# Patient Record
Sex: FEMALE | Race: WHITE | NOT HISPANIC OR LATINO | Employment: OTHER | URBAN - METROPOLITAN AREA
[De-identification: names, ages, dates, MRNs, and addresses within clinical notes are randomized per-mention and may not be internally consistent; named-entity substitution may affect disease eponyms.]

---

## 2017-01-04 ENCOUNTER — OFFICE VISIT (OUTPATIENT)
Dept: MEDICAL GROUP | Age: 72
End: 2017-01-04
Payer: MEDICARE

## 2017-01-04 VITALS
HEIGHT: 66 IN | HEART RATE: 83 BPM | BODY MASS INDEX: 25.07 KG/M2 | OXYGEN SATURATION: 93 % | WEIGHT: 156 LBS | TEMPERATURE: 97 F | DIASTOLIC BLOOD PRESSURE: 78 MMHG | SYSTOLIC BLOOD PRESSURE: 118 MMHG

## 2017-01-04 DIAGNOSIS — Z00.00 PREVENTATIVE HEALTH CARE: ICD-10-CM

## 2017-01-04 DIAGNOSIS — Z12.11 SCREENING FOR COLON CANCER: ICD-10-CM

## 2017-01-04 DIAGNOSIS — Z79.899 MEDICATION MANAGEMENT: ICD-10-CM

## 2017-01-04 DIAGNOSIS — E78.5 DYSLIPIDEMIA: ICD-10-CM

## 2017-01-04 DIAGNOSIS — F32.0 MILD SINGLE CURRENT EPISODE OF MAJOR DEPRESSIVE DISORDER (HCC): ICD-10-CM

## 2017-01-04 DIAGNOSIS — F17.210 SMOKING GREATER THAN 30 PACK YEARS: ICD-10-CM

## 2017-01-04 DIAGNOSIS — Z91.09 ENVIRONMENTAL ALLERGIES: ICD-10-CM

## 2017-01-04 PROCEDURE — 99214 OFFICE O/P EST MOD 30 MIN: CPT | Performed by: FAMILY MEDICINE

## 2017-01-04 RX ORDER — MONTELUKAST SODIUM 10 MG/1
10 TABLET ORAL DAILY
Qty: 180 TAB | Refills: 0 | Status: SHIPPED | OUTPATIENT
Start: 2017-01-04 | End: 2017-06-27 | Stop reason: SDUPTHER

## 2017-01-04 RX ORDER — ATORVASTATIN CALCIUM 10 MG/1
10 TABLET, FILM COATED ORAL
Qty: 180 TAB | Refills: 0 | Status: SHIPPED | OUTPATIENT
Start: 2017-01-04 | End: 2018-08-15

## 2017-01-04 RX ORDER — VENLAFAXINE HYDROCHLORIDE 75 MG/1
75 CAPSULE, EXTENDED RELEASE ORAL DAILY
Qty: 180 CAP | Refills: 0 | Status: SHIPPED | OUTPATIENT
Start: 2017-01-04 | End: 2017-06-27 | Stop reason: SDUPTHER

## 2017-01-04 ASSESSMENT — PATIENT HEALTH QUESTIONNAIRE - PHQ9: CLINICAL INTERPRETATION OF PHQ2 SCORE: 0

## 2017-01-04 NOTE — ASSESSMENT & PLAN NOTE
The patient has been compliant with her Lipitor 10 mg by mouth daily at bedtime. This was switched from Crestor in the past due to myalgias. She is tolerating the Lipitor just fine. She denies any abdominal pain, no muscle no History of elevated liver enzymes.

## 2017-01-04 NOTE — ASSESSMENT & PLAN NOTE
Patient continues to do fine on Effexor 75 mg. Her  continues to be very happy because she is no longer anxious or upset. She denies any adverse events and is tolerating medication just fine. Denies any depressed mood and also suicidal ideations no anhedonia no insomnia.

## 2017-01-04 NOTE — PROGRESS NOTES
This medical record contains text that has been entered with the assistance of computer voice recognition and dictation software.  Therefore, it may contain unintended errors in text, spelling, punctuation, or grammar    Chief Complaint   Patient presents with   • Establish Care     would like 1 yr rx's        Alissa Hwang is a 71 y.o. female here evaluation and management of: establish care, htn, MDD      HPI:     Dyslipidemia  The patient has been compliant with her Lipitor 10 mg by mouth daily at bedtime. This was switched from Crestor in the past due to myalgias. She is tolerating the Lipitor just fine. She denies any abdominal pain, no muscle no History of elevated liver enzymes.    Smoking greater than 30 pack years  She quit in 2005, was introduced to our lung cancer screening program.    Major depressive disorder  Patient continues to do fine on Effexor 75 mg. Her  continues to be very happy because she is no longer anxious or upset. She denies any adverse events and is tolerating medication just fine. Denies any depressed mood and also suicidal ideations no anhedonia no insomnia.    Preventative health care  The patient is a very pleasant 71-year-old female who presents to clinic for routine follow-up. She has a significant  past medical history of depression stable, hyperlipidemia, asthma moderate persistent, postmenopausal, had a 30-pack-year smoking history she did quit in 2005. The patient denied any chest pain, no sob, no oliveira, no  pnd, no orthopnea, no headache, no changes in vision, no numbness or tingling, no nausea, no diarrhea, no abdominal pain, no fevers, no chills, no bright red blood per rectum, no  difficulty urinating, no burning during micturition, no depressed mood, no other concerns.      Current medicines (including changes today)  Current Outpatient Prescriptions   Medication Sig Dispense Refill   • atorvastatin (LIPITOR) 10 MG Tab Take 1 Tab by mouth every bedtime.  180 Tab 0   • montelukast (SINGULAIR) 10 MG Tab Take 1 Tab by mouth every day. Indications: Asthma 180 Tab 0   • venlafaxine XR (EFFEXOR XR) 75 MG CAPSULE SR 24 HR Take 1 Cap by mouth every day. 180 Cap 0   • venlafaxine XR (EFFEXOR XR) 75 MG CAPSULE SR 24 HR Take 1 Cap by mouth every day. 90 Cap 0   • atorvastatin (LIPITOR) 10 MG Tab Take 1 Tab by mouth every day. 90 Tab 0   • fexofenadine-pseudoephedrine (ALLEGRA-D)  MG per tablet Take 1 Tab by mouth 2 times a day. 60 Tab 3   • acetaminophen-codeine (TYLENOL-CODEINE) 120-12 MG/5ML Solution Take 5 mL by mouth every 6 hours as needed. 120 mL 0   • fluticasone (FLONASE) 50 MCG/ACT nasal spray Spray 1 Spray in nose every day. 16 g 11   • fluticasone (FLONASE) 50 MCG/ACT nasal spray Spray 1-2 Sprays in nose every day. Each nostril. 1 Bottle 6   • estradiol (ESTRACE VAGINAL) 0.1 MG/GM vaginal cream Insert  in vagina every day.     • Budesonide-Formoterol Fumarate (SYMBICORT INH) Inhale 1 Puff by mouth every day. 160/4.5 mcg     • albuterol (VENTOLIN HFA) 108 (90 BASE) MCG/ACT AERS inhalation aerosol Inhale 2 Puffs by mouth every four hours as needed. Indications: Asthma       No current facility-administered medications for this visit.     She  has a past medical history of Depression and Asthma.  She  has past surgical history that includes enlarge breast with implant (1972).  Social History   Substance Use Topics   • Smoking status: Former Smoker -- 1.00 packs/day for 34 years     Types: Cigarettes     Start date: 01/01/1968     Quit date: 01/01/2005   • Smokeless tobacco: Never Used   • Alcohol Use: 7.0 oz/week     14 Glasses of wine per week      Comment: beer-2-3 per weekday, occ on weekend     Social History     Social History Narrative     Family History   Problem Relation Age of Onset   • Hypertension Sister    • Hypertension Brother    • Arthritis Brother      RA   • Dementia Mother      Family Status   Relation Status Death Age   • Sister Alive    •  "Brother Alive    • Mother  90     dementia   • Father  95     FTT   • Brother Alive          ROS  Please see hpi    All other systems reviewed and are negative     Objective:     Blood pressure 118/78, pulse 83, temperature 36.1 °C (97 °F), height 1.676 m (5' 5.98\"), weight 70.761 kg (156 lb), SpO2 93 %. Body mass index is 25.19 kg/(m^2).  Physical Exam:    Constitutional: Alert, no distress.  Skin: Warm, dry, good turgor, no rashes in visible areas.  Eye: Equal, round and reactive, conjunctiva clear, lids normal.  ENMT: Lips without lesions, good dentition, oropharynx clear.  Neck: Trachea midline, no masses, no thyromegaly. No cervical or supraclavicular lymphadenopathy.  Respiratory: Unlabored respiratory effort, lungs clear to auscultation, no wheezes, no ronchi.  Cardiovascular: Normal S1, S2, no murmur, no edema.  Abdomen: Soft, non-tender, no masses, no hepatosplenomegaly.  Psych: Alert and oriented x3, normal affect and mood.          Assessment and Plan:   The following treatment plan was discussed, again this medical record contains text that has been entered with the assistance of computer voice recognition and dictation software.  Therefore, it may contain unintended errors in text, spelling, punctuation, or grammar      1. Dyslipidemia  Patient has been stable with current management  We will make no changes for now    - COMP METABOLIC PANEL; Future  - CBC WITH DIFFERENTIAL; Future  - LIPID PROFILE; Future  - atorvastatin (LIPITOR) 10 MG Tab; Take 1 Tab by mouth every bedtime.  Dispense: 180 Tab; Refill: 0    2. Mild single current episode of major depressive disorder (HCC)  Overall doing well  Symptoms are controlled  Continue current management  - venlafaxine XR (EFFEXOR XR) 75 MG CAPSULE SR 24 HR; Take 1 Cap by mouth every day.  Dispense: 180 Cap; Refill: 0    3. Preventative health care  We reviewed [place] preventative task force guidelines    4. Screening for colon cancer    - OCCULT " BLOOD FECES IMMUNOASSAY; Future    5. Environmental allergies  Patient has been stable with current management  We will make no changes for now    6. Medication management    - montelukast (SINGULAIR) 10 MG Tab; Take 1 Tab by mouth every day. Indications: Asthma  Dispense: 180 Tab; Refill: 0    7. Smoking greater than 30 pack years  She states she did enroll in her lung cancer screening program  But needs to follow-up      Followup: Return in about 6 months (around 7/4/2017) for Reevaluation, Medication refill.

## 2017-01-04 NOTE — ASSESSMENT & PLAN NOTE
The patient is a very pleasant 71-year-old female who presents to clinic for routine follow-up. She has a significant  past medical history of depression stable, hyperlipidemia, asthma moderate persistent, postmenopausal, had a 30-pack-year smoking history she did quit in 2005. The patient denied any chest pain, no sob, no oliveira, no  pnd, no orthopnea, no headache, no changes in vision, no numbness or tingling, no nausea, no diarrhea, no abdominal pain, no fevers, no chills, no bright red blood per rectum, no  difficulty urinating, no burning during micturition, no depressed mood, no other concerns.

## 2017-01-17 ENCOUNTER — HOSPITAL ENCOUNTER (OUTPATIENT)
Dept: LAB | Facility: MEDICAL CENTER | Age: 72
End: 2017-01-17
Attending: FAMILY MEDICINE
Payer: MEDICARE

## 2017-01-17 DIAGNOSIS — E78.5 DYSLIPIDEMIA: ICD-10-CM

## 2017-01-17 LAB
ALBUMIN SERPL BCP-MCNC: 4 G/DL (ref 3.2–4.9)
ALBUMIN/GLOB SERPL: 1.6 G/DL
ALP SERPL-CCNC: 62 U/L (ref 30–99)
ALT SERPL-CCNC: 36 U/L (ref 2–50)
ANION GAP SERPL CALC-SCNC: 8 MMOL/L (ref 0–11.9)
AST SERPL-CCNC: 26 U/L (ref 12–45)
BASOPHILS # BLD AUTO: 0.06 K/UL (ref 0–0.12)
BASOPHILS NFR BLD AUTO: 1.1 % (ref 0–1.8)
BILIRUB SERPL-MCNC: 0.7 MG/DL (ref 0.1–1.5)
BUN SERPL-MCNC: 14 MG/DL (ref 8–22)
CALCIUM SERPL-MCNC: 9 MG/DL (ref 8.5–10.5)
CHLORIDE SERPL-SCNC: 111 MMOL/L (ref 96–112)
CHOLEST SERPL-MCNC: 186 MG/DL (ref 100–199)
CO2 SERPL-SCNC: 25 MMOL/L (ref 20–33)
CREAT SERPL-MCNC: 0.72 MG/DL (ref 0.5–1.4)
EOSINOPHIL # BLD: 0.5 K/UL (ref 0–0.51)
EOSINOPHIL NFR BLD AUTO: 9 % (ref 0–6.9)
ERYTHROCYTE [DISTWIDTH] IN BLOOD BY AUTOMATED COUNT: 47 FL (ref 35.9–50)
GLOBULIN SER CALC-MCNC: 2.5 G/DL (ref 1.9–3.5)
GLUCOSE SERPL-MCNC: 92 MG/DL (ref 65–99)
HCT VFR BLD AUTO: 41.3 % (ref 37–47)
HDLC SERPL-MCNC: 78 MG/DL
HGB BLD-MCNC: 13.9 G/DL (ref 12–16)
IMM GRANULOCYTES # BLD AUTO: 0.01 K/UL (ref 0–0.11)
IMM GRANULOCYTES NFR BLD AUTO: 0.2 % (ref 0–0.9)
LDLC SERPL CALC-MCNC: 92 MG/DL
LYMPHOCYTES # BLD: 2.32 K/UL (ref 1–4.8)
LYMPHOCYTES NFR BLD AUTO: 41.7 % (ref 22–41)
MCH RBC QN AUTO: 31.5 PG (ref 27–33)
MCHC RBC AUTO-ENTMCNC: 33.7 G/DL (ref 33.6–35)
MCV RBC AUTO: 93.7 FL (ref 81.4–97.8)
MONOCYTES # BLD: 0.27 K/UL (ref 0–0.85)
MONOCYTES NFR BLD AUTO: 4.9 % (ref 0–13.4)
NEUTROPHILS # BLD: 2.4 K/UL (ref 2–7.15)
NEUTROPHILS NFR BLD AUTO: 43.1 % (ref 44–72)
NRBC # BLD AUTO: 0 K/UL
NRBC BLD-RTO: 0 /100 WBC
PLATELET # BLD AUTO: 305 K/UL (ref 164–446)
PMV BLD AUTO: 10.5 FL (ref 9–12.9)
POTASSIUM SERPL-SCNC: 4.3 MMOL/L (ref 3.6–5.5)
PROT SERPL-MCNC: 6.5 G/DL (ref 6–8.2)
RBC # BLD AUTO: 4.41 M/UL (ref 4.2–5.4)
SODIUM SERPL-SCNC: 144 MMOL/L (ref 135–145)
TRIGL SERPL-MCNC: 82 MG/DL (ref 0–149)
WBC # BLD AUTO: 5.6 K/UL (ref 4.8–10.8)

## 2017-01-17 PROCEDURE — 85025 COMPLETE CBC W/AUTO DIFF WBC: CPT

## 2017-01-17 PROCEDURE — 36415 COLL VENOUS BLD VENIPUNCTURE: CPT

## 2017-01-17 PROCEDURE — 80053 COMPREHEN METABOLIC PANEL: CPT

## 2017-01-17 PROCEDURE — 80061 LIPID PANEL: CPT

## 2017-05-03 ENCOUNTER — OFFICE VISIT (OUTPATIENT)
Dept: URGENT CARE | Facility: CLINIC | Age: 72
End: 2017-05-03
Payer: MEDICARE

## 2017-05-03 ENCOUNTER — HOSPITAL ENCOUNTER (OUTPATIENT)
Facility: MEDICAL CENTER | Age: 72
End: 2017-05-03
Attending: PHYSICIAN ASSISTANT
Payer: MEDICARE

## 2017-05-03 VITALS
TEMPERATURE: 98 F | DIASTOLIC BLOOD PRESSURE: 78 MMHG | SYSTOLIC BLOOD PRESSURE: 120 MMHG | WEIGHT: 155 LBS | HEART RATE: 80 BPM | BODY MASS INDEX: 25.03 KG/M2 | RESPIRATION RATE: 18 BRPM | OXYGEN SATURATION: 97 %

## 2017-05-03 DIAGNOSIS — R53.83 FATIGUE, UNSPECIFIED TYPE: ICD-10-CM

## 2017-05-03 LAB
ALBUMIN SERPL BCP-MCNC: 4.3 G/DL (ref 3.2–4.9)
ALBUMIN/GLOB SERPL: 1.4 G/DL
ALP SERPL-CCNC: 61 U/L (ref 30–99)
ALT SERPL-CCNC: 30 U/L (ref 2–50)
ANION GAP SERPL CALC-SCNC: 7 MMOL/L (ref 0–11.9)
AST SERPL-CCNC: 24 U/L (ref 12–45)
BASOPHILS # BLD AUTO: 0.1 % (ref 0–1.8)
BASOPHILS # BLD: 0.01 K/UL (ref 0–0.12)
BILIRUB SERPL-MCNC: 0.7 MG/DL (ref 0.1–1.5)
BUN SERPL-MCNC: 16 MG/DL (ref 8–22)
CALCIUM SERPL-MCNC: 10 MG/DL (ref 8.5–10.5)
CHLORIDE SERPL-SCNC: 103 MMOL/L (ref 96–112)
CO2 SERPL-SCNC: 26 MMOL/L (ref 20–33)
CREAT SERPL-MCNC: 0.86 MG/DL (ref 0.5–1.4)
EKG 4674: NORMAL
EOSINOPHIL # BLD AUTO: 0.35 K/UL (ref 0–0.51)
EOSINOPHIL NFR BLD: 5.2 % (ref 0–6.9)
ERYTHROCYTE [DISTWIDTH] IN BLOOD BY AUTOMATED COUNT: 47.6 FL (ref 35.9–50)
GFR SERPL CREATININE-BSD FRML MDRD: >60 ML/MIN/1.73 M 2
GLOBULIN SER CALC-MCNC: 3 G/DL (ref 1.9–3.5)
GLUCOSE SERPL-MCNC: 101 MG/DL (ref 65–99)
HCT VFR BLD AUTO: 42 % (ref 37–47)
HGB BLD-MCNC: 13.7 G/DL (ref 12–16)
IMM GRANULOCYTES # BLD AUTO: 0.02 K/UL (ref 0–0.11)
IMM GRANULOCYTES NFR BLD AUTO: 0.3 % (ref 0–0.9)
LYMPHOCYTES # BLD AUTO: 2.18 K/UL (ref 1–4.8)
LYMPHOCYTES NFR BLD: 32.2 % (ref 22–41)
MCH RBC QN AUTO: 30.4 PG (ref 27–33)
MCHC RBC AUTO-ENTMCNC: 32.6 G/DL (ref 33.6–35)
MCV RBC AUTO: 93.3 FL (ref 81.4–97.8)
MONOCYTES # BLD AUTO: 0.43 K/UL (ref 0–0.85)
MONOCYTES NFR BLD AUTO: 6.4 % (ref 0–13.4)
NEUTROPHILS # BLD AUTO: 3.77 K/UL (ref 2–7.15)
NEUTROPHILS NFR BLD: 55.8 % (ref 44–72)
NRBC # BLD AUTO: 0 K/UL
NRBC BLD AUTO-RTO: 0 /100 WBC
PLATELET # BLD AUTO: 269 K/UL (ref 164–446)
PMV BLD AUTO: 11 FL (ref 9–12.9)
POTASSIUM SERPL-SCNC: 4.5 MMOL/L (ref 3.6–5.5)
PROT SERPL-MCNC: 7.3 G/DL (ref 6–8.2)
RBC # BLD AUTO: 4.5 M/UL (ref 4.2–5.4)
SODIUM SERPL-SCNC: 136 MMOL/L (ref 135–145)
WBC # BLD AUTO: 6.8 K/UL (ref 4.8–10.8)

## 2017-05-03 PROCEDURE — 80053 COMPREHEN METABOLIC PANEL: CPT

## 2017-05-03 PROCEDURE — G8432 DEP SCR NOT DOC, RNG: HCPCS | Performed by: PHYSICIAN ASSISTANT

## 2017-05-03 PROCEDURE — 99214 OFFICE O/P EST MOD 30 MIN: CPT | Performed by: PHYSICIAN ASSISTANT

## 2017-05-03 PROCEDURE — 85025 COMPLETE CBC W/AUTO DIFF WBC: CPT

## 2017-05-03 PROCEDURE — 1101F PT FALLS ASSESS-DOCD LE1/YR: CPT | Performed by: PHYSICIAN ASSISTANT

## 2017-05-03 PROCEDURE — G8419 CALC BMI OUT NRM PARAM NOF/U: HCPCS | Performed by: PHYSICIAN ASSISTANT

## 2017-05-03 PROCEDURE — 1036F TOBACCO NON-USER: CPT | Performed by: PHYSICIAN ASSISTANT

## 2017-05-03 PROCEDURE — 3014F SCREEN MAMMO DOC REV: CPT | Performed by: PHYSICIAN ASSISTANT

## 2017-05-03 PROCEDURE — 4040F PNEUMOC VAC/ADMIN/RCVD: CPT | Performed by: PHYSICIAN ASSISTANT

## 2017-05-03 NOTE — MR AVS SNAPSHOT
"        Alissa Abraham OnesimopaulinaRoeIrene   5/3/2017 12:00 PM   Office Visit   MRN: [id number]    Department:  [facility]   Dept Phone:  [phone]    Description:  Female : 1945   Provider:  Grazyna Looney PA-C           Reason for Visit     Fatigue X 4 days fatigue.      Allergies as of 5/3/2017     Allergen Noted Reactions    Sulfa Drugs 2015       \"crazy\"      You were diagnosed with     Fatigue, unspecified type   [3362032]       Abnormal EKG   [[id number]]         Vital Signs     Blood Pressure Pulse Temperature Respirations Weight Oxygen Saturation    120/78 mmHg 80 36.7 °C (98 °F) 18 70.308 kg (155 lb) 97%    Smoking Status                   Former Smoker           Basic Information     Date Of Birth Sex Race Ethnicity Preferred Language    1945 Female White Non- English      Problem List              ICD-10-CM Priority Class Noted - Resolved    Asthma, moderate persistent J45.40   2015 - Present    Environmental allergies Z91.09   2015 - Present    Major depressive disorder (CMS-HCC) F32.9   2015 - Present    Dyslipidemia E78.5   2015 - Present    Smoking greater than 30 pack years F17.210   2016 - Present    Postmenopausal Z78.0   2016 - Present    Screening for colon cancer Z12.11   2016 - Present    Need for prophylactic vaccination with combined vaccine Z23   2016 - Present    Viral upper respiratory tract infection J06.9, B97.89   2016 - Present    Preventative health care Z00.00   2017 - Present      Health Maintenance        Date Due Completion Dates    IMM DTaP/Tdap/Td Vaccine (1 - Tdap) 1966 ---    PAP SMEAR 1968 ---    COLONOSCOPY 1997 ---    IMM ZOSTER VACCINE 2007 ---    MAMMOGRAM 2016    IMM PNEUMOCOCCAL 65+ (ADULT) LOW/MEDIUM RISK SERIES (2 of 2 - PPSV23) 2016    BONE DENSITY 2021            Results     POCT EKG                   Current Immunizations    " 13-VALENT PCV PREVNAR 11/23/2015 10:20 AM    Influenza Vaccine Adult HD 11/23/2015 10:20 AM      Below and/or attached are the medications your provider expects you to take. Review all of your home medications and newly ordered medications with your provider and/or pharmacist. Follow medication instructions as directed by your provider and/or pharmacist. Please keep your medication list with you and share with your provider. Update the information when medications are discontinued, doses are changed, or new medications (including over-the-counter products) are added; and carry medication information at all times in the event of emergency situations     Allergies:  SULFA DRUGS - (reactions not documented)               Medications  Valid as of: May 03, 2017 - 12:49 PM    Generic Name Brand Name Tablet Size Instructions for use    Acetaminophen-Codeine (Solution) TYLENOL-CODEINE 120-12 MG/5ML Take 5 mL by mouth every 6 hours as needed.        Albuterol Sulfate (Aero Soln) albuterol 108 (90 BASE) MCG/ACT Inhale 2 Puffs by mouth every four hours as needed. Indications: Asthma        Atorvastatin Calcium (Tab) LIPITOR 10 MG Take 1 Tab by mouth every day.        Atorvastatin Calcium (Tab) LIPITOR 10 MG Take 1 Tab by mouth every bedtime.        Budesonide-Formoterol Fumarate   Inhale 1 Puff by mouth every day. 160/4.5 mcg        Estradiol (Cream) ESTRACE 0.1 MG/GM Insert  in vagina every day.        Fexofenadine-Pseudoephedrine (TABLET SR 12 HR) ALLEGRA-D  MG Take 1 Tab by mouth 2 times a day.        Fluticasone Propionate (Suspension) FLONASE 50 MCG/ACT Spray 1-2 Sprays in nose every day. Each nostril.        Fluticasone Propionate (Suspension) FLONASE 50 MCG/ACT Spray 1 Spray in nose every day.        Montelukast Sodium (Tab) SINGULAIR 10 MG Take 1 Tab by mouth every day. Indications: Asthma        Venlafaxine HCl (CAPSULE SR 24 HR) EFFEXOR XR 75 MG Take 1 Cap by mouth every day.        Venlafaxine HCl (CAPSULE SR  24 HR) EFFEXOR XR 75 MG Take 1 Cap by mouth every day.        .                 Medicines prescribed today were sent to:     [facility] - [place], [place] - [address]    [address] [address]    Phone: [phone] Fax: [phone]    Open 24 Hours?: No    [facility] - [place], [place] - [address]    [address] [place] [address]    Phone: [phone] Fax: [phone]    Open 24 Hours?: No    [facility], [place] - [address] AT [address]    [address] [place] [address]    Phone: [phone] Fax: [phone]    Open 24 Hours?: No      Medication refill instructions:       If your prescription bottle indicates you have medication refills left, it is not necessary to call your provider’s office. Please contact your pharmacy and they will refill your medication.    If your prescription bottle indicates you do not have any refills left, you may request refills at any time through one of the following ways: The online [employer] system (except Urgent Care), by calling your provider’s office, or by asking your pharmacy to contact your provider’s office with a refill request. Medication refills are processed only during regular business hours and may not be available until the next business day. Your provider may request additional information or to have a follow-up visit with you prior to refilling your medication.   *Please Note: Medication refills are assigned a new Rx number when refilled electronically. Your pharmacy may indicate that no refills were authorized even though a new prescription for the same medication is available at the pharmacy. Please request the medicine by name with the pharmacy before contacting your provider for a refill.        Your To Do List     Future Labs/Procedures Complete By Expires    CBC WITH DIFFERENTIAL  As directed 5/3/2018    COMP METABOLIC PANEL  As  directed 5/3/2018         [facility] Access Code: Activation code not generated  Current [facility] Status: Active

## 2017-05-03 NOTE — PROGRESS NOTES
Chief Complaint   Patient presents with   • Fatigue     X 4 days fatigue.       HISTORY OF PRESENT ILLNESS: Patient is a 71 y.o. female who presents today for 4 days of fatigue.  Patient states she had a very busy Friday and Saturday, horse show, heat/wind/dust.   Sunday/Monday were ok with rest but today and yesterday after 9 hours of sleep she still feels fatigue.   She has hx of asthma and the dust seemed to aggravate it slightly.   Denies sore throat, ear pain, headache, chest pain, palpitations, nausea, vomiting, body aches.   She sees PCP regularly and labs have been stable.    Patient does admit to medication change recently by mistake. She has been stable on Venlafaxine and notes she forgot to take it for a few days this weekend.  She just started it back yesterday.     Patient Active Problem List    Diagnosis Date Noted   • Preventative health care 01/04/2017   • Viral upper respiratory tract infection 12/07/2016   • Smoking greater than 30 pack years 09/01/2016   • Postmenopausal 09/01/2016   • Screening for colon cancer 09/01/2016   • Need for prophylactic vaccination with combined vaccine 09/01/2016   • Asthma, moderate persistent 02/26/2015   • Environmental allergies 02/26/2015   • Major depressive disorder (CMS-[facility]) 02/26/2015   • Dyslipidemia 02/26/2015       Allergies:Sulfa drugs    Current Outpatient Prescriptions Ordered in [facility]   Medication Sig Dispense Refill   • atorvastatin (LIPITOR) 10 MG Tab Take 1 Tab by mouth every bedtime. 180 Tab 0   • montelukast (SINGULAIR) 10 MG Tab Take 1 Tab by mouth every day. Indications: Asthma 180 Tab 0   • venlafaxine XR (EFFEXOR XR) 75 MG CAPSULE SR 24 HR Take 1 Cap by mouth every day. 180 Cap 0   • fexofenadine-pseudoephedrine (ALLEGRA-D)  MG per tablet Take 1 Tab by mouth 2 times a day. 60 Tab 3   • acetaminophen-codeine (TYLENOL-CODEINE) 120-12 MG/5ML Solution Take 5 mL by mouth every 6 hours as needed. 120 mL 0   • fluticasone (FLONASE) 50 MCG/ACT  nasal spray Spray 1 Spray in nose every day. 16 g 11   • venlafaxine XR (EFFEXOR XR) 75 MG CAPSULE SR 24 HR Take 1 Cap by mouth every day. 90 Cap 0   • atorvastatin (LIPITOR) 10 MG Tab Take 1 Tab by mouth every day. 90 Tab 0   • fluticasone (FLONASE) 50 MCG/ACT nasal spray Spray 1-2 Sprays in nose every day. Each nostril. 1 Bottle 6   • estradiol (ESTRACE VAGINAL) 0.1 MG/GM vaginal cream Insert  in vagina every day.     • Budesonide-Formoterol Fumarate (SYMBICORT INH) Inhale 1 Puff by mouth every day. 160/4.5 mcg     • albuterol (VENTOLIN HFA) 108 (90 BASE) MCG/ACT AERS inhalation aerosol Inhale 2 Puffs by mouth every four hours as needed. Indications: Asthma       No current [facility]-ordered facility-administered medications on file.       Past Medical History   Diagnosis Date   • Depression    • Asthma        Social History   Substance Use Topics   • Smoking status: Former Smoker -- 1.00 packs/day for 34 years     Types: Cigarettes     Start date: 1968     Quit date: 2005   • Smokeless tobacco: Never Used   • Alcohol Use: 7.0 oz/week     14 Glasses of wine per week      Comment: beer-2-3 per weekday, occ on weekend       Family Status   Relation Status Death Age   • Sister Alive    • Brother Alive    • Mother  90     dementia   • Father  95     FTT   • Brother Alive      Family History   Problem Relation Age of Onset   • Hypertension Sister    • Hypertension Brother    • Arthritis Brother      RA   • Dementia Mother        ROS:  Review of Systems   Constitutional: SEE HPI  HENT: Negative for ear pain, nosebleeds, congestion, sore throat and neck pain.    Eyes: Negative for blurred vision.   Respiratory: Negative for cough, sputum production, shortness of breath and wheezing.    Cardiovascular: Negative for chest pain, palpitations, orthopnea and leg swelling.   Gastrointestinal: Negative for heartburn, nausea, vomiting and abdominal pain.   Genitourinary: Negative for dysuria, urgency and  frequency.   All other systems reviewed and are negative.       Exam:  Blood pressure 120/78, pulse 80, temperature 36.7 °C (98 °F), resp. rate 18, weight 70.308 kg (155 lb), SpO2 97 %.  General:  Well nourished, well developed female in NAD  Eyes: PERRLA, EOM within normal limits, no conjunctival injection, no scleral icterus, visual fields and acuity grossly intact.  Ears: Normal shape and symmetry, no tenderness, no discharge. External canals are without any significant edema or erythema. Tympanic membranes are without any inflammation, no effusion. Gross auditory acuity is intact  Nose: Symmetrical, sinuses without tenderness, no discharge.   Mouth: reasonable hygiene, no erythema exudates or tonsillar enlargement.  Neck: no masses, range of motion within normal limits, no tracheal deviation. No lymphadenopathy.  No thyromegaly.   Pulmonary: Normal respiratory effort, no wheezes, crackles, or rhonchi.  Cardiovascular: regular rate and rhythm without murmurs, rubs, or gallops.  Abdomen: Soft, nontender, nondistended. Normal bowel sounds. No hepatosplenomegaly or masses, or hernias. No rebound or guarding.  Skin: No visible rashes or lesion. Warm, pink, dry.   Extremities: no clubbing, cyanosis, or edema.  Neuro: A&O x 3. Speech normal/clear.  Normal gait.       EKG Interpretation   Interpreted by me   Rhythm: normal sinus   Rate: normal   Axis: normal   Ectopy: none   Conduction: normal   ST Segments: no acute change   T Waves: no acute change   Q Waves: none   Clinical Impression: no acute changes and normal EKG      Assessment/Plan:  1. Fatigue, unspecified type  CBC WITH DIFFERENTIAL    COMP METABOLIC PANEL    POCT EKG       -benign physical exam.   -ekg with note of possible LVH criteria.  Not overt, compared to 2015 EKG stable.   Did mention to patient however she would like to follow up with PCP on this.   Do not feel this is related  -labs unremarkable.   -discussed with patient this is likely a  combination of the busy weekend and her abruptly discontinuing her Effexor and the side effect of this.   -she has resumed this and will make follow up with PCP in next few weeks for check up     Supportive care, differential diagnoses, and indications for immediate follow-up discussed with patient.   Pathogenesis of diagnosis discussed including typical length and natural progression.   Instructed to return to clinic or nearest emergency department for any change in condition, further concerns, or worsening of symptoms.  Patient states understanding of the plan of care and discharge instructions.  Instructed to make an appointment, for follow up, with their primary care provider.      Grazyna Looney PA-C

## 2017-05-15 ENCOUNTER — TELEPHONE (OUTPATIENT)
Dept: URGENT CARE | Facility: CLINIC | Age: 72
End: 2017-05-15

## 2017-05-15 ENCOUNTER — OFFICE VISIT (OUTPATIENT)
Dept: URGENT CARE | Facility: CLINIC | Age: 72
End: 2017-05-15
Payer: MEDICARE

## 2017-05-15 VITALS
OXYGEN SATURATION: 95 % | BODY MASS INDEX: 24.22 KG/M2 | HEART RATE: 74 BPM | WEIGHT: 150 LBS | RESPIRATION RATE: 14 BRPM | TEMPERATURE: 97.9 F | SYSTOLIC BLOOD PRESSURE: 96 MMHG | DIASTOLIC BLOOD PRESSURE: 68 MMHG

## 2017-05-15 DIAGNOSIS — B34.9 VIRAL ILLNESS: ICD-10-CM

## 2017-05-15 DIAGNOSIS — B30.9 ACUTE VIRAL CONJUNCTIVITIS OF BOTH EYES: ICD-10-CM

## 2017-05-15 DIAGNOSIS — J45.991 ASTHMA, COUGH VARIANT: ICD-10-CM

## 2017-05-15 DIAGNOSIS — J02.9 PHARYNGITIS, UNSPECIFIED ETIOLOGY: ICD-10-CM

## 2017-05-15 LAB
INT CON NEG: NEGATIVE
INT CON POS: POSITIVE
S PYO AG THROAT QL: NORMAL

## 2017-05-15 PROCEDURE — 4040F PNEUMOC VAC/ADMIN/RCVD: CPT | Performed by: PHYSICIAN ASSISTANT

## 2017-05-15 PROCEDURE — 99214 OFFICE O/P EST MOD 30 MIN: CPT | Performed by: PHYSICIAN ASSISTANT

## 2017-05-15 PROCEDURE — G8420 CALC BMI NORM PARAMETERS: HCPCS | Performed by: PHYSICIAN ASSISTANT

## 2017-05-15 PROCEDURE — 1036F TOBACCO NON-USER: CPT | Performed by: PHYSICIAN ASSISTANT

## 2017-05-15 PROCEDURE — G8432 DEP SCR NOT DOC, RNG: HCPCS | Performed by: PHYSICIAN ASSISTANT

## 2017-05-15 PROCEDURE — 87880 STREP A ASSAY W/OPTIC: CPT | Performed by: PHYSICIAN ASSISTANT

## 2017-05-15 PROCEDURE — 1101F PT FALLS ASSESS-DOCD LE1/YR: CPT | Performed by: PHYSICIAN ASSISTANT

## 2017-05-15 PROCEDURE — 3014F SCREEN MAMMO DOC REV: CPT | Performed by: PHYSICIAN ASSISTANT

## 2017-05-15 RX ORDER — NEOMYCIN POLYMYXIN B SULFATES AND DEXAMETHASONE 3.5; 10000; 1 MG/ML; [USP'U]/ML; MG/ML
1 SUSPENSION/ DROPS OPHTHALMIC 4 TIMES DAILY
Qty: 5 ML | Refills: 0 | Status: SHIPPED | OUTPATIENT
Start: 2017-05-15 | End: 2018-08-15

## 2017-05-15 RX ORDER — PROMETHAZINE HYDROCHLORIDE AND CODEINE PHOSPHATE 6.25; 1 MG/5ML; MG/5ML
5-10 SYRUP ORAL
Qty: 120 ML | Refills: 0 | Status: SHIPPED | OUTPATIENT
Start: 2017-05-15 | End: 2018-08-15

## 2017-05-15 ASSESSMENT — ENCOUNTER SYMPTOMS
MYALGIAS: 1
ABDOMINAL PAIN: 0
CHILLS: 1
SORE THROAT: 1
DIZZINESS: 0
WHEEZING: 1
FEVER: 0
NAUSEA: 0
SPUTUM PRODUCTION: 1
BODY ACHES: 1
PALPITATIONS: 0
SHORTNESS OF BREATH: 1
COUGH: 1
VOMITING: 0
HEADACHES: 0

## 2017-05-15 NOTE — TELEPHONE ENCOUNTER
Pt called regarding eyes Rx ,  Rx was sent by mistake to [facility] mail order, I called and cancelled and Called for Rx at [facility] pharmacy.

## 2017-05-15 NOTE — MR AVS SNAPSHOT
"        Alissa Abraham OnesimopaulinaMarisa   5/15/2017 9:30 AM   Office Visit   MRN: [id number]    Department:  [facility]   Dept Phone:  [phone]    Description:  Female : 1945   Provider:  Jessica Frias PA-C           Reason for Visit     Generalized Body Aches x 1 week       Allergies as of 5/15/2017     Allergen Noted Reactions    Sulfa Drugs 2015       \"crazy\"      You were diagnosed with     Pharyngitis, unspecified etiology   [5409104]       Asthma, cough variant   [300003]       Viral illness   [952929]       Acute viral conjunctivitis of both eyes   [2442662]         Vital Signs     Blood Pressure Pulse Temperature Respirations Weight Oxygen Saturation    96/68 mmHg 74 36.6 °C (97.9 °F) 14 68.04 kg (150 lb) 95%    Smoking Status                   Former Smoker           Basic Information     Date Of Birth Sex Race Ethnicity Preferred Language    1945 Female White Non- English      Problem List              ICD-10-CM Priority Class Noted - Resolved    Asthma, moderate persistent J45.40   2015 - Present    Environmental allergies Z91.09   2015 - Present    Major depressive disorder (CMS-HCC) F32.9   2015 - Present    Dyslipidemia E78.5   2015 - Present    Smoking greater than 30 pack years F17.210   2016 - Present    Postmenopausal Z78.0   2016 - Present    Screening for colon cancer Z12.11   2016 - Present    Need for prophylactic vaccination with combined vaccine Z23   2016 - Present    Viral upper respiratory tract infection J06.9, B97.89   2016 - Present    Preventative health care Z00.00   2017 - Present      Health Maintenance        Date Due Completion Dates    IMM DTaP/Tdap/Td Vaccine (1 - Tdap) 1966 ---    PAP SMEAR 1968 ---    COLONOSCOPY 1997 ---    IMM ZOSTER VACCINE 2007 ---    MAMMOGRAM 2016    IMM PNEUMOCOCCAL 65+ (ADULT) LOW/MEDIUM RISK SERIES (2 of 2 - PPSV23) 2016 " 11/23/2015    BONE DENSITY 9/9/2021 9/9/2016            Results     POCT Rapid Strep A      Component    Rapid Strep Screen    NEG    Internal Control Positive    Positive    Internal Control Negative    Negative                        Current Immunizations     13-VALENT PCV PREVNAR 11/23/2015 10:20 AM    Influenza Vaccine Adult HD 11/23/2015 10:20 AM      Below and/or attached are the medications your provider expects you to take. Review all of your home medications and newly ordered medications with your provider and/or pharmacist. Follow medication instructions as directed by your provider and/or pharmacist. Please keep your medication list with you and share with your provider. Update the information when medications are discontinued, doses are changed, or new medications (including over-the-counter products) are added; and carry medication information at all times in the event of emergency situations     Allergies:  SULFA DRUGS - (reactions not documented)               Medications  Valid as of: May 15, 2017 - 10:29 AM    Generic Name Brand Name Tablet Size Instructions for use    Acetaminophen-Codeine (Solution) TYLENOL-CODEINE 120-12 MG/5ML Take 5 mL by mouth every 6 hours as needed.        Albuterol Sulfate (Aero Soln) albuterol 108 (90 BASE) MCG/ACT Inhale 2 Puffs by mouth every four hours as needed. Indications: Asthma        Atorvastatin Calcium (Tab) LIPITOR 10 MG Take 1 Tab by mouth every day.        Atorvastatin Calcium (Tab) LIPITOR 10 MG Take 1 Tab by mouth every bedtime.        Budesonide-Formoterol Fumarate   Inhale 1 Puff by mouth every day. 160/4.5 mcg        Estradiol (Cream) ESTRACE 0.1 MG/GM Insert  in vagina every day.        Fexofenadine-Pseudoephedrine (TABLET SR 12 HR) ALLEGRA-D  MG Take 1 Tab by mouth 2 times a day.        Fluticasone Propionate (Suspension) FLONASE 50 MCG/ACT Spray 1-2 Sprays in nose every day. Each nostril.        Fluticasone Propionate (Suspension) FLONASE 50  MCG/ACT Spray 1 Spray in nose every day.        Montelukast Sodium (Tab) SINGULAIR 10 MG Take 1 Tab by mouth every day. Indications: Asthma        Neomycin-Polymyxin-Dexameth (Suspension) MAXITROL 0.1 % Place 1 Drop in both eyes 4 times a day.        Promethazine-Codeine (Syrup) PHENERGAN-CODEINE 6.25-10 MG/5ML Take 5-10 mL by mouth at bedtime as needed for Cough (May redose every 4-6 hours PRN).        Venlafaxine HCl (CAPSULE SR 24 HR) EFFEXOR XR 75 MG Take 1 Cap by mouth every day.        Venlafaxine HCl (CAPSULE SR 24 HR) EFFEXOR XR 75 MG Take 1 Cap by mouth every day.        .                 Medicines prescribed today were sent to:     [facility] - [place], [place] - [address]    [address] [facility] [address]    Phone: [phone] Fax: [phone]    Open 24 Hours?: No    [facility] [address] [place] [address] & [facility]    [address] [facility] [address]    Phone: [phone] Fax: [phone]    Open 24 Hours?: No      Medication refill instructions:       If your prescription bottle indicates you have medication refills left, it is not necessary to call your provider’s office. Please contact your pharmacy and they will refill your medication.    If your prescription bottle indicates you do not have any refills left, you may request refills at any time through one of the following ways: The online OneTeamVisi system (except Urgent Care), by calling your provider’s office, or by asking your pharmacy to contact your provider’s office with a refill request. Medication refills are processed only during regular business hours and may not be available until the next business day. Your provider may request additional information or to have a follow-up visit with you prior to refilling your medication.   *Please Note: Medication refills are assigned a new Rx number when refilled electronically. Your pharmacy may indicate that  no refills were authorized even though a new prescription for the same medication is available at the pharmacy. Please request the medicine by name with the pharmacy before contacting your provider for a refill.           Qulsarhart Access Code: Activation code not generated  Current VideoProst Status: Active

## 2017-05-15 NOTE — PROGRESS NOTES
Subjective:      Alissa Hwang is a 71 y.o. female who presents with Generalized Body Aches    Current medications reviewed.  Past Medical History   Diagnosis Date   • Depression    • Asthma      Social History   Substance Use Topics   • Smoking status: Former Smoker -- 1.00 packs/day for 34 years     Types: Cigarettes     Start date: 01/01/1968     Quit date: 01/01/2005   • Smokeless tobacco: Never Used   • Alcohol Use: 7.0 oz/week     14 Glasses of wine per week      Comment: beer-2-3 per weekday, occ on weekend     Family History Reviewed: noncontributory      One week with myalgias, cough, red/draining eye, hypothermia with chills.  She is asthmatic and is experiencing increased sob, wheezing, darkening sputurm, severe sore throat.      Generalized Body Aches  Associated symptoms include chills, coughing, myalgias and a sore throat. Pertinent negatives include no abdominal pain, chest pain, congestion, fever, headaches, nausea or vomiting.       Review of Systems   Constitutional: Positive for chills and malaise/fatigue. Negative for fever.   HENT: Positive for ear pain (pressure) and sore throat. Negative for congestion.    Respiratory: Positive for cough, sputum production, shortness of breath and wheezing.    Cardiovascular: Negative for chest pain and palpitations.   Gastrointestinal: Negative for nausea, vomiting and abdominal pain.   Musculoskeletal: Positive for myalgias. Negative for joint pain.   Neurological: Negative for dizziness and headaches.   All other systems reviewed and are negative.         Objective:     BP 96/68 mmHg  Pulse 74  Temp(Src) 36.6 °C (97.9 °F)  Resp 14  Wt 68.04 kg (150 lb)  SpO2 95%     Physical Exam   Constitutional: She is oriented to person, place, and time. She appears well-developed and well-nourished.   HENT:   Right Ear: A middle ear effusion is present.   Left Ear: A middle ear effusion is present.   Nose: Mucosal edema (moderate with moderate  erythema), rhinorrhea and sinus tenderness present. Right sinus exhibits no maxillary sinus tenderness and no frontal sinus tenderness. Left sinus exhibits no maxillary sinus tenderness and no frontal sinus tenderness.   Mouth/Throat: Posterior oropharyngeal edema (moderate) and posterior oropharyngeal erythema (moderate) present.   Cardiovascular: Normal rate and regular rhythm.    Pulmonary/Chest: Effort normal and breath sounds normal.   Neurological: She is alert and oriented to person, place, and time.   Skin: Skin is warm and dry.   Nursing note and vitals reviewed.              Assessment/Plan:     1. Pharyngitis, unspecified etiology  POCT Rapid Strep A    promethazine-codeine (PHENERGAN-CODEINE) 6.25-10 MG/5ML Syrup    neomycin-polymyxin-dexamethasone (MAXITROL) 0.1 % ophthalmic suspension   2. Asthma, cough variant  promethazine-codeine (PHENERGAN-CODEINE) 6.25-10 MG/5ML Syrup    neomycin-polymyxin-dexamethasone (MAXITROL) 0.1 % ophthalmic suspension   3. Viral illness  promethazine-codeine (PHENERGAN-CODEINE) 6.25-10 MG/5ML Syrup    neomycin-polymyxin-dexamethasone (MAXITROL) 0.1 % ophthalmic suspension   4. Acute viral conjunctivitis of both eyes  promethazine-codeine (PHENERGAN-CODEINE) 6.25-10 MG/5ML Syrup    neomycin-polymyxin-dexamethasone (MAXITROL) 0.1 % ophthalmic suspension       Strep neg in office  Explained illness is associated with a virus and supportive treatment and rest is recommended treatment.  Illness should resolve on it's own with Rx meds. OTC meds discussed for symptom control.  Follow up with pcp in 1-2 weeks if sx are worsening.   RX maxitrol and Prometh/Cod cough syrup: Advised no drinking etoh, driving, or operating heavy equipment while taking.  Pt was advised of the sedation effect of these medications.  reviewed.  Start using albuterol inhaler 3-4 x daily.  Patient reports understanding and agrees with plan.

## 2017-05-17 ENCOUNTER — TELEPHONE (OUTPATIENT)
Dept: MEDICAL GROUP | Age: 72
End: 2017-05-17

## 2017-05-17 NOTE — TELEPHONE ENCOUNTER
ESTABLISHED PATIENT PRE-VISIT PLANNING     Note: Patient will not be contacted if there is no indication to call.     1.  Reviewed note from last office visit with PCP and/or other med group provider: Yes    2.  If any orders were placed at last visit, do we have Results/Consult Notes?        •  Labs - labs completed except for FIT test.       •  Imaging - Imaging ordered, completed and results are in chart.       •  Referrals - No referrals were ordered at last office visit.    3.  Immunizations were updated in [facility] using WebIZ?: Yes       •  Web Iz Recommendations: HEPATITIS A  HEPATITIS B PNEUMOVAX (PPSV23) TDAP ZOSTAVAX (Shingles)    4.  Patient is due for the following Health Maintenance Topics:   Health Maintenance Due   Topic Date Due   • IMM DTaP/Tdap/Td Vaccine (1 - Tdap) 12/05/1966   • PAP SMEAR  12/05/1968   • COLONOSCOPY  12/05/1997   • IMM ZOSTER VACCINE  12/05/2007   • Annual Wellness Visit  02/27/2016   • MAMMOGRAM  11/11/2016   • IMM PNEUMOCOCCAL 65+ (ADULT) LOW/MEDIUM RISK SERIES (2 of 2 - PPSV23) 11/23/2016       - Patient has completed FLU and PREVNAR (PCV13)  Immunization(s) per WebIZ. Chart has been updated.    5.  Patient was not informed to arrive 15 min prior to their scheduled appointment and bring in their medication bottles.

## 2017-05-18 ENCOUNTER — APPOINTMENT (OUTPATIENT)
Dept: MEDICAL GROUP | Age: 72
End: 2017-05-18
Payer: MEDICARE

## 2017-05-25 ENCOUNTER — OFFICE VISIT (OUTPATIENT)
Dept: MEDICAL GROUP | Facility: MEDICAL CENTER | Age: 72
End: 2017-05-25
Payer: MEDICARE

## 2017-05-25 VITALS
HEIGHT: 66 IN | OXYGEN SATURATION: 95 % | DIASTOLIC BLOOD PRESSURE: 68 MMHG | SYSTOLIC BLOOD PRESSURE: 102 MMHG | WEIGHT: 153.6 LBS | TEMPERATURE: 98 F | HEART RATE: 64 BPM | BODY MASS INDEX: 24.68 KG/M2

## 2017-05-25 DIAGNOSIS — R09.81 NASAL CONGESTION: ICD-10-CM

## 2017-05-25 DIAGNOSIS — Z91.09 ENVIRONMENTAL ALLERGIES: ICD-10-CM

## 2017-05-25 PROCEDURE — 1036F TOBACCO NON-USER: CPT | Performed by: NURSE PRACTITIONER

## 2017-05-25 PROCEDURE — G8432 DEP SCR NOT DOC, RNG: HCPCS | Performed by: NURSE PRACTITIONER

## 2017-05-25 PROCEDURE — 3014F SCREEN MAMMO DOC REV: CPT | Performed by: NURSE PRACTITIONER

## 2017-05-25 PROCEDURE — 1101F PT FALLS ASSESS-DOCD LE1/YR: CPT | Performed by: NURSE PRACTITIONER

## 2017-05-25 PROCEDURE — 4040F PNEUMOC VAC/ADMIN/RCVD: CPT | Performed by: NURSE PRACTITIONER

## 2017-05-25 PROCEDURE — G8420 CALC BMI NORM PARAMETERS: HCPCS | Performed by: NURSE PRACTITIONER

## 2017-05-25 PROCEDURE — 99214 OFFICE O/P EST MOD 30 MIN: CPT | Performed by: NURSE PRACTITIONER

## 2017-05-25 NOTE — MR AVS SNAPSHOT
"Alissa Abraham OnesimopaulinaRoeIrene   2017 11:40 AM   Office Visit   MRN: [id number]    Department:  [facility]   Dept Phone:  [phone]    Description:  Female : 1945   Provider:  NAYELY Harp           Reason for Visit     Influenza x16 days       Allergies as of 2017     Allergen Noted Reactions    Sulfa Drugs 2015       \"crazy\"      You were diagnosed with     Nasal congestion   [566984]       Environmental allergies   [049110]         Vital Signs     Blood Pressure Pulse Temperature Height Weight Body Mass Index    102/68 mmHg 64 36.7 °C (98 °F) 1.676 m (5' 6\") 69.673 kg (153 lb 9.6 oz) 24.80 kg/m2    Oxygen Saturation Smoking Status                95% Former Smoker          Basic Information     Date Of Birth Sex Race Ethnicity Preferred Language    1945 Female White Non- English      Your appointments     2017  8:00 AM   ANNUAL EXAM PREVENTATIVE with Wilbert Calhoun M.D.   [address] [id number] [phone]              Problem List              ICD-10-CM Priority Class Noted - Resolved    Asthma, moderate persistent J45.40   2015 - Present    Environmental allergies Z91.09   2015 - Present    Major depressive disorder (CMS-[facility]) F32.9   2015 - Present    Dyslipidemia E78.5   2015 - Present    Smoking greater than 30 pack years F17.210   2016 - Present    Postmenopausal Z78.0   2016 - Present    Screening for colon cancer Z12.11   2016 - Present    Need for prophylactic vaccination with combined vaccine Z23   2016 - Present    Viral upper respiratory tract infection J06.9, B97.89   2016 - Present    Preventative health care Z00.00   2017 - Present      Health Maintenance        Date Due Completion Dates    IMM DTaP/Tdap/Td Vaccine (1 - Tdap) 1966 ---    PAP SMEAR 1968 ---    COLONOSCOPY 1997 ---    IMM ZOSTER " VACCINE 12/5/2007 ---    MAMMOGRAM 11/11/2016 11/11/2015    IMM PNEUMOCOCCAL 65+ (ADULT) LOW/MEDIUM RISK SERIES (2 of 2 - PPSV23) 11/23/2016 11/23/2015    BONE DENSITY 9/9/2021 9/9/2016            Current Immunizations     13-VALENT PCV PREVNAR 11/23/2015 10:20 AM    Influenza Vaccine Adult HD 11/17/2016, 11/23/2015 10:20 AM      Below and/or attached are the medications your provider expects you to take. Review all of your home medications and newly ordered medications with your provider and/or pharmacist. Follow medication instructions as directed by your provider and/or pharmacist. Please keep your medication list with you and share with your provider. Update the information when medications are discontinued, doses are changed, or new medications (including over-the-counter products) are added; and carry medication information at all times in the event of emergency situations     Allergies:  SULFA DRUGS - (reactions not documented)               Medications  Valid as of: May 25, 2017 - 12:35 PM    Generic Name Brand Name Tablet Size Instructions for use    Acetaminophen-Codeine (Solution) TYLENOL-CODEINE 120-12 MG/5ML Take 5 mL by mouth every 6 hours as needed.        Albuterol Sulfate (Aero Soln) albuterol 108 (90 BASE) MCG/ACT Inhale 2 Puffs by mouth every four hours as needed. Indications: Asthma        Atorvastatin Calcium (Tab) LIPITOR 10 MG Take 1 Tab by mouth every day.        Atorvastatin Calcium (Tab) LIPITOR 10 MG Take 1 Tab by mouth every bedtime.        Budesonide-Formoterol Fumarate   Inhale 1 Puff by mouth every day. 160/4.5 mcg        Estradiol (Cream) ESTRACE 0.1 MG/GM Insert  in vagina every day.        Fexofenadine-Pseudoephedrine (TABLET SR 12 HR) ALLEGRA-D  MG Take 1 Tab by mouth 2 times a day.        Fluticasone Propionate (Suspension) FLONASE 50 MCG/ACT Spray 1-2 Sprays in nose every day. Each nostril.        Fluticasone Propionate (Suspension) FLONASE 50 MCG/ACT Spray 1 Spray in nose  every day.        Montelukast Sodium (Tab) SINGULAIR 10 MG Take 1 Tab by mouth every day. Indications: Asthma        Neomycin-Polymyxin-Dexameth (Suspension) MAXITROL 0.1 % Place 1 Drop in both eyes 4 times a day.        Promethazine-Codeine (Syrup) PHENERGAN-CODEINE 6.25-10 MG/5ML Take 5-10 mL by mouth at bedtime as needed for Cough (May redose every 4-6 hours PRN).        Venlafaxine HCl (CAPSULE SR 24 HR) EFFEXOR XR 75 MG Take 1 Cap by mouth every day.        Venlafaxine HCl (CAPSULE SR 24 HR) EFFEXOR XR 75 MG Take 1 Cap by mouth every day.        .                 Medicines prescribed today were sent to:     [facility] - [place], [place] - [address]    [address] [facility] [address]    Phone: [phone] Fax: [phone]    Open 24 Hours?: No    [facility] [address] [place] [address] & [place] [id number] [facility] [address]    Phone: [phone] Fax: [phone]    Open 24 Hours?: No      Medication refill instructions:       If your prescription bottle indicates you have medication refills left, it is not necessary to call your provider’s office. Please contact your pharmacy and they will refill your medication.    If your prescription bottle indicates you do not have any refills left, you may request refills at any time through one of the following ways: The online [employer] system (except Urgent Care), by calling your provider’s office, or by asking your pharmacy to contact your provider’s office with a refill request. Medication refills are processed only during regular business hours and may not be available until the next business day. Your provider may request additional information or to have a follow-up visit with you prior to refilling your medication.   *Please Note: Medication refills are assigned a new Rx number when refilled electronically. Your pharmacy may indicate that no refills were authorized even  though a new prescription for the same medication is available at the pharmacy. Please request the medicine by name with the pharmacy before contacting your provider for a refill.           Deezerhart Access Code: Activation code not generated  Current NORCAT Status: Active

## 2017-05-25 NOTE — PROGRESS NOTES
Subjective:     Chief Complaint   Patient presents with   • Influenza     x16 days      Alissa Hwang is a 71 y.o. female established patient here for evaluation of acute congestion, throat irritation. She was initially seen in urgent care just over 2 weeks ago with viral illness at that time. Her cough is gradually resolved and she was feeling slightly better. However, she continues to have significant nasal mucus, yellow and sometimes blood-tinged. She has postnasal drainage irritating the throat. She feels like the original illness is lingering.  She does have history of environmental allergies, previously treated with Allegra-D but not taking anything recently.  She denies fever, shortness of breath, nausea, chest pain, focal facial pain, pain in the teeth, headache    Current medicines (including changes today)  Current Outpatient Prescriptions   Medication Sig Dispense Refill   • promethazine-codeine (PHENERGAN-CODEINE) 6.25-10 MG/5ML Syrup Take 5-10 mL by mouth at bedtime as needed for Cough (May redose every 4-6 hours PRN). 120 mL 0   • neomycin-polymyxin-dexamethasone (MAXITROL) 0.1 % ophthalmic suspension Place 1 Drop in both eyes 4 times a day. 5 mL 0   • atorvastatin (LIPITOR) 10 MG Tab Take 1 Tab by mouth every bedtime. 180 Tab 0   • montelukast (SINGULAIR) 10 MG Tab Take 1 Tab by mouth every day. Indications: Asthma 180 Tab 0   • venlafaxine XR (EFFEXOR XR) 75 MG CAPSULE SR 24 HR Take 1 Cap by mouth every day. 180 Cap 0   • fexofenadine-pseudoephedrine (ALLEGRA-D)  MG per tablet Take 1 Tab by mouth 2 times a day. 60 Tab 3   • acetaminophen-codeine (TYLENOL-CODEINE) 120-12 MG/5ML Solution Take 5 mL by mouth every 6 hours as needed. 120 mL 0   • fluticasone (FLONASE) 50 MCG/ACT nasal spray Spray 1 Spray in nose every day. 16 g 11   • venlafaxine XR (EFFEXOR XR) 75 MG CAPSULE SR 24 HR Take 1 Cap by mouth every day. 90 Cap 0   • atorvastatin (LIPITOR) 10 MG Tab Take 1 Tab by mouth every  "day. 90 Tab 0   • fluticasone (FLONASE) 50 MCG/ACT nasal spray Spray 1-2 Sprays in nose every day. Each nostril. 1 Bottle 6   • estradiol (ESTRACE VAGINAL) 0.1 MG/GM vaginal cream Insert  in vagina every day.     • Budesonide-Formoterol Fumarate (SYMBICORT INH) Inhale 1 Puff by mouth every day. 160/4.5 mcg     • albuterol (VENTOLIN HFA) 108 (90 BASE) MCG/ACT AERS inhalation aerosol Inhale 2 Puffs by mouth every four hours as needed. Indications: Asthma       No current facility-administered medications for this visit.     She  has a past medical history of Depression and Asthma.    ROS included above     Objective:     Blood pressure 102/68, pulse 64, temperature 36.7 °C (98 °F), height 1.676 m (5' 6\"), weight 69.673 kg (153 lb 9.6 oz), SpO2 95 %. Body mass index is 24.8 kg/(m^2).     Physical Exam:  General: Alert, oriented in no acute distress.  Eye contact is good, speech is normal, affect calm  HEENT: Oral mucosa pink moist, no lesions. Nares with erythema, edema, yellow mucus. No tenderness over maxillary or frontal sinuses. TMs gray with good landmarks bilaterally. No lymphadenopathy.  Lungs: clear to auscultation bilaterally, normal effort, no wheeze/ rhonchi/ rales.  CV: regular rate and rhythm, S1, S2  Abdomen: soft, nontender  Ext: no edema, color normal, vascularity normal, temperature normal    Assessment and Plan:   The following treatment plan was discussed   1. Nasal congestion  Significant nasal congestion with yellow and blood-tinged mucus, PND causing throat irriation. No tenderness over maxillary or frontal sinuses, no other indication of bacterial infection. The cough that she was initially seen for has gradually improved and her lungs are clear today. Advised to start trial of Flonase and Zyrtec, daily nasal irrigation. Notify me for development of pain in the teeth or focal facial pain, fever. Otherwise she will send me an update via email or phone next week    2. Environmental allergies  Same " as above       Followup: 1 week by phone/ email         Please note that this dictation was created using voice recognition software. I have worked with consultants from the vendor as well as technical experts from [facility] [employer] to optimize the interface. I have made every reasonable attempt to correct obvious errors, but I expect that there are errors of grammar and possibly content that I did not discover before finalizing the note.

## 2017-05-30 ENCOUNTER — OFFICE VISIT (OUTPATIENT)
Dept: URGENT CARE | Facility: CLINIC | Age: 72
End: 2017-05-30
Payer: MEDICARE

## 2017-05-30 VITALS
HEART RATE: 78 BPM | WEIGHT: 155 LBS | RESPIRATION RATE: 20 BRPM | OXYGEN SATURATION: 96 % | BODY MASS INDEX: 25.03 KG/M2 | TEMPERATURE: 98.2 F | DIASTOLIC BLOOD PRESSURE: 68 MMHG | SYSTOLIC BLOOD PRESSURE: 110 MMHG

## 2017-05-30 DIAGNOSIS — J02.9 PHARYNGITIS, UNSPECIFIED ETIOLOGY: ICD-10-CM

## 2017-05-30 PROCEDURE — 1036F TOBACCO NON-USER: CPT | Performed by: NURSE PRACTITIONER

## 2017-05-30 PROCEDURE — 3014F SCREEN MAMMO DOC REV: CPT | Performed by: NURSE PRACTITIONER

## 2017-05-30 PROCEDURE — 99214 OFFICE O/P EST MOD 30 MIN: CPT | Performed by: NURSE PRACTITIONER

## 2017-05-30 PROCEDURE — 4040F PNEUMOC VAC/ADMIN/RCVD: CPT | Performed by: NURSE PRACTITIONER

## 2017-05-30 PROCEDURE — 1101F PT FALLS ASSESS-DOCD LE1/YR: CPT | Performed by: NURSE PRACTITIONER

## 2017-05-30 PROCEDURE — G8432 DEP SCR NOT DOC, RNG: HCPCS | Performed by: NURSE PRACTITIONER

## 2017-05-30 PROCEDURE — G8419 CALC BMI OUT NRM PARAM NOF/U: HCPCS | Performed by: NURSE PRACTITIONER

## 2017-05-30 RX ORDER — AMOXICILLIN 500 MG/1
500 CAPSULE ORAL 3 TIMES DAILY
Qty: 30 CAP | Refills: 0 | Status: SHIPPED | OUTPATIENT
Start: 2017-05-30 | End: 2017-06-09

## 2017-05-30 ASSESSMENT — ENCOUNTER SYMPTOMS
FEVER: 0
SWOLLEN GLANDS: 1
SORE THROAT: 1
CARDIOVASCULAR NEGATIVE: 1
MUSCULOSKELETAL NEGATIVE: 1
EYES NEGATIVE: 1
TROUBLE SWALLOWING: 1
CHILLS: 0
RESPIRATORY NEGATIVE: 1

## 2017-05-30 NOTE — MR AVS SNAPSHOT
"        Alissa Abraham Jaiden   2017 9:30 AM   Office Visit   MRN: [id number]    Department:  [facility]   Dept Phone:  [phone]    Description:  Female : 1945   Provider:  Cathey J Hamman, A.P.N.           Reason for Visit     Pharyngitis Couple days swollen throat, earache , gum pain .      Allergies as of 2017     Allergen Noted Reactions    Sulfa Drugs 2015       \"crazy\"      You were diagnosed with     Pharyngitis, unspecified etiology   [2408948]         Vital Signs     Blood Pressure Pulse Temperature Respirations Weight Oxygen Saturation    110/68 mmHg 78 36.8 °C (98.2 °F) 20 70.308 kg (155 lb) 96%    Smoking Status                   Former Smoker           Basic Information     Date Of Birth Sex Race Ethnicity Preferred Language    1945 Female White Non- English      Your appointments     2017  8:00 AM   ANNUAL EXAM PREVENTATIVE with Wilbert Calhoun M.D.   [address] [id number] [phone]              Problem List              ICD-10-CM Priority Class Noted - Resolved    Asthma, moderate persistent J45.40   2015 - Present    Environmental allergies Z91.09   2015 - Present    Major depressive disorder (CMS-HCC) F32.9   2015 - Present    Dyslipidemia E78.5   2015 - Present    Smoking greater than 30 pack years F17.210   2016 - Present    Postmenopausal Z78.0   2016 - Present    Screening for colon cancer Z12.11   2016 - Present    Need for prophylactic vaccination with combined vaccine Z23   2016 - Present    Viral upper respiratory tract infection J06.9, B97.89   2016 - Present    Preventative health care Z00.00   2017 - Present      Health Maintenance        Date Due Completion Dates    IMM DTaP/Tdap/Td Vaccine (1 - Tdap) 1966 ---    PAP SMEAR 1968 ---    COLONOSCOPY 1997 ---    IMM ZOSTER VACCINE 2007 ---   "    MAMMOGRAM 11/11/2016 11/11/2015    IMM PNEUMOCOCCAL 65+ (ADULT) LOW/MEDIUM RISK SERIES (2 of 2 - PPSV23) 11/23/2016 11/23/2015    BONE DENSITY 9/9/2021 9/9/2016            Current Immunizations     13-VALENT PCV PREVNAR 11/23/2015 10:20 AM    Influenza Vaccine Adult HD 11/17/2016, 11/23/2015 10:20 AM      Below and/or attached are the medications your provider expects you to take. Review all of your home medications and newly ordered medications with your provider and/or pharmacist. Follow medication instructions as directed by your provider and/or pharmacist. Please keep your medication list with you and share with your provider. Update the information when medications are discontinued, doses are changed, or new medications (including over-the-counter products) are added; and carry medication information at all times in the event of emergency situations     Allergies:  SULFA DRUGS - (reactions not documented)               Medications  Valid as of: May 30, 2017 -  9:59 AM    Generic Name Brand Name Tablet Size Instructions for use    Acetaminophen-Codeine (Solution) TYLENOL-CODEINE 120-12 MG/5ML Take 5 mL by mouth every 6 hours as needed.        Albuterol Sulfate (Aero Soln) albuterol 108 (90 BASE) MCG/ACT Inhale 2 Puffs by mouth every four hours as needed. Indications: Asthma        Amoxicillin (Cap) AMOXIL 500 MG Take 1 Cap by mouth 3 times a day for 10 days.        Atorvastatin Calcium (Tab) LIPITOR 10 MG Take 1 Tab by mouth every day.        Atorvastatin Calcium (Tab) LIPITOR 10 MG Take 1 Tab by mouth every bedtime.        Budesonide-Formoterol Fumarate   Inhale 1 Puff by mouth every day. 160/4.5 mcg        Estradiol (Cream) ESTRACE 0.1 MG/GM Insert  in vagina every day.        Fexofenadine-Pseudoephedrine (TABLET SR 12 HR) ALLEGRA-D  MG Take 1 Tab by mouth 2 times a day.        Fluticasone Propionate (Suspension) FLONASE 50 MCG/ACT Spray 1-2 Sprays in nose every day. Each nostril.        Fluticasone  Propionate (Suspension) FLONASE 50 MCG/ACT Spray 1 Spray in nose every day.        Montelukast Sodium (Tab) SINGULAIR 10 MG Take 1 Tab by mouth every day. Indications: Asthma        Neomycin-Polymyxin-Dexameth (Suspension) MAXITROL 0.1 % Place 1 Drop in both eyes 4 times a day.        Promethazine-Codeine (Syrup) PHENERGAN-CODEINE 6.25-10 MG/5ML Take 5-10 mL by mouth at bedtime as needed for Cough (May redose every 4-6 hours PRN).        Venlafaxine HCl (CAPSULE SR 24 HR) EFFEXOR XR 75 MG Take 1 Cap by mouth every day.        Venlafaxine HCl (CAPSULE SR 24 HR) EFFEXOR XR 75 MG Take 1 Cap by mouth every day.        .                 Medicines prescribed today were sent to:     [facility] - [place], [place] - [address] [facility] [address] [facility]    [address] [address]    Phone: [phone] Fax: [phone]    Open 24 Hours?: No    [facility] [address] [place] [address] - [id number] [place] [address] & [facility]    [address] [facility] [address]    Phone: [phone] Fax: [phone]    Open 24 Hours?: No      Medication refill instructions:       If your prescription bottle indicates you have medication refills left, it is not necessary to call your provider’s office. Please contact your pharmacy and they will refill your medication.    If your prescription bottle indicates you do not have any refills left, you may request refills at any time through one of the following ways: The online [employer] system (except Urgent Care), by calling your provider’s office, or by asking your pharmacy to contact your provider’s office with a refill request. Medication refills are processed only during regular business hours and may not be available until the next business day. Your provider may request additional information or to have a follow-up visit with you prior to refilling your medication.   *Please Note: Medication refills are assigned a new Rx number when refilled  electronically. Your pharmacy may indicate that no refills were authorized even though a new prescription for the same medication is available at the pharmacy. Please request the medicine by name with the pharmacy before contacting your provider for a refill.           Ed4Ut Access Code: Activation code not generated  Current Simples[employer] Status: Active

## 2017-05-30 NOTE — PROGRESS NOTES
Subjective:      Alissa Hwang is a 71 y.o. female who presents with Pharyngitis    Past Medical History   Diagnosis Date   • Depression    • Asthma      Social History     Social History   • Marital Status:      Spouse Name: N/A   • Number of Children: N/A   • Years of Education: N/A     Occupational History   • Not on file.     Social History Main Topics   • Smoking status: Former Smoker -- 1.00 packs/day for 34 years     Types: Cigarettes     Start date: 01/01/1968     Quit date: 01/01/2005   • Smokeless tobacco: Never Used   • Alcohol Use: 7.0 oz/week     14 Glasses of wine per week      Comment: beer-2-3 per weekday, occ on weekend   • Drug Use: No   • Sexual Activity:     Partners: Male     Other Topics Concern   • Not on file     Social History Narrative     Family History   Problem Relation Age of Onset   • Hypertension Sister    • Hypertension Brother    • Arthritis Brother      RA   • Dementia Mother      Allergies: Sulfa drugs    This patient is a 71-year-old female who presents today with complaint of week long onset of sore throat. She does not have any other upper respiratory symptoms at this time. Patient states that swallowing is painful and that her mouth is painful as well. She has not been on antibiotics recently.        Pharyngitis   This is a new problem. The current episode started in the past 7 days. The problem has been unchanged. There has been no fever. Associated symptoms include swollen glands and trouble swallowing. She has tried nothing for the symptoms. The treatment provided no relief.       Review of Systems   Constitutional: Positive for malaise/fatigue. Negative for fever and chills.   HENT: Positive for sore throat and trouble swallowing.    Eyes: Negative.    Respiratory: Negative.    Cardiovascular: Negative.    Genitourinary: Negative.    Musculoskeletal: Negative.    Skin: Negative.        All other systems reviewed and are negative      Objective:     BP  110/68 mmHg  Pulse 78  Temp(Src) 36.8 °C (98.2 °F)  Resp 20  Wt 70.308 kg (155 lb)  SpO2 96%     Physical Exam   Constitutional: She is oriented to person, place, and time. She appears well-developed and well-nourished. No distress.   HENT:   Head: Normocephalic.   Right Ear: External ear normal.   Left Ear: External ear normal.   Oropharynx is red   Eyes: Conjunctivae and EOM are normal. Pupils are equal, round, and reactive to light.   Neck: Normal range of motion. Neck supple.   Cardiovascular: Normal rate and regular rhythm.    Pulmonary/Chest: Effort normal and breath sounds normal.   Lymphadenopathy:     She has cervical adenopathy.   Neurological: She is alert and oriented to person, place, and time.   Skin: Skin is warm and dry. She is not diaphoretic.   Vitals reviewed.              Assessment/Plan:   Pharyngitis  Amoxicillin  Warm salt water gargles  Tylenol when necessary  Follow-up if symptoms persist or worsen ----  There are no diagnoses linked to this encounter.

## 2017-06-27 ENCOUNTER — TELEPHONE (OUTPATIENT)
Dept: MEDICAL GROUP | Age: 72
End: 2017-06-27

## 2017-06-27 DIAGNOSIS — E78.5 DYSLIPIDEMIA: ICD-10-CM

## 2017-06-27 RX ORDER — ATORVASTATIN CALCIUM 10 MG/1
TABLET, FILM COATED ORAL
Qty: 90 TAB | Refills: 0 | Status: SHIPPED | OUTPATIENT
Start: 2017-06-27 | End: 2017-06-28 | Stop reason: SDUPTHER

## 2017-06-27 RX ORDER — MONTELUKAST SODIUM 10 MG/1
TABLET ORAL
Qty: 90 TAB | Refills: 0 | Status: SHIPPED | OUTPATIENT
Start: 2017-06-27 | End: 2017-06-28 | Stop reason: SDUPTHER

## 2017-06-27 RX ORDER — VENLAFAXINE HYDROCHLORIDE 75 MG/1
CAPSULE, EXTENDED RELEASE ORAL
Qty: 90 CAP | Refills: 0 | Status: SHIPPED | OUTPATIENT
Start: 2017-06-27 | End: 2017-06-28 | Stop reason: SDUPTHER

## 2017-06-27 NOTE — TELEPHONE ENCOUNTER
ESTABLISHED PATIENT PRE-VISIT PLANNING     Note: Patient will not be contacted if there is no indication to call.     1.  Reviewed notes from the last few office visits within the medical group: Yes    2.  If any orders were placed at last visit or intended to be done for this visit (i.e. 6 mos follow-up), do we have Results/Consult Notes?        •  Labs - Labs ordered, completed and results are in chart.       •  Imaging - Imaging was not ordered at last office visit.       •  Referrals - No referrals were ordered at last office visit.    3. Is this appointment scheduled as a Hospital Follow-Up? No    4.  Immunizations were updated in [facility] using WebIZ?: Yes       •  Web Iz Recommendations: HEPATITIS A  PNEUMOVAX (PPSV23) TDAP ZOSTAVAX (Shingles)    5.  Patient is due for the following Health Maintenance Topics:   Health Maintenance Due   Topic Date Due   • IMM DTaP/Tdap/Td Vaccine (1 - Tdap) 12/05/1966   • PAP SMEAR  12/05/1968   • COLONOSCOPY  12/05/1997   • IMM ZOSTER VACCINE  12/05/2007   • Annual Wellness Visit  02/27/2016   • MAMMOGRAM  11/11/2016   • IMM PNEUMOCOCCAL 65+ (ADULT) LOW/MEDIUM RISK SERIES (2 of 2 - PPSV23) 11/23/2016       - Patient has completed FLU and PREVNAR (PCV13)  Immunization(s) per WebIZ. Chart has been updated.    6.  Patient was NOT informed to arrive 15 min prior to their scheduled appointment and bring in their medication bottles.

## 2017-06-28 ENCOUNTER — OFFICE VISIT (OUTPATIENT)
Dept: MEDICAL GROUP | Age: 72
End: 2017-06-28
Payer: MEDICARE

## 2017-06-28 VITALS
BODY MASS INDEX: 24.17 KG/M2 | DIASTOLIC BLOOD PRESSURE: 78 MMHG | SYSTOLIC BLOOD PRESSURE: 102 MMHG | WEIGHT: 154 LBS | OXYGEN SATURATION: 96 % | HEART RATE: 62 BPM | TEMPERATURE: 98 F | HEIGHT: 67 IN

## 2017-06-28 DIAGNOSIS — J45.40 MODERATE PERSISTENT ASTHMA WITHOUT COMPLICATION: ICD-10-CM

## 2017-06-28 DIAGNOSIS — Z12.11 SCREENING FOR COLON CANCER: ICD-10-CM

## 2017-06-28 DIAGNOSIS — Z12.31 VISIT FOR SCREENING MAMMOGRAM: ICD-10-CM

## 2017-06-28 DIAGNOSIS — Z00.00 MEDICARE ANNUAL WELLNESS VISIT, INITIAL: ICD-10-CM

## 2017-06-28 DIAGNOSIS — F32.0 MILD SINGLE CURRENT EPISODE OF MAJOR DEPRESSIVE DISORDER (HCC): ICD-10-CM

## 2017-06-28 DIAGNOSIS — J06.9 VIRAL UPPER RESPIRATORY TRACT INFECTION: ICD-10-CM

## 2017-06-28 DIAGNOSIS — L57.0 AK (ACTINIC KERATOSIS): ICD-10-CM

## 2017-06-28 DIAGNOSIS — Z78.0 POSTMENOPAUSAL: ICD-10-CM

## 2017-06-28 DIAGNOSIS — Z23 NEED FOR VACCINATION: ICD-10-CM

## 2017-06-28 PROCEDURE — 17000 DESTRUCT PREMALG LESION: CPT | Performed by: FAMILY MEDICINE

## 2017-06-28 PROCEDURE — 17003 DESTRUCT PREMALG LES 2-14: CPT | Performed by: FAMILY MEDICINE

## 2017-06-28 PROCEDURE — 90732 PPSV23 VACC 2 YRS+ SUBQ/IM: CPT | Performed by: FAMILY MEDICINE

## 2017-06-28 PROCEDURE — G0439 PPPS, SUBSEQ VISIT: HCPCS | Mod: 25 | Performed by: FAMILY MEDICINE

## 2017-06-28 PROCEDURE — G0009 ADMIN PNEUMOCOCCAL VACCINE: HCPCS | Performed by: FAMILY MEDICINE

## 2017-06-28 RX ORDER — FLUTICASONE PROPIONATE 50 MCG
1 SPRAY, SUSPENSION (ML) NASAL DAILY
Qty: 16 G | Refills: 3 | Status: SHIPPED | OUTPATIENT
Start: 2017-06-28 | End: 2018-08-15

## 2017-06-28 RX ORDER — VENLAFAXINE HYDROCHLORIDE 75 MG/1
CAPSULE, EXTENDED RELEASE ORAL
Qty: 360 CAP | Refills: 0 | Status: SHIPPED | OUTPATIENT
Start: 2017-06-28 | End: 2018-08-15

## 2017-06-28 RX ORDER — ATORVASTATIN CALCIUM 10 MG/1
TABLET, FILM COATED ORAL
Qty: 360 TAB | Refills: 0 | Status: SHIPPED | OUTPATIENT
Start: 2017-06-28 | End: 2018-08-15

## 2017-06-28 RX ORDER — MONTELUKAST SODIUM 10 MG/1
TABLET ORAL
Qty: 360 TAB | Refills: 0 | Status: SHIPPED | OUTPATIENT
Start: 2017-06-28 | End: 2018-08-14 | Stop reason: SDUPTHER

## 2017-06-28 RX ORDER — ALBUTEROL SULFATE 90 UG/1
2 AEROSOL, METERED RESPIRATORY (INHALATION) EVERY 4 HOURS PRN
Qty: 8.5 G | Refills: 1 | Status: SHIPPED | OUTPATIENT
Start: 2017-06-28 | End: 2019-09-18 | Stop reason: SDUPTHER

## 2017-06-28 ASSESSMENT — PATIENT HEALTH QUESTIONNAIRE - PHQ9: CLINICAL INTERPRETATION OF PHQ2 SCORE: 0

## 2017-06-28 NOTE — MR AVS SNAPSHOT
"        Alissa Abraham OnesimopaulinaRoeIrene   2017 8:00 AM   Office Visit   MRN: [id number]    Department:  [address] [place] [address]   Dept Phone:  [phone]    Description:  Female : 1945   Provider:  Wilbert Calhoun M.D.           Reason for Visit     Annual Wellness Visit           Allergies as of 2017     Allergen Noted Reactions    Sulfa Drugs 2015       \"crazy\"      You were diagnosed with     Need for vaccination   [488637]       Postmenopausal   [089890]       Viral upper respiratory tract infection   [757589]       Moderate persistent asthma without complication   [523550]       Mild single current episode of major depressive disorder (CMS-HCC)   [0634293]       Screening for colon cancer   [577257]       Visit for screening mammogram   [861189]       AK (actinic keratosis)   [152450]         Vital Signs     Blood Pressure Pulse Temperature Height Weight Body Mass Index    102/78 mmHg 62 36.7 °C (98 °F) 1.689 m (5' 6.5\") 69.854 kg (154 lb) 24.49 kg/m2    Oxygen Saturation Smoking Status                96% Former Smoker          Basic Information     Date Of Birth Sex Race Ethnicity Preferred Language    1945 Female White Non- English      Your appointments     2017  3:00 PM   ANNUAL EXAM PREVENTATIVE with Wilbert Calhoun M.D.   [address] [id number] [phone]              Problem List              ICD-10-CM Priority Class Noted - Resolved    Asthma, moderate persistent J45.40   2015 - Present    Environmental allergies Z91.09   2015 - Present    Major depressive disorder (CMS-[facility]) F32.9   2015 - Present    Dyslipidemia E78.5   2015 - Present    Smoking greater than 30 pack years F17.210   2016 - Present    Postmenopausal Z78.0   2016 - Present    Screening for colon cancer Z12.11   2016 - Present    Need for prophylactic vaccination with combined vaccine " Z23   9/1/2016 - Present    Viral upper respiratory tract infection J06.9, B97.89   12/7/2016 - Present    Preventative health care Z00.00   1/4/2017 - Present    Visit for screening mammogram Z12.31   6/28/2017 - Present    AK (actinic keratosis) L57.0   6/28/2017 - Present      Health Maintenance        Date Due Completion Dates    IMM DTaP/Tdap/Td Vaccine (1 - Tdap) 12/5/1966 ---    PAP SMEAR 12/5/1968 ---    COLONOSCOPY 12/5/1997 ---    IMM ZOSTER VACCINE 12/5/2007 ---    MAMMOGRAM 11/11/2016 11/11/2015    BONE DENSITY 9/9/2021 9/9/2016            Current Immunizations     13-VALENT PCV PREVNAR 11/23/2015 10:20 AM    Influenza Vaccine Adult HD 11/17/2016, 11/23/2015 10:20 AM    Pneumococcal polysaccharide vaccine (PPSV-23) 6/28/2017      Below and/or attached are the medications your provider expects you to take. Review all of your home medications and newly ordered medications with your provider and/or pharmacist. Follow medication instructions as directed by your provider and/or pharmacist. Please keep your medication list with you and share with your provider. Update the information when medications are discontinued, doses are changed, or new medications (including over-the-counter products) are added; and carry medication information at all times in the event of emergency situations     Allergies:  SULFA DRUGS - (reactions not documented)               Medications  Valid as of: June 28, 2017 -  8:55 AM    Generic Name Brand Name Tablet Size Instructions for use    Acetaminophen-Codeine (Solution) TYLENOL-CODEINE 120-12 MG/5ML Take 5 mL by mouth every 6 hours as needed.        Albuterol Sulfate (Aero Soln) albuterol 108 (90 BASE) MCG/ACT Inhale 2 Puffs by mouth every four hours as needed. Indications: Asthma        Atorvastatin Calcium (Tab) LIPITOR 10 MG Take 1 Tab by mouth every day.        Atorvastatin Calcium (Tab) LIPITOR 10 MG Take 1 Tab by mouth every bedtime.        Atorvastatin Calcium (Tab) LIPITOR  10 MG TAKE 1 TABLET AT BEDTIME        Budesonide-Formoterol Fumarate   Inhale 1 Puff by mouth every day. 160/4.5 mcg        Estradiol (Cream) ESTRACE 0.1 MG/GM Insert  in vagina every day.        Estrogens, Conjugated (Cream) PREMARIN 0.625 MG/GM Apply to vagina prn coitus        Fexofenadine-Pseudoephedrine (TABLET SR 12 HR) ALLEGRA-D  MG Take 1 Tab by mouth 2 times a day.        Fluticasone Propionate (Suspension) FLONASE 50 MCG/ACT Spray 1-2 Sprays in nose every day. Each nostril.        Fluticasone Propionate (Suspension) FLONASE 50 MCG/ACT Spray 1 Spray in nose every day.        Misc. Devices (Misc) Misc. Devices  Please give patient shingles vaccine and Tdap        Montelukast Sodium (Tab) SINGULAIR 10 MG TAKE 1 TABLET EVERY DAY FOR ASTHMA        Neomycin-Polymyxin-Dexameth (Suspension) MAXITROL 0.1 % Place 1 Drop in both eyes 4 times a day.        Promethazine-Codeine (Syrup) PHENERGAN-CODEINE 6.25-10 MG/5ML Take 5-10 mL by mouth at bedtime as needed for Cough (May redose every 4-6 hours PRN).        Venlafaxine HCl (CAPSULE SR 24 HR) EFFEXOR XR 75 MG Take 1 Cap by mouth every day.        Venlafaxine HCl (CAPSULE SR 24 HR) EFFEXOR XR 75 MG TAKE 1 CAPSULE EVERY DAY        .                 Medicines prescribed today were sent to:     [facility] - [place], [place] - [address]    [address] [facility] [address]    Phone: [phone] Fax: [phone]    Open 24 Hours?: No    [facility] [address] [place] [address] - [address] [facility]    [address] [facility] [address]    Phone: [phone] Fax: [phone]    Open 24 Hours?: No      Medication refill instructions:       If your prescription bottle indicates you have medication refills left, it is not necessary to call your provider’s office. Please contact your pharmacy and they will refill your medication.    If your prescription bottle indicates you do not have any refills  left, you may request refills at any time through one of the following ways: The online [employer] system (except Urgent Care), by calling your provider’s office, or by asking your pharmacy to contact your provider’s office with a refill request. Medication refills are processed only during regular business hours and may not be available until the next business day. Your provider may request additional information or to have a follow-up visit with you prior to refilling your medication.   *Please Note: Medication refills are assigned a new Rx number when refilled electronically. Your pharmacy may indicate that no refills were authorized even though a new prescription for the same medication is available at the pharmacy. Please request the medicine by name with the pharmacy before contacting your provider for a refill.        Your To Do List     Future Labs/Procedures Complete By Expires    MA-SCREEN MAMMO W/CAD-BILAT  As directed 6/28/2018      Referral     A referral request has been sent to our patient care coordination department. Please allow 3-5 business days for us to process this request and contact you either by phone or mail. If you do not hear from us by the 5th business day, please call us at [phone].           Anesco Access Code: Activation code not generated  Current Anesco Status: Active

## 2017-06-28 NOTE — PROGRESS NOTES
Chief Complaint   Patient presents with   • Annual Wellness Visit         HPI:  Alissa Hwang is a 71 y.o. female here for Medicare Annual Wellness Visit        Patient Active Problem List    Diagnosis Date Noted   • Visit for screening mammogram 06/28/2017   • AK (actinic keratosis) 06/28/2017   • Medicare annual wellness visit, initial 06/28/2017   • Preventative health care 01/04/2017   • Viral upper respiratory tract infection 12/07/2016   • Smoking greater than 30 pack years 09/01/2016   • Postmenopausal 09/01/2016   • Screening for colon cancer 09/01/2016   • Need for prophylactic vaccination with combined vaccine 09/01/2016   • Asthma, moderate persistent 02/26/2015   • Environmental allergies 02/26/2015   • Major depressive disorder (CMS-HCC) 02/26/2015   • Dyslipidemia 02/26/2015       Current Outpatient Prescriptions   Medication Sig Dispense Refill   • Misc. Devices Misc Please give patient shingles vaccine and Tdap 1 Application 0   • venlafaxine XR (EFFEXOR XR) 75 MG CAPSULE SR 24 HR TAKE 1 CAPSULE EVERY  Cap 0   • montelukast (SINGULAIR) 10 MG Tab TAKE 1 TABLET EVERY DAY FOR ASTHMA 360 Tab 0   • atorvastatin (LIPITOR) 10 MG Tab TAKE 1 TABLET AT BEDTIME 360 Tab 0   • conjugated estrogen (PREMARIN) 0.625 MG/GM Cream Apply to vagina prn coitus 1 Tube 0   • fluticasone (FLONASE) 50 MCG/ACT nasal spray Spray 1 Spray in nose every day. 16 g 3   • albuterol (VENTOLIN HFA) 108 (90 BASE) MCG/ACT Aero Soln inhalation aerosol Inhale 2 Puffs by mouth every four hours as needed. Indications: Asthma 8.5 g 1   • venlafaxine XR (EFFEXOR XR) 75 MG CAPSULE SR 24 HR Take 1 Cap by mouth every day. 90 Cap 0   • atorvastatin (LIPITOR) 10 MG Tab Take 1 Tab by mouth every day. 90 Tab 0   • fluticasone (FLONASE) 50 MCG/ACT nasal spray Spray 1-2 Sprays in nose every day. Each nostril. 1 Bottle 6   • Budesonide-Formoterol Fumarate (SYMBICORT INH) Inhale 1 Puff by mouth every day. 160/4.5 mcg     •  promethazine-codeine (PHENERGAN-CODEINE) 6.25-10 MG/5ML Syrup Take 5-10 mL by mouth at bedtime as needed for Cough (May redose every 4-6 hours PRN). (Patient not taking: Reported on 6/28/2017) 120 mL 0   • neomycin-polymyxin-dexamethasone (MAXITROL) 0.1 % ophthalmic suspension Place 1 Drop in both eyes 4 times a day. (Patient not taking: Reported on 6/28/2017) 5 mL 0   • atorvastatin (LIPITOR) 10 MG Tab Take 1 Tab by mouth every bedtime. 180 Tab 0   • fexofenadine-pseudoephedrine (ALLEGRA-D)  MG per tablet Take 1 Tab by mouth 2 times a day. (Patient not taking: Reported on 6/28/2017) 60 Tab 3   • acetaminophen-codeine (TYLENOL-CODEINE) 120-12 MG/5ML Solution Take 5 mL by mouth every 6 hours as needed. (Patient not taking: Reported on 6/28/2017) 120 mL 0   • estradiol (ESTRACE VAGINAL) 0.1 MG/GM vaginal cream Insert  in vagina every day.       No current facility-administered medications for this visit.        Patient is taking medications as noted in medication list.  Current supplements as per medication list.   Chronic narcotic pain medicines: no    Allergies: Sulfa drugs    Current social contact/activities: shopping, going to dinner, festivals, going to the beach, traveling, riding horse     Is patient current with immunizations?  No, due for PNEUMOVAX (PPSV23), TDAP and ZOSTAVAX (Shingles). Patient is interested in receiving PNEUMOVAX (PPSV23), TDAP and ZOSTAVAX (Shingles) today.     She  reports that she quit smoking about 12 years ago. Her smoking use included Cigarettes. She started smoking about 49 years ago. She has a 34 pack-year smoking history. She has never used smokeless tobacco. She reports that she drinks about 7.0 oz of alcohol per week. She reports that she does not use illicit drugs.  Counseling given: Yes        DPA/Advanced Directive:  Patient does not have an Advanced Directive.  A packet and workshop information was given on Advanced Directives.    ROS:    Gait: Uses no assistive device    Ostomy: no   Other tubes: no   Amputations: no   Chronic oxygen use: no   Last eye exam: 1/2017   Wears hearing aids: no   : Denies incontinence.       Depression Screening    Little interest or pleasure in doing things?  0 - not at all  Feeling down, depressed, or hopeless?  0 - not at all  Patient Health Questionnaire Score: 0  If depressive symptoms identified deferred to follow up visit unless specifically addressed in assessment and plan.    Interpretation of PHQ-9 Total Score   Score Severity   1-4 Minimal Depression   5-9 Mild Depression   10-14 Moderate Depression   15-19 Moderately Severe Depression   20-27 Severe Depression    Screening for Cognitive Impairment    Three Minute Recall (banana, sunrise, fence)  3/3    Draw clock face with all 12 numbers set to the hand to show 10 minutes past 11 o'clock  5/5  If cognitive concerns identified deferred to follow up visit unless specifically addressed in assessment and plan.    Fall Risk Assessment    Has the patient had two or more falls in the last year or any fall with injury in the last year?  No  If Fall Risk identified deferred to follow up visit unless specifically addressed in assessment and plan.    Safety Assessment    Throw rugs on floor.  Yes  Handrails on all stairs.  Yes  Good lighting in all hallways.  Yes  Difficulty hearing.  Yes  Patient counseled about all safety risks that were identified.    Functional Assessment ADLs    Are there any barriers preventing you from cooking for yourself or meeting nutritional needs?  No.    Are there any barriers preventing you from driving safely or obtaining transportation?  No.    Are there any barriers preventing you from using a telephone or calling for help?  No.    Are there any barriers preventing you from shopping?  No.    Are there any barriers preventing you from taking care of your own finances?  No.    Are there any barriers preventing you from managing your medications?  No.    Are currently  "engaging any exercise or physical activity?  Yes.  Rides a horse 5 days week. Walking every day.    Health Maintenance Summary                IMM DTaP/Tdap/Td Vaccine Overdue 12/5/1966     PAP SMEAR Overdue 12/5/1968     COLONOSCOPY Overdue 12/5/1997     IMM ZOSTER VACCINE Overdue 12/5/2007     Annual Wellness Visit Overdue 2/27/2016      Done 2/26/2015 Visit Dx: Medicare annual wellness visit, initial    MAMMOGRAM Overdue 11/11/2016      Done 11/11/2015 MA-SCREEN MAMMO W/ IMPLANTS W/CAD-BILAT    IMM PNEUMOCOCCAL 65+ (ADULT) LOW/MEDIUM RISK SERIES Overdue 11/23/2016      Done 11/23/2015 Imm Admin: Pneumococcal Conjugate Vaccine (Prevnar/PCV-13)    BONE DENSITY Next Due 9/9/2021      Done 9/9/2016 DS-BONE DENSITY STUDY (DEXA)          Patient Care Team:  Wilbert Calhoun M.D. as PCP - General (Family Medicine)  Wen Price M.D. (Pulmonary Medicine)  Yo King O.D. as Consulting Physician (Optometry)    Social History   Substance Use Topics   • Smoking status: Former Smoker -- 1.00 packs/day for 34 years     Types: Cigarettes     Start date: 01/01/1968     Quit date: 01/01/2005   • Smokeless tobacco: Never Used   • Alcohol Use: 7.0 oz/week     14 Glasses of wine per week      Comment: beer-2-3 per weekday, occ on weekend     Family History   Problem Relation Age of Onset   • Hypertension Sister    • Hypertension Brother    • Arthritis Brother      RA   • Dementia Mother      She  has a past medical history of Depression and Asthma.   Past Surgical History   Procedure Laterality Date   • Pr enlarge breast with implant  1972           Exam:     Blood pressure 102/78, pulse 62, temperature 36.7 °C (98 °F), height 1.689 m (5' 6.5\"), weight 69.854 kg (154 lb), SpO2 96 %. Body mass index is 24.49 kg/(m^2).    Hearing excellent.    Dentition good  Alert, oriented in no acute distress.  Eye contact is good, speech goal directed, affect calm      Assessment and Plan. The following treatment and monitoring " plan is recommended:    1. Need for vaccination  Pneumococal Polysaccharide Vaccine 23-Valent =>1yo SQ/IM    Misc. Devices Misc    Annual Wellness Visit - Includes PPPS Subsequent ()   2. Postmenopausal  conjugated estrogen (PREMARIN) 0.625 MG/GM Cream    Annual Wellness Visit - Includes PPPS Subsequent ()   3. Viral upper respiratory tract infection  fluticasone (FLONASE) 50 MCG/ACT nasal spray    Annual Wellness Visit - Includes PPPS Subsequent ()   4. Moderate persistent asthma without complication  Annual Wellness Visit - Includes PPPS Subsequent ()   5. Mild single current episode of major depressive disorder (CMS-HCC)  Annual Wellness Visit - Includes PPPS Subsequent ()   6. Screening for colon cancer  REFERRAL TO GI FOR COLONOSCOPY    Annual Wellness Visit - Includes PPPS Subsequent ()   7. Visit for screening mammogram  MA-SCREEN MAMMO W/CAD-BILAT    Annual Wellness Visit - Includes PPPS Subsequent ()   8. AK (actinic keratosis)  Annual Wellness Visit - Includes PPPS Subsequent ()   9. Medicare annual wellness visit, initial  Annual Wellness Visit - Includes PPPS Subsequent ()         Services suggested: No services needed at this time  Health Care Screening recommendations as per orders if indicated.  Referrals offered: PT/OT/Nutrition counseling/Behavioral Health/Smoking cessation as per orders if indicated.    Discussion today about general wellness and lifestyle habits:    · Prevent falls and reduce trip hazards; Cautioned about securing or removing rugs.  · Have a working fire alarm and carbon monoxide detector;   · Engage in regular physical activity and social activities       Follow-up: Return in about 3 months (around 9/28/2017) for Reevaluation.

## 2017-07-06 ENCOUNTER — OFFICE VISIT (OUTPATIENT)
Dept: MEDICAL GROUP | Age: 72
End: 2017-07-06
Payer: MEDICARE

## 2017-07-06 ENCOUNTER — HOSPITAL ENCOUNTER (OUTPATIENT)
Facility: MEDICAL CENTER | Age: 72
End: 2017-07-06
Attending: FAMILY MEDICINE
Payer: MEDICARE

## 2017-07-06 VITALS
DIASTOLIC BLOOD PRESSURE: 62 MMHG | HEART RATE: 92 BPM | HEIGHT: 66 IN | OXYGEN SATURATION: 97 % | BODY MASS INDEX: 24.81 KG/M2 | WEIGHT: 154.4 LBS | SYSTOLIC BLOOD PRESSURE: 120 MMHG | TEMPERATURE: 97 F

## 2017-07-06 DIAGNOSIS — Z00.00 PREVENTATIVE HEALTH CARE: ICD-10-CM

## 2017-07-06 DIAGNOSIS — I10 BENIGN ESSENTIAL HTN: ICD-10-CM

## 2017-07-06 DIAGNOSIS — Z12.4 CERVICAL CANCER SCREENING: ICD-10-CM

## 2017-07-06 DIAGNOSIS — D48.9 NEOPLASM OF UNCERTAIN BEHAVIOR: ICD-10-CM

## 2017-07-06 PROCEDURE — 87491 CHLMYD TRACH DNA AMP PROBE: CPT | Mod: GZ

## 2017-07-06 PROCEDURE — 88175 CYTOPATH C/V AUTO FLUID REDO: CPT

## 2017-07-06 PROCEDURE — 87591 N.GONORRHOEAE DNA AMP PROB: CPT | Mod: GZ

## 2017-07-06 PROCEDURE — 87624 HPV HI-RISK TYP POOLED RSLT: CPT

## 2017-07-06 NOTE — PROGRESS NOTES
This medical record contains text that has been entered with the assistance of computer voice recognition and dictation software.  Therefore, it may contain unintended errors in text, spelling, punctuation, or grammar    Chief Complaint   Patient presents with   • Other     see reason for visit       Alissa Hwang is a 71 y.o. female here evaluation and management of: Pelvic exam      HPI:     Preventative health care  The patient is a 71-year-old female with a history of abnormal Paps status post cone biopsy who presents clinic for pelvic exam.      Current medicines (including changes today)  Current Outpatient Prescriptions   Medication Sig Dispense Refill   • venlafaxine XR (EFFEXOR XR) 75 MG CAPSULE SR 24 HR TAKE 1 CAPSULE EVERY  Cap 0   • montelukast (SINGULAIR) 10 MG Tab TAKE 1 TABLET EVERY DAY FOR ASTHMA 360 Tab 0   • atorvastatin (LIPITOR) 10 MG Tab TAKE 1 TABLET AT BEDTIME 360 Tab 0   • conjugated estrogen (PREMARIN) 0.625 MG/GM Cream Apply to vagina prn coitus 1 Tube 0   • fluticasone (FLONASE) 50 MCG/ACT nasal spray Spray 1 Spray in nose every day. 16 g 3   • albuterol (VENTOLIN HFA) 108 (90 BASE) MCG/ACT Aero Soln inhalation aerosol Inhale 2 Puffs by mouth every four hours as needed. Indications: Asthma 8.5 g 1   • venlafaxine XR (EFFEXOR XR) 75 MG CAPSULE SR 24 HR Take 1 Cap by mouth every day. 90 Cap 0   • atorvastatin (LIPITOR) 10 MG Tab Take 1 Tab by mouth every day. 90 Tab 0   • Misc. Devices Misc Please give patient shingles vaccine and Tdap 1 Application 0   • promethazine-codeine (PHENERGAN-CODEINE) 6.25-10 MG/5ML Syrup Take 5-10 mL by mouth at bedtime as needed for Cough (May redose every 4-6 hours PRN). (Patient not taking: Reported on 6/28/2017) 120 mL 0   • neomycin-polymyxin-dexamethasone (MAXITROL) 0.1 % ophthalmic suspension Place 1 Drop in both eyes 4 times a day. (Patient not taking: Reported on 6/28/2017) 5 mL 0   • atorvastatin (LIPITOR) 10 MG Tab Take 1 Tab by  "mouth every bedtime. 180 Tab 0   • fexofenadine-pseudoephedrine (ALLEGRA-D)  MG per tablet Take 1 Tab by mouth 2 times a day. (Patient not taking: Reported on 2017) 60 Tab 3   • acetaminophen-codeine (TYLENOL-CODEINE) 120-12 MG/5ML Solution Take 5 mL by mouth every 6 hours as needed. (Patient not taking: Reported on 2017) 120 mL 0   • fluticasone (FLONASE) 50 MCG/ACT nasal spray Spray 1-2 Sprays in nose every day. Each nostril. 1 Bottle 6   • estradiol (ESTRACE VAGINAL) 0.1 MG/GM vaginal cream Insert  in vagina every day.     • Budesonide-Formoterol Fumarate (SYMBICORT INH) Inhale 1 Puff by mouth every day. 160/4.5 mcg       No current facility-administered medications for this visit.     She  has a past medical history of Depression and Asthma.  She  has past surgical history that includes enlarge breast with implant ().  Social History   Substance Use Topics   • Smoking status: Former Smoker -- 1.00 packs/day for 34 years     Types: Cigarettes     Start date: 1968     Quit date: 2005   • Smokeless tobacco: Never Used   • Alcohol Use: 7.0 oz/week     14 Glasses of wine per week      Comment: beer-2-3 per weekday, occ on weekend     Social History     Social History Narrative     Family History   Problem Relation Age of Onset   • Hypertension Sister    • Hypertension Brother    • Arthritis Brother      RA   • Dementia Mother      Family Status   Relation Status Death Age   • Sister Alive    • Brother Alive    • Brother Alive    • Mother  90     dementia   • Father  95     FTT   • Maternal Grandmother     • Maternal Grandfather     • Paternal Grandmother     • Paternal Grandfather           ROS  Please see history of present illness    All other systems reviewed and are negative     Objective:     Blood pressure 120/62, pulse 92, temperature 36.1 °C (97 °F), height 1.689 m (5' 6.5\"), weight 70.035 kg (154 lb 6.4 oz), SpO2 97 %. Body mass " index is 24.55 kg/(m^2).  Physical Exam:    Constitutional: Alert, no distress.  Skin: Warm, dry, good turgor, no rashes in visible areas.  Eye: Equal, round and reactive, conjunctiva clear, lids normal.  ENMT: Lips without lesions, good dentition, oropharynx clear.  Neck: Trachea midline, no masses, no thyromegaly. No cervical or supraclavicular lymphadenopathy.  Respiratory: Unlabored respiratory effort, lungs clear to auscultation, no wheezes, no ronchi.  Cardiovascular: Normal S1, S2, no murmur, no edema.  Abdomen: Soft, non-tender, no masses, no hepatosplenomegaly.  Psych: Alert and oriented x3, normal affect and mood.      Female MA at beside during pap/pelvic exam ([place])      Pelvic:     Nl ext/int genitalia, cervix nl, no lesions, nl discharge, no CMT, no adnexal masses palpated, PAP sample taken, Cervical Chlamydia/Gonorhea sample taken        Assessment and Plan:   The following treatment plan was discussed    1. Cervical cancer screening  Patient tollerrated procedure well  There were no adverse events  Patient was given post procedure precautions     2. Benign essential HTN    - COMP METABOLIC PANEL; Future  - LIPID PROFILE; Future  - CBC WITH DIFFERENTIAL; Future    3. Neoplasm of uncertain behavior    - REFERRAL TO DERMATOLOGY      4. Preventative health care        HEALTH MAINTENANCE: due for mammogram, shingles, colonoscopy, and tdap    Instructed to Follow up in clinic or ER for worsening symptoms, difficulty breathing, lack of expected recovery, or should new symptoms or problems arise.    Followup: Return in about 4 weeks (around 8/3/2017) for Cryotherapy.       Once again this medical record contains text that has been entered with the assistance of computer voice recognition and dictation software.  Therefore, it may contain unintended errors in text, spelling, punctuation, or grammar

## 2017-07-06 NOTE — ASSESSMENT & PLAN NOTE
The patient is a 71-year-old female with a history of abnormal Paps status post cone biopsy who presents clinic for pelvic exam.

## 2017-07-07 LAB
C TRACH DNA GENITAL QL NAA+PROBE: NEGATIVE
CYTOLOGY REG CYTOL: NORMAL
HPV HR 12 DNA CVX QL NAA+PROBE: NEGATIVE
HPV16 DNA SPEC QL NAA+PROBE: NEGATIVE
HPV18 DNA SPEC QL NAA+PROBE: NEGATIVE
N GONORRHOEA DNA GENITAL QL NAA+PROBE: NEGATIVE
SPECIMEN SOURCE: NORMAL
SPECIMEN SOURCE: NORMAL

## 2017-07-17 ENCOUNTER — HOSPITAL ENCOUNTER (OUTPATIENT)
Dept: RADIOLOGY | Facility: MEDICAL CENTER | Age: 72
End: 2017-07-17
Attending: FAMILY MEDICINE
Payer: MEDICARE

## 2017-07-17 DIAGNOSIS — Z12.31 VISIT FOR SCREENING MAMMOGRAM: ICD-10-CM

## 2017-07-17 PROCEDURE — 77063 BREAST TOMOSYNTHESIS BI: CPT

## 2017-07-26 ENCOUNTER — OFFICE VISIT (OUTPATIENT)
Dept: URGENT CARE | Facility: CLINIC | Age: 72
End: 2017-07-26
Payer: MEDICARE

## 2017-07-26 VITALS
WEIGHT: 154 LBS | OXYGEN SATURATION: 97 % | RESPIRATION RATE: 12 BRPM | BODY MASS INDEX: 24.75 KG/M2 | TEMPERATURE: 98.8 F | DIASTOLIC BLOOD PRESSURE: 70 MMHG | SYSTOLIC BLOOD PRESSURE: 104 MMHG | HEART RATE: 66 BPM | HEIGHT: 66 IN

## 2017-07-26 DIAGNOSIS — S99.922A INJURY OF TOENAIL OF LEFT FOOT, INITIAL ENCOUNTER: ICD-10-CM

## 2017-07-26 PROCEDURE — 99212 OFFICE O/P EST SF 10 MIN: CPT | Performed by: PHYSICIAN ASSISTANT

## 2017-07-26 ASSESSMENT — ENCOUNTER SYMPTOMS
NUMBNESS: 0
FOCAL WEAKNESS: 0
TINGLING: 0
FEVER: 0
CHILLS: 0
SENSORY CHANGE: 0
JOINT SWELLING: 1
WEAKNESS: 0
ARTHRALGIAS: 0

## 2017-07-26 NOTE — PROGRESS NOTES
"Subjective:      Alissa Hwang is a 71 y.o. female who presents with Toe Pain            Toe Injury  This is a new problem. Episode onset: 1 week ago. The patient reports that her horse stepped on her right big toe - denies pain. The patient is concerned with bruising beneath the toenail. The problem has been gradually improving. Associated symptoms include joint swelling (swelling of right big toe). Pertinent negatives include no arthralgias (no toe joint pain ), chills, fever, numbness or weakness. Associated symptoms comments: Subungual hematoma of right big toe . Nothing aggravates the symptoms. She has tried nothing for the symptoms.   TDAP is UTD.    Past Medical History   Diagnosis Date   • Depression    • Asthma      Past Surgical History   Procedure Laterality Date   • Pr enlarge breast with implant  1972       Family History   Problem Relation Age of Onset   • Hypertension Sister    • Hypertension Brother    • Arthritis Brother      RA   • Dementia Mother        Allergies   Allergen Reactions   • Sulfa Drugs      \"crazy\"       Medications, Allergies, and current problem list reviewed today in Epic      Review of Systems   Constitutional: Negative for fever and chills.   Musculoskeletal: Positive for joint swelling (swelling of right big toe). Negative for joint pain and arthralgias (no toe joint pain ).   Skin:        Bruising beneath right big toe    Neurological: Negative for tingling, sensory change, focal weakness, weakness and numbness.     All other systems reviewed and are negative.        Objective:     /70 mmHg  Pulse 66  Temp(Src) 37.1 °C (98.8 °F)  Resp 12  Ht 1.689 m (5' 6.5\")  Wt 69.854 kg (154 lb)  BMI 24.49 kg/m2  SpO2 97%     Physical Exam   Constitutional: She is oriented to person, place, and time. She appears well-developed and well-nourished. No distress.   Eyes: Conjunctivae are normal.   Pulmonary/Chest: Effort normal. No respiratory distress. "   Musculoskeletal:        Feet:    Neurological: She is alert and oriented to person, place, and time. No cranial nerve deficit.   Psychiatric: She has a normal mood and affect. Her behavior is normal. Judgment and thought content normal.               Assessment/Plan:     1. Injury of toenail of left foot, initial encounter    Reassurance given. X-ray not warranted due to NTTP.  Explained to the patient that her toenail would eventually fall off. Recommended natural progression of injury.     Differential diagnoses, Supportive care, and indications for immediate follow-up discussed with patient.   Instructed to return to clinic or nearest emergency department for any change in condition, further concerns, or worsening of symptoms.    The patient demonstrated a good understanding and agreed with the treatment plan.    Grazyna Rush PA-C

## 2017-07-26 NOTE — MR AVS SNAPSHOT
"        Alissa Leigh Hwang   2017 9:00 AM   Office Visit   MRN: [id number]    Department:  [facility]   Dept Phone:  [phone]    Description:  Female : 1945   Provider:  Grazyna Rush PA-C           Reason for Visit     Toe Pain horse stepped on toe, R big toe, happened a week ago       Allergies as of 2017     Allergen Noted Reactions    Sulfa Drugs 2015       \"crazy\"      You were diagnosed with     Injury of toenail of left foot, initial encounter   [[id number]]         Vital Signs     Blood Pressure Pulse Temperature Respirations Height Weight    104/70 mmHg 66 37.1 °C (98.8 °F) 12 1.689 m (5' 6.5\") 69.854 kg (154 lb)    Body Mass Index Oxygen Saturation Smoking Status             24.49 kg/m2 97% Former Smoker         Basic Information     Date Of Birth Sex Race Ethnicity Preferred Language    1945 Female White Non- English      Problem List              ICD-10-CM Priority Class Noted - Resolved    Asthma, moderate persistent J45.40   2015 - Present    Environmental allergies Z91.09   2015 - Present    Major depressive disorder (CMS-HCC) F32.9   2015 - Present    Dyslipidemia E78.5   2015 - Present    Smoking greater than 30 pack years F17.210   2016 - Present    Postmenopausal Z78.0   2016 - Present    Screening for colon cancer Z12.11   2016 - Present    Need for prophylactic vaccination with combined vaccine Z23   2016 - Present    Viral upper respiratory tract infection J06.9, B97.89   2016 - Present    Preventative health care Z00.00   2017 - Present    Visit for screening mammogram Z12.31   2017 - Present    AK (actinic keratosis) L57.0   2017 - Present    Medicare annual wellness visit, initial Z00.00   2017 - Present    Benign essential HTN I10   2017 - Present    Cervical cancer screening Z12.4   2017 - Present    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   2017 - Present      "   Health Maintenance        Date Due Completion Dates    IMM DTaP/Tdap/Td Vaccine (1 - Tdap) 12/5/1966 ---    COLONOSCOPY 12/5/1997 ---    IMM ZOSTER VACCINE 12/5/2007 ---    IMM INFLUENZA (1) 9/1/2017 11/17/2016, 11/23/2015    MAMMOGRAM 7/17/2018 7/17/2017, 11/11/2015    PAP SMEAR 7/6/2020 7/6/2017, 7/6/2017, 7/6/2017 (Done)    Override on 7/6/2017: Done    BONE DENSITY 9/9/2021 9/9/2016            Current Immunizations     13-VALENT PCV PREVNAR 11/23/2015 10:20 AM    Influenza Vaccine Adult HD 11/17/2016, 11/23/2015 10:20 AM    Pneumococcal polysaccharide vaccine (PPSV-23) 6/28/2017      Below and/or attached are the medications your provider expects you to take. Review all of your home medications and newly ordered medications with your provider and/or pharmacist. Follow medication instructions as directed by your provider and/or pharmacist. Please keep your medication list with you and share with your provider. Update the information when medications are discontinued, doses are changed, or new medications (including over-the-counter products) are added; and carry medication information at all times in the event of emergency situations     Allergies:  SULFA DRUGS - (reactions not documented)               Medications  Valid as of: July 26, 2017 -  9:31 AM    Generic Name Brand Name Tablet Size Instructions for use    Acetaminophen-Codeine (Solution) TYLENOL-CODEINE 120-12 MG/5ML Take 5 mL by mouth every 6 hours as needed.        Albuterol Sulfate (Aero Soln) albuterol 108 (90 BASE) MCG/ACT Inhale 2 Puffs by mouth every four hours as needed. Indications: Asthma        Atorvastatin Calcium (Tab) LIPITOR 10 MG Take 1 Tab by mouth every day.        Atorvastatin Calcium (Tab) LIPITOR 10 MG Take 1 Tab by mouth every bedtime.        Atorvastatin Calcium (Tab) LIPITOR 10 MG TAKE 1 TABLET AT BEDTIME        Budesonide-Formoterol Fumarate   Inhale 1 Puff by mouth every day. 160/4.5 mcg        Estradiol (Cream) ESTRACE 0.1  MG/GM Insert  in vagina every day.        Estrogens, Conjugated (Cream) PREMARIN 0.625 MG/GM Apply to vagina prn coitus        Fexofenadine-Pseudoephedrine (TABLET SR 12 HR) ALLEGRA-D  MG Take 1 Tab by mouth 2 times a day.        Fluticasone Propionate (Suspension) FLONASE 50 MCG/ACT Spray 1-2 Sprays in nose every day. Each nostril.        Fluticasone Propionate (Suspension) FLONASE 50 MCG/ACT Spray 1 Spray in nose every day.        Misc. Devices (Misc) Misc. Devices  Please give patient shingles vaccine and Tdap        Montelukast Sodium (Tab) SINGULAIR 10 MG TAKE 1 TABLET EVERY DAY FOR ASTHMA        Neomycin-Polymyxin-Dexameth (Suspension) MAXITROL 0.1 % Place 1 Drop in both eyes 4 times a day.        Promethazine-Codeine (Syrup) PHENERGAN-CODEINE 6.25-10 MG/5ML Take 5-10 mL by mouth at bedtime as needed for Cough (May redose every 4-6 hours PRN).        Venlafaxine HCl (CAPSULE SR 24 HR) EFFEXOR XR 75 MG Take 1 Cap by mouth every day.        Venlafaxine HCl (CAPSULE SR 24 HR) EFFEXOR XR 75 MG TAKE 1 CAPSULE EVERY DAY        .                 Medicines prescribed today were sent to:     [facility] [address] [place] [address] - [id number] [place], [place] [id number] [facility] [address]    Phone: [phone] Fax: [phone]    Open 24 Hours?: No      Medication refill instructions:       If your prescription bottle indicates you have medication refills left, it is not necessary to call your provider’s office. Please contact your pharmacy and they will refill your medication.    If your prescription bottle indicates you do not have any refills left, you may request refills at any time through one of the following ways: The online [employer] system (except Urgent Care), by calling your provider’s office, or by asking your pharmacy to contact your provider’s office with a refill request. Medication refills are processed only during regular business hours and may not be  available until the next business day. Your provider may request additional information or to have a follow-up visit with you prior to refilling your medication.   *Please Note: Medication refills are assigned a new Rx number when refilled electronically. Your pharmacy may indicate that no refills were authorized even though a new prescription for the same medication is available at the pharmacy. Please request the medicine by name with the pharmacy before contacting your provider for a refill.           [employer] Access Code: Activation code not generated  Current [employer] Status: Active

## 2018-01-02 ENCOUNTER — OFFICE VISIT (OUTPATIENT)
Dept: URGENT CARE | Facility: CLINIC | Age: 73
End: 2018-01-02
Payer: MEDICARE

## 2018-01-02 VITALS
OXYGEN SATURATION: 95 % | HEART RATE: 65 BPM | SYSTOLIC BLOOD PRESSURE: 124 MMHG | RESPIRATION RATE: 14 BRPM | WEIGHT: 151 LBS | BODY MASS INDEX: 23.7 KG/M2 | DIASTOLIC BLOOD PRESSURE: 80 MMHG | TEMPERATURE: 97 F | HEIGHT: 67 IN

## 2018-01-02 DIAGNOSIS — J06.9 VIRAL URI: ICD-10-CM

## 2018-01-02 PROCEDURE — 99213 OFFICE O/P EST LOW 20 MIN: CPT | Performed by: INTERNAL MEDICINE

## 2018-01-02 ASSESSMENT — ENCOUNTER SYMPTOMS
FEVER: 0
WHEEZING: 0
HEADACHES: 0
VOMITING: 0
PALPITATIONS: 0
WEIGHT LOSS: 0
BLOOD IN STOOL: 0
DIZZINESS: 0
EYES NEGATIVE: 1
DIARRHEA: 0
CHILLS: 0
NAUSEA: 0
SHORTNESS OF BREATH: 0
SORE THROAT: 0
COUGH: 1
MYALGIAS: 1

## 2018-01-03 NOTE — PROGRESS NOTES
"Subjective:      Alissa Hwang is a 72 y.o. female who presents with Other (chest congestion)            Patient is a very pleasant 72-year-old female who presents today with 7 days of nasal congestion, cough, stuffiness. She has had a flu shot. She has a history of chronic mildly persistent asthma and was concerned of possible pneumonia. No fever today and she states that she is feeling better        Review of Systems   Constitutional: Positive for malaise/fatigue. Negative for chills, fever and weight loss.   HENT: Positive for congestion. Negative for sore throat.    Eyes: Negative.    Respiratory: Positive for cough. Negative for shortness of breath and wheezing.    Cardiovascular: Negative for chest pain, palpitations and leg swelling.   Gastrointestinal: Negative for blood in stool, diarrhea, nausea and vomiting.   Genitourinary: Negative for dysuria.   Musculoskeletal: Positive for myalgias.   Skin: Negative for rash.   Neurological: Negative for dizziness (negative headache) and headaches.          Objective:     /80   Pulse 65   Temp 36.1 °C (97 °F)   Resp 14   Ht 1.689 m (5' 6.5\")   Wt 68.5 kg (151 lb)   SpO2 95%   BMI 24.01 kg/m²      Physical Exam   Constitutional: She is oriented to person, place, and time. She appears well-developed and well-nourished. She is active. No distress.   HENT:   Head: Normocephalic and atraumatic.   Right Ear: External ear normal.   Left Ear: External ear normal.   Mouth/Throat: Oropharynx is clear and moist. No oropharyngeal exudate.   Eyes: Conjunctivae and EOM are normal. Pupils are equal, round, and reactive to light. Right eye exhibits no discharge. Left eye exhibits no discharge. No scleral icterus.   Neck: Normal range of motion. Neck supple. No JVD present. Carotid bruit is not present. No thyroid mass and no thyromegaly present.   Cardiovascular: Normal rate, regular rhythm, S1 normal, S2 normal and normal heart sounds.  Exam reveals no " friction rub.    No murmur heard.  Pulmonary/Chest: Effort normal. No respiratory distress. She has wheezes (mild). She has no rales.   Abdominal: Soft. Bowel sounds are normal. She exhibits no distension and no mass. There is no hepatosplenomegaly. There is no tenderness. There is no rebound and no guarding.   Musculoskeletal: Normal range of motion. She exhibits no edema.   Lymphadenopathy:     She has no cervical adenopathy.   Neurological: She is alert and oriented to person, place, and time. She has normal strength. No cranial nerve deficit.   Skin: Skin is warm and dry. No rash noted. No erythema.   Psychiatric: She has a normal mood and affect. Her behavior is normal. Thought content normal.               Assessment/Plan:   Viral URI  Asthma      Patient advised to use her inhaler as needed  Humidified o2  Tylenol when necessary, by mouth fluids  Return to clinic for worsening symptoms

## 2018-05-21 ENCOUNTER — APPOINTMENT (OUTPATIENT)
Dept: RADIOLOGY | Facility: IMAGING CENTER | Age: 73
End: 2018-05-21
Attending: NURSE PRACTITIONER
Payer: MEDICARE

## 2018-05-21 ENCOUNTER — OFFICE VISIT (OUTPATIENT)
Dept: URGENT CARE | Facility: CLINIC | Age: 73
End: 2018-05-21
Payer: MEDICARE

## 2018-05-21 VITALS
BODY MASS INDEX: 23.23 KG/M2 | HEART RATE: 72 BPM | RESPIRATION RATE: 16 BRPM | DIASTOLIC BLOOD PRESSURE: 80 MMHG | SYSTOLIC BLOOD PRESSURE: 130 MMHG | OXYGEN SATURATION: 98 % | TEMPERATURE: 98.2 F | WEIGHT: 148 LBS | HEIGHT: 67 IN

## 2018-05-21 DIAGNOSIS — S20.212A RIB CONTUSION, LEFT, INITIAL ENCOUNTER: ICD-10-CM

## 2018-05-21 DIAGNOSIS — R07.81 RIB PAIN ON LEFT SIDE: ICD-10-CM

## 2018-05-21 PROCEDURE — 71101 X-RAY EXAM UNILAT RIBS/CHEST: CPT | Mod: TC,FY,LT | Performed by: NURSE PRACTITIONER

## 2018-05-21 PROCEDURE — 99214 OFFICE O/P EST MOD 30 MIN: CPT | Performed by: NURSE PRACTITIONER

## 2018-05-22 ASSESSMENT — ENCOUNTER SYMPTOMS
FEVER: 0
SHORTNESS OF BREATH: 0
COUGH: 0

## 2018-05-22 NOTE — PROGRESS NOTES
"Subjective:      Alissa Hwang is a 72 y.o. female who presents with Fall (2 wks ago horse push her back , hurt ribs, left side pain .)            This is a new problem. Pt reports she was walking one of her younger horses on a lead down a gravel path, when he became spooked and knocked her down. She states she landed on the front of her chest, also striking the left side of her face on the gravel. She states all other injuries have healed, except she still has pain on the left side of her rib cage. She has been able to continue doing her regular day to day activites, but the pain is still present as it was 2 weeks ago. She has taken some tylenol for the pain with little relief. Denies any SOB, difficulty breathing, or cough.        Review of Systems   Constitutional: Negative for fever.   Respiratory: Negative for cough and shortness of breath.    Musculoskeletal:        Left rib cage injury   All other systems reviewed and are negative.    Past Medical History:   Diagnosis Date   • Asthma    • Depression       Past Surgical History:   Procedure Laterality Date   • PB ENLARGE BREAST WITH IMPLANT  1972      Social History     Social History   • Marital status:      Spouse name: N/A   • Number of children: N/A   • Years of education: N/A     Occupational History   • Not on file.     Social History Main Topics   • Smoking status: Former Smoker     Packs/day: 1.00     Years: 34.00     Types: Cigarettes     Start date: 1/1/1968     Quit date: 1/1/2005   • Smokeless tobacco: Never Used   • Alcohol use 7.0 oz/week     14 Glasses of wine per week      Comment: beer-2-3 per weekday, occ on weekend   • Drug use: No   • Sexual activity: Yes     Partners: Male     Other Topics Concern   • Not on file     Social History Narrative   • No narrative on file          Objective:     /80   Pulse 72   Temp 36.8 °C (98.2 °F)   Resp 16   Ht 1.689 m (5' 6.5\")   Wt 67.1 kg (148 lb)   SpO2 98%   BMI 23.53 " kg/m²      Physical Exam   Constitutional: She is oriented to person, place, and time. Vital signs are normal. She appears well-developed and well-nourished.   HENT:   Head: Normocephalic and atraumatic.   Eyes: EOM are normal. Pupils are equal, round, and reactive to light.   Neck: Normal range of motion.   Cardiovascular: Normal rate and regular rhythm.    Pulmonary/Chest: Effort normal. She exhibits tenderness.       Musculoskeletal: Normal range of motion.   Neurological: She is alert and oriented to person, place, and time.   Skin: Skin is warm and dry. Capillary refill takes less than 2 seconds.   Psychiatric: She has a normal mood and affect. Her speech is normal and behavior is normal. Thought content normal.   Vitals reviewed.         Xray: no fracture or dislocation by my read. Radiology review pending.  5/21/2018 5:46 PM    HISTORY/REASON FOR EXAM: Left-sided rib pain below breast region after fall to ground.      TECHNIQUE/EXAM DESCRIPTION AND NUMBER OF VIEWS:  5 images of the left ribs and chest.    COMPARISON: CXR 12/7/2016    FINDINGS:  No displaced fractures or acute bony changes are noted.  There is no evidence of a hemo or pneumothorax.   Impression       Negative left rib series.          Assessment/Plan:     1. Rib pain on left side  - [id number] (WITH 1-VIEW CXR) LEFT; Future    2. Rib contusion, left, initial encounter    Advised pt a contusion may take several weeks to heal, tenderness will slowly improve  Splinting techniques discussed  Alternate tylenol and ibuprofen PRN pain  Continue to cough and deep breathe, no shallow breaths  Supportive care, differential diagnoses, and indications for immediate follow-up discussed with patient.    Pathogenesis of diagnosis discussed including typical length and natural progression.      Instructed to return to  or nearest emergency department if symptoms fail to improve, for any change in condition, further concerns, or new concerning  symptoms.  Patient states understanding of the plan of care and discharge instructions.

## 2018-07-26 ENCOUNTER — HOSPITAL ENCOUNTER (OUTPATIENT)
Dept: LAB | Facility: MEDICAL CENTER | Age: 73
End: 2018-07-26
Attending: FAMILY MEDICINE
Payer: MEDICARE

## 2018-07-26 DIAGNOSIS — E78.00 PURE HYPERCHOLESTEROLEMIA: ICD-10-CM

## 2018-07-26 LAB
ALBUMIN SERPL BCP-MCNC: 4.2 G/DL (ref 3.2–4.9)
ALBUMIN/GLOB SERPL: 1.6 G/DL
ALP SERPL-CCNC: 61 U/L (ref 30–99)
ALT SERPL-CCNC: 24 U/L (ref 2–50)
ANION GAP SERPL CALC-SCNC: 8 MMOL/L (ref 0–11.9)
AST SERPL-CCNC: 21 U/L (ref 12–45)
BILIRUB SERPL-MCNC: 0.8 MG/DL (ref 0.1–1.5)
BUN SERPL-MCNC: 14 MG/DL (ref 8–22)
CALCIUM SERPL-MCNC: 9.7 MG/DL (ref 8.5–10.5)
CHLORIDE SERPL-SCNC: 105 MMOL/L (ref 96–112)
CHOLEST SERPL-MCNC: 187 MG/DL (ref 100–199)
CO2 SERPL-SCNC: 27 MMOL/L (ref 20–33)
CREAT SERPL-MCNC: 0.75 MG/DL (ref 0.5–1.4)
ERYTHROCYTE [DISTWIDTH] IN BLOOD BY AUTOMATED COUNT: 46.5 FL (ref 35.9–50)
GLOBULIN SER CALC-MCNC: 2.6 G/DL (ref 1.9–3.5)
GLUCOSE SERPL-MCNC: 96 MG/DL (ref 65–99)
HCT VFR BLD AUTO: 41.6 % (ref 37–47)
HDLC SERPL-MCNC: 79 MG/DL
HGB BLD-MCNC: 13.7 G/DL (ref 12–16)
LDLC SERPL CALC-MCNC: 94 MG/DL
MCH RBC QN AUTO: 30.8 PG (ref 27–33)
MCHC RBC AUTO-ENTMCNC: 32.9 G/DL (ref 33.6–35)
MCV RBC AUTO: 93.5 FL (ref 81.4–97.8)
PLATELET # BLD AUTO: 295 K/UL (ref 164–446)
PMV BLD AUTO: 10.7 FL (ref 9–12.9)
POTASSIUM SERPL-SCNC: 5 MMOL/L (ref 3.6–5.5)
PROT SERPL-MCNC: 6.8 G/DL (ref 6–8.2)
RBC # BLD AUTO: 4.45 M/UL (ref 4.2–5.4)
SODIUM SERPL-SCNC: 140 MMOL/L (ref 135–145)
TRIGL SERPL-MCNC: 69 MG/DL (ref 0–149)
WBC # BLD AUTO: 5.5 K/UL (ref 4.8–10.8)

## 2018-07-26 PROCEDURE — 36415 COLL VENOUS BLD VENIPUNCTURE: CPT

## 2018-07-26 PROCEDURE — 80061 LIPID PANEL: CPT

## 2018-07-26 PROCEDURE — 85027 COMPLETE CBC AUTOMATED: CPT

## 2018-07-26 PROCEDURE — 80053 COMPREHEN METABOLIC PANEL: CPT

## 2018-08-13 ENCOUNTER — TELEPHONE (OUTPATIENT)
Dept: MEDICAL GROUP | Age: 73
End: 2018-08-13

## 2018-08-13 NOTE — TELEPHONE ENCOUNTER
Future Appointments       Provider Department Center    8/14/2018 3:40 PM Estevan Atkins M.D. [facility] [address]                    Left message for patient to call back regarding pre-visit planning. Please transfer call to [phone].

## 2018-08-14 ENCOUNTER — OFFICE VISIT (OUTPATIENT)
Dept: MEDICAL GROUP | Age: 73
End: 2018-08-14
Payer: MEDICARE

## 2018-08-14 VITALS
BODY MASS INDEX: 24.8 KG/M2 | HEIGHT: 67 IN | OXYGEN SATURATION: 92 % | TEMPERATURE: 97.2 F | DIASTOLIC BLOOD PRESSURE: 66 MMHG | WEIGHT: 158 LBS | SYSTOLIC BLOOD PRESSURE: 126 MMHG | HEART RATE: 57 BPM

## 2018-08-14 DIAGNOSIS — M25.571 ACUTE RIGHT ANKLE PAIN: ICD-10-CM

## 2018-08-14 DIAGNOSIS — E78.5 DYSLIPIDEMIA: ICD-10-CM

## 2018-08-14 DIAGNOSIS — F32.0 MAJOR DEPRESSIVE DISORDER, SINGLE EPISODE, MILD (HCC): ICD-10-CM

## 2018-08-14 DIAGNOSIS — F32.0 CURRENT MILD EPISODE OF MAJOR DEPRESSIVE DISORDER WITHOUT PRIOR EPISODE (HCC): ICD-10-CM

## 2018-08-14 DIAGNOSIS — J45.909 UNCOMPLICATED ASTHMA, UNSPECIFIED ASTHMA SEVERITY, UNSPECIFIED WHETHER PERSISTENT: ICD-10-CM

## 2018-08-14 DIAGNOSIS — Z12.31 VISIT FOR SCREENING MAMMOGRAM: ICD-10-CM

## 2018-08-14 PROCEDURE — 99214 OFFICE O/P EST MOD 30 MIN: CPT | Performed by: FAMILY MEDICINE

## 2018-08-14 RX ORDER — MONTELUKAST SODIUM 10 MG/1
TABLET ORAL
Qty: 360 TAB | Refills: 0 | Status: SHIPPED | OUTPATIENT
Start: 2018-08-14 | End: 2019-01-10 | Stop reason: SDUPTHER

## 2018-08-14 RX ORDER — ATORVASTATIN CALCIUM 10 MG/1
10 TABLET, FILM COATED ORAL DAILY
Qty: 90 TAB | Refills: 0 | Status: SHIPPED | OUTPATIENT
Start: 2018-08-14 | End: 2019-01-10 | Stop reason: SDUPTHER

## 2018-08-14 RX ORDER — VENLAFAXINE HYDROCHLORIDE 75 MG/1
75 CAPSULE, EXTENDED RELEASE ORAL DAILY
Qty: 90 CAP | Refills: 0 | Status: SHIPPED | OUTPATIENT
Start: 2018-08-14 | End: 2018-11-30 | Stop reason: SDUPTHER

## 2018-08-14 ASSESSMENT — PATIENT HEALTH QUESTIONNAIRE - PHQ9: CLINICAL INTERPRETATION OF PHQ2 SCORE: 0

## 2018-08-15 PROBLEM — M25.571 RIGHT ANKLE PAIN: Status: ACTIVE | Noted: 2018-08-15

## 2018-08-15 RX ORDER — ATORVASTATIN CALCIUM 10 MG/1
10 TABLET, FILM COATED ORAL DAILY
Qty: 90 TAB | Refills: 2 | Status: SHIPPED | OUTPATIENT
Start: 2018-08-15 | End: 2019-01-10

## 2018-08-15 NOTE — ASSESSMENT & PLAN NOTE
Doing well with atorvastatin 10 mg p.o. daily.  Denies any muscle aches, she did have muscle aches with Crestor so we switched atorvastatin.  No history of elevated liver enzymes.    Results for SUERoeFALLON LEWIS DRUMMOND (MRN [id number]) as of 8/15/2018 08:06   Ref. Range 7/26/2018 09:48   Cholesterol,Tot Latest Ref Range: 100 - 199 mg/dL 187   Triglycerides Latest Ref Range: 0 - 149 mg/dL 69   HDL Latest Ref Range: >=40 mg/dL 79   LDL Latest Ref Range: <100 mg/dL 94

## 2018-08-15 NOTE — ASSESSMENT & PLAN NOTE
NEW PROBLEM    Patient states that she twisted her ankle about 2 weeks ago.  She was walking and she tripped over a rock she felt extreme pain but was able to bear weight after 10 seconds.  Denies any popping locking or instability the pain is not unbearable about 1 out of 10, dull ache over the lateral malleolus.  She is been using ice and anti-inflammatories overall it is improving.  But she noticed a small swelling on the right side.  Denies any chest pain or shortness breath no dyspnea on exertion, no other new joint pain no new rashes no nausea no vomiting no fevers chills or night sweats.

## 2018-08-15 NOTE — PROGRESS NOTES
This medical record contains text that has been entered with the assistance of computer voice recognition and dictation software.  Therefore, it may contain unintended errors in text, spelling, punctuation, or grammar    Chief Complaint   Patient presents with   • Medication Refill   • Ankle Pain     right ankle         Alissa Hwang is a 72 y.o. female here evaluation and management of: Ankle pain hyperlipidemia routine visit depression      HPI:     Right ankle pain  NEW PROBLEM    Patient states that she twisted her ankle about 2 weeks ago.  She was walking and she tripped over a rock she felt extreme pain but was able to bear weight after 10 seconds.  Denies any popping locking or instability the pain is not unbearable about 1 out of 10, dull ache over the lateral malleolus.  She is been using ice and anti-inflammatories overall it is improving.  But she noticed a small swelling on the right side.  Denies any chest pain or shortness breath no dyspnea on exertion, no other new joint pain no new rashes no nausea no vomiting no fevers chills or night sweats.    Dyslipidemia  Doing well with atorvastatin 10 mg p.o. daily.  Denies any muscle aches, she did have muscle aches with Crestor so we switched atorvastatin.  No history of elevated liver enzymes.    Results for ALISSA HWANG (MRN [id number]) as of 8/15/2018 08:06   Ref. Range 7/26/2018 09:48   Cholesterol,Tot Latest Ref Range: 100 - 199 mg/dL 187   Triglycerides Latest Ref Range: 0 - 149 mg/dL 69   HDL Latest Ref Range: >=40 mg/dL 79   LDL Latest Ref Range: <100 mg/dL 94       Major depressive disorder  Patient is doing very well with Effexor 75 mg extended release p.o. Daily.    DEPRESSION SCREEN  Denied all of the following,  Depressed mood  Loss of interest or pleasure in nearly all activities  Changes in appetite or weight  Insomnia or hypersomnia,  Psychomotor agitation or retardation,  Fatigue or loss of energy,  Feelings of  worthlessness or guilt,  Difficulty thinking, concentrating, or making decisions,  Recurrent thoughts of death or suicidal ideation, plans, or attempts   No Early Morning Awakenings    Current medicines (including changes today)  Current Outpatient Prescriptions   Medication Sig Dispense Refill   • atorvastatin (LIPITOR) 10 MG Tab Take 1 Tab by mouth every day. 90 Tab 2   • venlafaxine XR (EFFEXOR XR) 75 MG CAPSULE SR 24 HR Take 1 Cap by mouth every day. 90 Cap 0   • atorvastatin (LIPITOR) 10 MG Tab Take 1 Tab by mouth every day. 90 Tab 0   • montelukast (SINGULAIR) 10 MG Tab TAKE 1 TABLET EVERY DAY FOR ASTHMA 360 Tab 0   • Misc. Devices Misc Please give patient shingles vaccine and Tdap 1 Application 0   • albuterol (VENTOLIN HFA) 108 (90 BASE) MCG/ACT Aero Soln inhalation aerosol Inhale 2 Puffs by mouth every four hours as needed. Indications: Asthma 8.5 g 1   • Budesonide-Formoterol Fumarate (SYMBICORT INH) Inhale 1 Puff by mouth every day. 160/4.5 mcg       No current facility-administered medications for this visit.      She  has a past medical history of Asthma and Depression.  She  has a past surgical history that includes pr enlarge breast with implant ().  Social History   Substance Use Topics   • Smoking status: Former Smoker     Packs/day: 1.00     Years: 34.00     Types: Cigarettes     Start date: 1968     Quit date: 2005   • Smokeless tobacco: Never Used   • Alcohol use 7.0 oz/week     14 Glasses of wine per week      Comment: beer-2-3 per weekday, occ on weekend     Social History     Social History Narrative   • No narrative on file     Family History   Problem Relation Age of Onset   • Hypertension Sister    • Hypertension Brother    • Arthritis Brother         RA   • Dementia Mother      Family Status   Relation Status   • Sis Alive   • Bro Alive   • Bro Alive   • Mo  at age 90        dementia   • Fa  at age 95        FTT   • MGMo    • MGFa    • PGMo  "   • PGFa          ROS    Please see hpi     All other systems reviewed and are negative     Objective:     Blood pressure 126/66, pulse (!) 57, temperature 36.2 °C (97.2 °F), height 1.689 m (5' 6.5\"), weight 71.7 kg (158 lb), SpO2 92 %, not currently breastfeeding. Body mass index is 25.12 kg/m².  Physical Exam:    Constitutional: Alert, no distress.  Skin: Warm, dry, good turgor, no rashes in visible areas.  Eye: Equal, round and reactive, conjunctiva clear, lids normal.  ENMT: Lips without lesions, good dentition, oropharynx clear.  Neck: Trachea midline, no masses, no thyromegaly. No cervical or supraclavicular lymphadenopathy.  Respiratory: Unlabored respiratory effort, lungs clear to auscultation, no wheezes, no ronchi.  Cardiovascular: Normal S1, S2, no murmur, no edema.  Abdomen: Soft, non-tender, no masses, no hepatosplenomegaly.  Psych: Alert and oriented x3, normal affect and mood.          Assessment and Plan:   The following treatment plan was discussed      1. Major depressive disorder, single episode, mild (HCC)    Patient has been stable with current management  We will make no changes for now    - venlafaxine XR (EFFEXOR XR) 75 MG CAPSULE SR 24 HR; Take 1 Cap by mouth every day.  Dispense: 90 Cap; Refill: 0    2. Dyslipidemia  Patient has been stable with current management  We will make no changes for now    - atorvastatin (LIPITOR) 10 MG Tab; Take 1 Tab by mouth every day.  Dispense: 90 Tab; Refill: 0    3. Uncomplicated asthma, unspecified asthma severity, unspecified whether persistent  I explained the importance of preventing antibiotic resistance  Educated patient that Washing the nasal cavities with saline reduces postnasal drainage, removes secretions, and rinses away allergens and irritants.  Patient was encouraged to use nasal saline rinses prior to using intranasal steroid  Will add Zyrtec and steroid taper   Oral prednisone  20 mg twice daily for five days, followed by 20 " mg daily for five days (ie, total of 10 days of treatment).    If symptoms persist will consider antibiotics  And Leukotriene D4 (LTD4) receptor blockers including montelukast or zafirlukast     - montelukast (SINGULAIR) 10 MG Tab; TAKE 1 TABLET EVERY DAY FOR ASTHMA  Dispense: 360 Tab; Refill: 0    4. Visit for screening mammogram  Due for mammogram    - MA-MAMMO SCREENING BILAT W/[place] W/O CAD; Future    5. Acute right ankle pain  Patient was instructed on activity modification ×2 weeks  Use the brace that  was given in clinic for 2 weeks with activity  NSAIDs when necessary  Ice when necessary and compression          HEALTH MAINTENANCE:    Instructed to Follow up in clinic or ER for worsening symptoms, difficulty breathing, lack of expected recovery, or should new symptoms or problems arise.    Followup: Return in about 6 months (around 2/14/2019) for Reevaluation.       Once again this medical record contains text that has been entered with the assistance of computer voice recognition and dictation software.  Therefore, it may contain unintended errors in text, spelling, punctuation, or grammar

## 2018-08-15 NOTE — ASSESSMENT & PLAN NOTE
Patient is doing very well with Effexor 75 mg extended release p.o. Daily.    DEPRESSION SCREEN  Denied all of the following,  Depressed mood  Loss of interest or pleasure in nearly all activities  Changes in appetite or weight  Insomnia or hypersomnia,  Psychomotor agitation or retardation,  Fatigue or loss of energy,  Feelings of worthlessness or guilt,  Difficulty thinking, concentrating, or making decisions,  Recurrent thoughts of death or suicidal ideation, plans, or attempts   No Early Morning Awakenings

## 2018-09-07 ENCOUNTER — HOSPITAL ENCOUNTER (OUTPATIENT)
Dept: RADIOLOGY | Facility: MEDICAL CENTER | Age: 73
End: 2018-09-07
Attending: FAMILY MEDICINE
Payer: MEDICARE

## 2018-09-07 DIAGNOSIS — Z12.31 VISIT FOR SCREENING MAMMOGRAM: ICD-10-CM

## 2018-09-07 PROCEDURE — 77067 SCR MAMMO BI INCL CAD: CPT

## 2018-11-30 DIAGNOSIS — F32.0 MAJOR DEPRESSIVE DISORDER, SINGLE EPISODE, MILD (HCC): ICD-10-CM

## 2018-12-04 RX ORDER — VENLAFAXINE HYDROCHLORIDE 75 MG/1
CAPSULE, EXTENDED RELEASE ORAL
Qty: 90 CAP | Refills: 0 | Status: SHIPPED | OUTPATIENT
Start: 2018-12-04 | End: 2019-01-10 | Stop reason: SDUPTHER

## 2018-12-05 ENCOUNTER — PATIENT OUTREACH (OUTPATIENT)
Dept: HEALTH INFORMATION MANAGEMENT | Facility: OTHER | Age: 73
End: 2018-12-05

## 2018-12-05 NOTE — PROGRESS NOTES
1. Attempt #:1    2. HealthConnect Verified: no    3. Verify PCP: yes    4. Review Care Team: yes    5. WebIZ Checked & Epic Updated: Yes   Td (adult), adsorbed   Influenza w/preserv.   Zoster Bar (Shingrix)         WebIZ Recommendations: FLU, TD and SHINGRIX (Shingles)  · Is patient due for Tdap? NO  · Is patient due for Shingles? YES. Patient was not notified of copay/out of pocket cost.    6. Reviewed/Updated the following with patient:       •   Communication Preference Obtained? YES       •   Preferred Pharmacy? YES       •   Preferred Lab? YES       •   Family History (document living status of immediate family members and if + hx of cancer, diabetes, hypertension, hyperlipidemia, heart attack, stroke) YES    7. Annual Wellness Visit Scheduling  · Scheduling Status:Scheduled     8. Care Gap Scheduling (Attempt to Schedule EACH Overdue Care Gap!)     Health Maintenance Due   Topic Date Due   • IMM ZOSTER VACCINES (2 of 3) 08/31/2017   • Annual Wellness Visit  06/29/2018   • IMM INFLUENZA (1) 09/01/2018        Scheduled patient for Annual Wellness Visit     9. [employer] Activation: already active    10. [employer] Fannie: no    11. Virtual Visits: no    12. Opt In to Text Messages: no    Transferred from [employer]   13. Patient was NOT advised: “This is a free wellness visit. The provider will screen for medical conditions to help you stay healthy. If you have other concerns to address you may be asked to discuss these at a separate visit or there may be an additional fee.”     14. Patient was NOT informed to arrive 15 min prior to their scheduled appointment and bring in their medication bottles.

## 2019-01-10 ENCOUNTER — OFFICE VISIT (OUTPATIENT)
Dept: MEDICAL GROUP | Age: 74
End: 2019-01-10
Payer: MEDICARE

## 2019-01-10 VITALS
HEART RATE: 68 BPM | TEMPERATURE: 97.7 F | OXYGEN SATURATION: 91 % | SYSTOLIC BLOOD PRESSURE: 128 MMHG | BODY MASS INDEX: 25.27 KG/M2 | HEIGHT: 67 IN | DIASTOLIC BLOOD PRESSURE: 68 MMHG | WEIGHT: 161 LBS

## 2019-01-10 DIAGNOSIS — F32.0 MAJOR DEPRESSIVE DISORDER, SINGLE EPISODE, MILD (HCC): ICD-10-CM

## 2019-01-10 DIAGNOSIS — E78.5 DYSLIPIDEMIA: ICD-10-CM

## 2019-01-10 DIAGNOSIS — J45.909 UNCOMPLICATED ASTHMA, UNSPECIFIED ASTHMA SEVERITY, UNSPECIFIED WHETHER PERSISTENT: ICD-10-CM

## 2019-01-10 DIAGNOSIS — Z00.00 MEDICARE ANNUAL WELLNESS VISIT, INITIAL: ICD-10-CM

## 2019-01-10 PROCEDURE — G0439 PPPS, SUBSEQ VISIT: HCPCS | Performed by: FAMILY MEDICINE

## 2019-01-10 RX ORDER — MONTELUKAST SODIUM 10 MG/1
TABLET ORAL
Qty: 360 TAB | Refills: 0 | Status: SHIPPED | OUTPATIENT
Start: 2019-01-10 | End: 2019-08-27 | Stop reason: SDUPTHER

## 2019-01-10 RX ORDER — INFLUENZA VACCINE, ADJUVANTED 15; 15; 15 UG/.5ML; UG/.5ML; UG/.5ML
INJECTION, SUSPENSION INTRAMUSCULAR
Refills: 0 | COMMUNITY
Start: 2018-11-19 | End: 2019-01-10

## 2019-01-10 RX ORDER — ATORVASTATIN CALCIUM 10 MG/1
10 TABLET, FILM COATED ORAL DAILY
Qty: 90 TAB | Refills: 0 | Status: SHIPPED | OUTPATIENT
Start: 2019-01-10 | End: 2019-04-18 | Stop reason: SDUPTHER

## 2019-01-10 RX ORDER — VENLAFAXINE HYDROCHLORIDE 75 MG/1
CAPSULE, EXTENDED RELEASE ORAL
Qty: 90 CAP | Refills: 0 | Status: SHIPPED | OUTPATIENT
Start: 2019-01-10 | End: 2019-04-08 | Stop reason: SDUPTHER

## 2019-01-10 ASSESSMENT — PATIENT HEALTH QUESTIONNAIRE - PHQ9
5. POOR APPETITE OR OVEREATING: NOT AT ALL
1. LITTLE INTEREST OR PLEASURE IN DOING THINGS: NOT AT ALL
6. FEELING BAD ABOUT YOURSELF - OR THAT YOU ARE A FAILURE OR HAVE LET YOURSELF OR YOUR FAMILY DOWN: NOT AL ALL
8. MOVING OR SPEAKING SO SLOWLY THAT OTHER PEOPLE COULD HAVE NOTICED. OR THE OPPOSITE, BEING SO FIGETY OR RESTLESS THAT YOU HAVE BEEN MOVING AROUND A LOT MORE THAN USUAL: NOT AT ALL
SUM OF ALL RESPONSES TO PHQ9 QUESTIONS 1 AND 2: 0
SUM OF ALL RESPONSES TO PHQ QUESTIONS 1-9: 0
3. TROUBLE FALLING OR STAYING ASLEEP OR SLEEPING TOO MUCH: NOT AT ALL
7. TROUBLE CONCENTRATING ON THINGS, SUCH AS READING THE NEWSPAPER OR WATCHING TELEVISION: NOT AT ALL
CLINICAL INTERPRETATION OF PHQ2 SCORE: 0
2. FEELING DOWN, DEPRESSED, IRRITABLE, OR HOPELESS: NOT AT ALL
9. THOUGHTS THAT YOU WOULD BE BETTER OFF DEAD, OR OF HURTING YOURSELF: NOT AT ALL
4. FEELING TIRED OR HAVING LITTLE ENERGY: NOT AT ALL

## 2019-01-10 ASSESSMENT — ACTIVITIES OF DAILY LIVING (ADL): BATHING_REQUIRES_ASSISTANCE: 0

## 2019-01-10 ASSESSMENT — ENCOUNTER SYMPTOMS: GENERAL WELL-BEING: EXCELLENT

## 2019-01-10 NOTE — PROGRESS NOTES
Chief Complaint   Patient presents with   • Annual Wellness Visit              HPI:  Alissa is a 73 y.o. here for Medicare Annual Wellness Visit        Patient Active Problem List    Diagnosis Date Noted   • Right ankle pain 08/15/2018   • Uncomplicated asthma 08/14/2018   • Benign essential HTN 07/06/2017   • Cervical cancer screening 07/06/2017   • Neoplasm of uncertain behavior 07/06/2017   • Visit for screening mammogram 06/28/2017   • AK (actinic keratosis) 06/28/2017   • Medicare annual wellness visit, initial 06/28/2017   • Preventative health care 01/04/2017   • Viral upper respiratory tract infection 12/07/2016   • Smoking greater than 30 pack years 09/01/2016   • Postmenopausal 09/01/2016   • Screening for colon cancer 09/01/2016   • Need for prophylactic vaccination with combined vaccine 09/01/2016   • Asthma, moderate persistent 02/26/2015   • Environmental allergies 02/26/2015   • Major depressive disorder 02/26/2015   • Dyslipidemia 02/26/2015       Current Outpatient Prescriptions   Medication Sig Dispense Refill   • venlafaxine XR (EFFEXOR XR) 75 MG CAPSULE SR 24 HR TAKE 1 CAPSULE EVERY DAY 90 Cap 0   • atorvastatin (LIPITOR) 10 MG Tab Take 1 Tab by mouth every day. 90 Tab 0   • montelukast (SINGULAIR) 10 MG Tab TAKE 1 TABLET EVERY DAY FOR ASTHMA 360 Tab 0   • Misc. Devices Misc Please give patient shingles vaccine and Tdap 1 Application 0   • albuterol (VENTOLIN HFA) 108 (90 BASE) MCG/ACT Aero Soln inhalation aerosol Inhale 2 Puffs by mouth every four hours as needed. Indications: Asthma 8.5 g 1   • Budesonide-Formoterol Fumarate (SYMBICORT INH) Inhale 1 Puff by mouth every day. 160/4.5 mcg       No current facility-administered medications for this visit.         Patient is taking medications as noted in medication list.  Current supplements as per medication list.     Allergies: Sulfa drugs    Current social contact/activities: country club//dinner with friends     Is patient current  with immunizations? No, due for SHINGRIX (Shingles). Patient is interested in receiving NONE today.    She  reports that she quit smoking about 14 years ago. Her smoking use included Cigarettes. She started smoking about 51 years ago. She has a 34.00 pack-year smoking history. She has never used smokeless tobacco. She reports that she drinks about 7.0 oz of alcohol per week . She reports that she uses drugs, including Marijuana.  Counseling given: Not Answered        DPA/Advanced directive: Patient does not have an Advanced Directive.  A packet and workshop information was given on Advanced Directives.    ROS:    Gait: Uses no assistive device   Ostomy: No   Other tubes: No   Amputations: No   Chronic oxygen use No   Last eye exam 2018   Wears hearing aids: No   : Denies any urinary leakage during the last 6 months      Screening:        Depression Screening    Little interest or pleasure in doing things?  0 - not at all  Feeling down, depressed, or hopeless? 0 - not at all  Patient Health Questionnaire Score: 0    If depressive symptoms identified deferred to follow up visit unless specifically addressed in assessment and plan.    Interpretation of PHQ-9 Total Score   Score Severity   1-4 No Depression   5-9 Mild Depression   10-14 Moderate Depression   15-19 Moderately Severe Depression   20-27 Severe Depression    Screening for Cognitive Impairment    Three Minute Recall (leader, season, table)  3/3    Pacheco clock face with all 12 numbers and set the hands to show 10 past 11.  Yes    If cognitive concerns identified, deferred for follow up unless specifically addressed in assessment and plan.    Fall Risk Assessment    Has the patient had two or more falls in the last year or any fall with injury in the last year?  No  If fall risk identified, deferred for follow up unless specifically addressed in assessment and plan.    Safety Assessment    Throw rugs on floor.  Yes  Handrails on all stairs.  No  Good lighting  in all hallways.  Yes  Difficulty hearing.  No  Patient counseled about all safety risks that were identified.    Functional Assessment ADLs    Are there any barriers preventing you from cooking for yourself or meeting nutritional needs?  No.    Are there any barriers preventing you from driving safely or obtaining transportation?  No.    Are there any barriers preventing you from using a telephone or calling for help?  No.    Are there any barriers preventing you from shopping?  No.    Are there any barriers preventing you from taking care of your own finances?  No.    Are there any barriers preventing you from managing your medications?  No.    Are there any barriers preventing you from showering, bathing or dressing yourself?  No.    Are you currently engaging in any exercise or physical activity?  Yes.  Ride   What is your perception of your health?  Excellent.    Health Maintenance Summary                IMM ZOSTER VACCINES Overdue 8/31/2017      Done 7/6/2017 Imm Admin: Zoster Vaccine Live (ZVL) (Zostavax)    Annual Wellness Visit Overdue 6/29/2018      Done 6/28/2017 Visit Dx: Medicare annual wellness visit, initial     Patient has more history with this topic...    MAMMOGRAM Next Due 9/7/2019      Done 9/7/2018 MA-MAMMO SCREENING BILAT W/IMPLANTS W/[place] W/CAD     Patient has more history with this topic...    PAP SMEAR Next Due 7/6/2020      Done 7/6/2017      Patient has more history with this topic...    BONE DENSITY Next Due 9/9/2021      Done 9/9/2016 DS-BONE DENSITY STUDY (DEXA)    COLONOSCOPY Next Due 1/9/2023      Done 1/9/2018 REFERRAL TO GI FOR COLONOSCOPY    IMM DTaP/Tdap/Td Vaccine Next Due 7/6/2027      Done 7/6/2017 Imm Admin: Tdap Vaccine          Patient Care Team:  Estevan Atkins M.D. as PCP - General (Family Medicine)  Wen Price M.D. (Pulmonary Medicine)  Yo King O.D. as Consulting Physician (Optometry)    Social History   Substance Use Topics   • Smoking status:  "Former Smoker     Packs/day: 1.00     Years: 34.00     Types: Cigarettes     Start date: 1/1/1968     Quit date: 1/1/2005   • Smokeless tobacco: Never Used   • Alcohol use 7.0 oz/week     14 Glasses of wine per week     Family History   Problem Relation Age of Onset   • Hypertension Sister    • Hypertension Brother    • Arthritis Brother         RA   • Dementia Mother    • Heart Attack Mother      She  has a past medical history of Asthma and Depression.   Past Surgical History:   Procedure Laterality Date   • PB ENLARGE BREAST WITH IMPLANT  1972           Exam:     Blood pressure 128/68, pulse 68, temperature 36.5 °C (97.7 °F), height 1.702 m (5' 7\"), weight 73 kg (161 lb), SpO2 91 %, not currently breastfeeding. Body mass index is 25.22 kg/m².    Hearing good.    Dentition good  Alert, oriented in no acute distress.  Eye contact is good, speech goal directed, affect calm      Assessment and Plan. The following treatment and monitoring plan is recommended:    1. Major depressive disorder, single episode, mild (HCC)  venlafaxine XR (EFFEXOR XR) 75 MG CAPSULE SR 24 HR   2. Dyslipidemia  atorvastatin (LIPITOR) 10 MG Tab   3. Uncomplicated asthma, unspecified asthma severity, unspecified whether persistent  montelukast (SINGULAIR) 10 MG Tab   4. Medicare annual wellness visit, initial  Subsequent Annual Wellness Visit - Includes PPPS ()     Plan:    1. Patient has been stable with current management  We will make no changes for now    2. Patient has been stable with current management  We will make no changes for now    3. Patient has been stable with current management  We will make no changes for now    4. complete    Services suggested: No services needed at this time  Health Care Screening recommendations as per orders if indicated.  Referrals offered: PT/OT/Nutrition counseling/Behavioral Health/Smoking cessation as per orders if indicated.    Discussion today about general wellness and lifestyle habits:  "   · Prevent falls and reduce trip hazards; Cautioned about securing or removing rugs.  · Have a working fire alarm and carbon monoxide detector;   · Engage in regular physical activity and social activities       Follow-up: Return in about 6 months (around 7/10/2019) for Reevaluation, labs.

## 2019-02-05 DIAGNOSIS — L30.9 DERMATITIS: ICD-10-CM

## 2019-04-22 DIAGNOSIS — E78.5 DYSLIPIDEMIA: ICD-10-CM

## 2019-04-23 RX ORDER — ATORVASTATIN CALCIUM 10 MG/1
10 TABLET, FILM COATED ORAL DAILY
Qty: 90 TAB | Refills: 0 | Status: SHIPPED | OUTPATIENT
Start: 2019-04-23 | End: 2019-08-27

## 2019-08-27 ENCOUNTER — OFFICE VISIT (OUTPATIENT)
Dept: MEDICAL GROUP | Facility: LAB | Age: 74
End: 2019-08-27
Payer: MEDICARE

## 2019-08-27 VITALS
SYSTOLIC BLOOD PRESSURE: 122 MMHG | HEART RATE: 69 BPM | HEIGHT: 67 IN | BODY MASS INDEX: 24.8 KG/M2 | RESPIRATION RATE: 12 BRPM | WEIGHT: 158 LBS | TEMPERATURE: 98 F | DIASTOLIC BLOOD PRESSURE: 64 MMHG | OXYGEN SATURATION: 95 %

## 2019-08-27 DIAGNOSIS — F32.0 CURRENT MILD EPISODE OF MAJOR DEPRESSIVE DISORDER WITHOUT PRIOR EPISODE (HCC): ICD-10-CM

## 2019-08-27 DIAGNOSIS — Z12.39 BREAST CANCER SCREENING: ICD-10-CM

## 2019-08-27 DIAGNOSIS — F17.210 SMOKING GREATER THAN 30 PACK YEARS: ICD-10-CM

## 2019-08-27 DIAGNOSIS — J45.909 UNCOMPLICATED ASTHMA, UNSPECIFIED ASTHMA SEVERITY, UNSPECIFIED WHETHER PERSISTENT: ICD-10-CM

## 2019-08-27 DIAGNOSIS — E78.5 DYSLIPIDEMIA: ICD-10-CM

## 2019-08-27 DIAGNOSIS — J45.40 MODERATE PERSISTENT ASTHMA WITHOUT COMPLICATION: ICD-10-CM

## 2019-08-27 DIAGNOSIS — F32.0 MAJOR DEPRESSIVE DISORDER, SINGLE EPISODE, MILD (HCC): ICD-10-CM

## 2019-08-27 DIAGNOSIS — Z11.59 NEED FOR HEPATITIS C SCREENING TEST: ICD-10-CM

## 2019-08-27 PROCEDURE — 99214 OFFICE O/P EST MOD 30 MIN: CPT | Performed by: FAMILY MEDICINE

## 2019-08-27 RX ORDER — MONTELUKAST SODIUM 10 MG/1
TABLET ORAL
Qty: 360 TAB | Refills: 0 | Status: SHIPPED | OUTPATIENT
Start: 2019-08-27 | End: 2019-09-18 | Stop reason: SDUPTHER

## 2019-08-27 RX ORDER — VENLAFAXINE HYDROCHLORIDE 75 MG/1
CAPSULE, EXTENDED RELEASE ORAL
Qty: 90 CAP | Refills: 3 | Status: SHIPPED | OUTPATIENT
Start: 2019-08-27 | End: 2019-09-18 | Stop reason: SDUPTHER

## 2019-08-27 RX ORDER — ATORVASTATIN CALCIUM 10 MG/1
10 TABLET, FILM COATED ORAL
Qty: 90 TAB | Refills: 3 | Status: SHIPPED | OUTPATIENT
Start: 2019-08-27 | End: 2019-09-18 | Stop reason: SDUPTHER

## 2019-08-27 NOTE — PROGRESS NOTES
Subjective:     CC: [facility]    HPI:   Alissa presents today with    Depression:  This is a chronic stable issue, new to me.  Patient states that she has no issues with depression however she has been on Effexor for several years now.  She is attempted to trial off but has not been able to stay off the medication.  She denies any side effects.  Is here for refills.    HLD:  This is a chronic stable issue, new to me.  Patient ASCVD risk 9%.  Lipid panel stable.  Patient tries to maintain a healthy diet and exercise.  She is currently on Lipitor 10 mg.  She denies any side effects.    Asthma:  This is a chronic stable issue, new to me.  Patient currently on albuterol as needed and the last time she is needed to use it is 1 year ago.  She is not using her maintenance inhaler as she denies having any shortness of breath.  She is also taking Singulair daily which she states she needs at for her allergy related asthma.  She denies a nighttime cough or shortness of breath.        Past Medical History:   Diagnosis Date   • Asthma    • Depression        Social History     Tobacco Use   • Smoking status: Former Smoker     Packs/day: 1.00     Years: 34.00     Pack years: 34.00     Types: Cigarettes     Start date: 1968     Last attempt to quit: 2005     Years since quittin.6   • Smokeless tobacco: Never Used   Substance Use Topics   • Alcohol use: Yes     Alcohol/week: 7.0 oz     Types: 14 Glasses of wine per week   • Drug use: Yes     Types: Marijuana     Comment: occasional       Current Outpatient Medications Ordered in Epic   Medication Sig Dispense Refill   • atorvastatin (LIPITOR) 10 MG Tab TAKE 1 TABLET EVERY DAY 90 Tab 2   • venlafaxine XR (EFFEXOR XR) 75 MG CAPSULE SR 24 HR TAKE 1 CAPSULE EVERY DAY 90 Cap 1   • montelukast (SINGULAIR) 10 MG Tab TAKE 1 TABLET EVERY DAY FOR ASTHMA 360 Tab 0   • albuterol (VENTOLIN HFA) 108 (90 BASE) MCG/ACT Aero Soln inhalation aerosol Inhale 2 Puffs by mouth every four  "hours as needed. Indications: Asthma 8.5 g 1   • Budesonide-Formoterol Fumarate (SYMBICORT INH) Inhale 1 Puff by mouth every day. 160/4.5 mcg     • Misc. Devices Misc Please give patient shingles vaccine and Tdap 1 Application 0     No current Epic-ordered facility-administered medications on file.        Allergies:  Sulfa drugs    ROS:  Gen: no fevers/chill, no changes in weight  Eyes: no changes in vision  ENT: no sore throat, no hearing loss, no bloody nose  Pulm: no sob, no cough  CV: no chest pain, no palpitations  GI: no nausea/vomiting, no diarrhea  : no dysuria  MSk: no myalgias  Skin: no rash  Neuro: no headaches, no numbness/tingling  Heme/Lymph: no easy bruising      Objective:       Exam:  /64   Pulse 69   Temp 36.7 °C (98 °F) (Temporal)   Resp 12   Ht 1.702 m (5' 7\")   Wt 71.7 kg (158 lb)   SpO2 95%   BMI 24.75 kg/m²  Body mass index is 24.75 kg/m².    Gen: Alert and oriented, No apparent distress.  Neck: Neck is supple without lymphadenopathy.  Lungs: Normal effort, CTA bilaterally, no wheezes, rhonchi, or rales  CV: Regular rate and rhythm. No murmurs, rubs, or gallops.       Ext: No clubbing, cyanosis, edema.      Assessment & Plan:     73 y.o. female with the following -     1. Current mild episode of major depressive disorder without prior episode (HCC)  Continue Effexor as needed.  Discussed therapy and other forms of depression management.    2. Dyslipidemia  Continue statin.  Stable ASCVD risk and lipid panel.  Continue diet and exercise.  - atorvastatin (LIPITOR) 10 MG Tab; Take 1 Tab by mouth every day.  Dispense: 90 Tab; Refill: 3    3. Moderate persistent asthma without complication  Can continue albuterol as needed okay to stay off of maintenance inhaler.  Continue Singulair.    4. Breast cancer screening  Ordered today  - MA-SCREEN MAMMO W/CAD-BILAT; Future    5. Need for hepatitis C screening test  Ordered today  - HEP C VIRUS ANTIBODY; Future    6. Smoking greater than 30 " pack years  Greater than 30-pack-year history quit within 14 years, follow-up lung cancer screening program  - REFERRAL TO LUNG CANCER SCREENING PROGRAM    Please note that this dictation was created using voice recognition software. I have made every reasonable attempt to correct obvious errors, but I expect that there are errors of grammar and possibly content that I did not discover before finalizing the note.

## 2019-08-28 DIAGNOSIS — Z12.2 ENCOUNTER FOR SCREENING FOR MALIGNANT NEOPLASM OF RESPIRATORY ORGANS: ICD-10-CM

## 2019-09-17 DIAGNOSIS — Z72.0 TOBACCO USE: ICD-10-CM

## 2019-09-18 DIAGNOSIS — E78.5 DYSLIPIDEMIA: ICD-10-CM

## 2019-09-18 DIAGNOSIS — J45.909 UNCOMPLICATED ASTHMA, UNSPECIFIED ASTHMA SEVERITY, UNSPECIFIED WHETHER PERSISTENT: ICD-10-CM

## 2019-09-18 DIAGNOSIS — F32.0 MAJOR DEPRESSIVE DISORDER, SINGLE EPISODE, MILD (HCC): ICD-10-CM

## 2019-09-18 RX ORDER — ATORVASTATIN CALCIUM 10 MG/1
10 TABLET, FILM COATED ORAL
Qty: 90 TAB | Refills: 3 | Status: SHIPPED | OUTPATIENT
Start: 2019-09-18 | End: 2020-01-03 | Stop reason: SDUPTHER

## 2019-09-18 RX ORDER — ALBUTEROL SULFATE 90 UG/1
2 AEROSOL, METERED RESPIRATORY (INHALATION) EVERY 4 HOURS PRN
Qty: 8.5 G | Refills: 1 | Status: SHIPPED | OUTPATIENT
Start: 2019-09-18 | End: 2020-09-25

## 2019-09-18 RX ORDER — VENLAFAXINE HYDROCHLORIDE 75 MG/1
CAPSULE, EXTENDED RELEASE ORAL
Qty: 90 CAP | Refills: 3 | Status: SHIPPED | OUTPATIENT
Start: 2019-09-18 | End: 2020-01-03 | Stop reason: SDUPTHER

## 2019-09-18 RX ORDER — MONTELUKAST SODIUM 10 MG/1
TABLET ORAL
Qty: 360 TAB | Refills: 0 | Status: SHIPPED | OUTPATIENT
Start: 2019-09-18 | End: 2020-01-03 | Stop reason: SDUPTHER

## 2019-09-18 NOTE — TELEPHONE ENCOUNTER
----- Message from Alissa Hwang sent at 9/18/2019 12:18 PM PDT -----  Regarding: Prescription Question  Contact: [phone]  Please make sure that all my prescriptions are fulled through the [employer] [facility] [employer] instead of [facility].     Thank you.

## 2019-09-18 NOTE — TELEPHONE ENCOUNTER
Was the patient seen in the last year in this department? Yes 8/27/19    Does patient have an active prescription for medications requested? No     Received Request Via: patient

## 2019-09-20 ENCOUNTER — HOSPITAL ENCOUNTER (OUTPATIENT)
Dept: LAB | Facility: MEDICAL CENTER | Age: 74
End: 2019-09-20
Attending: FAMILY MEDICINE
Payer: MEDICARE

## 2019-09-20 DIAGNOSIS — Z11.59 NEED FOR HEPATITIS C SCREENING TEST: ICD-10-CM

## 2019-09-20 LAB — HCV AB SER QL: NEGATIVE

## 2019-09-20 PROCEDURE — 36415 COLL VENOUS BLD VENIPUNCTURE: CPT | Mod: GY

## 2019-09-20 PROCEDURE — 86803 HEPATITIS C AB TEST: CPT | Mod: GY

## 2019-10-14 ENCOUNTER — HOSPITAL ENCOUNTER (OUTPATIENT)
Dept: RADIOLOGY | Facility: MEDICAL CENTER | Age: 74
End: 2019-10-14
Attending: FAMILY MEDICINE
Payer: MEDICARE

## 2019-10-14 DIAGNOSIS — Z12.31 VISIT FOR SCREENING MAMMOGRAM: ICD-10-CM

## 2019-10-14 PROCEDURE — 77063 BREAST TOMOSYNTHESIS BI: CPT

## 2019-10-28 ENCOUNTER — APPOINTMENT (OUTPATIENT)
Dept: RADIOLOGY | Facility: MEDICAL CENTER | Age: 74
End: 2019-10-28
Attending: FAMILY MEDICINE
Payer: MEDICARE

## 2019-11-08 ENCOUNTER — HOSPITAL ENCOUNTER (OUTPATIENT)
Dept: RADIOLOGY | Facility: MEDICAL CENTER | Age: 74
End: 2019-11-08
Attending: FAMILY MEDICINE
Payer: MEDICARE

## 2019-11-08 DIAGNOSIS — Z12.2 SCREENING FOR RESPIRATORY ORGAN CANCER: ICD-10-CM

## 2019-11-08 PROCEDURE — G0297 LDCT FOR LUNG CA SCREEN: HCPCS

## 2019-11-11 ENCOUNTER — TELEPHONE (OUTPATIENT)
Dept: MEDICAL GROUP | Facility: LAB | Age: 74
End: 2019-11-11

## 2019-11-11 NOTE — TELEPHONE ENCOUNTER
----- Message from Steff Her M.D. sent at 11/10/2019  5:52 PM PST -----  Need to see patient   ----- Message -----  From: Int[facility], Radiant In  Sent: 11/8/2019   3:32 PM PST  To: Steff Her M.D.

## 2019-11-12 ENCOUNTER — OFFICE VISIT (OUTPATIENT)
Dept: MEDICAL GROUP | Facility: LAB | Age: 74
End: 2019-11-12
Payer: MEDICARE

## 2019-11-12 VITALS
SYSTOLIC BLOOD PRESSURE: 110 MMHG | HEIGHT: 67 IN | TEMPERATURE: 97.8 F | HEART RATE: 68 BPM | OXYGEN SATURATION: 96 % | DIASTOLIC BLOOD PRESSURE: 62 MMHG | BODY MASS INDEX: 24.96 KG/M2 | WEIGHT: 159 LBS

## 2019-11-12 DIAGNOSIS — Z23 NEED FOR VACCINATION: ICD-10-CM

## 2019-11-12 DIAGNOSIS — R91.1 LUNG NODULE: ICD-10-CM

## 2019-11-12 DIAGNOSIS — K44.9 HIATAL HERNIA: ICD-10-CM

## 2019-11-12 DIAGNOSIS — D35.02 ADRENAL ADENOMA, LEFT: ICD-10-CM

## 2019-11-12 PROCEDURE — 90471 IMMUNIZATION ADMIN: CPT | Performed by: FAMILY MEDICINE

## 2019-11-12 PROCEDURE — 90750 HZV VACC RECOMBINANT IM: CPT | Performed by: FAMILY MEDICINE

## 2019-11-12 PROCEDURE — 99214 OFFICE O/P EST MOD 30 MIN: CPT | Mod: 25 | Performed by: FAMILY MEDICINE

## 2019-11-13 NOTE — PROGRESS NOTES
Subjective:     CC: LDCT    HPI:   Alissa presents today with     LDCT:  Patient here to follow up Low dose CT for lung cancer screening. She has a greater than 30 pack year history and quit less than 15 years ago. She is asymptomatic.  A 2.2 cm groundglass nodule in the right upper lung was found.  3 solid nodules in the middle lobe measuring up to 5 mm were found.  An incidental left adrenal adenoma was found and a tiny hiatal hernia was found.  The recommendations according to Fleischner guidelines is a repeat 6-month low-dose CT.      Past Medical History:   Diagnosis Date   • Asthma    • Depression        Social History     Tobacco Use   • Smoking status: Former Smoker     Packs/day: 1.00     Years: 34.00     Pack years: 34.00     Types: Cigarettes     Start date: 1968     Last attempt to quit: 2005     Years since quittin.8   • Smokeless tobacco: Never Used   Substance Use Topics   • Alcohol use: Yes     Alcohol/week: 7.0 oz     Types: 14 Glasses of wine per week   • Drug use: Yes     Types: Marijuana     Comment: occasional       Current Outpatient Medications Ordered in Epic   Medication Sig Dispense Refill   • atorvastatin (LIPITOR) 10 MG Tab Take 1 Tab by mouth every day. 90 Tab 3   • montelukast (SINGULAIR) 10 MG Tab TAKE 1 TABLET EVERY DAY FOR ASTHMA 360 Tab 0   • venlafaxine XR (EFFEXOR XR) 75 MG CAPSULE SR 24 HR TAKE 1 CAPSULE EVERY DAY 90 Cap 3   • albuterol (VENTOLIN HFA) 108 (90 Base) MCG/ACT Aero Soln inhalation aerosol Inhale 2 Puffs by mouth every four hours as needed. Indications: Asthma 8.5 g 1   • Budesonide-Formoterol Fumarate (SYMBICORT INH) Inhale 1 Puff by mouth every day. 160/4.5 mcg       No current [facility]-ordered facility-administered medications on file.        Allergies:  Sulfa drugs    ROS:  Gen: no fevers/chill, no changes in weight  Eyes: no changes in vision  ENT: no sore throat, no hearing loss, no bloody nose  Pulm: no sob, no cough  CV: no chest pain, no  "palpitations  GI: no nausea/vomiting, no diarrhea  : no dysuria  MSk: no myalgias  Skin: no rash  Neuro: no headaches, no numbness/tingling  Heme/Lymph: no easy bruising      Objective:       Exam:  /62 (BP Location: Left arm, Patient Position: Sitting)   Pulse 68   Temp 36.6 °C (97.8 °F) (Temporal)   Ht 1.702 m (5' 7\")   Wt 72.1 kg (159 lb)   SpO2 96%   BMI 24.90 kg/m²  Body mass index is 24.9 kg/m².    Gen: Alert and oriented, No apparent distress.  Neck: Neck is supple without lymphadenopathy.  Lungs: Normal effort, CTA bilaterally, no wheezes, rhonchi, or rales  CV: Regular rate and rhythm. No murmurs, rubs, or gallops.               Ext: No clubbing, cyanosis, edema.    Assessment & Plan:     73 y.o. female with the following -     1. Need for vaccination  Administered today  - Shingrix Vaccine    2. Adrenal adenoma, left  Benign, monitor clinically    3. Hiatal hernia  Patient complains of on and off reflux, can use abortive medications as needed    4. Lung nodule  Will repeat low-dose CT in 6 months      Please note that this dictation was created using voice recognition software. I have made every reasonable attempt to correct obvious errors, but I expect that there are errors of grammar and possibly content that I did not discover before finalizing the note.      "

## 2020-01-03 DIAGNOSIS — F32.0 MAJOR DEPRESSIVE DISORDER, SINGLE EPISODE, MILD (HCC): ICD-10-CM

## 2020-01-03 DIAGNOSIS — J45.909 UNCOMPLICATED ASTHMA, UNSPECIFIED ASTHMA SEVERITY, UNSPECIFIED WHETHER PERSISTENT: ICD-10-CM

## 2020-01-03 DIAGNOSIS — E78.5 DYSLIPIDEMIA: ICD-10-CM

## 2020-01-06 RX ORDER — VENLAFAXINE HYDROCHLORIDE 75 MG/1
CAPSULE, EXTENDED RELEASE ORAL
Qty: 90 CAP | Refills: 3 | Status: SHIPPED | OUTPATIENT
Start: 2020-01-06 | End: 2021-03-22 | Stop reason: SDUPTHER

## 2020-01-06 RX ORDER — ATORVASTATIN CALCIUM 10 MG/1
10 TABLET, FILM COATED ORAL
Qty: 90 TAB | Refills: 3 | Status: SHIPPED | OUTPATIENT
Start: 2020-01-06 | End: 2021-03-15 | Stop reason: SDUPTHER

## 2020-01-06 RX ORDER — MONTELUKAST SODIUM 10 MG/1
TABLET ORAL
Qty: 360 TAB | Refills: 0 | Status: SHIPPED | OUTPATIENT
Start: 2020-01-06 | End: 2020-03-08 | Stop reason: SDUPTHER

## 2020-03-08 DIAGNOSIS — J45.909 UNCOMPLICATED ASTHMA, UNSPECIFIED ASTHMA SEVERITY, UNSPECIFIED WHETHER PERSISTENT: ICD-10-CM

## 2020-03-09 RX ORDER — MONTELUKAST SODIUM 10 MG/1
TABLET ORAL
Qty: 360 TAB | Refills: 0 | Status: SHIPPED | OUTPATIENT
Start: 2020-03-09 | End: 2021-03-15 | Stop reason: SDUPTHER

## 2020-03-09 NOTE — TELEPHONE ENCOUNTER
Received request via: Pharmacy    Was the patient seen in the last year in this department? Yes  11/12/19  Does the patient have an active prescription (recently filled or refills available) for medication(s) requested? No

## 2020-03-19 ENCOUNTER — OFFICE VISIT (OUTPATIENT)
Dept: MEDICAL GROUP | Facility: LAB | Age: 75
End: 2020-03-19
Payer: MEDICARE

## 2020-03-19 VITALS
BODY MASS INDEX: 24.64 KG/M2 | HEIGHT: 67 IN | TEMPERATURE: 97.3 F | SYSTOLIC BLOOD PRESSURE: 108 MMHG | OXYGEN SATURATION: 93 % | WEIGHT: 157 LBS | DIASTOLIC BLOOD PRESSURE: 60 MMHG | HEART RATE: 61 BPM

## 2020-03-19 DIAGNOSIS — E78.5 DYSLIPIDEMIA: ICD-10-CM

## 2020-03-19 DIAGNOSIS — Z91.89 OTHER SPECIFIED PERSONAL RISK FACTORS, NOT ELSEWHERE CLASSIFIED: ICD-10-CM

## 2020-03-19 DIAGNOSIS — L57.0 KERATOSIS: ICD-10-CM

## 2020-03-19 DIAGNOSIS — H91.90 HEARING LOSS, UNSPECIFIED HEARING LOSS TYPE, UNSPECIFIED LATERALITY: ICD-10-CM

## 2020-03-19 DIAGNOSIS — N95.2 VAGINAL ATROPHY: ICD-10-CM

## 2020-03-19 PROCEDURE — 99214 OFFICE O/P EST MOD 30 MIN: CPT | Performed by: FAMILY MEDICINE

## 2020-03-19 RX ORDER — CONJUGATED ESTROGENS 0.62 MG/G
0.5 CREAM VAGINAL DAILY
Qty: 1 TUBE | Refills: 3 | Status: SHIPPED | OUTPATIENT
Start: 2020-03-19 | End: 2020-09-25

## 2020-03-19 ASSESSMENT — FIBROSIS 4 INDEX: FIB4 SCORE: 1.05

## 2020-03-19 NOTE — PROGRESS NOTES
Subjective:     CC: Questions    HPI:   Alissa presents today with    Skin lesion:  This is a chronic issue, new to me.  Patient states she has had a patch on her stomach and breast that she noticed 1 day.  She states it is raised and she can feel it.  She is worried about skin cancer.  She has not noticed any changes from when she has noticed it.  She denies any itching, crusting, oozing, or changes at all.    Pain with sex:  This is a chronic unstable issue, new to me.  Patient complains of pain with sex.  She states it is a burning sensation during intercourse and she noticed that this started to happen when she went through menopause.  She has not been participating in sexual activity because of the pain and she would like to with her .  She denies any discharge or bleeding.    Cardiac Risk:  This is a chronic unstable issue.  Patient states she is looking into her family history and she has significant cardiac disease in her family.  She is currently on Lipitor and her ASCVD risk on Lipitor is 8.9%.  She tries to maintain a healthy lifestyle.  She does have a smoking history.  She would like to know more of her cardiac risk.    Hearing Loss:  This is a chronic unstable issue, new to me.  Patient complaining of bilateral hearing loss for several years now.  She would like to see an audiologist.    Past Medical History:   Diagnosis Date   • Asthma    • Depression        Social History     Tobacco Use   • Smoking status: Former Smoker     Packs/day: 1.00     Years: 34.00     Pack years: 34.00     Types: Cigarettes     Start date: 1/1/1968     Last attempt to quit: 1/1/2005     Years since quitting: 15.2   • Smokeless tobacco: Never Used   Substance Use Topics   • Alcohol use: Yes     Alcohol/week: 7.0 oz     Types: 14 Glasses of wine per week   • Drug use: Yes     Types: Marijuana     Comment: occasional       Current Outpatient Medications Ordered in Epic   Medication Sig Dispense Refill   • estrogens,  "conjugated (PREMARIN) 0.625 MG/GM Cream Insert 0.5 g in vagina every day. 1 Tube 3   • montelukast (SINGULAIR) 10 MG Tab TAKE 1 TABLET EVERY DAY FOR ASTHMA 360 Tab 0   • atorvastatin (LIPITOR) 10 MG Tab Take 1 Tab by mouth every day. 90 Tab 3   • venlafaxine XR (EFFEXOR XR) 75 MG CAPSULE SR 24 HR TAKE 1 CAPSULE EVERY DAY 90 Cap 3   • albuterol (VENTOLIN HFA) 108 (90 Base) MCG/ACT Aero Soln inhalation aerosol Inhale 2 Puffs by mouth every four hours as needed. Indications: Asthma 8.5 g 1   • Budesonide-Formoterol Fumarate (SYMBICORT INH) Inhale 1 Puff by mouth every day. 160/4.5 mcg       No current [facility]-ordered facility-administered medications on file.        Allergies:  Sulfa drugs    ROS:  Gen: no fevers/chill, no changes in weight  Eyes: no changes in vision  ENT: no sore throat, +hearing loss, no bloody nose  Pulm: no sob, no cough  CV: no chest pain, no palpitations  GI: no nausea/vomiting, no diarrhea  : no dysuria  MSk: no myalgias  Skin: no rash, + skin changes  Neuro: no headaches, no numbness/tingling  Heme/Lymph: no easy bruising      Objective:       Exam:  /60 (BP Location: Left arm, Patient Position: Sitting)   Pulse 61   Temp 36.3 °C (97.3 °F) (Temporal)   Ht 1.702 m (5' 7\")   Wt 71.2 kg (157 lb)   SpO2 93%   BMI 24.59 kg/m²  Body mass index is 24.59 kg/m².    Gen: Alert and oriented, No apparent distress.  Neck: Neck is supple without lymphadenopathy.  Lungs: Normal effort, CTA bilaterally, no wheezes, rhonchi, or rales  CV: Regular rate and rhythm. No murmurs, rubs, or gallops.               Ext: No clubbing, cyanosis, edema.  Skin:    Areas of concern clinically seborrheic keratoses    Assessment & Plan:     74 y.o. female with the following -     1. Other specified personal risk factors, not elsewhere classified  See below  - CT-CARDIAC SCORING; Future    2. Hearing loss, unspecified hearing loss type, unspecified laterality  Referral placed to audiology.  - REFERRAL TO " AUDIOLOGY    3. Vaginal atrophy  Likely vaginal atrophy, did not perform pelvic exam.  Discussed the risks and benefits of vaginal estrogen.  Patient agrees to trial.  Discussed water-based loops as well.  Follow-up if no resolution.    4. Keratosis  Discussed the benign nature of seborrheic keratoses.  We will continue to monitor for changes.    5. Dyslipidemia  Discussed cardiac scoring as another piece of data to determine cardiac risk.  Patient is interested in this.  Ordered today.        Please note that this dictation was created using voice recognition software. I have made every reasonable attempt to correct obvious errors, but I expect that there are errors of grammar and possibly content that I did not discover before finalizing the note.

## 2020-05-26 ENCOUNTER — PATIENT OUTREACH (OUTPATIENT)
Dept: HEALTH INFORMATION MANAGEMENT | Facility: OTHER | Age: 75
End: 2020-05-26

## 2020-05-26 ENCOUNTER — TELEPHONE (OUTPATIENT)
Dept: HEALTH INFORMATION MANAGEMENT | Facility: OTHER | Age: 75
End: 2020-05-26

## 2020-05-26 DIAGNOSIS — Z72.0 TOBACCO USE: ICD-10-CM

## 2020-05-26 NOTE — TELEPHONE ENCOUNTER
2. SPECIFIC Action To Be Taken: Orders pending, please sign.   At last lung cancer screening CT in November 2019, radiologist recommended 6 month follow-up chest CT.  Due May 2020

## 2020-06-07 ENCOUNTER — HOSPITAL ENCOUNTER (OUTPATIENT)
Dept: RADIOLOGY | Facility: MEDICAL CENTER | Age: 75
End: 2020-06-07
Attending: FAMILY MEDICINE
Payer: MEDICARE

## 2020-06-07 DIAGNOSIS — Z72.0 TOBACCO USE: ICD-10-CM

## 2020-06-07 PROCEDURE — 71250 CT THORAX DX C-: CPT

## 2020-06-15 ENCOUNTER — APPOINTMENT (OUTPATIENT)
Dept: RADIOLOGY | Facility: MEDICAL CENTER | Age: 75
End: 2020-06-15
Attending: FAMILY MEDICINE
Payer: COMMERCIAL

## 2020-09-25 ENCOUNTER — HOSPITAL ENCOUNTER (EMERGENCY)
Facility: MEDICAL CENTER | Age: 75
End: 2020-09-25
Attending: EMERGENCY MEDICINE
Payer: MEDICARE

## 2020-09-25 ENCOUNTER — APPOINTMENT (OUTPATIENT)
Dept: RADIOLOGY | Facility: MEDICAL CENTER | Age: 75
End: 2020-09-25
Attending: EMERGENCY MEDICINE
Payer: MEDICARE

## 2020-09-25 VITALS
TEMPERATURE: 97.6 F | RESPIRATION RATE: 16 BRPM | BODY MASS INDEX: 25.51 KG/M2 | DIASTOLIC BLOOD PRESSURE: 65 MMHG | WEIGHT: 158.73 LBS | SYSTOLIC BLOOD PRESSURE: 143 MMHG | OXYGEN SATURATION: 97 % | HEIGHT: 66 IN | HEART RATE: 56 BPM

## 2020-09-25 DIAGNOSIS — R07.9 CHEST PAIN, UNSPECIFIED TYPE: ICD-10-CM

## 2020-09-25 LAB
ALBUMIN SERPL BCP-MCNC: 4.2 G/DL (ref 3.2–4.9)
ALBUMIN/GLOB SERPL: 1.7 G/DL
ALP SERPL-CCNC: 73 U/L (ref 30–99)
ALT SERPL-CCNC: 39 U/L (ref 2–50)
ANION GAP SERPL CALC-SCNC: 11 MMOL/L (ref 7–16)
AST SERPL-CCNC: 30 U/L (ref 12–45)
BASOPHILS # BLD AUTO: 0.7 % (ref 0–1.8)
BASOPHILS # BLD: 0.05 K/UL (ref 0–0.12)
BILIRUB SERPL-MCNC: 0.6 MG/DL (ref 0.1–1.5)
BUN SERPL-MCNC: 15 MG/DL (ref 8–22)
CALCIUM SERPL-MCNC: 8.9 MG/DL (ref 8.4–10.2)
CHLORIDE SERPL-SCNC: 103 MMOL/L (ref 96–112)
CO2 SERPL-SCNC: 22 MMOL/L (ref 20–33)
CREAT SERPL-MCNC: 0.69 MG/DL (ref 0.5–1.4)
EKG IMPRESSION: NORMAL
EOSINOPHIL # BLD AUTO: 0.27 K/UL (ref 0–0.51)
EOSINOPHIL NFR BLD: 4 % (ref 0–6.9)
ERYTHROCYTE [DISTWIDTH] IN BLOOD BY AUTOMATED COUNT: 46.5 FL (ref 35.9–50)
GLOBULIN SER CALC-MCNC: 2.5 G/DL (ref 1.9–3.5)
GLUCOSE SERPL-MCNC: 92 MG/DL (ref 65–99)
HCT VFR BLD AUTO: 39.8 % (ref 37–47)
HGB BLD-MCNC: 13.5 G/DL (ref 12–16)
IMM GRANULOCYTES # BLD AUTO: 0.01 K/UL (ref 0–0.11)
IMM GRANULOCYTES NFR BLD AUTO: 0.1 % (ref 0–0.9)
INR PPP: 0.87 (ref 0.87–1.13)
LYMPHOCYTES # BLD AUTO: 2.12 K/UL (ref 1–4.8)
LYMPHOCYTES NFR BLD: 31.6 % (ref 22–41)
MCH RBC QN AUTO: 31.5 PG (ref 27–33)
MCHC RBC AUTO-ENTMCNC: 33.9 G/DL (ref 33.6–35)
MCV RBC AUTO: 92.8 FL (ref 81.4–97.8)
MONOCYTES # BLD AUTO: 0.49 K/UL (ref 0–0.85)
MONOCYTES NFR BLD AUTO: 7.3 % (ref 0–13.4)
NEUTROPHILS # BLD AUTO: 3.77 K/UL (ref 2–7.15)
NEUTROPHILS NFR BLD: 56.3 % (ref 44–72)
NRBC # BLD AUTO: 0 K/UL
NRBC BLD-RTO: 0 /100 WBC
NT-PROBNP SERPL IA-MCNC: 45 PG/ML (ref 0–125)
PLATELET # BLD AUTO: 270 K/UL (ref 164–446)
PMV BLD AUTO: 10.3 FL (ref 9–12.9)
POTASSIUM SERPL-SCNC: 4.3 MMOL/L (ref 3.6–5.5)
PROT SERPL-MCNC: 6.7 G/DL (ref 6–8.2)
PROTHROMBIN TIME: 11.6 SEC (ref 12–14.6)
RBC # BLD AUTO: 4.29 M/UL (ref 4.2–5.4)
SODIUM SERPL-SCNC: 136 MMOL/L (ref 135–145)
TROPONIN T SERPL-MCNC: <6 NG/L (ref 6–19)
TROPONIN T SERPL-MCNC: <6 NG/L (ref 6–19)
WBC # BLD AUTO: 6.7 K/UL (ref 4.8–10.8)

## 2020-09-25 PROCEDURE — 93005 ELECTROCARDIOGRAM TRACING: CPT

## 2020-09-25 PROCEDURE — 99285 EMERGENCY DEPT VISIT HI MDM: CPT

## 2020-09-25 PROCEDURE — 85025 COMPLETE CBC W/AUTO DIFF WBC: CPT

## 2020-09-25 PROCEDURE — 74175 CTA ABDOMEN W/CONTRAST: CPT

## 2020-09-25 PROCEDURE — 93005 ELECTROCARDIOGRAM TRACING: CPT | Performed by: EMERGENCY MEDICINE

## 2020-09-25 PROCEDURE — 80053 COMPREHEN METABOLIC PANEL: CPT

## 2020-09-25 PROCEDURE — 71045 X-RAY EXAM CHEST 1 VIEW: CPT

## 2020-09-25 PROCEDURE — 84484 ASSAY OF TROPONIN QUANT: CPT

## 2020-09-25 PROCEDURE — 83880 ASSAY OF NATRIURETIC PEPTIDE: CPT

## 2020-09-25 PROCEDURE — 85610 PROTHROMBIN TIME: CPT

## 2020-09-25 PROCEDURE — 700117 HCHG RX CONTRAST REV CODE 255: Performed by: EMERGENCY MEDICINE

## 2020-09-25 RX ORDER — IRON HEME POLYPEPTIDE/FOLIC AC 12-1MG
5000 TABLET ORAL DAILY
COMMUNITY

## 2020-09-25 RX ORDER — MULTIVIT WITH MINERALS/LUTEIN
1 TABLET ORAL DAILY
COMMUNITY

## 2020-09-25 RX ORDER — ASPIRIN 325 MG
325 TABLET ORAL EVERY 6 HOURS PRN
Status: SHIPPED | COMMUNITY
End: 2022-08-26

## 2020-09-25 RX ADMIN — IOHEXOL 100 ML: 350 INJECTION, SOLUTION INTRAVENOUS at 16:37

## 2020-09-25 ASSESSMENT — PAIN DESCRIPTION - DESCRIPTORS: DESCRIPTORS: STABBING

## 2020-09-25 NOTE — ED PROVIDER NOTES
"ED Provider Note    CHIEF COMPLAINT  Chief Complaint   Patient presents with   • Chest Pain   • Back Pain   • Jaw Pain       Hpi  Alissa Hwang is a 74 y.o. female here for evaluation of back pain and chest pain.  Patient states that she woke up this morning fine, started to her normal activity, which she had sudden onset of mid back pain, that radiated to her chest.  States that this persisted for a few hours, and then gradually went away.  States that she does have a small pain that she feels down the back, but nothing acute patient has no fever chills, no vomiting, no fall or other trauma.  She has no ill contacts.  Nothing seems alleviate exacerbate her symptoms.  She not take anything prior to arrival for the discomfort.      ROS  See HPI for further details, o/w negative.     PAST MEDICAL HISTORY   has a past medical history of Asthma and Depression.    SOCIAL HISTORY  Social History     Tobacco Use   • Smoking status: Former Smoker     Packs/day: 1.00     Years: 34.00     Pack years: 34.00     Types: Cigarettes     Start date: 1/1/1968     Quit date: 1/1/2005     Years since quitting: 15.7   • Smokeless tobacco: Never Used   Substance and Sexual Activity   • Alcohol use: Yes     Alcohol/week: 7.0 oz     Types: 14 Glasses of wine per week     Comment: Occasionally   • Drug use: Not Currently     Types: Marijuana   • Sexual activity: Yes     Partners: Male       Family History  No bleeding disorders    SURGICAL HISTORY   has a past surgical history that includes enlarge breast with implant (1972).    CURRENT MEDICATIONS  Home Medications    **Home medications have not yet been reviewed for this encounter**         ALLERGIES  Allergies   Allergen Reactions   • Sulfa Drugs      \"crazy\"       REVIEW OF SYSTEMS  See HPI for further details. Review of systems as above, otherwise all other systems are negative.     PHYSICAL EXAM  Constitutional: Well developed, well nourished. No acute distress.  HEENT: " Normocephalic, atraumatic. Posterior pharynx clear and moist.  Eyes:  EOMI. Normal sclera.  Neck: Supple, Full range of motion, nontender.  Chest/Pulmonary: clear to ausculation. Symmetrical expansion.   Cardio: Regular rate and rhythm with no murmur.   Abdomen: Soft, nontender. No peritoneal signs. No guarding. No palpable masses.  Musculoskeletal: No deformity, no edema, neurovascular intact.   Neuro: Clear speech, appropriate, cooperative, cranial nerves II-XII grossly intact.  Psych: Normal mood and affect    Results for orders placed or performed during the hospital encounter of 09/25/20   CBC with Differential   Result Value Ref Range    WBC 6.7 4.8 - 10.8 K/uL    RBC 4.29 4.20 - 5.40 M/uL    Hemoglobin 13.5 12.0 - 16.0 g/dL    Hematocrit 39.8 37.0 - 47.0 %    MCV 92.8 81.4 - 97.8 fL    MCH 31.5 27.0 - 33.0 pg    MCHC 33.9 33.6 - 35.0 g/dL    RDW 46.5 35.9 - 50.0 fL    Platelet Count 270 164 - 446 K/uL    MPV 10.3 9.0 - 12.9 fL    Neutrophils-Polys 56.30 44.00 - 72.00 %    Lymphocytes 31.60 22.00 - 41.00 %    Monocytes 7.30 0.00 - 13.40 %    Eosinophils 4.00 0.00 - 6.90 %    Basophils 0.70 0.00 - 1.80 %    Immature Granulocytes 0.10 0.00 - 0.90 %    Nucleated RBC 0.00 /100 WBC    Neutrophils (Absolute) 3.77 2.00 - 7.15 K/uL    Lymphs (Absolute) 2.12 1.00 - 4.80 K/uL    Monos (Absolute) 0.49 0.00 - 0.85 K/uL    Eos (Absolute) 0.27 0.00 - 0.51 K/uL    Baso (Absolute) 0.05 0.00 - 0.12 K/uL    Immature Granulocytes (abs) 0.01 0.00 - 0.11 K/uL    NRBC (Absolute) 0.00 K/uL   Complete Metabolic Panel (CMP)   Result Value Ref Range    Sodium 136 135 - 145 mmol/L    Potassium 4.3 3.6 - 5.5 mmol/L    Chloride 103 96 - 112 mmol/L    Co2 22 20 - 33 mmol/L    Anion Gap 11.0 7.0 - 16.0    Glucose 92 65 - 99 mg/dL    Bun 15 8 - 22 mg/dL    Creatinine 0.69 0.50 - 1.40 mg/dL    Calcium 8.9 8.4 - 10.2 mg/dL    AST(SGOT) 30 12 - 45 U/L    ALT(SGPT) 39 2 - 50 U/L    Alkaline Phosphatase 73 30 - 99 U/L    Total Bilirubin 0.6 0.1 -  1.5 mg/dL    Albumin 4.2 3.2 - 4.9 g/dL    Total Protein 6.7 6.0 - 8.2 g/dL    Globulin 2.5 1.9 - 3.5 g/dL    A-G Ratio 1.7 g/dL   Troponin   Result Value Ref Range    Troponin T <6 6 - 19 ng/L   Prothrombin Time   Result Value Ref Range    PT 11.6 (L) 12.0 - 14.6 sec    INR 0.87 0.87 - 1.13   proBrain Natriuretic Peptide, NT   Result Value Ref Range    NT-proBNP 45 0 - 125 pg/mL   ESTIMATED GFR   Result Value Ref Range    GFR If African American >60 >60 mL/min/1.73 m 2    GFR If Non African American >60 >60 mL/min/1.73 m 2   TROPONIN   Result Value Ref Range    Troponin T <6 6 - 19 ng/L   EKG   Result Value Ref Range    Report       [facility] Emergency Dept.    Test Date:  2020  Pt Name:    LEWIS ROGERSFALLON        Department: [facility]  MRN:        [id number]                      Room:  Gender:     Female                       Technician: VINI  :        1945                   Requested By:ER TRIAGE PROTOCOL  Order #:    [id number]                    Reading MD:    Measurements  Intervals                                Axis  Rate:       60                           P:          79  IL:         160                          QRS:        64  QRSD:       80                           T:          68  QT:         413  QTc:        413    Interpretive Statements  Sinus rhythm  Nonspecific T abnrm, anterolateral leads  No previous ECG available for comparison       Ekg;  nsr 60.   No st elevation, st elevation, qt 413.  No ectopy.       CT-CTA COMPLETE THORACOABDOMINAL AORTA   Final Result      No evidence of aortic aneurysm or dissection.      No central or segmental pulmonary embolus is identified.      Ill-defined groundglass opacity in the right upper lobe with a small solid component is unchanged compared to prior.      Bilateral pulmonary nodules are unchanged.      Prominence of the common duct measuring 8 mm. Further evaluation with right upper quadrant ultrasound is recommended.       Small hypodense hepatic and right renal lesions are too small to characterize. Most likely these represent small cysts.      Indeterminate 1.3 cm left adrenal nodule.      Colonic diverticulosis.         Fleischner Society pulmonary nodule recommendations:      Ground Glass: CT at 6-12 months to confirm persistence, then CT every 2 years until 5 years      Part Solid: CT at 3-6 months to confirm persistence. If unchanged and solid component remains less than 6 mm, annual CT should be performed for 5 years.      Comments: In practice, part-solid nodules cannot be defined as such until equal to or greater than 6 mm, and nodules less than 6 mm do not usually require follow-up. Persistent part-solid nodules with solid components equal to or greater than 6 mm should    be considered highly suspicious.      Note: These recommendations do not apply to lung cancer screening, patients with immunosuppression, or patients with known primary cancer.      Fleischner Society 2017 Guidelines for Management of Incidentally Detected Pulmonary Nodules in Adults         DX-CHEST-PORTABLE (1 VIEW)   Final Result      No evidence of acute cardiopulmonary process.            PROCEDURES     MEDICAL RECORD  I have reviewed patient's medical record and pertinent results are listed.    COURSE & MEDICAL DECISION MAKING  I have reviewed any medical record information, laboratory studies and radiographic results as noted above.    If you have had any blood pressure issues while here in the emergency department, please see your doctor for a further evaluation or work up.    6:15 PM  The pt is nontoxic-appearing, afebrile, and appears comfortable.  She has no current pain.  She has had 2- troponins, and unremarkable EKG, and negative CT her chest and abdomen to rule out dissection, and she states that she has not had pain since she has been here.  She now discloses that yesterday she was pulling on a horse, and thinks it may have been the  repetitive motion of pulling it caused her back pain and chest pain.  Pt aware of pulmonary nodule and liver cyst follow up.    Differential diagnoses include but not limited to: mi, pe, aaa, dissection,     This patient presents with chest pain .  At this time, I have counseled the patient/family regarding their medications, pain control, and follow up.  They will continue their medications, if any, as prescribed.  They will return immediately for any worsening symptoms and/or any other medical concerns.  They will see their doctor, or contact the doctor provided, in 1-2 days for follow up.       FINAL IMPRESSION  Chest pain   Pulmonary nodule       Electronically signed by: Romie Rodarte D.O., 9/25/2020 3:07 PM

## 2020-09-25 NOTE — ED TRIAGE NOTES
"Presents complaining of acute onset of central chest pain referred to her back (\"between my shoulder blades\"), and left jaw recurring since this AM.  She denies any associated dizziness, or dyspnea.   Chief Complaint   Patient presents with   • Chest Pain   • Back Pain   • Jaw Pain     /74   Pulse 66   Temp 36.4 °C (97.6 °F) (Temporal)   Resp 20   Ht 1.676 m (5' 6\")   Wt 72 kg (158 lb 11.7 oz)   SpO2 95%   BMI 25.62 kg/m²     "

## 2020-09-26 NOTE — DISCHARGE INSTRUCTIONS
Please be sure to follow up on the pulmonary nodule and liver cysts noted on todays CT. Your doctor will know the follow up steps.

## 2020-09-26 NOTE — ED NOTES
Agreeable to D/C.  Discharge and f/u instructions discussed and understanding verbalized.  Ambulatory out of ED.

## 2020-09-26 NOTE — ED NOTES
"Repeat troponin in process.  Resting/reading in no distress.  Denies any chest/back/shoulder discomfort at this time but states \"she just doesn't feel herself\". Call light in reach; aware to call for any needs/changes.   "

## 2020-10-09 NOTE — DOCUMENTATION QUERY
[facility]                                                                       Query Response Note      PATIENT:               LEWIS HARRELL  ACCT #:                  [id number]  MRN:                     [id number]  :                      1945  ADMIT DATE:       2020 2:50 PM  DISCH DATE:        2020 6:33 PM  RESPONDING  PROVIDER #:        [id number]           QUERY TEXT:    Your assistance is needed in determining the reason  for BNP lab test that was ordered.  This service is non-covered by the diagnoses documented in the medical record.      Can you please provide an additional diagnosis that describes the sign or symptom that  (prompted/initiated) the BNP lab.    NOTE:  If an appropriate answer is not listed below, please respond with a new note.        The patient's Clinical Indicators include:  BNP  Options provided:   -- Other related diagnosis, Please specify diagnosis demonstrating medical necessity for lab/imaging/other test.)   -- Unable to determine      Query created by: Araseli Santiago on 2020 2:50 AM    RESPONSE TEXT:    Unable to determine          Electronically signed by:  LEWIS CAMPOVERDE DO 10/9/2020 4:43 PM

## 2021-01-15 DIAGNOSIS — Z23 NEED FOR VACCINATION: ICD-10-CM

## 2021-02-16 ENCOUNTER — IMMUNIZATION (OUTPATIENT)
Dept: FAMILY PLANNING/WOMEN'S HEALTH CLINIC | Facility: IMMUNIZATION CENTER | Age: 76
End: 2021-02-16
Attending: INTERNAL MEDICINE
Payer: MEDICARE

## 2021-02-16 DIAGNOSIS — Z23 NEED FOR VACCINATION: ICD-10-CM

## 2021-02-16 DIAGNOSIS — Z23 ENCOUNTER FOR VACCINATION: Primary | ICD-10-CM

## 2021-02-16 PROCEDURE — 91300 PFIZER SARS-COV-2 VACCINE: CPT

## 2021-02-16 PROCEDURE — 0001A PFIZER SARS-COV-2 VACCINE: CPT

## 2021-03-06 ENCOUNTER — IMMUNIZATION (OUTPATIENT)
Dept: FAMILY PLANNING/WOMEN'S HEALTH CLINIC | Facility: IMMUNIZATION CENTER | Age: 76
End: 2021-03-06
Attending: INTERNAL MEDICINE
Payer: MEDICARE

## 2021-03-06 DIAGNOSIS — Z23 ENCOUNTER FOR VACCINATION: Primary | ICD-10-CM

## 2021-03-06 PROCEDURE — 0002A PFIZER SARS-COV-2 VACCINE: CPT | Performed by: INTERNAL MEDICINE

## 2021-03-06 PROCEDURE — 91300 PFIZER SARS-COV-2 VACCINE: CPT | Performed by: INTERNAL MEDICINE

## 2021-03-15 DIAGNOSIS — J45.909 UNCOMPLICATED ASTHMA, UNSPECIFIED ASTHMA SEVERITY, UNSPECIFIED WHETHER PERSISTENT: ICD-10-CM

## 2021-03-15 DIAGNOSIS — E78.5 DYSLIPIDEMIA: ICD-10-CM

## 2021-03-15 NOTE — TELEPHONE ENCOUNTER
Received request via: Pharmacy    Was the patient seen in the last year in this department? Yes  3/19/20 FV 3/24/21  Does the patient have an active prescription (recently filled or refills available) for medication(s) requested? No

## 2021-03-16 RX ORDER — MONTELUKAST SODIUM 10 MG/1
TABLET ORAL
Qty: 30 TABLET | Refills: 0 | Status: SHIPPED | OUTPATIENT
Start: 2021-03-16 | End: 2021-03-24 | Stop reason: SDUPTHER

## 2021-03-16 RX ORDER — ATORVASTATIN CALCIUM 10 MG/1
10 TABLET, FILM COATED ORAL
Qty: 30 TABLET | Refills: 0 | Status: SHIPPED | OUTPATIENT
Start: 2021-03-16 | End: 2021-03-24 | Stop reason: SDUPTHER

## 2021-03-17 SDOH — ECONOMIC STABILITY: TRANSPORTATION INSECURITY
IN THE PAST 12 MONTHS, HAS LACK OF RELIABLE TRANSPORTATION KEPT YOU FROM MEDICAL APPOINTMENTS, MEETINGS, WORK OR FROM GETTING THINGS NEEDED FOR DAILY LIVING?: NO

## 2021-03-17 SDOH — ECONOMIC STABILITY: INCOME INSECURITY: IN THE LAST 12 MONTHS, WAS THERE A TIME WHEN YOU WERE NOT ABLE TO PAY THE MORTGAGE OR RENT ON TIME?: NO

## 2021-03-17 SDOH — HEALTH STABILITY: PHYSICAL HEALTH: ON AVERAGE, HOW MANY MINUTES DO YOU ENGAGE IN EXERCISE AT THIS LEVEL?: 30 MINUTES

## 2021-03-17 SDOH — ECONOMIC STABILITY: HOUSING INSECURITY
IN THE LAST 12 MONTHS, WAS THERE A TIME WHEN YOU DID NOT HAVE A STEADY PLACE TO SLEEP OR SLEPT IN A SHELTER (INCLUDING NOW)?: NO

## 2021-03-17 SDOH — ECONOMIC STABILITY: HOUSING INSECURITY: IN THE LAST 12 MONTHS, HOW MANY PLACES HAVE YOU LIVED?: 1

## 2021-03-17 SDOH — HEALTH STABILITY: PHYSICAL HEALTH: ON AVERAGE, HOW MANY DAYS PER WEEK DO YOU ENGAGE IN MODERATE TO STRENUOUS EXERCISE (LIKE A BRISK WALK)?: 5 DAYS

## 2021-03-17 SDOH — ECONOMIC STABILITY: TRANSPORTATION INSECURITY
IN THE PAST 12 MONTHS, HAS THE LACK OF TRANSPORTATION KEPT YOU FROM MEDICAL APPOINTMENTS OR FROM GETTING MEDICATIONS?: NO

## 2021-03-17 SDOH — HEALTH STABILITY: MENTAL HEALTH
STRESS IS WHEN SOMEONE FEELS TENSE, NERVOUS, ANXIOUS, OR CAN'T SLEEP AT NIGHT BECAUSE THEIR MIND IS TROUBLED. HOW STRESSED ARE YOU?: NOT AT ALL

## 2021-03-17 ASSESSMENT — SOCIAL DETERMINANTS OF HEALTH (SDOH)
IN A TYPICAL WEEK, HOW MANY TIMES DO YOU TALK ON THE PHONE WITH FAMILY, FRIENDS, OR NEIGHBORS?: THREE TIMES A WEEK
HOW OFTEN DO YOU GET TOGETHER WITH FRIENDS OR RELATIVES?: MORE THAN THREE TIMES A WEEK
DO YOU BELONG TO ANY CLUBS OR ORGANIZATIONS SUCH AS CHURCH GROUPS UNIONS, FRATERNAL OR ATHLETIC GROUPS, OR SCHOOL GROUPS?: YES
HOW OFTEN DO YOU HAVE A DRINK CONTAINING ALCOHOL: 4 OR MORE TIMES A WEEK
HOW OFTEN DO YOU HAVE SIX OR MORE DRINKS ON ONE OCCASION: NEVER
HOW OFTEN DO YOU ATTENT MEETINGS OF THE CLUB OR ORGANIZATION YOU BELONG TO?: 1 TO 4 TIMES PER YEAR
HOW OFTEN DO YOU ATTEND CHURCH OR RELIGIOUS SERVICES?: NEVER
HOW MANY DRINKS CONTAINING ALCOHOL DO YOU HAVE ON A TYPICAL DAY WHEN YOU ARE DRINKING: 1 OR 2
WITHIN THE PAST 12 MONTHS, YOU WORRIED THAT YOUR FOOD WOULD RUN OUT BEFORE YOU GOT THE MONEY TO BUY MORE: NEVER TRUE
HOW HARD IS IT FOR YOU TO PAY FOR THE VERY BASICS LIKE FOOD, HOUSING, MEDICAL CARE, AND HEATING?: NOT HARD AT ALL
WITHIN THE PAST 12 MONTHS, THE FOOD YOU BOUGHT JUST DIDN'T LAST AND YOU DIDN'T HAVE MONEY TO GET MORE: NEVER TRUE

## 2021-03-22 DIAGNOSIS — F32.0 MAJOR DEPRESSIVE DISORDER, SINGLE EPISODE, MILD (HCC): ICD-10-CM

## 2021-03-22 RX ORDER — VENLAFAXINE HYDROCHLORIDE 75 MG/1
CAPSULE, EXTENDED RELEASE ORAL
Qty: 90 CAPSULE | Refills: 0 | Status: SHIPPED | OUTPATIENT
Start: 2021-03-22 | End: 2021-03-24 | Stop reason: SDUPTHER

## 2021-03-22 NOTE — TELEPHONE ENCOUNTER
Received request via: Pharmacy  Establishing 3/24  Was the patient seen in the last year in this department? Yes  3/19/20  Does the patient have an active prescription (recently filled or refills available) for medication(s) requested? No

## 2021-03-24 ENCOUNTER — OFFICE VISIT (OUTPATIENT)
Dept: MEDICAL GROUP | Facility: LAB | Age: 76
End: 2021-03-24
Payer: MEDICARE

## 2021-03-24 VITALS
SYSTOLIC BLOOD PRESSURE: 126 MMHG | BODY MASS INDEX: 25.85 KG/M2 | DIASTOLIC BLOOD PRESSURE: 76 MMHG | TEMPERATURE: 96.8 F | WEIGHT: 164.7 LBS | HEIGHT: 67 IN | RESPIRATION RATE: 18 BRPM | OXYGEN SATURATION: 95 % | HEART RATE: 56 BPM

## 2021-03-24 DIAGNOSIS — E78.5 DYSLIPIDEMIA: Primary | ICD-10-CM

## 2021-03-24 DIAGNOSIS — F33.42 RECURRENT MAJOR DEPRESSIVE DISORDER, IN FULL REMISSION (HCC): ICD-10-CM

## 2021-03-24 DIAGNOSIS — Z12.31 ENCOUNTER FOR SCREENING MAMMOGRAM FOR BREAST CANCER: ICD-10-CM

## 2021-03-24 DIAGNOSIS — E55.9 VITAMIN D DEFICIENCY: ICD-10-CM

## 2021-03-24 DIAGNOSIS — J45.40 MODERATE PERSISTENT ASTHMA WITHOUT COMPLICATION: ICD-10-CM

## 2021-03-24 PROCEDURE — 99214 OFFICE O/P EST MOD 30 MIN: CPT | Performed by: PHYSICIAN ASSISTANT

## 2021-03-24 RX ORDER — ATORVASTATIN CALCIUM 10 MG/1
10 TABLET, FILM COATED ORAL
Qty: 90 TABLET | Refills: 1 | Status: SHIPPED | OUTPATIENT
Start: 2021-03-24 | End: 2021-10-21

## 2021-03-24 RX ORDER — VENLAFAXINE HYDROCHLORIDE 75 MG/1
CAPSULE, EXTENDED RELEASE ORAL
Qty: 90 CAPSULE | Refills: 1 | Status: SHIPPED | OUTPATIENT
Start: 2021-03-24 | End: 2021-06-22

## 2021-03-24 RX ORDER — MONTELUKAST SODIUM 10 MG/1
TABLET ORAL
Qty: 90 TABLET | Refills: 1 | Status: SHIPPED | OUTPATIENT
Start: 2021-03-24 | End: 2021-10-09

## 2021-03-24 ASSESSMENT — PATIENT HEALTH QUESTIONNAIRE - PHQ9
2. FEELING DOWN, DEPRESSED, IRRITABLE, OR HOPELESS: NOT AT ALL
SUM OF ALL RESPONSES TO PHQ QUESTIONS 1-9: 0
7. TROUBLE CONCENTRATING ON THINGS, SUCH AS READING THE NEWSPAPER OR WATCHING TELEVISION: NOT AT ALL
SUM OF ALL RESPONSES TO PHQ9 QUESTIONS 1 AND 2: 0
3. TROUBLE FALLING OR STAYING ASLEEP OR SLEEPING TOO MUCH: NOT AT ALL
4. FEELING TIRED OR HAVING LITTLE ENERGY: NOT AT ALL
9. THOUGHTS THAT YOU WOULD BE BETTER OFF DEAD, OR OF HURTING YOURSELF: NOT AT ALL
1. LITTLE INTEREST OR PLEASURE IN DOING THINGS: NOT AT ALL
5. POOR APPETITE OR OVEREATING: NOT AT ALL
8. MOVING OR SPEAKING SO SLOWLY THAT OTHER PEOPLE COULD HAVE NOTICED. OR THE OPPOSITE, BEING SO FIGETY OR RESTLESS THAT YOU HAVE BEEN MOVING AROUND A LOT MORE THAN USUAL: NOT AT ALL
6. FEELING BAD ABOUT YOURSELF - OR THAT YOU ARE A FAILURE OR HAVE LET YOURSELF OR YOUR FAMILY DOWN: NOT AL ALL

## 2021-03-24 ASSESSMENT — FIBROSIS 4 INDEX: FIB4 SCORE: 1.3

## 2021-03-24 NOTE — ASSESSMENT & PLAN NOTE
New to me; chronic and stable  Doing well on current regimen of Singulair.   Pt does have ongoing mucous production, mostly in the morning.   No hemoptysis.   Denies sob

## 2021-03-24 NOTE — PROGRESS NOTES
Subjective:   Alissa Hwang is a 75 y.o. female here today for chronic care management. New to me; has been seen by [facility] w/in the past 3 years.       Recurrent major depressive disorder, in full remission (HCC)  New to me; chronic and stable.   Doing well on Effexor.   Will send in 90 day Rx.     Asthma, moderate persistent  New to me; chronic and stable  Doing well on current regimen of Singulair.   Pt does have ongoing mucous production, mostly in the morning.   No hemoptysis.   Denies sob     Dyslipidemia  New to me; chronic and stable.   Currently using Lipitor 10mg PO once QHS.     Lab Results   Component Value Date/Time    CHOLSTRLTOT 187 07/26/2018 0948    TRIGLYCERIDE 69 07/26/2018 0948    HDL 79 07/26/2018 0948    LDL 94 07/26/2018 0948     The 10-year ASCVD risk score (Olga NAIR Jr., et al., 2013) is: 12.2%       Current medicines (including changes today)  Current Outpatient Medications   Medication Sig Dispense Refill   • montelukast (SINGULAIR) 10 MG Tab TAKE 1 TABLET EVERY DAY FOR ASTHMA 90 tablet 1   • venlafaxine XR (EFFEXOR XR) 75 MG CAPSULE SR 24 HR TAKE 1 CAPSULE EVERY DAY 90 capsule 1   • atorvastatin (LIPITOR) 10 MG Tab Take 1 tablet by mouth every day. 90 tablet 1   • aspirin (ASA) 325 MG Tab Take 325 mg by mouth every 6 hours as needed. headache     • B Complex Vitamins (B COMPLEX-B12 PO) Take 1 Tab by mouth every day.     • cholecalciferol (DIALYVITE VITAMIN D 5000) 5000 UNIT Cap Take 5,000 Units by mouth every day.     • Ascorbic Acid (VITAMIN C) 1000 MG Tab Take 1 Tab by mouth every day.     • NON SPECIFIED Take 1 Tab by mouth every day. BIO-FLEX     • Bioflavonoid Products (GRAPE SEED PO) Take 1 Tab by mouth every day.       No current facility-administered medications for this visit.     She  has a past medical history of Asthma and Depression.    ROS  No chest pain, no shortness of breath, no abdominal pain       Objective:     /76 (BP Location: Left arm, Patient  "Position: Sitting, BP Cuff Size: Adult)   Pulse (!) 56   Temp 36 °C (96.8 °F) (Temporal)   Resp 18   Ht 1.702 m (5' 7\")   Wt 74.7 kg (164 lb 11.2 oz)   SpO2 95%  Body mass index is 25.8 kg/m².   Physical Exam:  Constitutional: Alert, no distress.  Skin: Warm, dry, good turgor, no rashes in visible areas.  Eye: Equal, round conjunctiva clear, lids normal.  Neck: Trachea midline  Respiratory: Unlabored respiratory effort, lungs clear to auscultation, no wheezes, no ronchi.  Cardiovascular: Normal S1, S2, no murmur, no edema.  Psych: Alert and oriented x3, normal affect and mood.        Assessment and Plan:   The following treatment plan was discussed    1. Dyslipidemia  New to me; chronic and stable.   No changes to regimen.   Pt requested CT cardiac scoring; I agree with this. Lipids are well controlled, though The 10-year ASCVD risk score (Olga DC Jr., et al., 2013) is: 12.2%. Could consider increasing statin therapy pending calcium score.   - Comp Metabolic Panel; Future  - Lipid Profile; Future  - CT-CARDIAC SCORING; Future  - atorvastatin (LIPITOR) 10 MG Tab; Take 1 tablet by mouth every day.  Dispense: 90 tablet; Refill: 1    2. Recurrent major depressive disorder, in full remission (HCC)  New to me; condition is chronic, stable and mood is well controlled on current regimen  No changes.   F/u in 6 months for refill; sooner if needed.   - Comp Metabolic Panel; Future  - TSH WITH REFLEX TO FT4; Future  - venlafaxine XR (EFFEXOR XR) 75 MG CAPSULE SR 24 HR; TAKE 1 CAPSULE EVERY DAY  Dispense: 90 capsule; Refill: 1    3. Moderate persistent asthma without complication  New to me; condition is chronic, stable and mood is well controlled on current regimen  No changes.   F/u in 6 months for refill; sooner if needed.   - CBC WITH DIFFERENTIAL; Future  - Comp Metabolic Panel; Future  - montelukast (SINGULAIR) 10 MG Tab; TAKE 1 TABLET EVERY DAY FOR ASTHMA  Dispense: 90 tablet; Refill: 1    4. Encounter for " screening mammogram for breast cancer  - MA-SCREENING MAMMO BILAT W/TOMOSYNTHESIS W/CAD; Future    5. Vitamin D deficiency  Follow up; using Vitamin D 5000IU/day  Continue current regimen.   - VITAMIN D,25 HYDROXY; Future      Followup: Return in about 4 weeks (around 4/21/2021) for Annual wellness visit .       Chitra Toth P.A.-C.  Supervising MD: Dr. Milind Jc MD  03/24/21

## 2021-03-24 NOTE — ASSESSMENT & PLAN NOTE
New to me; chronic and stable.   Currently using Lipitor 10mg PO once QHS.     Lab Results   Component Value Date/Time    CHOLSTRLTOT 187 07/26/2018 0948    TRIGLYCERIDE 69 07/26/2018 0948    HDL 79 07/26/2018 0948    LDL 94 07/26/2018 0948     The 10-year ASCVD risk score (Olga NAIR Jr., et al., 2013) is: 12.2%

## 2021-04-03 ENCOUNTER — HOSPITAL ENCOUNTER (OUTPATIENT)
Dept: LAB | Facility: MEDICAL CENTER | Age: 76
End: 2021-04-03
Attending: PHYSICIAN ASSISTANT
Payer: MEDICARE

## 2021-04-03 DIAGNOSIS — J45.40 MODERATE PERSISTENT ASTHMA WITHOUT COMPLICATION: ICD-10-CM

## 2021-04-03 DIAGNOSIS — F33.42 RECURRENT MAJOR DEPRESSIVE DISORDER, IN FULL REMISSION (HCC): ICD-10-CM

## 2021-04-03 DIAGNOSIS — E55.9 VITAMIN D DEFICIENCY: ICD-10-CM

## 2021-04-03 DIAGNOSIS — E78.5 DYSLIPIDEMIA: ICD-10-CM

## 2021-04-03 LAB
ALBUMIN SERPL BCP-MCNC: 4.3 G/DL (ref 3.2–4.9)
ALBUMIN/GLOB SERPL: 1.7 G/DL
ALP SERPL-CCNC: 72 U/L (ref 30–99)
ALT SERPL-CCNC: 22 U/L (ref 2–50)
ANION GAP SERPL CALC-SCNC: 8 MMOL/L (ref 7–16)
AST SERPL-CCNC: 26 U/L (ref 12–45)
BASOPHILS # BLD AUTO: 0.8 % (ref 0–1.8)
BASOPHILS # BLD: 0.04 K/UL (ref 0–0.12)
BILIRUB SERPL-MCNC: 0.7 MG/DL (ref 0.1–1.5)
BUN SERPL-MCNC: 11 MG/DL (ref 8–22)
CALCIUM SERPL-MCNC: 9.2 MG/DL (ref 8.5–10.5)
CHLORIDE SERPL-SCNC: 97 MMOL/L (ref 96–112)
CHOLEST SERPL-MCNC: 202 MG/DL (ref 100–199)
CO2 SERPL-SCNC: 26 MMOL/L (ref 20–33)
CREAT SERPL-MCNC: 0.64 MG/DL (ref 0.5–1.4)
EOSINOPHIL # BLD AUTO: 0.52 K/UL (ref 0–0.51)
EOSINOPHIL NFR BLD: 9.9 % (ref 0–6.9)
ERYTHROCYTE [DISTWIDTH] IN BLOOD BY AUTOMATED COUNT: 46.8 FL (ref 35.9–50)
FASTING STATUS PATIENT QL REPORTED: NORMAL
GLOBULIN SER CALC-MCNC: 2.6 G/DL (ref 1.9–3.5)
GLUCOSE SERPL-MCNC: 87 MG/DL (ref 65–99)
HCT VFR BLD AUTO: 42.8 % (ref 37–47)
HDLC SERPL-MCNC: 102 MG/DL
HGB BLD-MCNC: 14.2 G/DL (ref 12–16)
IMM GRANULOCYTES # BLD AUTO: 0.01 K/UL (ref 0–0.11)
IMM GRANULOCYTES NFR BLD AUTO: 0.2 % (ref 0–0.9)
LDLC SERPL CALC-MCNC: 93 MG/DL
LYMPHOCYTES # BLD AUTO: 2.13 K/UL (ref 1–4.8)
LYMPHOCYTES NFR BLD: 40.6 % (ref 22–41)
MCH RBC QN AUTO: 31.7 PG (ref 27–33)
MCHC RBC AUTO-ENTMCNC: 33.2 G/DL (ref 33.6–35)
MCV RBC AUTO: 95.5 FL (ref 81.4–97.8)
MONOCYTES # BLD AUTO: 0.38 K/UL (ref 0–0.85)
MONOCYTES NFR BLD AUTO: 7.2 % (ref 0–13.4)
NEUTROPHILS # BLD AUTO: 2.17 K/UL (ref 2–7.15)
NEUTROPHILS NFR BLD: 41.3 % (ref 44–72)
NRBC # BLD AUTO: 0 K/UL
NRBC BLD-RTO: 0 /100 WBC
PLATELET # BLD AUTO: 267 K/UL (ref 164–446)
PMV BLD AUTO: 11.1 FL (ref 9–12.9)
POTASSIUM SERPL-SCNC: 5.2 MMOL/L (ref 3.6–5.5)
PROT SERPL-MCNC: 6.9 G/DL (ref 6–8.2)
RBC # BLD AUTO: 4.48 M/UL (ref 4.2–5.4)
SODIUM SERPL-SCNC: 131 MMOL/L (ref 135–145)
TRIGL SERPL-MCNC: 35 MG/DL (ref 0–149)
TSH SERPL DL<=0.005 MIU/L-ACNC: 3.56 UIU/ML (ref 0.38–5.33)
WBC # BLD AUTO: 5.3 K/UL (ref 4.8–10.8)

## 2021-04-03 PROCEDURE — 80053 COMPREHEN METABOLIC PANEL: CPT

## 2021-04-03 PROCEDURE — 85025 COMPLETE CBC W/AUTO DIFF WBC: CPT

## 2021-04-03 PROCEDURE — 84443 ASSAY THYROID STIM HORMONE: CPT

## 2021-04-03 PROCEDURE — 36415 COLL VENOUS BLD VENIPUNCTURE: CPT

## 2021-04-03 PROCEDURE — 80061 LIPID PANEL: CPT

## 2021-04-03 PROCEDURE — 82306 VITAMIN D 25 HYDROXY: CPT

## 2021-04-06 LAB — 25(OH)D3 SERPL-MCNC: 50 NG/ML (ref 30–80)

## 2021-04-07 ENCOUNTER — TELEPHONE (OUTPATIENT)
Dept: MEDICAL GROUP | Facility: LAB | Age: 76
End: 2021-04-07

## 2021-04-07 NOTE — TELEPHONE ENCOUNTER
ANNUAL WELLNESS VISIT PRE-VISIT PLANNING    1.  Reviewed notes from the last office visit: Yes    2.  If any orders were ordered or intended to be done prior to visit (i.e. 6 mos follow-up), do we have results/consult notes or has patient scheduled?        •  Labs - Labs ordered, completed on 4/3/21 and results are in chart.  Note: If patient appointment is for lab review and patient did not complete labs, check with provider if OK to reschedule patient until labs completed.       •  Imaging - Imaging ordered, NOT completed. Patient advised to complete prior to next appointment. pt has appts        •  Referrals - No referrals were ordered at last office visit.    3.  Immunizations were updated in Epic using Reconcile Outside Information activity? Yes        4.  Patient is due for the following Health Maintenance Topics:   Health Maintenance Due   Topic Date Due   • IMM ZOSTER VACCINES (3 of 3) 01/07/2020   • Annual Wellness Visit  01/11/2020   • MAMMOGRAM  10/14/2020   • LUNG CANCER SCREENING  12/07/2020     5.  Reviewed/Updated the following with patient:       •   Preferred Pharmacy? Yes       •   Preferred Lab? Yes       •   Preferred Communication? Yes       •   Allergies? Yes       •   Medications? YES. Was Abstract Encounter opened and chart updated? YES    6.  Care Team Updated:       •   DME Company (gait device, O2, CPAP, etc.): N\A       •   Other Specialists (eye doctor, derm, GYN, cardiology, endo, etc): YES    7.  Patient was advised: “This is a free wellness visit. The provider will screen for medical conditions to help you stay healthy. If you have other concerns to address you may be asked to discuss these at a separate visit or there may be an additional fee.”     8.  AHA (Puls8) form printed for Provider? N/A

## 2021-04-13 ENCOUNTER — HOSPITAL ENCOUNTER (OUTPATIENT)
Dept: RADIOLOGY | Facility: MEDICAL CENTER | Age: 76
End: 2021-04-13
Attending: PHYSICIAN ASSISTANT
Payer: COMMERCIAL

## 2021-04-13 DIAGNOSIS — E78.5 DYSLIPIDEMIA: ICD-10-CM

## 2021-04-13 PROCEDURE — 4410556 CT-CARDIAC SCORING (SELF PAY ONLY)

## 2021-04-14 ENCOUNTER — OFFICE VISIT (OUTPATIENT)
Dept: MEDICAL GROUP | Facility: LAB | Age: 76
End: 2021-04-14
Payer: MEDICARE

## 2021-04-14 VITALS
SYSTOLIC BLOOD PRESSURE: 130 MMHG | DIASTOLIC BLOOD PRESSURE: 78 MMHG | HEART RATE: 56 BPM | OXYGEN SATURATION: 97 % | HEIGHT: 67 IN | TEMPERATURE: 96.5 F | RESPIRATION RATE: 18 BRPM | BODY MASS INDEX: 25.36 KG/M2 | WEIGHT: 161.6 LBS

## 2021-04-14 DIAGNOSIS — R41.3 MEMORY CHANGES: ICD-10-CM

## 2021-04-14 DIAGNOSIS — Z23 NEED FOR VACCINATION: ICD-10-CM

## 2021-04-14 DIAGNOSIS — Z00.00 MEDICARE ANNUAL WELLNESS VISIT, SUBSEQUENT: Primary | ICD-10-CM

## 2021-04-14 PROCEDURE — G0439 PPPS, SUBSEQ VISIT: HCPCS | Performed by: PHYSICIAN ASSISTANT

## 2021-04-14 PROCEDURE — 90471 IMMUNIZATION ADMIN: CPT | Performed by: PHYSICIAN ASSISTANT

## 2021-04-14 PROCEDURE — 90750 HZV VACC RECOMBINANT IM: CPT | Performed by: PHYSICIAN ASSISTANT

## 2021-04-14 RX ORDER — PYRIDOXINE HCL (VITAMIN B6) 50 MG
1 TABLET ORAL
COMMUNITY

## 2021-04-14 ASSESSMENT — FIBROSIS 4 INDEX: FIB4 SCORE: 1.52

## 2021-04-14 ASSESSMENT — ACTIVITIES OF DAILY LIVING (ADL): BATHING_REQUIRES_ASSISTANCE: 0

## 2021-04-14 ASSESSMENT — PATIENT HEALTH QUESTIONNAIRE - PHQ9: CLINICAL INTERPRETATION OF PHQ2 SCORE: 0

## 2021-04-14 ASSESSMENT — ENCOUNTER SYMPTOMS: GENERAL WELL-BEING: EXCELLENT

## 2021-04-14 NOTE — PROGRESS NOTES
Chief Complaint   Patient presents with   • Annual Wellness Visit         HPI:  Leigh is a 75 y.o. here for Medicare Annual Wellness Visit  New concerns for slight change in memory and having concerns about short term memory.   Would like to have more in depth testing for memory     Increasing exercising. Working on weight.     Patient Active Problem List    Diagnosis Date Noted   • Adrenal adenoma, left 11/12/2019   • Tobacco use 09/17/2019   • Right ankle pain 08/15/2018   • Uncomplicated asthma 08/14/2018   • Benign essential HTN 07/06/2017   • Cervical cancer screening 07/06/2017   • Neoplasm of uncertain behavior 07/06/2017   • Visit for screening mammogram 06/28/2017   • AK (actinic keratosis) 06/28/2017   • Medicare annual wellness visit, initial 06/28/2017   • Preventative health care 01/04/2017   • Smoking greater than 30 pack years 09/01/2016   • Postmenopausal 09/01/2016   • Screening for colon cancer 09/01/2016   • Need for prophylactic vaccination with combined vaccine 09/01/2016   • Asthma, moderate persistent 02/26/2015   • Environmental allergies 02/26/2015   • Recurrent major depressive disorder, in full remission (HCC) 02/26/2015   • Dyslipidemia 02/26/2015       Current Outpatient Medications   Medication Sig Dispense Refill   • Cyanocobalamin (B-12) 100 MCG Tab Take  by mouth.     • montelukast (SINGULAIR) 10 MG Tab TAKE 1 TABLET EVERY DAY FOR ASTHMA 90 tablet 1   • venlafaxine XR (EFFEXOR XR) 75 MG CAPSULE SR 24 HR TAKE 1 CAPSULE EVERY DAY 90 capsule 1   • atorvastatin (LIPITOR) 10 MG Tab Take 1 tablet by mouth every day. 90 tablet 1   • aspirin (ASA) 325 MG Tab Take 325 mg by mouth every 6 hours as needed. headache     • B Complex Vitamins (B COMPLEX-B12 PO) Take 1 Tab by mouth every day.     • cholecalciferol (DIALYVITE VITAMIN D 5000) 5000 UNIT Cap Take 5,000 Units by mouth every day.     • Ascorbic Acid (VITAMIN C) 1000 MG Tab Take 1 Tab by mouth every day.     • NON SPECIFIED Take 1 Tab by  mouth every day. BIO-FLEX     • Bioflavonoid Products (GRAPE SEED PO) Take 1 Tab by mouth every day.       No current facility-administered medications for this visit.        Patient is taking medications as noted in medication list.  Current supplements as per medication list.     Allergies: Sulfa drugs    Current social contact/activities: zoom/facetime, works with      Is patient current with immunizations? No, due for SHINGRIX (Shingles). Patient is interested in receiving SHINGRIX (Shingles) today.    She  reports that she quit smoking about 16 years ago. Her smoking use included cigarettes. She started smoking about 53 years ago. She has a 34.00 pack-year smoking history. She has never used smokeless tobacco. She reports current alcohol use of about 7.0 oz of alcohol per week. She reports previous drug use. Drug: Marijuana.  Counseling given: Not Answered        DPA/Advanced directive: Patient does not have an Advanced Directive.  A packet and workshop information was given on Advanced Directives.    ROS:    Gait: Uses no assistive device   Ostomy: No   Other tubes: No   Amputations: No   Chronic oxygen use No   Last eye exam 3/14/2021   Wears hearing aids: No   : Reports urinary leakage during the last 6 months that has not interfered at all with their daily activities or sleep.    Depression Screening    Little interest or pleasure in doing things?  0 - not at all  Feeling down, depressed, or hopeless? 0 - not at all  Patient Health Questionnaire Score: 0    If depressive symptoms identified deferred to follow up visit unless specifically addressed in assessment and plan.    Interpretation of PHQ-9 Total Score   Score Severity   1-4 No Depression   5-9 Mild Depression   10-14 Moderate Depression   15-19 Moderately Severe Depression   20-27 Severe Depression    Screening for Cognitive Impairment    Three Minute Recall (captain, garden, picture)  3/3 All three  Pacheco clock face with all 12  numbers and set the hands to show 5 past 8.  Yes 5/5  If cognitive concerns identified, deferred for follow up unless specifically addressed in assessment and plan.    Fall Risk Assessment    Has the patient had two or more falls in the last year or any fall with injury in the last year?  No  If fall risk identified, deferred for follow up unless specifically addressed in assessment and plan.    Safety Assessment    Throw rugs on floor.  No  Handrails on all stairs.  No  Good lighting in all hallways.  Yes  Difficulty hearing.  No  Patient counseled about all safety risks that were identified.    Functional Assessment ADLs    Are there any barriers preventing you from cooking for yourself or meeting nutritional needs?  No.    Are there any barriers preventing you from driving safely or obtaining transportation?  No.    Are there any barriers preventing you from using a telephone or calling for help?  No.    Are there any barriers preventing you from shopping?  No.    Are there any barriers preventing you from taking care of your own finances?  No.    Are there any barriers preventing you from managing your medications?  No.    Are there any barriers preventing you from showering, bathing or dressing yourself?  No.    Are you currently engaging in any exercise or physical activity?  Yes.  Walk dogs, ride horse, rides a bike  What is your perception of your health?  Excellent.    Health Maintenance Summary                IMM ZOSTER VACCINES Overdue 1/7/2020      Done 11/12/2019 Imm Admin: Zoster Vaccine Recombinant (RZV) (SHINGRIX)     Patient has more history with this topic...    MAMMOGRAM Overdue 10/14/2020      Done 10/14/2019 MA-SCREENING MAMMO BILAT W/IMPLANTS W/DENVER W/CAD     Patient has more history with this topic...    LUNG CANCER SCREENING Overdue 12/7/2020      Done 6/7/2020 CT-CHEST (THORAX) W/O     Patient has more history with this topic...    BONE DENSITY Next Due 9/9/2021      Done 9/9/2016 DS-BONE  "DENSITY STUDY (DEXA)    Annual Wellness Visit Next Due 4/15/2022      Done 2021 Visit Dx: Medicare annual wellness visit, subsequent     Patient has more history with this topic...    COLONOSCOPY Next Due 2023      Done 2018 REFERRAL TO GI FOR COLONOSCOPY    IMM DTaP/Tdap/Td Vaccine Next Due 2027      Done 2017 Imm Admin: Tdap Vaccine          Patient Care Team:  Chitra Toth P.A.-C. as PCP - General (Physician Assistant)  Wen Price M.D. (Pulmonary Medicine)  Yo King O.D. as Consulting Physician (Optometry)    Social History     Tobacco Use   • Smoking status: Former Smoker     Packs/day: 1.00     Years: 34.00     Pack years: 34.00     Types: Cigarettes     Start date: 1968     Quit date: 2005     Years since quittin.2   • Smokeless tobacco: Never Used   Substance Use Topics   • Alcohol use: Yes     Alcohol/week: 7.0 oz     Types: 14 Glasses of wine per week     Comment: Occasionally   • Drug use: Not Currently     Types: Marijuana     Family History   Problem Relation Age of Onset   • Hypertension Sister    • Hypertension Brother    • Arthritis Brother         RA   • Dementia Mother    • Heart Attack Mother      She  has a past medical history of Asthma and Depression.   Past Surgical History:   Procedure Laterality Date   • SD BREAST AUGMENTATION WITH IMPLANT             Exam:     /78 (BP Location: Left arm, Patient Position: Sitting, BP Cuff Size: Adult)   Pulse (!) 56   Temp 35.8 °C (96.5 °F) (Temporal)   Resp 18   Ht 1.702 m (5' 7\")   Wt 73.3 kg (161 lb 9.6 oz)   SpO2 97%  Body mass index is 25.31 kg/m².    Hearing excellent.    Dentition good  Alert, oriented in no acute distress.  Eye contact is good, speech goal directed, affect calm  HEART: RRR. Normal S1 and S2   LUNGS; CTA. No wheeze     Assessment and Plan. The following treatment and monitoring plan is recommended:    1. Medicare annual wellness visit, subsequent  Subsequent " Annual Wellness Visit - Includes PPPS ()    Shingles Vaccine (SHINGRIX)   2. Need for vaccination     3. Memory changes  REFERRAL TO NEUROLOGY     Services suggested: No services needed at this time  Health Care Screening recommendations as per orders if indicated.  Referrals offered: PT/OT/Nutrition counseling/Behavioral Health/Smoking cessation as per orders if indicated.    Discussion today about general wellness and lifestyle habits:    · Prevent falls and reduce trip hazards; Cautioned about securing or removing rugs.  · Have a working fire alarm and carbon monoxide detector;   · Engage in regular physical activity and social activities       Follow-up: No follow-ups on file.     Chitra Toth P.A.-C.

## 2021-04-14 NOTE — PROGRESS NOTES
Chief Complaint   Patient presents with   • Annual Wellness Visit         HPI:  Leigh is a 75 y.o. here for Medicare Annual Wellness Visit      Patient Active Problem List    Diagnosis Date Noted   • Adrenal adenoma, left 11/12/2019   • Tobacco use 09/17/2019   • Right ankle pain 08/15/2018   • Uncomplicated asthma 08/14/2018   • Benign essential HTN 07/06/2017   • Cervical cancer screening 07/06/2017   • Neoplasm of uncertain behavior 07/06/2017   • Visit for screening mammogram 06/28/2017   • AK (actinic keratosis) 06/28/2017   • Medicare annual wellness visit, initial 06/28/2017   • Preventative health care 01/04/2017   • Smoking greater than 30 pack years 09/01/2016   • Postmenopausal 09/01/2016   • Screening for colon cancer 09/01/2016   • Need for prophylactic vaccination with combined vaccine 09/01/2016   • Asthma, moderate persistent 02/26/2015   • Environmental allergies 02/26/2015   • Recurrent major depressive disorder, in full remission (HCC) 02/26/2015   • Dyslipidemia 02/26/2015       Current Outpatient Medications   Medication Sig Dispense Refill   • Cyanocobalamin (B-12) 100 MCG Tab Take  by mouth.     • montelukast (SINGULAIR) 10 MG Tab TAKE 1 TABLET EVERY DAY FOR ASTHMA 90 tablet 1   • venlafaxine XR (EFFEXOR XR) 75 MG CAPSULE SR 24 HR TAKE 1 CAPSULE EVERY DAY 90 capsule 1   • atorvastatin (LIPITOR) 10 MG Tab Take 1 tablet by mouth every day. 90 tablet 1   • aspirin (ASA) 325 MG Tab Take 325 mg by mouth every 6 hours as needed. headache     • B Complex Vitamins (B COMPLEX-B12 PO) Take 1 Tab by mouth every day.     • cholecalciferol (DIALYVITE VITAMIN D 5000) 5000 UNIT Cap Take 5,000 Units by mouth every day.     • Ascorbic Acid (VITAMIN C) 1000 MG Tab Take 1 Tab by mouth every day.     • NON SPECIFIED Take 1 Tab by mouth every day. BIO-FLEX     • Bioflavonoid Products (GRAPE SEED PO) Take 1 Tab by mouth every day.       No current facility-administered medications for this visit.        Patient is  taking medications as noted in medication list.  Current supplements as per medication list.     Allergies: Sulfa drugs    Current social contact/activities: zoom/facetime, works with      Is patient current with immunizations? No, due for SHINGRIX (Shingles). Patient is interested in receiving SHINGRIX (Shingles) today.    She  reports that she quit smoking about 16 years ago. Her smoking use included cigarettes. She started smoking about 53 years ago. She has a 34.00 pack-year smoking history. She has never used smokeless tobacco. She reports current alcohol use of about 7.0 oz of alcohol per week. She reports previous drug use. Drug: Marijuana.  Counseling given: Not Answered        DPA/Advanced directive: Patient does not have an Advanced Directive.  A packet and workshop information was given on Advanced Directives.    ROS:    Gait: Uses no assistive device   Ostomy: No   Other tubes: No   Amputations: No   Chronic oxygen use No   Last eye exam 3/14/2021   Wears hearing aids: No   : Reports urinary leakage during the last 6 months that has not interfered at all with their daily activities or sleep.        Depression Screening    Little interest or pleasure in doing things?  0 - not at all  Feeling down, depressed, or hopeless? 0 - not at all  Patient Health Questionnaire Score: 0    If depressive symptoms identified deferred to follow up visit unless specifically addressed in assessment and plan.    Interpretation of PHQ-9 Total Score   Score Severity   1-4 No Depression   5-9 Mild Depression   10-14 Moderate Depression   15-19 Moderately Severe Depression   20-27 Severe Depression    Screening for Cognitive Impairment    Three Minute Recall (captain, garden, picture)  3/3 All three  Pacheco clock face with all 12 numbers and set the hands to show 5 past 8.  Yes 5/5  If cognitive concerns identified, deferred for follow up unless specifically addressed in assessment and plan.    Fall Risk  Assessment    Has the patient had two or more falls in the last year or any fall with injury in the last year?  No  If fall risk identified, deferred for follow up unless specifically addressed in assessment and plan.    Safety Assessment    Throw rugs on floor.  No  Handrails on all stairs.  No  Good lighting in all hallways.  Yes  Difficulty hearing.  No  Patient counseled about all safety risks that were identified.    Functional Assessment ADLs    Are there any barriers preventing you from cooking for yourself or meeting nutritional needs?  No.    Are there any barriers preventing you from driving safely or obtaining transportation?  No.    Are there any barriers preventing you from using a telephone or calling for help?  No.    Are there any barriers preventing you from shopping?  No.    Are there any barriers preventing you from taking care of your own finances?  No.    Are there any barriers preventing you from managing your medications?  No.    Are there any barriers preventing you from showering, bathing or dressing yourself?  No.    Are you currently engaging in any exercise or physical activity?  Yes.  Walk dogs, ride horse, rides a bike  What is your perception of your health?  Excellent.    Health Maintenance Summary                IMM ZOSTER VACCINES Overdue 1/7/2020      Done 11/12/2019 Imm Admin: Zoster Vaccine Recombinant (RZV) (SHINGRIX)     Patient has more history with this topic...    MAMMOGRAM Overdue 10/14/2020      Done 10/14/2019 MA-SCREENING MAMMO BILAT W/IMPLANTS W/DENVER W/CAD     Patient has more history with this topic...    LUNG CANCER SCREENING Overdue 12/7/2020      Done 6/7/2020 CT-CHEST (THORAX) W/O     Patient has more history with this topic...    BONE DENSITY Next Due 9/9/2021      Done 9/9/2016 DS-BONE DENSITY STUDY (DEXA)    Annual Wellness Visit Next Due 4/15/2022      Done 4/14/2021 Visit Dx: Medicare annual wellness visit, subsequent     Patient has more history with this  topic...    COLONOSCOPY Next Due 2023      Done 2018 REFERRAL TO GI FOR COLONOSCOPY    IMM DTaP/Tdap/Td Vaccine Next Due 2027      Done 2017 Imm Admin: Tdap Vaccine          Patient Care Team:  Chitra Toth P.A.-C. as PCP - General (Physician Assistant)  Wen Price M.D. (Pulmonary Medicine)  Yo King O.D. as Consulting Physician (Optometry)    Social History     Tobacco Use   • Smoking status: Former Smoker     Packs/day: 1.00     Years: 34.00     Pack years: 34.00     Types: Cigarettes     Start date: 1968     Quit date: 2005     Years since quittin.2   • Smokeless tobacco: Never Used   Substance Use Topics   • Alcohol use: Yes     Alcohol/week: 7.0 oz     Types: 14 Glasses of wine per week     Comment: Occasionally   • Drug use: Not Currently     Types: Marijuana     Family History   Problem Relation Age of Onset   • Hypertension Sister    • Hypertension Brother    • Arthritis Brother         RA   • Dementia Mother    • Heart Attack Mother      She  has a past medical history of Asthma and Depression.   Past Surgical History:   Procedure Laterality Date   • VT BREAST AUGMENTATION WITH IMPLANT             Exam:     There were no vitals taken for this visit. There is no height or weight on file to calculate BMI.    Hearing excellent.    Dentition good  Alert, oriented in no acute distress.  Eye contact is good, speech goal directed, affect calm    Assessment and Plan. The following treatment and monitoring plan is recommended:    1. Medicare annual wellness visit, subsequent           Services suggested: No services needed at this time  Health Care Screening recommendations as per orders if indicated.  Referrals offered: PT/OT/Nutrition counseling/Behavioral Health/Smoking cessation as per orders if indicated.    Discussion today about general wellness and lifestyle habits:    · Prevent falls and reduce trip hazards; Cautioned about securing or removing  nayely.  · Have a working fire alarm and carbon monoxide detector;   · Engage in regular physical activity and social activities       Follow-up: No follow-ups on file.

## 2021-04-15 ENCOUNTER — PATIENT MESSAGE (OUTPATIENT)
Dept: MEDICAL GROUP | Facility: LAB | Age: 76
End: 2021-04-15

## 2021-04-15 DIAGNOSIS — E78.5 HYPERLIPIDEMIA, UNSPECIFIED HYPERLIPIDEMIA TYPE: ICD-10-CM

## 2021-04-15 NOTE — PROGRESS NOTES
D/C lipitor - Cardiac scoring is 0. Will check lipids in 3 months.   Pt to use red yeast rice.     Chitra Toth P.A.-C.

## 2021-06-02 ENCOUNTER — APPOINTMENT (OUTPATIENT)
Dept: RADIOLOGY | Facility: MEDICAL CENTER | Age: 76
End: 2021-06-02
Attending: PHYSICIAN ASSISTANT
Payer: MEDICARE

## 2021-06-02 DIAGNOSIS — E66.3 OVERWEIGHT: ICD-10-CM

## 2021-06-02 RX ORDER — PHENDIMETRAZINE TARTRATE 35 MG/1
TABLET ORAL
Qty: 14 TABLET | Refills: 0 | Status: SHIPPED | OUTPATIENT
Start: 2021-06-02 | End: 2021-09-13 | Stop reason: SDUPTHER

## 2021-06-02 NOTE — PROGRESS NOTES
1. Overweight  Phendimetrazine Tartrate 35 MG Tab     PDMP has been reviewed.   To use these sparingly to help to lose additional 6lbs (goal weight).   No chest pain or sob  No palpitations  Was Rx these 04/13/2021 and had no complication or side effects from this.     Chitra Toth P.A.-C.

## 2021-06-07 ENCOUNTER — TELEPHONE (OUTPATIENT)
Dept: MEDICAL GROUP | Facility: LAB | Age: 76
End: 2021-06-07

## 2021-06-07 NOTE — TELEPHONE ENCOUNTER
DOCUMENTATION OF PAR STATUS:    1. Name of Medication & Dose: Phendimetrazine 35mg     2. Name of Prescription Coverage Company & phone #: [employer]    3. Date Prior Auth Submitted: 6/7/2021    4. What information was given to obtain insurance decision? Faxed OV notes    5. Prior Auth Status? NO RX COVERAGE    6. Patient Notified: no

## 2021-06-22 DIAGNOSIS — F33.42 RECURRENT MAJOR DEPRESSIVE DISORDER, IN FULL REMISSION (HCC): ICD-10-CM

## 2021-06-22 RX ORDER — VENLAFAXINE HYDROCHLORIDE 75 MG/1
CAPSULE, EXTENDED RELEASE ORAL
Qty: 90 CAPSULE | Refills: 1 | Status: SHIPPED | OUTPATIENT
Start: 2021-06-22 | End: 2022-01-10

## 2021-06-22 NOTE — TELEPHONE ENCOUNTER
Received request via: Pharmacy    Was the patient seen in the last year in this department? Yes  LOV: 4/14/2021  Does the patient have an active prescription (recently filled or refills available) for medication(s) requested? No

## 2021-09-13 DIAGNOSIS — E66.3 OVERWEIGHT: ICD-10-CM

## 2021-09-13 RX ORDER — PHENDIMETRAZINE TARTRATE 35 MG/1
TABLET ORAL
Qty: 14 TABLET | Refills: 0 | Status: SHIPPED | OUTPATIENT
Start: 2021-09-13 | End: 2021-10-13

## 2021-09-13 NOTE — TELEPHONE ENCOUNTER
Received request via: Patient    Was the patient seen in the last year in this department? Yes  4/14/21  Does the patient have an active prescription (recently filled or refills available) for medication(s) requested? No

## 2021-09-13 NOTE — TELEPHONE ENCOUNTER
PDMP has been reviewed.   1. Overweight  Phendimetrazine Tartrate 35 MG Tab       Chitra Toth P.A.-C.

## 2021-09-13 NOTE — TELEPHONE ENCOUNTER
----- Message from Alissa Bonilla sent at 9/11/2021  9:22 AM PDT -----  Regarding: Phendimetrazine  I never did get this prescription  of Phendimetrazine.  Would you please submit it one more time.      Thank you.

## 2021-10-09 DIAGNOSIS — J45.40 MODERATE PERSISTENT ASTHMA WITHOUT COMPLICATION: ICD-10-CM

## 2021-10-09 RX ORDER — MONTELUKAST SODIUM 10 MG/1
TABLET ORAL
Qty: 90 TABLET | Refills: 1 | Status: SHIPPED | OUTPATIENT
Start: 2021-10-09 | End: 2022-04-25

## 2021-10-09 NOTE — TELEPHONE ENCOUNTER
Received request via: Pharmacy    Was the patient seen in the last year in this department? Yes  LOV 04/14/2021  Does the patient have an active prescription (recently filled or refills available) for medication(s) requested? No

## 2021-10-21 ENCOUNTER — OFFICE VISIT (OUTPATIENT)
Dept: MEDICAL GROUP | Facility: LAB | Age: 76
End: 2021-10-21
Payer: MEDICARE

## 2021-10-21 VITALS
OXYGEN SATURATION: 95 % | WEIGHT: 157.8 LBS | DIASTOLIC BLOOD PRESSURE: 96 MMHG | BODY MASS INDEX: 24.77 KG/M2 | HEIGHT: 67 IN | TEMPERATURE: 97.8 F | SYSTOLIC BLOOD PRESSURE: 132 MMHG | RESPIRATION RATE: 16 BRPM | HEART RATE: 52 BPM

## 2021-10-21 DIAGNOSIS — Z23 NEED FOR VACCINATION: ICD-10-CM

## 2021-10-21 DIAGNOSIS — J45.40 MODERATE PERSISTENT ASTHMA WITHOUT COMPLICATION: ICD-10-CM

## 2021-10-21 PROCEDURE — 90662 IIV NO PRSV INCREASED AG IM: CPT | Performed by: PHYSICIAN ASSISTANT

## 2021-10-21 PROCEDURE — G0008 ADMIN INFLUENZA VIRUS VAC: HCPCS | Performed by: PHYSICIAN ASSISTANT

## 2021-10-21 PROCEDURE — 99213 OFFICE O/P EST LOW 20 MIN: CPT | Mod: 25 | Performed by: PHYSICIAN ASSISTANT

## 2021-10-21 RX ORDER — ALBUTEROL SULFATE 90 UG/1
2 AEROSOL, METERED RESPIRATORY (INHALATION) EVERY 4 HOURS PRN
Qty: 1 EACH | Refills: 1 | Status: SHIPPED | OUTPATIENT
Start: 2021-10-21 | End: 2021-11-23

## 2021-10-21 ASSESSMENT — FIBROSIS 4 INDEX: FIB4 SCORE: 1.52

## 2021-10-21 NOTE — PROGRESS NOTES
"Subjective:   Alissa Bonilla is a 75 y.o. female here today for cough/asthma.     Asthma, moderate persistent  Follow up, has known asthma.   Reports that she has been having Productive coughing fits, intermittent, and mostly in the AM.   She believes this may be allergy related.  Hasn't used the albuterol inhaler in years.   Uses montelukast consistently.  Does not use daily allergy medication.    Denies fevers/chills  No changes in taste or smell.   She recently got her 3rd COVID-19 vaccination.       Current medicines (including changes today)  Current Outpatient Medications   Medication Sig Dispense Refill   • albuterol 108 (90 Base) MCG/ACT Aero Soln inhalation aerosol Inhale 2 Puffs every four hours as needed for Shortness of Breath. 1 Each 1   • montelukast (SINGULAIR) 10 MG Tab TAKE 1 TABLET BY MOUTH EVERY DAY FOR ASTHMA 90 Tablet 1   • venlafaxine XR (EFFEXOR XR) 75 MG CAPSULE SR 24 HR TAKE 1 CAPSULE BY MOUTH EVERY DAY 90 capsule 1   • aspirin (ASA) 325 MG Tab Take 325 mg by mouth every 6 hours as needed. headache     • B Complex Vitamins (B COMPLEX-B12 PO) Take 1 Tab by mouth every day.     • cholecalciferol (DIALYVITE VITAMIN D 5000) 5000 UNIT Cap Take 5,000 Units by mouth every day.     • Ascorbic Acid (VITAMIN C) 1000 MG Tab Take 1 Tab by mouth every day.     • NON SPECIFIED Take 1 Tab by mouth every day. BIO-FLEX     • Bioflavonoid Products (GRAPE SEED PO) Take 1 Tab by mouth every day.     • Cyanocobalamin (B-12) 100 MCG Tab Take  by mouth.       No current facility-administered medications for this visit.     She  has a past medical history of Asthma and Depression.    ROS  As per HPI  No chest pain, no shortness of breath, no abdominal pain       Objective:     /96 (BP Location: Left arm, Patient Position: Sitting, BP Cuff Size: Adult)   Pulse (!) 52   Temp 36.6 °C (97.8 °F) (Temporal)   Resp 16   Ht 1.702 m (5' 7\")   Wt 71.6 kg (157 lb 12.8 oz)   SpO2 95%  Body mass index is " 24.71 kg/m².   Physical Exam:  Constitutional: Alert, no distress.  Skin: Warm, dry, good turgor, no rashes in visible areas.  Eye: Equal, round and reactive, conjunctiva clear, lids normal.  ENMT: Lips without lesions, good dentition, oropharynx clear. TMs pearly gray bilaterally.  Neck: Trachea midline, no masses, no thyromegaly. No cervical or supraclavicular lymphadenopathy  Respiratory: Unlabored respiratory effort, lungs clear to auscultation, no wheezes, no ronchi.  Cardiovascular: Normal S1, S2, no murmur, no edema.  Psych: Alert and oriented x3, normal affect and mood.    Assessment and Plan:   The following treatment plan was discussed    1. Need for vaccination  - INFLUENZA VACCINE, HIGH DOSE (65+ ONLY)    2. Moderate persistent asthma without complication  Follow up; recommend that she use Mucinex, especially in the AM.   OK to also use an anti-histamine in addition to her Singulair.   Refill of albuterol as written.  UC/ED precautions if symptoms acutely worsen or if she develops fevers/chills/new SOB.    - albuterol 108 (90 Base) MCG/ACT Aero Soln inhalation aerosol; Inhale 2 Puffs every four hours as needed for Shortness of Breath.  Dispense: 1 Each; Refill: 1    *We also had a discussion about importance of hydrating, especially if/when she is consuming alcohol     Followup: Return if symptoms worsen or fail to improve.       Chitra Toth P.A.-C.  Supervising MD: Dr. Milind Jc MD  10/21/21

## 2021-10-21 NOTE — ASSESSMENT & PLAN NOTE
Follow up, has known asthma.   Reports that she has been having Productive coughing fits, intermittent, and mostly in the AM.   She believes this may be allergy related.  Hasn't used the albuterol inhaler in years.   Uses montelukast consistently.  Does not use daily allergy medication.    Denies fevers/chills  No changes in taste or smell.

## 2021-11-23 DIAGNOSIS — J45.40 MODERATE PERSISTENT ASTHMA WITHOUT COMPLICATION: ICD-10-CM

## 2021-11-23 RX ORDER — ALBUTEROL SULFATE 90 UG/1
AEROSOL, METERED RESPIRATORY (INHALATION)
Qty: 8.5 G | Refills: 0 | Status: SHIPPED | OUTPATIENT
Start: 2021-11-23 | End: 2022-10-24 | Stop reason: SDUPTHER

## 2021-11-23 NOTE — TELEPHONE ENCOUNTER
Received request via: Pharmacy    Was the patient seen in the last year in this department? Yes  LOV 10/21/2021  Does the patient have an active prescription (recently filled or refills available) for medication(s) requested? No

## 2021-12-31 ENCOUNTER — HOSPITAL ENCOUNTER (OUTPATIENT)
Dept: LAB | Facility: MEDICAL CENTER | Age: 76
End: 2021-12-31
Attending: PHYSICIAN ASSISTANT
Payer: MEDICARE

## 2021-12-31 LAB
25(OH)D3 SERPL-MCNC: 66 NG/ML (ref 30–100)
ALBUMIN SERPL BCP-MCNC: 4.3 G/DL (ref 3.2–4.9)
ALBUMIN/GLOB SERPL: 1.7 G/DL
ALP SERPL-CCNC: 77 U/L (ref 30–99)
ALT SERPL-CCNC: 18 U/L (ref 2–50)
ANION GAP SERPL CALC-SCNC: 12 MMOL/L (ref 7–16)
AST SERPL-CCNC: 20 U/L (ref 12–45)
BASOPHILS # BLD AUTO: 1.1 % (ref 0–1.8)
BASOPHILS # BLD: 0.05 K/UL (ref 0–0.12)
BILIRUB SERPL-MCNC: 0.7 MG/DL (ref 0.1–1.5)
BUN SERPL-MCNC: 13 MG/DL (ref 8–22)
CALCIUM SERPL-MCNC: 8.8 MG/DL (ref 8.4–10.2)
CHLORIDE SERPL-SCNC: 105 MMOL/L (ref 96–112)
CO2 SERPL-SCNC: 22 MMOL/L (ref 20–33)
CREAT SERPL-MCNC: 0.63 MG/DL (ref 0.5–1.4)
CRP SERPL HS-MCNC: 0.9 MG/L (ref 0–3)
DHEA-S SERPL-MCNC: 21.3 UG/DL (ref 12–154)
EOSINOPHIL # BLD AUTO: 0.25 K/UL (ref 0–0.51)
EOSINOPHIL NFR BLD: 5.4 % (ref 0–6.9)
ERYTHROCYTE [DISTWIDTH] IN BLOOD BY AUTOMATED COUNT: 48.3 FL (ref 35.9–50)
EST. AVERAGE GLUCOSE BLD GHB EST-MCNC: 108 MG/DL
ESTRADIOL SERPL-MCNC: <5 PG/ML
FASTING STATUS PATIENT QL REPORTED: NORMAL
GLOBULIN SER CALC-MCNC: 2.5 G/DL (ref 1.9–3.5)
GLUCOSE SERPL-MCNC: 94 MG/DL (ref 65–99)
HBA1C MFR BLD: 5.4 % (ref 4–5.6)
HCT VFR BLD AUTO: 42 % (ref 37–47)
HCYS SERPL-SCNC: 6.65 UMOL/L
HGB BLD-MCNC: 13.8 G/DL (ref 12–16)
IMM GRANULOCYTES # BLD AUTO: 0.01 K/UL (ref 0–0.11)
IMM GRANULOCYTES NFR BLD AUTO: 0.2 % (ref 0–0.9)
LYMPHOCYTES # BLD AUTO: 1.61 K/UL (ref 1–4.8)
LYMPHOCYTES NFR BLD: 34.6 % (ref 22–41)
MCH RBC QN AUTO: 31.6 PG (ref 27–33)
MCHC RBC AUTO-ENTMCNC: 32.9 G/DL (ref 33.6–35)
MCV RBC AUTO: 96.1 FL (ref 81.4–97.8)
MONOCYTES # BLD AUTO: 0.26 K/UL (ref 0–0.85)
MONOCYTES NFR BLD AUTO: 5.6 % (ref 0–13.4)
NEUTROPHILS # BLD AUTO: 2.47 K/UL (ref 2–7.15)
NEUTROPHILS NFR BLD: 53.1 % (ref 44–72)
NRBC # BLD AUTO: 0 K/UL
NRBC BLD-RTO: 0 /100 WBC
PLATELET # BLD AUTO: 300 K/UL (ref 164–446)
PMV BLD AUTO: 10.7 FL (ref 9–12.9)
POTASSIUM SERPL-SCNC: 3.9 MMOL/L (ref 3.6–5.5)
PROGEST SERPL-MCNC: 0.22 NG/ML
PROLACTIN SERPL-MCNC: 9.73 NG/ML (ref 2.8–26)
PROT SERPL-MCNC: 6.8 G/DL (ref 6–8.2)
RBC # BLD AUTO: 4.37 M/UL (ref 4.2–5.4)
SODIUM SERPL-SCNC: 139 MMOL/L (ref 135–145)
T3FREE SERPL-MCNC: 2.61 PG/ML (ref 2–4.4)
T4 FREE SERPL-MCNC: 0.92 NG/DL (ref 0.93–1.7)
THYROPEROXIDASE AB SERPL-ACNC: <9 IU/ML (ref 0–9)
TSH SERPL DL<=0.005 MIU/L-ACNC: 3.7 UIU/ML (ref 0.38–5.33)
WBC # BLD AUTO: 4.7 K/UL (ref 4.8–10.8)

## 2021-12-31 PROCEDURE — 84146 ASSAY OF PROLACTIN: CPT

## 2021-12-31 PROCEDURE — 84270 ASSAY OF SEX HORMONE GLOBUL: CPT

## 2021-12-31 PROCEDURE — 84402 ASSAY OF FREE TESTOSTERONE: CPT

## 2021-12-31 PROCEDURE — 84140 ASSAY OF PREGNENOLONE: CPT

## 2021-12-31 PROCEDURE — 80053 COMPREHEN METABOLIC PANEL: CPT

## 2021-12-31 PROCEDURE — 86376 MICROSOMAL ANTIBODY EACH: CPT

## 2021-12-31 PROCEDURE — 82306 VITAMIN D 25 HYDROXY: CPT

## 2021-12-31 PROCEDURE — 84439 ASSAY OF FREE THYROXINE: CPT

## 2021-12-31 PROCEDURE — 82627 DEHYDROEPIANDROSTERONE: CPT

## 2021-12-31 PROCEDURE — 85025 COMPLETE CBC W/AUTO DIFF WBC: CPT

## 2021-12-31 PROCEDURE — 82670 ASSAY OF TOTAL ESTRADIOL: CPT

## 2021-12-31 PROCEDURE — 84482 T3 REVERSE: CPT

## 2021-12-31 PROCEDURE — 82679 ASSAY OF ESTRONE: CPT

## 2021-12-31 PROCEDURE — 84305 ASSAY OF SOMATOMEDIN: CPT

## 2021-12-31 PROCEDURE — 86800 THYROGLOBULIN ANTIBODY: CPT

## 2021-12-31 PROCEDURE — 84403 ASSAY OF TOTAL TESTOSTERONE: CPT

## 2021-12-31 PROCEDURE — 84443 ASSAY THYROID STIM HORMONE: CPT

## 2021-12-31 PROCEDURE — 84144 ASSAY OF PROGESTERONE: CPT

## 2021-12-31 PROCEDURE — 83090 ASSAY OF HOMOCYSTEINE: CPT | Mod: GA

## 2021-12-31 PROCEDURE — 36415 COLL VENOUS BLD VENIPUNCTURE: CPT

## 2021-12-31 PROCEDURE — 86141 C-REACTIVE PROTEIN HS: CPT | Mod: GA

## 2021-12-31 PROCEDURE — 83525 ASSAY OF INSULIN: CPT

## 2021-12-31 PROCEDURE — 84481 FREE ASSAY (FT-3): CPT

## 2021-12-31 PROCEDURE — 83036 HEMOGLOBIN GLYCOSYLATED A1C: CPT | Mod: GA

## 2022-01-03 LAB
INSULIN P FAST SERPL-ACNC: 6 UIU/ML (ref 3–25)
PREG SERPL-MCNC: 35 NG/DL (ref 15–132)

## 2022-01-04 LAB — ESTRONE SERPL-MCNC: 12 PG/ML

## 2022-01-05 LAB
SHBG SERPL-SCNC: 45 NMOL/L (ref 30–135)
T3REVERSE SERPL-MCNC: 8.1 NG/DL (ref 9–27)
TESTOST FREE SERPL-MCNC: 3.6 PG/ML (ref 0.6–3.8)
TESTOST SERPL-MCNC: 26 NG/DL (ref 5–32)
THYROGLOB AB SERPL-ACNC: <0.9 IU/ML (ref 0–4)

## 2022-01-06 LAB
IGF-I SERPL-MCNC: 155 NG/ML (ref 22–220)
IGF-I Z-SCORE SERPL: 1.1

## 2022-01-10 DIAGNOSIS — F33.42 RECURRENT MAJOR DEPRESSIVE DISORDER, IN FULL REMISSION (HCC): ICD-10-CM

## 2022-01-10 RX ORDER — VENLAFAXINE HYDROCHLORIDE 75 MG/1
CAPSULE, EXTENDED RELEASE ORAL
Qty: 90 CAPSULE | Refills: 1 | Status: SHIPPED | OUTPATIENT
Start: 2022-01-10 | End: 2022-06-14

## 2022-01-10 NOTE — TELEPHONE ENCOUNTER
Received request via: Pharmacy    Was the patient seen in the last year in this department? Yes  10/21/21  Does the patient have an active prescription (recently filled or refills available) for medication(s) requested? No

## 2022-03-29 ENCOUNTER — HOSPITAL ENCOUNTER (OUTPATIENT)
Dept: LAB | Facility: MEDICAL CENTER | Age: 77
End: 2022-03-29
Attending: PHYSICIAN ASSISTANT
Payer: MEDICARE

## 2022-03-29 LAB
25(OH)D3 SERPL-MCNC: 75 NG/ML (ref 30–100)
ALBUMIN SERPL BCP-MCNC: 4.4 G/DL (ref 3.2–4.9)
ALBUMIN/GLOB SERPL: 1.8 G/DL
ALP SERPL-CCNC: 67 U/L (ref 30–99)
ALT SERPL-CCNC: 22 U/L (ref 2–50)
ANION GAP SERPL CALC-SCNC: 10 MMOL/L (ref 7–16)
AST SERPL-CCNC: 24 U/L (ref 12–45)
BASOPHILS # BLD AUTO: 0.7 % (ref 0–1.8)
BASOPHILS # BLD: 0.05 K/UL (ref 0–0.12)
BILIRUB SERPL-MCNC: 0.7 MG/DL (ref 0.1–1.5)
BUN SERPL-MCNC: 14 MG/DL (ref 8–22)
CALCIUM SERPL-MCNC: 8.9 MG/DL (ref 8.4–10.2)
CHLORIDE SERPL-SCNC: 104 MMOL/L (ref 96–112)
CO2 SERPL-SCNC: 23 MMOL/L (ref 20–33)
CREAT SERPL-MCNC: 0.75 MG/DL (ref 0.5–1.4)
DHEA-S SERPL-MCNC: 436 UG/DL (ref 12–154)
EOSINOPHIL # BLD AUTO: 0.21 K/UL (ref 0–0.51)
EOSINOPHIL NFR BLD: 3 % (ref 0–6.9)
ERYTHROCYTE [DISTWIDTH] IN BLOOD BY AUTOMATED COUNT: 47.4 FL (ref 35.9–50)
EST. AVERAGE GLUCOSE BLD GHB EST-MCNC: 103 MG/DL
ESTRADIOL SERPL-MCNC: 58 PG/ML
GFR SERPLBLD CREATININE-BSD FMLA CKD-EPI: 83 ML/MIN/1.73 M 2
GLOBULIN SER CALC-MCNC: 2.5 G/DL (ref 1.9–3.5)
GLUCOSE SERPL-MCNC: 91 MG/DL (ref 65–99)
HBA1C MFR BLD: 5.2 % (ref 4–5.6)
HCT VFR BLD AUTO: 44 % (ref 37–47)
HGB BLD-MCNC: 14.5 G/DL (ref 12–16)
IMM GRANULOCYTES # BLD AUTO: 0.02 K/UL (ref 0–0.11)
IMM GRANULOCYTES NFR BLD AUTO: 0.3 % (ref 0–0.9)
LYMPHOCYTES # BLD AUTO: 1.66 K/UL (ref 1–4.8)
LYMPHOCYTES NFR BLD: 24.1 % (ref 22–41)
MCH RBC QN AUTO: 31.2 PG (ref 27–33)
MCHC RBC AUTO-ENTMCNC: 33 G/DL (ref 33.6–35)
MCV RBC AUTO: 94.6 FL (ref 81.4–97.8)
MONOCYTES # BLD AUTO: 0.36 K/UL (ref 0–0.85)
MONOCYTES NFR BLD AUTO: 5.2 % (ref 0–13.4)
NEUTROPHILS # BLD AUTO: 4.6 K/UL (ref 2–7.15)
NEUTROPHILS NFR BLD: 66.7 % (ref 44–72)
NRBC # BLD AUTO: 0 K/UL
NRBC BLD-RTO: 0 /100 WBC
PLATELET # BLD AUTO: 305 K/UL (ref 164–446)
PMV BLD AUTO: 10.2 FL (ref 9–12.9)
POTASSIUM SERPL-SCNC: 4.4 MMOL/L (ref 3.6–5.5)
PROGEST SERPL-MCNC: 1.9 NG/ML
PROT SERPL-MCNC: 6.9 G/DL (ref 6–8.2)
RBC # BLD AUTO: 4.65 M/UL (ref 4.2–5.4)
SODIUM SERPL-SCNC: 137 MMOL/L (ref 135–145)
T3FREE SERPL-MCNC: 2.71 PG/ML (ref 2–4.4)
T4 FREE SERPL-MCNC: 0.85 NG/DL (ref 0.93–1.7)
TSH SERPL DL<=0.005 MIU/L-ACNC: 3.03 UIU/ML (ref 0.38–5.33)
WBC # BLD AUTO: 6.9 K/UL (ref 4.8–10.8)

## 2022-03-29 PROCEDURE — 83036 HEMOGLOBIN GLYCOSYLATED A1C: CPT | Mod: GA

## 2022-03-29 PROCEDURE — 84439 ASSAY OF FREE THYROXINE: CPT

## 2022-03-29 PROCEDURE — 84270 ASSAY OF SEX HORMONE GLOBUL: CPT

## 2022-03-29 PROCEDURE — 84140 ASSAY OF PREGNENOLONE: CPT

## 2022-03-29 PROCEDURE — 84305 ASSAY OF SOMATOMEDIN: CPT

## 2022-03-29 PROCEDURE — 84482 T3 REVERSE: CPT

## 2022-03-29 PROCEDURE — 84403 ASSAY OF TOTAL TESTOSTERONE: CPT

## 2022-03-29 PROCEDURE — 36415 COLL VENOUS BLD VENIPUNCTURE: CPT

## 2022-03-29 PROCEDURE — 86337 INSULIN ANTIBODIES: CPT

## 2022-03-29 PROCEDURE — 82670 ASSAY OF TOTAL ESTRADIOL: CPT

## 2022-03-29 PROCEDURE — 82627 DEHYDROEPIANDROSTERONE: CPT

## 2022-03-29 PROCEDURE — 80053 COMPREHEN METABOLIC PANEL: CPT

## 2022-03-29 PROCEDURE — 82306 VITAMIN D 25 HYDROXY: CPT

## 2022-03-29 PROCEDURE — 84443 ASSAY THYROID STIM HORMONE: CPT

## 2022-03-29 PROCEDURE — 84481 FREE ASSAY (FT-3): CPT

## 2022-03-29 PROCEDURE — 85025 COMPLETE CBC W/AUTO DIFF WBC: CPT

## 2022-03-29 PROCEDURE — 82679 ASSAY OF ESTRONE: CPT

## 2022-03-29 PROCEDURE — 84402 ASSAY OF FREE TESTOSTERONE: CPT

## 2022-03-29 PROCEDURE — 84144 ASSAY OF PROGESTERONE: CPT

## 2022-04-07 LAB — ESTRONE SERPL-MCNC: 25.9 PG/ML

## 2022-04-12 LAB — PREG SERPL-MCNC: 29 NG/DL (ref 15–132)

## 2022-04-13 LAB — INSULIN HUMAN AB SER-ACNC: <0.4 U/ML (ref 0–0.4)

## 2022-04-14 LAB
IGF-I SERPL-MCNC: 190 NG/ML (ref 22–220)
IGF-I Z-SCORE SERPL: 1.6

## 2022-04-15 LAB — T3REVERSE SERPL-MCNC: 9.7 NG/DL (ref 9–27)

## 2022-04-25 DIAGNOSIS — J45.40 MODERATE PERSISTENT ASTHMA WITHOUT COMPLICATION: ICD-10-CM

## 2022-04-25 RX ORDER — MONTELUKAST SODIUM 10 MG/1
TABLET ORAL
Qty: 90 TABLET | Refills: 1 | Status: SHIPPED | OUTPATIENT
Start: 2022-04-25 | End: 2022-08-29

## 2022-04-27 LAB
SHBG SERPL-SCNC: 21 NMOL/L (ref 17–125)
TESTOST FREE SERPL-MCNC: 7.7 PG/ML (ref 0.6–3.8)
TESTOST SERPL-MCNC: 37 NG/DL (ref 5–32)

## 2022-06-02 ENCOUNTER — APPOINTMENT (OUTPATIENT)
Dept: URGENT CARE | Facility: CLINIC | Age: 77
End: 2022-06-02
Payer: MEDICARE

## 2022-06-02 ENCOUNTER — HOSPITAL ENCOUNTER (EMERGENCY)
Facility: MEDICAL CENTER | Age: 77
End: 2022-06-02
Payer: MEDICARE

## 2022-06-02 ENCOUNTER — OFFICE VISIT (OUTPATIENT)
Dept: URGENT CARE | Facility: CLINIC | Age: 77
End: 2022-06-02
Payer: MEDICARE

## 2022-06-02 VITALS
OXYGEN SATURATION: 95 % | HEIGHT: 67 IN | DIASTOLIC BLOOD PRESSURE: 84 MMHG | RESPIRATION RATE: 18 BRPM | TEMPERATURE: 96.8 F | WEIGHT: 162.7 LBS | HEART RATE: 65 BPM | BODY MASS INDEX: 25.54 KG/M2 | SYSTOLIC BLOOD PRESSURE: 147 MMHG

## 2022-06-02 VITALS
SYSTOLIC BLOOD PRESSURE: 108 MMHG | TEMPERATURE: 98 F | BODY MASS INDEX: 24.8 KG/M2 | DIASTOLIC BLOOD PRESSURE: 72 MMHG | HEIGHT: 67 IN | WEIGHT: 158 LBS | HEART RATE: 64 BPM | RESPIRATION RATE: 14 BRPM | OXYGEN SATURATION: 96 %

## 2022-06-02 DIAGNOSIS — J45.41 MODERATE PERSISTENT ASTHMA WITH EXACERBATION: ICD-10-CM

## 2022-06-02 DIAGNOSIS — J06.9 VIRAL URI WITH COUGH: ICD-10-CM

## 2022-06-02 PROCEDURE — 302449 STATCHG TRIAGE ONLY (STATISTIC)

## 2022-06-02 PROCEDURE — 99213 OFFICE O/P EST LOW 20 MIN: CPT | Performed by: PHYSICIAN ASSISTANT

## 2022-06-02 RX ORDER — PREDNISONE 10 MG/1
40 TABLET ORAL DAILY
Qty: 20 TABLET | Refills: 0 | Status: SHIPPED | OUTPATIENT
Start: 2022-06-02 | End: 2022-06-07

## 2022-06-02 RX ORDER — LEVOTHYROXINE SODIUM 0.03 MG/1
25 TABLET ORAL
COMMUNITY
Start: 2022-04-28 | End: 2023-07-13 | Stop reason: SDUPTHER

## 2022-06-02 ASSESSMENT — ENCOUNTER SYMPTOMS
DIARRHEA: 0
SORE THROAT: 1
WHEEZING: 0
SHORTNESS OF BREATH: 0
VOMITING: 0
EYE REDNESS: 0
MYALGIAS: 0
COUGH: 1
EYE DISCHARGE: 0
HEADACHES: 1
NAUSEA: 0
FEVER: 0

## 2022-06-02 ASSESSMENT — FIBROSIS 4 INDEX
FIB4 SCORE: 1.24
FIB4 SCORE: 1.24

## 2022-06-02 NOTE — PROGRESS NOTES
Subjective     Leigh Bonilla is a 76 y.o. female who presents with Cough (X 6 days, wet deep cough, body chills, sore throat, worst at night, Yesterday negative home covid PCR.)            Cough  This is a new problem. Episode onset: x 6 days ago. The problem has been unchanged. The cough is non-productive (The patient states initially her cough was dry, but has gradually become more productive of sputum.). Associated symptoms include ear congestion, headaches, nasal congestion and a sore throat (The patient states she is experiencing an intermittent sore throat, which is worse at night.). Pertinent negatives include no chest pain, ear pain, eye redness, fever, myalgias, shortness of breath or wheezing. Treatments tried: OTC IBU. Her past medical history is significant for asthma.     The patient reports no recent sick contacts. No exposure to COVID-19.  No known exposure to influenza.  The patient is fully vaccinated gets COVID-19.  The patient states she was recently tested for COVID-19 via PCR through the [facility], which was negative.      PMH:  has a past medical history of Asthma and Depression.  MEDS:   Current Outpatient Medications:   •  levothyroxine (SYNTHROID) 25 MCG Tab, , Disp: , Rfl:   •  montelukast (SINGULAIR) 10 MG Tab, TAKE 1 TABLET BY MOUTH EVERY DAY FOR ASTHMA, Disp: 90 Tablet, Rfl: 1  •  venlafaxine XR (EFFEXOR XR) 75 MG CAPSULE SR 24 HR, TAKE 1 CAPSULE BY MOUTH EVERY DAY, Disp: 90 Capsule, Rfl: 1  •  albuterol 108 (90 Base) MCG/ACT Aero Soln inhalation aerosol, INHALE 2 PUFFS BY MOUTH EVERY 4 HOURS AS NEEDED FOR SHORTNESS OF BREATH, Disp: 8.5 g, Rfl: 0  •  Cyanocobalamin (B-12) 100 MCG Tab, Take  by mouth., Disp: , Rfl:   •  aspirin (ASA) 325 MG Tab, Take 325 mg by mouth every 6 hours as needed. headache, Disp: , Rfl:   •  B Complex Vitamins (B COMPLEX-B12 PO), Take 1 Tab by mouth every day., Disp: , Rfl:   •  cholecalciferol (DIALYVITE VITAMIN D 5000) 5000 UNIT Cap,  "Take 5,000 Units by mouth every day., Disp: , Rfl:   •  Ascorbic Acid (VITAMIN C) 1000 MG Tab, Take 1 Tab by mouth every day., Disp: , Rfl:   •  NON SPECIFIED, Take 1 Tab by mouth every day. BIO-FLEX, Disp: , Rfl:   •  Bioflavonoid Products (GRAPE SEED PO), Take 1 Tab by mouth every day., Disp: , Rfl:   ALLERGIES:   Allergies   Allergen Reactions   • Sulfa Drugs      \"crazy\"     SURGHX:   Past Surgical History:   Procedure Laterality Date   • VA BREAST AUGMENTATION WITH IMPLANT  1972     SOCHX:  reports that she quit smoking about 17 years ago. Her smoking use included cigarettes. She started smoking about 54 years ago. She has a 34.00 pack-year smoking history. She has never used smokeless tobacco. She reports current alcohol use of about 7.0 oz of alcohol per week. She reports current drug use. Drug: Marijuana.  FH: Family history was reviewed, no pertinent findings to report      Review of Systems   Constitutional: Positive for malaise/fatigue. Negative for fever.   HENT: Positive for congestion and sore throat (The patient states she is experiencing an intermittent sore throat, which is worse at night.). Negative for ear pain.    Eyes: Negative for discharge and redness.   Respiratory: Positive for cough. Negative for shortness of breath and wheezing.    Cardiovascular: Negative for chest pain.   Gastrointestinal: Negative for diarrhea, nausea and vomiting.   Musculoskeletal: Negative for myalgias.   Neurological: Positive for headaches.              Objective     /72   Pulse 64   Temp 36.7 °C (98 °F) (Temporal)   Resp 14   Ht 1.702 m (5' 7\")   Wt 71.7 kg (158 lb)   SpO2 96%   Breastfeeding No   BMI 24.75 kg/m²      Physical Exam  Constitutional:       General: She is not in acute distress.     Appearance: Normal appearance. She is not ill-appearing.   HENT:      Head: Normocephalic and atraumatic.      Right Ear: Tympanic membrane, ear canal and external ear normal.      Left Ear: Tympanic " membrane, ear canal and external ear normal.      Nose: Nose normal.      Mouth/Throat:      Mouth: Mucous membranes are moist.      Pharynx: Oropharynx is clear. No posterior oropharyngeal erythema.   Eyes:      Extraocular Movements: Extraocular movements intact.      Conjunctiva/sclera: Conjunctivae normal.   Cardiovascular:      Rate and Rhythm: Normal rate and regular rhythm.      Heart sounds: Normal heart sounds.   Pulmonary:      Effort: Pulmonary effort is normal. No respiratory distress.      Breath sounds: Normal breath sounds. No wheezing.   Musculoskeletal:         General: Normal range of motion.      Cervical back: Normal range of motion and neck supple.   Skin:     General: Skin is warm and dry.   Neurological:      Mental Status: She is alert and oriented to person, place, and time.                             Assessment & Plan          1. Viral URI with cough    2. Moderate persistent asthma with exacerbation  - predniSONE (DELTASONE) 10 MG Tab; Take 4 Tablets by mouth every day for 5 days.  Dispense: 20 Tablet; Refill: 0    The patient's presenting symptoms physical exam endings are consistent with a viral URI with associated cough.  The patient's current viral illness is likely exacerbating her known underlying asthma.  This is an acute exacerbation of a chronic problem.  The patient's physical exam today in clinic was normal.  The patient's lungs were clear to auscultation without wheezing, and her pulse ox was within normal limits.  The patient is nontoxic and appears in no acute distress.  The patient's vital signs are stable and within normal limits.  She is afebrile today in clinic.  Discussed likely viral etiology with the patient.  Will prescribe the patient prednisone for her acute asthma exacerbation.  Advised patient to monitor for worsening signs or symptoms.  Recommend OTC medications and supportive care for symptomatic management.  Recommend patient follow-up with her PCP as needed.   Discussed return precautions with the patient, and she verbalized understanding.    Differential diagnoses, supportive care, and indications for immediate follow-up discussed with patient.   Instructed to return to clinic or nearest emergency department for any change in condition, further concerns, or worsening of symptoms.    OTC Tylenol or Motrin for fever/discomfort.  OTC cough/cold medication for symptomatic relief  OTC Supportive Care for Congestion - saline nasal spray or neti pot  Drink plenty of fluids  Follow-up with PCP  Return to clinic or go to the ED if symptoms worsen or fail to improve, or if the patient should develop worsening/increasing cough, congestion, ear pain, sore throat, shortness of breath, wheezing, chest pain, fever/chills, and/or any concerning symptoms.    Discussed plan with the patient, and she agrees to the above.     I personally reviewed prior external notes and test results pertinent to today's visit.  I have independently reviewed and interpreted all diagnostics ordered during this urgent care visit.       Please note that this dictation was created using voice recognition software. I have made every reasonable attempt to correct obvious errors, but I expect that there may be errors of grammar and possibly content that I did not discover before finalizing the note.     This note was electronically signed by Carlee Delgado PA-C

## 2022-06-03 ENCOUNTER — APPOINTMENT (OUTPATIENT)
Dept: RADIOLOGY | Facility: MEDICAL CENTER | Age: 77
End: 2022-06-03
Attending: PHYSICIAN ASSISTANT
Payer: MEDICARE

## 2022-06-03 DIAGNOSIS — Z12.31 VISIT FOR SCREENING MAMMOGRAM: ICD-10-CM

## 2022-06-06 ENCOUNTER — APPOINTMENT (OUTPATIENT)
Dept: RADIOLOGY | Facility: IMAGING CENTER | Age: 77
End: 2022-06-06
Attending: FAMILY MEDICINE
Payer: MEDICARE

## 2022-06-06 ENCOUNTER — OFFICE VISIT (OUTPATIENT)
Dept: URGENT CARE | Facility: CLINIC | Age: 77
End: 2022-06-06
Payer: MEDICARE

## 2022-06-06 VITALS
DIASTOLIC BLOOD PRESSURE: 58 MMHG | BODY MASS INDEX: 24.8 KG/M2 | SYSTOLIC BLOOD PRESSURE: 116 MMHG | HEIGHT: 67 IN | TEMPERATURE: 97.7 F | HEART RATE: 79 BPM | RESPIRATION RATE: 16 BRPM | OXYGEN SATURATION: 95 % | WEIGHT: 158 LBS

## 2022-06-06 DIAGNOSIS — R05.9 COUGH: ICD-10-CM

## 2022-06-06 DIAGNOSIS — J45.40 MODERATE PERSISTENT ASTHMA WITHOUT COMPLICATION: ICD-10-CM

## 2022-06-06 DIAGNOSIS — Z91.09 ENVIRONMENTAL ALLERGIES: ICD-10-CM

## 2022-06-06 PROCEDURE — 71046 X-RAY EXAM CHEST 2 VIEWS: CPT | Mod: TC,FY | Performed by: FAMILY MEDICINE

## 2022-06-06 PROCEDURE — 99214 OFFICE O/P EST MOD 30 MIN: CPT | Performed by: FAMILY MEDICINE

## 2022-06-06 ASSESSMENT — FIBROSIS 4 INDEX: FIB4 SCORE: 1.24

## 2022-06-06 NOTE — PROGRESS NOTES
"  Subjective:      76 y.o. female presents to urgent care for cold symptoms that started about 1.5 weeks ago.  Initially she had multiple viral symptoms, all of which have resolved except for cough and fatigue.  She is using Mucinex, Tylenol, Ibuprofen, and steam showers with only moderate relief in symptoms. She was seen in urgent care 6/2/2022 for similar symptoms.  At that time she was diagnosed with viral URI and given prednisone burst. Unfortunately these hasn't changed her symptoms.  She denies any tobacco product use.  She has asthma for which she uses albuterol as needed.  She is fully vaccinated against COVID, and had a negative PCR covid 5/31/2022.  No known sick contacts.    She denies any other questions or concerns at this time.    Current problem list, medication, and past medical/surgical history were reviewed in Epic.    ROS  See HPI     Objective:      /58   Pulse 79   Temp 36.5 °C (97.7 °F) (Temporal)   Resp 16   Ht 1.702 m (5' 7\")   Wt 71.7 kg (158 lb)   SpO2 95%   BMI 24.75 kg/m²     Physical Exam  Constitutional:       General: She is not in acute distress.     Appearance: She is not diaphoretic.   HENT:      Right Ear: Tympanic membrane, ear canal and external ear normal.      Left Ear: Tympanic membrane, ear canal and external ear normal.      Nose:      Right Sinus: No maxillary sinus tenderness or frontal sinus tenderness.      Left Sinus: No maxillary sinus tenderness or frontal sinus tenderness.      Mouth/Throat:      Palate: No lesions.      Pharynx: Uvula midline. Posterior oropharyngeal erythema present.      Tonsils: No tonsillar exudate. 1+ on the right. 1+ on the left.   Cardiovascular:      Rate and Rhythm: Normal rate and regular rhythm.      Heart sounds: Normal heart sounds.   Pulmonary:      Effort: Pulmonary effort is normal. No respiratory distress.      Breath sounds: Normal breath sounds.   Neurological:      Mental Status: She is alert.   Psychiatric:         " Mood and Affect: Affect normal.         Judgment: Judgment normal.       Assessment/Plan:     1. Cough  2. Moderate persistent asthma without complication  3. Environmental allergies  CXR: Showing no acute cardiopulmonary processes.  Cough may be related to exacerbation of chronic asthma versus exacerbation of environmental allergies.  She states she has stopped taking her antihistamine when she was sick because she has not been going outside.  May also be related to postviral cough as she initially had a virus.  Patient was encouraged to restart her antihistamine.  Continue with her regular asthma medication.  Continue with prednisone burst prescribed by previous provider.  - DX-CHEST-2 VIEWS; Future      Instructed to return to Urgent Care or nearest Emergency Department if symptoms fail to improve, for any change in condition, further concerns, or new concerning symptoms. Patient states understanding of the plan of care and discharge instructions.    Sheron Oconnell M.D.

## 2022-06-08 ENCOUNTER — OFFICE VISIT (OUTPATIENT)
Dept: MEDICAL GROUP | Facility: LAB | Age: 77
End: 2022-06-08
Payer: MEDICARE

## 2022-06-08 VITALS
SYSTOLIC BLOOD PRESSURE: 126 MMHG | DIASTOLIC BLOOD PRESSURE: 82 MMHG | OXYGEN SATURATION: 95 % | TEMPERATURE: 97 F | RESPIRATION RATE: 14 BRPM | HEIGHT: 67 IN | WEIGHT: 161 LBS | BODY MASS INDEX: 25.27 KG/M2 | HEART RATE: 66 BPM

## 2022-06-08 DIAGNOSIS — R09.82 POST-NASAL DRIP: ICD-10-CM

## 2022-06-08 DIAGNOSIS — R05.8 POST-VIRAL COUGH SYNDROME: ICD-10-CM

## 2022-06-08 PROCEDURE — 99214 OFFICE O/P EST MOD 30 MIN: CPT | Performed by: FAMILY MEDICINE

## 2022-06-08 ASSESSMENT — FIBROSIS 4 INDEX: FIB4 SCORE: 1.24

## 2022-06-08 NOTE — PROGRESS NOTES
"Subjective:     CC: Cough    HPI:   Alissa presents today with continued cough.  Cold symptoms initially started about 2 weeks ago with cough, fatigue, and general malaise.  Initially seen in urgent care 6/2 and treated for viral URI with prednisone burst.  Fully vaccinated against COVID, negative COVID PCR 5/31.  Seen again at urgent care 2 days ago negative CXR.  Recommended restarting antihistamine and continue with inhalers for asthma.    Cough with mucus, fatigue, nasal congestion continue.  Has been using Claritin-D, using Flonase but only for 3 days in a row as was concerned that she cannot use this for longer than that.  No significant sinus pain/pressure, no fevers, no trouble breathing.    Medications, past medical history, allergies, and social history have been reviewed and updated.      Objective:       Exam:  /82 (BP Location: Left arm, Patient Position: Sitting, BP Cuff Size: Adult)   Pulse 66   Temp 36.1 °C (97 °F)   Resp 14   Ht 1.702 m (5' 7\")   Wt 73 kg (161 lb)   SpO2 95%   BMI 25.22 kg/m²  Body mass index is 25.22 kg/m².    Constitutional: Alert. Well appearing. No distress.  Skin: Warm, dry, good turgor, no visible rashes.  Eye: Equal, round and reactive to light, conjunctiva clear, lids normal.  ENMT: Moist mucous membranes.  Pharynx is clear.  Nasal turbinates are inflamed with copious clear rhinorrhea.  No sinus tenderness.  Respiratory: Normal effort. Lungs are clear to auscultation bilaterally.  Cardiovascular: Regular rate and rhythm. Normal S1/S2. No murmurs, rubs or gallops.   Neuro: Moves all four extremities. No facial droop.  Psych: Answers questions appropriately. Normal affect and mood.      Assessment & Plan:     76 y.o. female with the following -     1. Post-viral cough syndrome  2. Post-nasal drip  Viral upper respiratory symptoms about 2 weeks ago that have generally resolved besides cough, congestion, fatigue.  No signs of bacterial infection on exam and she did " have negative CXR.  Discussed that I think symptoms are likely combination of postviral cough and postnasal drip.  Continue antihistamine, continue Flonase daily, recommended humidity and nasal saline rinses in the evening.  Continue Mucinex if providing benefit.  Has follow-up with PCP next week.  Discussed that they did not see indication for further imaging studies or antibiotics at this time.      Please note that this note was created using voice recognition software.

## 2022-06-14 ENCOUNTER — OFFICE VISIT (OUTPATIENT)
Dept: MEDICAL GROUP | Facility: LAB | Age: 77
End: 2022-06-14
Payer: MEDICARE

## 2022-06-14 VITALS
BODY MASS INDEX: 25.27 KG/M2 | HEIGHT: 67 IN | RESPIRATION RATE: 18 BRPM | TEMPERATURE: 97.1 F | OXYGEN SATURATION: 95 % | SYSTOLIC BLOOD PRESSURE: 138 MMHG | HEART RATE: 65 BPM | DIASTOLIC BLOOD PRESSURE: 78 MMHG | WEIGHT: 161 LBS

## 2022-06-14 DIAGNOSIS — Z12.31 ENCOUNTER FOR SCREENING MAMMOGRAM FOR BREAST CANCER: ICD-10-CM

## 2022-06-14 DIAGNOSIS — Z87.891 HISTORY OF CIGARETTE SMOKING: ICD-10-CM

## 2022-06-14 DIAGNOSIS — Z78.0 ASYMPTOMATIC MENOPAUSE: ICD-10-CM

## 2022-06-14 DIAGNOSIS — J40 BRONCHITIS: Primary | ICD-10-CM

## 2022-06-14 DIAGNOSIS — F33.42 RECURRENT MAJOR DEPRESSIVE DISORDER, IN FULL REMISSION (HCC): ICD-10-CM

## 2022-06-14 PROBLEM — Z12.4 CERVICAL CANCER SCREENING: Status: RESOLVED | Noted: 2017-07-06 | Resolved: 2022-06-14

## 2022-06-14 PROBLEM — Z00.00 PREVENTATIVE HEALTH CARE: Status: RESOLVED | Noted: 2017-01-04 | Resolved: 2022-06-14

## 2022-06-14 PROBLEM — Z72.0 TOBACCO USE: Status: RESOLVED | Noted: 2019-09-17 | Resolved: 2022-06-14

## 2022-06-14 PROBLEM — Z00.00 MEDICARE ANNUAL WELLNESS VISIT, INITIAL: Status: RESOLVED | Noted: 2017-06-28 | Resolved: 2022-06-14

## 2022-06-14 PROCEDURE — 99214 OFFICE O/P EST MOD 30 MIN: CPT | Performed by: PHYSICIAN ASSISTANT

## 2022-06-14 RX ORDER — BENZONATATE 100 MG/1
100 CAPSULE ORAL 3 TIMES DAILY PRN
Qty: 60 CAPSULE | Refills: 0 | Status: SHIPPED | OUTPATIENT
Start: 2022-06-14 | End: 2022-08-22

## 2022-06-14 RX ORDER — VENLAFAXINE HYDROCHLORIDE 37.5 MG/1
37.5 CAPSULE, EXTENDED RELEASE ORAL DAILY
Qty: 30 CAPSULE | Refills: 3 | Status: SHIPPED | OUTPATIENT
Start: 2022-06-14 | End: 2022-08-29 | Stop reason: SDUPTHER

## 2022-06-14 ASSESSMENT — PATIENT HEALTH QUESTIONNAIRE - PHQ9: CLINICAL INTERPRETATION OF PHQ2 SCORE: 0

## 2022-06-14 ASSESSMENT — FIBROSIS 4 INDEX: FIB4 SCORE: 1.24

## 2022-06-14 NOTE — ASSESSMENT & PLAN NOTE
New to me; URI x2 weeks.   States that she has been feeling unwell x2 weeks.  She is taken 3 COVID test all of which were negative.  2 of these were in at home test, one was done by the [facility].    States that she is feeling somewhat better today.  Continues to have a dry cough.  No fevers or chills.  Energy levels are improving.   No shortness of breath or wheezing.    Finally able to ride horse.

## 2022-06-14 NOTE — PROGRESS NOTES
Subjective:   CC: Alissa Bonilla is a 76 y.o. female here today for URI x2 weeks and medication follow-up    HPI:  Bronchitis  New to me; URI x2 weeks.   States that she has been feeling unwell x2 weeks.  She is taken 3 COVID test all of which were negative.  2 of these were in at home test, one was done by the [facility].    States that she is feeling somewhat better today.  Continues to have a dry cough.  No fevers or chills.  Energy levels are improving.   No shortness of breath or wheezing.    Finally able to ride horse.       Recurrent major depressive disorder, in full remission (HCC)  New to me; chronic and stable on current regimen.   Doing incredibly well at this time and is considering weaning off Venlafaxine.        Current medicines (including changes today)  Current Outpatient Medications   Medication Sig Dispense Refill   • venlafaxine XR (EFFEXOR XR) 37.5 MG CAPSULE SR 24 HR Take 1 Capsule by mouth every day. 30 Capsule 3   • benzonatate (TESSALON) 100 MG Cap Take 1 Capsule by mouth 3 times a day as needed for Cough. 60 Capsule 0   • levothyroxine (SYNTHROID) 25 MCG Tab      • montelukast (SINGULAIR) 10 MG Tab TAKE 1 TABLET BY MOUTH EVERY DAY FOR ASTHMA 90 Tablet 1   • albuterol 108 (90 Base) MCG/ACT Aero Soln inhalation aerosol INHALE 2 PUFFS BY MOUTH EVERY 4 HOURS AS NEEDED FOR SHORTNESS OF BREATH 8.5 g 0   • Cyanocobalamin (B-12) 100 MCG Tab Take  by mouth.     • aspirin (ASA) 325 MG Tab Take 325 mg by mouth every 6 hours as needed. headache     • B Complex Vitamins (B COMPLEX-B12 PO) Take 1 Tab by mouth every day.     • cholecalciferol (DIALYVITE VITAMIN D 5000) 5000 UNIT Cap Take 5,000 Units by mouth every day.     • Ascorbic Acid (VITAMIN C) 1000 MG Tab Take 1 Tab by mouth every day.     • NON SPECIFIED Take 1 Tab by mouth every day. BIO-FLEX     • Bioflavonoid Products (GRAPE SEED PO) Take 1 Tab by mouth every day.       No current facility-administered medications for this visit.  "    She  has a past medical history of Asthma, Depression, and Tobacco use (9/17/2019).    ROS   No chest pain, no shortness of breath, no abdominal pain       Objective:     /78 (BP Location: Left arm, Patient Position: Sitting, BP Cuff Size: Adult)   Pulse 65   Temp 36.2 °C (97.1 °F) (Temporal)   Resp 18   Ht 1.702 m (5' 7\")   Wt 73 kg (161 lb)   SpO2 95%  Body mass index is 25.22 kg/m².   Physical Exam:  Constitutional: Alert, no distress.  Skin: Warm, dry, good turgor, no rashes in visible areas.  Eye: Equal, round, conjunctiva clear, lids normal.  Neck: Trachea midline, no masses, no thyromegaly  Respiratory: Unlabored respiratory effort, lungs clear to auscultation, no wheezes, no ronchi.  Cardiovascular: Normal S1, S2, no murmur, no edema.  Psych: Alert and oriented x3, normal affect and mood.    Assessment and Plan:   The following treatment plan was discussed    1. Bronchitis  New to me x2 weeks, seems to be improving over the past few days.  Prescription for Tessalon Perles as written.  Pt was educated on use and SEs of medication.  Continue allergy medication, okay to use albuterol as needed cough or shortness of breath if needed  Continue to monitor symptoms, follow-up in 5 to 7 days if no improvement  - benzonatate (TESSALON) 100 MG Cap; Take 1 Capsule by mouth 3 times a day as needed for Cough.  Dispense: 60 Capsule; Refill: 0    2. Recurrent major depressive disorder, in full remission (HCC)  Follow-up, chronic condition, in remission.  Patient would like to wean off of venlafaxine.  To decrease dose down to 37.5 for 2 to 3 weeks, would hopefully be able to fully wean off thereafter.  Continue to monitor, follow-up as needed  - venlafaxine XR (EFFEXOR XR) 37.5 MG CAPSULE SR 24 HR; Take 1 Capsule by mouth every day.  Dispense: 30 Capsule; Refill: 3    3. History of tobacco abuse (Quit >6 mos ago)  Due for CT lung cancer screening; history of 30-pack-year smoking.  No smoking x15 years  - " CT-LUNG CANCER-SCREENING; Future    4. Encounter for screening mammogram for breast cancer  - MA-SCREENING MAMMO BILAT W/TOMOSYNTHESIS W/CAD; Future    5. Asymptomatic menopause  - DS-BONE DENSITY STUDY (DEXA); Future      Followup: Return if symptoms worsen or fail to improve.       Chitra oTth P.A.-C.  Supervising MD: Dr. Milind Jc MD  06/14/22

## 2022-06-14 NOTE — ASSESSMENT & PLAN NOTE
New to me; chronic and stable on current regimen.   Doing incredibly well at this time and is considering weaning off Venlafaxine.

## 2022-07-18 ENCOUNTER — HOSPITAL ENCOUNTER (OUTPATIENT)
Dept: RADIOLOGY | Facility: MEDICAL CENTER | Age: 77
End: 2022-07-18
Attending: PHYSICIAN ASSISTANT
Payer: MEDICARE

## 2022-07-18 DIAGNOSIS — Z78.0 ASYMPTOMATIC MENOPAUSE: ICD-10-CM

## 2022-07-18 PROCEDURE — 77080 DXA BONE DENSITY AXIAL: CPT

## 2022-07-19 ENCOUNTER — HOSPITAL ENCOUNTER (OUTPATIENT)
Dept: RADIOLOGY | Facility: MEDICAL CENTER | Age: 77
End: 2022-07-19
Attending: PHYSICIAN ASSISTANT
Payer: MEDICARE

## 2022-07-19 DIAGNOSIS — Z87.891 HISTORY OF CIGARETTE SMOKING: ICD-10-CM

## 2022-07-19 DIAGNOSIS — Z12.31 ENCOUNTER FOR SCREENING MAMMOGRAM FOR BREAST CANCER: ICD-10-CM

## 2022-07-19 PROCEDURE — 77063 BREAST TOMOSYNTHESIS BI: CPT

## 2022-07-19 PROCEDURE — 71271 CT THORAX LUNG CANCER SCR C-: CPT

## 2022-07-20 ENCOUNTER — TELEPHONE (OUTPATIENT)
Dept: MEDICAL GROUP | Facility: LAB | Age: 77
End: 2022-07-20
Payer: MEDICARE

## 2022-07-20 NOTE — TELEPHONE ENCOUNTER
Called pt and left VM for the patient to call me back about her CT lung cancer screening.     Chitra Toth P.A.-C.

## 2022-07-21 ENCOUNTER — TELEPHONE (OUTPATIENT)
Dept: MEDICAL GROUP | Facility: LAB | Age: 77
End: 2022-07-21
Payer: MEDICARE

## 2022-07-25 DIAGNOSIS — R91.1 NODULE OF APEX OF RIGHT LUNG: ICD-10-CM

## 2022-08-10 ENCOUNTER — HOSPITAL ENCOUNTER (OUTPATIENT)
Dept: RADIOLOGY | Facility: MEDICAL CENTER | Age: 77
End: 2022-08-10
Attending: PHYSICIAN ASSISTANT
Payer: MEDICARE

## 2022-08-10 ENCOUNTER — TELEPHONE (OUTPATIENT)
Dept: MEDICAL GROUP | Facility: LAB | Age: 77
End: 2022-08-10
Payer: MEDICARE

## 2022-08-10 DIAGNOSIS — R91.1 NODULE OF APEX OF RIGHT LUNG: ICD-10-CM

## 2022-08-10 PROCEDURE — 78815 PET IMAGE W/CT SKULL-THIGH: CPT | Mod: MH

## 2022-08-10 NOTE — TELEPHONE ENCOUNTER
Called and left a voicemail for the patient regarding her PET/CT scans.  I recommend that she give me a call back to further discuss the findings.    Chitra Toth P.A.-C.

## 2022-08-11 ENCOUNTER — TELEPHONE (OUTPATIENT)
Dept: MEDICAL GROUP | Facility: LAB | Age: 77
End: 2022-08-11
Payer: MEDICARE

## 2022-08-11 DIAGNOSIS — R91.8 MASS OF UPPER LOBE OF RIGHT LUNG: ICD-10-CM

## 2022-08-11 NOTE — PROGRESS NOTES
1. Mass of upper lobe of right lung  Referral to Hematology Oncology        Chitra Toth P.A.-C.

## 2022-08-11 NOTE — TELEPHONE ENCOUNTER
Left voicemail for the patient regarding PET/CT scan.  Discussed that I will also send her a MyChart message regarding findings as I do need to place referrals for further work-up regarding findings.    Chitra Toth P.A.-C.

## 2022-08-12 ENCOUNTER — TELEPHONE (OUTPATIENT)
Dept: MEDICAL GROUP | Facility: LAB | Age: 77
End: 2022-08-12
Payer: MEDICARE

## 2022-08-12 NOTE — TELEPHONE ENCOUNTER
Patient LVM about needing to get in contact with someone, called her back to update her on referral status ( in process) and scheduled her an appt to establish with another provider before Chitra leaves.

## 2022-08-22 ENCOUNTER — TELEPHONE (OUTPATIENT)
Dept: MEDICAL GROUP | Facility: LAB | Age: 77
End: 2022-08-22

## 2022-08-22 ENCOUNTER — HOSPITAL ENCOUNTER (OUTPATIENT)
Dept: HEMATOLOGY ONCOLOGY | Facility: MEDICAL CENTER | Age: 77
End: 2022-08-22
Attending: NURSE PRACTITIONER
Payer: MEDICARE

## 2022-08-22 VITALS
TEMPERATURE: 97.2 F | SYSTOLIC BLOOD PRESSURE: 122 MMHG | BODY MASS INDEX: 24.78 KG/M2 | WEIGHT: 157.85 LBS | OXYGEN SATURATION: 97 % | HEIGHT: 67 IN | DIASTOLIC BLOOD PRESSURE: 80 MMHG | HEART RATE: 64 BPM | RESPIRATION RATE: 16 BRPM

## 2022-08-22 DIAGNOSIS — R91.1 LUNG NODULE: ICD-10-CM

## 2022-08-22 PROCEDURE — 99212 OFFICE O/P EST SF 10 MIN: CPT | Performed by: NURSE PRACTITIONER

## 2022-08-22 PROCEDURE — 99212 OFFICE O/P EST SF 10 MIN: CPT

## 2022-08-22 PROCEDURE — 99203 OFFICE O/P NEW LOW 30 MIN: CPT | Performed by: NURSE PRACTITIONER

## 2022-08-22 ASSESSMENT — ENCOUNTER SYMPTOMS
CONSTIPATION: 0
FEVER: 0
SPUTUM PRODUCTION: 1
CHILLS: 0
SHORTNESS OF BREATH: 1
SORE THROAT: 0
NAUSEA: 0
WEIGHT LOSS: 0
MYALGIAS: 0
COUGH: 1
DIZZINESS: 0
DIAPHORESIS: 0
INSOMNIA: 0
DEPRESSION: 1
VOMITING: 0
DIARRHEA: 0
HEADACHES: 0
PALPITATIONS: 0
NERVOUS/ANXIOUS: 0
WHEEZING: 0

## 2022-08-22 ASSESSMENT — FIBROSIS 4 INDEX: FIB4 SCORE: 1.24

## 2022-08-22 ASSESSMENT — PAIN SCALES - GENERAL: PAINLEVEL: NO PAIN

## 2022-08-22 NOTE — TELEPHONE ENCOUNTER
NEW PATIENT VISIT PRE-VISIT PLANNING    1.  [employer] Patient is checked in Patient Demographics?Yes    2.  Immunizations were updated in Epic using Reconcile Outside Information activity? Yes         3.  Is this appointment scheduled as a Hospital Follow-Up? No    4.  Patient is due for the following Health Maintenance Topics:   Health Maintenance Due   Topic Date Due    IMM HEP B VACCINE (1 of 3 - 3-dose series) Never done    COVID-19 Vaccine (4 - Booster for Pfizer series) 02/13/2022     5.  Reviewed/Updated the following with patient:          Preferred Pharmacy? Yes          Preferred Lab? Yes          Preferred Communication? Yes          Allergies? Yes          Medications? YES. Was Abstract Encounter opened and chart updated? YES  6.  Updated Care Team?          DME Company (gait device, O2, CPAP, etc.) N\A          Other Specialists (eye doctor, derm, GYN, cardiology, endo, etc): YES    7.  AHA (Puls8) form printed for Provider? N/A

## 2022-08-22 NOTE — PROGRESS NOTES
Subjective     Leigh Bonilla is a 76 y.o. female who presents as a New Patient (Lung nodule)          HPI    Patient referred to me, Intake Oncology Coordinator by PCP ARCHIE Hawkins for a lung nodule.  Patient is accompanied by his  for today's visit.     Patient enrolled in the lung cancer screening CT in 2016, however her first lung cancer screening CT was completed on 11/8/2019.  That CT at that time showed a 2.2 cm groundglass nodule in the right upper lobe with 3 solid nodules in the middle lobe measuring up to 5 mm in size.  There was also an incidental left adrenal adenoma.  Based on these findings it was recommended she undergo a repeat CT scan in approximately 6 months which she did complete on 6/8/2020.  That CT showed part solid groundglass opacity in the right upper lobe measuring 11.2 x 17.3 mm which appears to be unchanged.  3 nodules in the right middle lobe also noted again which are unchanged.  11.8 mm left adrenal gland nodule which is unchanged.  Follow-up CT completed on 7/19/2022 noted interval enlargement of the partially groundglass and partially solid nodule in the right lung apex measuring 1.7 x 3.2 x 3.1 cm in size.  This previously measured 0.8 x 1.6 x 2.1 cm.  According to the reading radiologist this is concerning for slow-growing low-grade malignancy.  3 nodules in the right middle lobe continue to be unchanged.  Further recommendations given for PET/CT which was completed by PCP.  PET scan completed on 8/10/2022 showed abnormal uptake within the enlarging groundglass and part solid mass in the right lung apex highly suspicious for slow-growing malignancy such as an adenocarcinoma.  This had an SUV of 4.96.  There was some uptake within 2 right paratracheal nodes suspicious for metastatic disease however this may be nonspecific based on size as they measured 7 and 9 mm in size.  Reading radiologist did note some uptake within the left adrenal gland but is most  "consistent with a benign adenoma.  No evidence of metastatic disease noted in the neck, abdomen or pelvis.  Patient subsequently referred here for further evaluation.  I did personally review all imaging reports discussed above, and I did review with the images of the most recent CT and PET CT in detail, reviewed below with the patient and her  today.    Clinically patient stated she feels fine.  She does have cough, sputum production shortness of breath which she attributes to chronic asthma that she has had her whole life.  She does have a history of depression is on medication but states that she feels like she can get off this medication.  Otherwise no other clinical complaints.    Please see past medical and surgical history below.    Patient does have a family history of cancer with her sister who is currently undergoing treatment for lung cancer.  Both her paternal aunt and uncle  of lung cancer.    Patient is a former smoker.  She quit in .  She states she smoked approximately 34 years on average of 1 pack/day.    Allergies   Allergen Reactions    Sulfa Drugs      \"crazy\"     Current Outpatient Medications on File Prior to Encounter   Medication Sig Dispense Refill    venlafaxine XR (EFFEXOR XR) 37.5 MG CAPSULE SR 24 HR Take 1 Capsule by mouth every day. 30 Capsule 3    levothyroxine (SYNTHROID) 25 MCG Tab       albuterol 108 (90 Base) MCG/ACT Aero Soln inhalation aerosol INHALE 2 PUFFS BY MOUTH EVERY 4 HOURS AS NEEDED FOR SHORTNESS OF BREATH 8.5 g 0    Cyanocobalamin (B-12) 100 MCG Tab Take  by mouth.      aspirin (ASA) 325 MG Tab Take 325 mg by mouth every 6 hours as needed. headache      B Complex Vitamins (B COMPLEX-B12 PO) Take 1 Tab by mouth every day.      cholecalciferol (DIALYVITE VITAMIN D 5000) 5000 UNIT Cap Take 5,000 Units by mouth every day.      Ascorbic Acid (VITAMIN C) 1000 MG Tab Take 1 Tab by mouth every day.      NON SPECIFIED Take 1 Tab by mouth every day. BIO-FLEX      " benzonatate (TESSALON) 100 MG Cap Take 1 Capsule by mouth 3 times a day as needed for Cough. 60 Capsule 0    montelukast (SINGULAIR) 10 MG Tab TAKE 1 TABLET BY MOUTH EVERY DAY FOR ASTHMA (Patient not taking: Reported on 2022) 90 Tablet 1     No current facility-administered medications on file prior to encounter.     Past Medical History:   Diagnosis Date    Asthma     Depression     Tobacco use 2019     Past Surgical History:   Procedure Laterality Date    [place] BREAST AUGMENTATION WITH IMPLANT       Family History   Problem Relation Age of Onset    Dementia Mother     Heart Attack Mother     Cancer Sister 81        Lung    Hypertension Sister     Hypertension Brother     Arthritis Brother         RA    Cancer Paternal Aunt         Lung    Cancer Paternal Uncle         Lung     Social History     Socioeconomic History    Marital status:      Spouse name: Not on file    Number of children: Not on file    Years of education: Not on file    Highest education level: Bachelor's degree (e.g., BA, AB, BS)   Occupational History    Not on file   Tobacco Use    Smoking status: Former     Packs/day: 1.00     Years: 34.00     Pack years: 34.00     Types: Cigarettes     Start date: 1968     Quit date: 2005     Years since quittin.6    Smokeless tobacco: Never   Vaping Use    Vaping Use: Never used   Substance and Sexual Activity    Alcohol use: Yes     Alcohol/week: 7.0 oz     Types: 14 Glasses of wine per week     Comment: Occasionally    Drug use: Yes     Types: Marijuana     Comment: occ - gummies rarely    Sexual activity: Yes     Partners: Male   Other Topics Concern    Not on file   Social History Narrative    Not on file     Social Determinants of Health     Financial Resource Strain: Not on file   Food Insecurity: Not on file   Transportation Needs: Not on file   Physical Activity: Not on file   Stress: Not on file   Social Connections: Not on file   Intimate Partner Violence: Not on file  "  Housing Stability: Not on file           Review of Systems   Constitutional:  Negative for chills, diaphoresis, fever, malaise/fatigue and weight loss.   HENT:  Negative for congestion and sore throat.    Respiratory:  Positive for cough, sputum production and shortness of breath. Negative for wheezing.    Cardiovascular:  Negative for chest pain and palpitations.   Gastrointestinal:  Negative for constipation, diarrhea, nausea and vomiting.   Genitourinary:  Negative for dysuria.   Musculoskeletal:  Negative for myalgias.   Skin:  Negative for itching and rash.   Neurological:  Negative for dizziness and headaches.   Psychiatric/Behavioral:  Positive for depression. The patient is not nervous/anxious and does not have insomnia.             Objective     /80 (BP Location: Right arm, Patient Position: Sitting, BP Cuff Size: Adult)   Pulse 64   Temp 36.2 °C (97.2 °F) (Temporal)   Resp 16   Ht 1.702 m (5' 7.01\")   Wt 71.6 kg (157 lb 13.6 oz)   SpO2 97%   BMI 24.72 kg/m²      Physical Exam  Vitals reviewed.   Constitutional:       General: She is not in acute distress.     Appearance: Normal appearance. She is well-developed. She is not diaphoretic.   HENT:      Head: Normocephalic and atraumatic.      Mouth/Throat:      Mouth: Mucous membranes are moist.      Pharynx: Oropharynx is clear. No oropharyngeal exudate or posterior oropharyngeal erythema.   Eyes:      General: No scleral icterus.        Right eye: No discharge.         Left eye: No discharge.      Conjunctiva/sclera: Conjunctivae normal.      Pupils: Pupils are equal, round, and reactive to light.   Neck:      Thyroid: No thyromegaly.   Cardiovascular:      Rate and Rhythm: Normal rate and regular rhythm.      Pulses: Normal pulses.      Heart sounds: Normal heart sounds. No murmur heard.    No friction rub. No gallop.   Pulmonary:      Effort: Pulmonary effort is normal. No respiratory distress.      Breath sounds: Normal breath sounds. No " wheezing.   Abdominal:      General: Bowel sounds are normal. There is no distension.      Palpations: Abdomen is soft.      Tenderness: There is no abdominal tenderness.   Musculoskeletal:         General: No swelling or tenderness. Normal range of motion.      Cervical back: Normal range of motion and neck supple.   Lymphadenopathy:      Head:      Right side of head: No submental, submandibular, tonsillar, preauricular, posterior auricular or occipital adenopathy.      Left side of head: No submental, submandibular, tonsillar, preauricular, posterior auricular or occipital adenopathy.      Cervical: No cervical adenopathy.      Right cervical: No superficial, deep or posterior cervical adenopathy.     Left cervical: No superficial, deep or posterior cervical adenopathy.      Upper Body:      Right upper body: No supraclavicular adenopathy.      Left upper body: No supraclavicular adenopathy.   Skin:     General: Skin is warm and dry.      Coloration: Skin is not pale.      Findings: No erythema or rash.   Neurological:      Mental Status: She is alert and oriented to person, place, and time.   Psychiatric:         Mood and Affect: Mood normal.         Behavior: Behavior normal.           [id number] BASE TO MID-THIGH    Result Date: 8/10/2022  8/10/2022 10:09 AM HISTORY/REASON FOR EXAM:  Interval enlargement of the partially groundglass and partially solid nodule in the right lung apex, measuring 1.7 x 3.2 x 3.1 cm , previously 0.8 x 1.6 x2.1 cm, concerning for slowly growing low-grade malignancy. TECHNIQUE/EXAM DESCRIPTION AND NUMBER OF VIEWS: PET body imaging. Initially, 11.2 mCi F-18 FDG was administered intravenously under standardized conditions.  Approximately 45 minutes after FDG administration, the patient was placed in the supine position on the PET CT table. Blood glucose level was 82mg/dL. Low dose spiral CT imaging was performed from the skull base to the mid thighs. PET imaging was then performed  from the skull base to the mid thighs. CT images, PET images, and PET/CT fused images were reviewed on a PACS 3D workstation. The limited non-contrast CT data are used primarily for attenuation correction and anatomic correlation.  Evaluation of solid organs and bowel are especially limited utilizing this technique. COMPARISON: Lung cancer screening CT 7/19/2022 and 11/8/2019 FINDINGS: Head and Neck:  There is no abnormal FDG uptake. Thorax:  There is uptake within the 3.1 x 1.8 cm part groundglass part solid mass in the right lung apex with maximum SUV 4.86 on image 90 of series 3. No abnormal uptake in the 4 mm nodules in the right middle lobe. There is uptake within a 7 mm right paratracheal node on image 98 of series 3 with maximum SUV 3.87. There is uptake within a 9 mm right paratracheal node on image 102 of series 3 with maximum SUV 6.18. There is minimal nonspecific uptake in the left hilum with no measurable node with maximum SUV 4.94 on image 115. There is no right hilar abnormal uptake. Abdomen/Pelvis:  Focal uptake in the distal esophagus is probably inflammatory. There is uptake within the 11 mm left adrenal gland nodule with maximum SUV 6.75. The nodule is unchanged in size dating back to a CT of 11/8/2019. Musculoskeletal:  No abnormal FDG uptake to suggest metastatic disease. Additional CT findings:  There are bilateral breast implants. There is minimal atherosclerosis. There is a small hiatal hernia. There is colonic diverticulosis. There are calcified uterine fibroids.     1.  There is abnormal uptake within the enlarging heart groundglass and part solid mass in the right lung apex which is highly suspicious for slow growing malignancy such as adenocarcinoma. 2.  Uptake within 2 right paratracheal node suspicious for metastatic disease although nonspecific based on size. 3.  Uptake within the stable left adrenal gland nodule most consistent with a benign adenoma. 4.  No evidence of metastatic  disease in the neck, abdomen or pelvis.            Assessment & Plan       1. Lung nodule  Referral to Pulmonary and Sleep Medicine          Patient with an enlarging right upper lobe lung nodule concerning for malignancy.  PET scan does show nonspecific elevation in the paratracheal lymph nodes concerning for possible metastatic disease.  I did personally speak with Dr. Sims who has agreed to see the patient as EBUS is recommended at this time for tissue diagnosis and staging.  I spoke to the patient and her  today with regards to the findings and she did verbalize understanding's and agreed with the plan.  I will have patient follow-up with me after the biopsy to review the results and discuss further plan of care at that time.      Please note that this dictation was created using voice recognition software. I have made every reasonable attempt to correct obvious errors, but I expect that there are errors of grammar and possibly content that I did not discover before finalizing the note.

## 2022-08-23 NOTE — ADDENDUM NOTE
Encounter addended by: Meli Cardenas, Med Ass't on: 8/23/2022 10:37 AM   Actions taken: Charge Capture section accepted

## 2022-08-24 NOTE — ADDENDUM NOTE
Encounter addended by: Sakshi Polo, Med Ass't on: 8/23/2022 5:22 PM   Actions taken: Charge Capture section accepted

## 2022-08-25 ENCOUNTER — TELEPHONE (OUTPATIENT)
Dept: HEMATOLOGY ONCOLOGY | Facility: MEDICAL CENTER | Age: 77
End: 2022-08-25
Payer: MEDICARE

## 2022-08-25 ENCOUNTER — TELEPHONE (OUTPATIENT)
Dept: SLEEP MEDICINE | Facility: MEDICAL CENTER | Age: 77
End: 2022-08-25
Payer: MEDICARE

## 2022-08-25 NOTE — TELEPHONE ENCOUNTER
Pt called after contacting Pulmonary for her biopsy and is saying that the earliest they could get the patient in was 9/19. Pt is concerned this is too long of a wait and is wondering if we can contact them to see if it can be pushed up or if there is anywhere else we can send the referral to to get her in earlier.

## 2022-08-25 NOTE — TELEPHONE ENCOUNTER
"Pt has a \"urgent\" referral, she would like a call back to get scheduled sooner.     Leigh - [phone]    Thank you,   Arielle SOTO  "

## 2022-08-26 ENCOUNTER — OFFICE VISIT (OUTPATIENT)
Dept: SLEEP MEDICINE | Facility: MEDICAL CENTER | Age: 77
End: 2022-08-26
Payer: MEDICARE

## 2022-08-26 VITALS
DIASTOLIC BLOOD PRESSURE: 76 MMHG | RESPIRATION RATE: 16 BRPM | WEIGHT: 156 LBS | OXYGEN SATURATION: 96 % | HEART RATE: 58 BPM | SYSTOLIC BLOOD PRESSURE: 122 MMHG | BODY MASS INDEX: 24.48 KG/M2 | HEIGHT: 67 IN

## 2022-08-26 DIAGNOSIS — R91.1 LUNG NODULE: ICD-10-CM

## 2022-08-26 DIAGNOSIS — R05.9 COUGH: ICD-10-CM

## 2022-08-26 DIAGNOSIS — Z87.891 FORMER SMOKER: ICD-10-CM

## 2022-08-26 PROCEDURE — 99204 OFFICE O/P NEW MOD 45 MIN: CPT | Performed by: INTERNAL MEDICINE

## 2022-08-26 RX ORDER — AZELASTINE 1 MG/ML
1-2 SPRAY, METERED NASAL 2 TIMES DAILY
Qty: 30 ML | Refills: 11 | Status: SHIPPED | OUTPATIENT
Start: 2022-08-26 | End: 2022-08-29

## 2022-08-26 ASSESSMENT — ENCOUNTER SYMPTOMS
FOCAL WEAKNESS: 0
PHOTOPHOBIA: 0
DEPRESSION: 0
SPUTUM PRODUCTION: 1
DOUBLE VISION: 0
TREMORS: 0
SINUS PAIN: 0
WHEEZING: 0
FALLS: 0
HEARTBURN: 0
FEVER: 0
WEIGHT LOSS: 0
WEAKNESS: 0
BLURRED VISION: 0
HEADACHES: 0
SHORTNESS OF BREATH: 0
CLAUDICATION: 0
MYALGIAS: 0
COUGH: 1
DIARRHEA: 0
EYE PAIN: 0
HEMOPTYSIS: 0
STRIDOR: 0
VOMITING: 0
SPEECH CHANGE: 0
CHILLS: 0
EYE DISCHARGE: 0
NECK PAIN: 0
PND: 0
SORE THROAT: 0
CONSTIPATION: 0
NAUSEA: 0
DIZZINESS: 0
ABDOMINAL PAIN: 0
BACK PAIN: 0
ORTHOPNEA: 0
DIAPHORESIS: 0
PALPITATIONS: 0
EYE REDNESS: 0

## 2022-08-26 ASSESSMENT — FIBROSIS 4 INDEX: FIB4 SCORE: 1.24

## 2022-08-26 NOTE — PROGRESS NOTES
Chief Complaint   Patient presents with    Establish Care     Referal nicolas Pederson DX Lung nodule.     Results     Ct PET scan 8/10/22        HPI: This patient is a 76 y.o. female presenting for evaluation of pulmonary nodules and LAD that is FDG avid on PET. PMHs includes mild RAD on prn albuterol only and PND with associated chronic productive cough. She is a former smoker with 34 pk year hx and quit in roughly . She rides horses daily and is active w/o  SOB. No family hx of autoimmune dz or Ca. Pt lung cancer screening CT in 2019 showing RUL 2.2 cm GGO. Repeat CT in 2020 showed stability however no surveillance imaging was performed in  and CT 2022 showed increase in RUL GGO to up to 3 cm in diameter now with solid component. A PET form 8/10 showed low level FDG uptake with SUB of 4.86 and mild uptake in 9 mm paratracheal LN as well as smaller 7 mm paratracheal node. Pt denies unintended weight loss. No chest pain. She is not currently on any blood thinners or antiplatlet agents.     Past Medical History:   Diagnosis Date    Asthma     Depression     Tobacco use 2019       Social History     Socioeconomic History    Marital status:      Spouse name: Not on file    Number of children: Not on file    Years of education: Not on file    Highest education level: Bachelor's degree (e.g., BA, AB, BS)   Occupational History    Not on file   Tobacco Use    Smoking status: Former     Packs/day: 1.00     Years: 34.00     Pack years: 34.00     Types: Cigarettes     Start date: 1968     Quit date: 2005     Years since quittin.6     Passive exposure: Past    Smokeless tobacco: Never   Vaping Use    Vaping Use: Never used   Substance and Sexual Activity    Alcohol use: Yes     Alcohol/week: 8.4 oz     Types: 14 Glasses of wine per week    Drug use: Yes     Types: Marijuana, Oral     Comment: occ - gummies rarely    Sexual activity: Yes     Partners: Male   Other Topics Concern    Not on  file   Social History Narrative    Not on file     Social Determinants of Health     Financial Resource Strain: Not on file   Food Insecurity: Not on file   Transportation Needs: Not on file   Physical Activity: Not on file   Stress: Not on file   Social Connections: Not on file   Intimate Partner Violence: Not on file   Housing Stability: Not on file       Family History   Problem Relation Age of Onset    Dementia Mother     Heart Attack Mother     Cancer Sister 81        Lung    Hypertension Sister     Hypertension Brother     Arthritis Brother         RA    Cancer Paternal Aunt         Lung    Cancer Paternal Uncle         Lung       Current Outpatient Medications on File Prior to Visit   Medication Sig Dispense Refill    Levomefolate Glucosamine (METHYLFOLATE PO) Take  by mouth.      SELENIUM PO Take  by mouth.      venlafaxine XR (EFFEXOR XR) 37.5 MG CAPSULE SR 24 HR Take 1 Capsule by mouth every day. (Patient taking differently: Take 70 mg by mouth every day.) 30 Capsule 3    levothyroxine (SYNTHROID) 25 MCG Tab       albuterol 108 (90 Base) MCG/ACT Aero Soln inhalation aerosol INHALE 2 PUFFS BY MOUTH EVERY 4 HOURS AS NEEDED FOR SHORTNESS OF BREATH 8.5 g 0    Cyanocobalamin (B-12) 100 MCG Tab Take  by mouth.      B Complex Vitamins (B COMPLEX-B12 PO) Take 1 Tab by mouth every day.      cholecalciferol (DIALYVITE VITAMIN D 5000) 5000 UNIT Cap Take 5,000 Units by mouth every day.      Ascorbic Acid (VITAMIN C) 1000 MG Tab Take 1 Tab by mouth every day.      NON SPECIFIED Take 1 Tab by mouth every day. BIO-FLEX      montelukast (SINGULAIR) 10 MG Tab TAKE 1 TABLET BY MOUTH EVERY DAY FOR ASTHMA (Patient not taking: Reported on 8/26/2022) 90 Tablet 1     No current facility-administered medications on file prior to visit.       Allergies: Sulfa drugs    ROS:   Review of Systems   Constitutional:  Negative for chills, diaphoresis, fever, malaise/fatigue and weight loss.   HENT:  Negative for congestion, ear  "discharge, ear pain, hearing loss, nosebleeds, sinus pain, sore throat and tinnitus.    Eyes:  Negative for blurred vision, double vision, photophobia, pain, discharge and redness.   Respiratory:  Positive for cough and sputum production. Negative for hemoptysis, shortness of breath, wheezing and stridor.    Cardiovascular:  Negative for chest pain, palpitations, orthopnea, claudication, leg swelling and PND.   Gastrointestinal:  Negative for abdominal pain, constipation, diarrhea, heartburn, nausea and vomiting.   Genitourinary:  Negative for dysuria and urgency.   Musculoskeletal:  Negative for back pain, falls, joint pain, myalgias and neck pain.   Skin:  Negative for itching and rash.   Neurological:  Negative for dizziness, tremors, speech change, focal weakness, weakness and headaches.   Endo/Heme/Allergies:  Negative for environmental allergies.   Psychiatric/Behavioral:  Negative for depression.      /76 (BP Location: Right arm, Patient Position: Sitting, BP Cuff Size: Adult)   Pulse (!) 58   Resp 16   Ht 1.702 m (5' 7\")   Wt 70.8 kg (156 lb)   SpO2 96%     Physical Exam:  Physical Exam  Constitutional:       General: She is not in acute distress.     Appearance: Normal appearance. She is well-developed and normal weight.   HENT:      Head: Normocephalic and atraumatic.      Right Ear: External ear normal.      Left Ear: External ear normal.      Nose: Nose normal. No congestion.      Mouth/Throat:      Mouth: Mucous membranes are moist.      Pharynx: Oropharynx is clear. No oropharyngeal exudate.   Eyes:      General: No scleral icterus.     Extraocular Movements: Extraocular movements intact.      Conjunctiva/sclera: Conjunctivae normal.      Pupils: Pupils are equal, round, and reactive to light.   Neck:      Vascular: No JVD.      Trachea: No tracheal deviation.   Cardiovascular:      Rate and Rhythm: Normal rate and regular rhythm.      Heart sounds: Normal heart sounds. No murmur heard.    " No friction rub. No gallop.   Pulmonary:      Effort: Pulmonary effort is normal. No accessory muscle usage or respiratory distress.      Breath sounds: Normal breath sounds. No wheezing or rales.   Abdominal:      General: There is no distension.      Palpations: Abdomen is soft.      Tenderness: There is no abdominal tenderness.   Musculoskeletal:         General: No tenderness or deformity. Normal range of motion.      Cervical back: Normal range of motion and neck supple.      Right lower leg: No edema.      Left lower leg: No edema.   Lymphadenopathy:      Cervical: No cervical adenopathy.   Skin:     General: Skin is warm and dry.      Findings: No rash.      Nails: There is no clubbing.   Neurological:      Mental Status: She is alert and oriented to person, place, and time.      Cranial Nerves: No cranial nerve deficit.      Gait: Gait normal.   Psychiatric:         Behavior: Behavior normal.       PFTs as reviewed by me personally:none    Imaging as reviewed by me personally:as per hP    Assessment:  1. Lung nodule  Bronchoscopy    PULMONARY FUNCTION TESTS -Test requested: Complete Pulmonary Function Test      2. Cough  azelastine (ASTELIN) 137 MCG/SPRAY nasal spray      3. Former smoker            Plan:  Has increased significantly in size over 2 years and pt is high risk now with e/o possible metastasis based on FDG update in LN. We will obtain PFTs on the off chance that she is a surgical candidate but proceed with bronchoscopy with EBUS of LN and RUL biopsy  Chronic and presumed PND. Recommend OTC flonase and azlestine.   Tobacco free now for >15 years  Return in about 4 weeks (around 9/23/2022) for bronch biopsy results and PFTs.

## 2022-08-26 NOTE — TELEPHONE ENCOUNTER
Name from pharmacy: AZELASTINE 0.1%(137MCG) [id number]          Will file in chart as: azelastine (ASTELIN) 137 MCG/SPRAY nasal spray     Possible duplicate: Charlie to review recent actions on this medication    Sig: INSTILL 1 TO 2 SPRAYS INTO AFFECTED NOSTRIL(S) TWICE DAILY    Disp:  90 mL    Refills:  Not specified    Start: 8/26/2022    Class: Normal    For: Cough    To pharmacy: **Patient requests 90 days supply**    Last ordered: Today by Jadyn Martel M.D. Last refill

## 2022-08-26 NOTE — PATIENT INSTRUCTIONS
Flonase (generic fluticasone nasal spray) 1 spray each nostril at night before bed; okay to increase to twice daily, AM and PM    If persistent then add azelastine nasal spray 1-2 sprays each nostril staring once daily but can increase to twice daily

## 2022-08-27 SDOH — ECONOMIC STABILITY: HOUSING INSECURITY: IN THE LAST 12 MONTHS, HOW MANY PLACES HAVE YOU LIVED?: 1

## 2022-08-27 SDOH — HEALTH STABILITY: PHYSICAL HEALTH: ON AVERAGE, HOW MANY DAYS PER WEEK DO YOU ENGAGE IN MODERATE TO STRENUOUS EXERCISE (LIKE A BRISK WALK)?: 5 DAYS

## 2022-08-27 SDOH — HEALTH STABILITY: PHYSICAL HEALTH: ON AVERAGE, HOW MANY MINUTES DO YOU ENGAGE IN EXERCISE AT THIS LEVEL?: 30 MIN

## 2022-08-27 SDOH — ECONOMIC STABILITY: TRANSPORTATION INSECURITY
IN THE PAST 12 MONTHS, HAS LACK OF TRANSPORTATION KEPT YOU FROM MEETINGS, WORK, OR FROM GETTING THINGS NEEDED FOR DAILY LIVING?: NO

## 2022-08-27 SDOH — ECONOMIC STABILITY: FOOD INSECURITY: WITHIN THE PAST 12 MONTHS, THE FOOD YOU BOUGHT JUST DIDN'T LAST AND YOU DIDN'T HAVE MONEY TO GET MORE.: NEVER TRUE

## 2022-08-27 SDOH — ECONOMIC STABILITY: INCOME INSECURITY: HOW HARD IS IT FOR YOU TO PAY FOR THE VERY BASICS LIKE FOOD, HOUSING, MEDICAL CARE, AND HEATING?: NOT HARD AT ALL

## 2022-08-27 SDOH — ECONOMIC STABILITY: FOOD INSECURITY: WITHIN THE PAST 12 MONTHS, YOU WORRIED THAT YOUR FOOD WOULD RUN OUT BEFORE YOU GOT MONEY TO BUY MORE.: NEVER TRUE

## 2022-08-27 SDOH — ECONOMIC STABILITY: INCOME INSECURITY: IN THE LAST 12 MONTHS, WAS THERE A TIME WHEN YOU WERE NOT ABLE TO PAY THE MORTGAGE OR RENT ON TIME?: NO

## 2022-08-27 SDOH — HEALTH STABILITY: MENTAL HEALTH
STRESS IS WHEN SOMEONE FEELS TENSE, NERVOUS, ANXIOUS, OR CAN'T SLEEP AT NIGHT BECAUSE THEIR MIND IS TROUBLED. HOW STRESSED ARE YOU?: ONLY A LITTLE

## 2022-08-27 ASSESSMENT — LIFESTYLE VARIABLES
HOW OFTEN DO YOU HAVE SIX OR MORE DRINKS ON ONE OCCASION: NEVER
HOW MANY STANDARD DRINKS CONTAINING ALCOHOL DO YOU HAVE ON A TYPICAL DAY: 1 OR 2
SKIP TO QUESTIONS 9-10: 1
AUDIT-C TOTAL SCORE: 4
HOW OFTEN DO YOU HAVE A DRINK CONTAINING ALCOHOL: 4 OR MORE TIMES A WEEK

## 2022-08-27 ASSESSMENT — SOCIAL DETERMINANTS OF HEALTH (SDOH)
HOW OFTEN DO YOU ATTENT MEETINGS OF THE CLUB OR ORGANIZATION YOU BELONG TO?: MORE THAN 4 TIMES PER YEAR
HOW OFTEN DO YOU HAVE A DRINK CONTAINING ALCOHOL: 4 OR MORE TIMES A WEEK
HOW OFTEN DO YOU GET TOGETHER WITH FRIENDS OR RELATIVES?: ONCE A WEEK
HOW OFTEN DO YOU ATTEND CHURCH OR RELIGIOUS SERVICES?: NEVER
DO YOU BELONG TO ANY CLUBS OR ORGANIZATIONS SUCH AS CHURCH GROUPS UNIONS, FRATERNAL OR ATHLETIC GROUPS, OR SCHOOL GROUPS?: YES
HOW OFTEN DO YOU HAVE SIX OR MORE DRINKS ON ONE OCCASION: NEVER
WITHIN THE PAST 12 MONTHS, YOU WORRIED THAT YOUR FOOD WOULD RUN OUT BEFORE YOU GOT THE MONEY TO BUY MORE: NEVER TRUE
HOW MANY DRINKS CONTAINING ALCOHOL DO YOU HAVE ON A TYPICAL DAY WHEN YOU ARE DRINKING: 1 OR 2
IN A TYPICAL WEEK, HOW MANY TIMES DO YOU TALK ON THE PHONE WITH FAMILY, FRIENDS, OR NEIGHBORS?: TWICE A WEEK
HOW OFTEN DO YOU ATTENT MEETINGS OF THE CLUB OR ORGANIZATION YOU BELONG TO?: MORE THAN 4 TIMES PER YEAR
HOW OFTEN DO YOU ATTEND CHURCH OR RELIGIOUS SERVICES?: NEVER
HOW HARD IS IT FOR YOU TO PAY FOR THE VERY BASICS LIKE FOOD, HOUSING, MEDICAL CARE, AND HEATING?: NOT HARD AT ALL
HOW OFTEN DO YOU GET TOGETHER WITH FRIENDS OR RELATIVES?: ONCE A WEEK
DO YOU BELONG TO ANY CLUBS OR ORGANIZATIONS SUCH AS CHURCH GROUPS UNIONS, FRATERNAL OR ATHLETIC GROUPS, OR SCHOOL GROUPS?: YES
IN A TYPICAL WEEK, HOW MANY TIMES DO YOU TALK ON THE PHONE WITH FAMILY, FRIENDS, OR NEIGHBORS?: TWICE A WEEK

## 2022-08-29 ENCOUNTER — OFFICE VISIT (OUTPATIENT)
Dept: MEDICAL GROUP | Facility: LAB | Age: 77
End: 2022-08-29
Payer: MEDICARE

## 2022-08-29 VITALS
RESPIRATION RATE: 16 BRPM | DIASTOLIC BLOOD PRESSURE: 78 MMHG | TEMPERATURE: 97.6 F | BODY MASS INDEX: 24.64 KG/M2 | OXYGEN SATURATION: 93 % | HEIGHT: 67 IN | WEIGHT: 157 LBS | SYSTOLIC BLOOD PRESSURE: 112 MMHG | HEART RATE: 61 BPM

## 2022-08-29 DIAGNOSIS — D48.9 NEOPLASM OF UNCERTAIN BEHAVIOR: ICD-10-CM

## 2022-08-29 DIAGNOSIS — F33.42 RECURRENT MAJOR DEPRESSIVE DISORDER, IN FULL REMISSION (HCC): ICD-10-CM

## 2022-08-29 PROCEDURE — 99214 OFFICE O/P EST MOD 30 MIN: CPT | Performed by: FAMILY MEDICINE

## 2022-08-29 RX ORDER — VENLAFAXINE HYDROCHLORIDE 75 MG/1
75 CAPSULE, EXTENDED RELEASE ORAL DAILY
Qty: 90 CAPSULE | Refills: 2 | Status: SHIPPED | OUTPATIENT
Start: 2022-08-29 | End: 2022-11-27

## 2022-08-29 RX ORDER — AZELASTINE 1 MG/ML
SPRAY, METERED NASAL
Qty: 90 ML | Refills: 3 | Status: ON HOLD | OUTPATIENT
Start: 2022-08-29 | End: 2022-09-26

## 2022-08-29 ASSESSMENT — FIBROSIS 4 INDEX: FIB4 SCORE: 1.24

## 2022-08-29 NOTE — PROGRESS NOTES
Subjective:     Chief Complaint   Patient presents with    Establish Care         HPI:   Alissa presents today to establish care. This Friday biopsy coming up for lung mass, RUL and LN.   Former smoker.   No breathing issues. No SOB. No cough, no fevers.     Active riding her horse. Walking dogs.     Seeing Dr Kessler for bioidentical hormones and MK-677 Ibutamoren one tablet daily. This is a GH peptide.        Current Outpatient Medications Ordered in Epic   Medication Sig Dispense Refill    azelastine (ASTELIN) 137 MCG/SPRAY nasal spray INSTILL 1 TO 2 SPRAYS INTO AFFECTED NOSTRIL(S) TWICE DAILY 90 mL 3    Levomefolate Glucosamine (METHYLFOLATE PO) Take  by mouth.      SELENIUM PO Take  by mouth.      venlafaxine XR (EFFEXOR XR) 37.5 MG CAPSULE SR 24 HR Take 1 Capsule by mouth every day. (Patient taking differently: Take 70 mg by mouth every day.) 30 Capsule 3    levothyroxine (SYNTHROID) 25 MCG Tab       montelukast (SINGULAIR) 10 MG Tab TAKE 1 TABLET BY MOUTH EVERY DAY FOR ASTHMA (Patient not taking: Reported on 8/26/2022) 90 Tablet 1    albuterol 108 (90 Base) MCG/ACT Aero Soln inhalation aerosol INHALE 2 PUFFS BY MOUTH EVERY 4 HOURS AS NEEDED FOR SHORTNESS OF BREATH 8.5 g 0    Cyanocobalamin (B-12) 100 MCG Tab Take  by mouth.      B Complex Vitamins (B COMPLEX-B12 PO) Take 1 Tab by mouth every day.      cholecalciferol (DIALYVITE VITAMIN D 5000) 5000 UNIT Cap Take 5,000 Units by mouth every day.      Ascorbic Acid (VITAMIN C) 1000 MG Tab Take 1 Tab by mouth every day.      NON SPECIFIED Take 1 Tab by mouth every day. BIO-FLEX       No current Epic-ordered facility-administered medications on file.         ROS:  Gen: no fevers/chills, no changes in weight  Eyes: no changes in vision  ENT: no sore throat, no hearing loss, no bloody nose  Pulm: no sob, no cough  CV: no chest pain, no palpitations  GI: no nausea/vomiting, no diarrhea  : no dysuria  MSk: no myalgias  Skin: no rash  Neuro: no headaches, no  "numbness/tingling  Heme/Lymph: no easy bruising      Objective:     Exam:  /78   Pulse 61   Temp 36.4 °C (97.6 °F) (Temporal)   Resp 16   Ht 1.702 m (5' 7\")   Wt 71.2 kg (157 lb)   SpO2 93%   BMI 24.59 kg/m²  Body mass index is 24.59 kg/m².    Gen: Alert and oriented, No apparent distress.  Neck: Neck is supple without lymphadenopathy.  Lungs: Normal effort, CTA bilaterally, no wheezes, rhonchi, or rales  CV: Regular rate and rhythm. No murmurs, rubs, or gallops.  Ext: No clubbing, cyanosis, edema.      Assessment & Plan:     76 y.o. female with the following -     1. Recurrent major depressive disorder, in full remission (HCC)  Initially she had plans of titrating down on this medication but with everything going on with this lung mass found and biopsy coming up she does not think is a good idea for her to play with her dosing.  Would like to go back to her standard 75 mg capsule.  Refills are sent in.  - venlafaxine XR (EFFEXOR XR) 75 MG CAPSULE SR 24 HR; Take 1 Capsule by mouth every day for 90 days.  Dispense: 90 Capsule; Refill: 2    2. Neoplasm of uncertain behavior  Discussed upcoming lung and lymph node biopsy.  With a history of smoking and the imaging that has been done it is likely that this is probably something malignant as to what sort we do not know yet.  We did discuss that this might take a good week to get the pathology back but then she is already hooked up with pulmonology as well as hematology for further evaluation and treatment.            No follow-ups on file.    Please note that this dictation was created using voice recognition software. I have made every reasonable attempt to correct obvious errors, but I expect that there are errors of grammar and possibly content that I did not discover before finalizing the note.        "

## 2022-08-31 ENCOUNTER — PRE-ADMISSION TESTING (OUTPATIENT)
Dept: ADMISSIONS | Facility: MEDICAL CENTER | Age: 77
End: 2022-08-31
Attending: INTERNAL MEDICINE
Payer: MEDICARE

## 2022-08-31 ASSESSMENT — FIBROSIS 4 INDEX: FIB4 SCORE: 1.24

## 2022-08-31 NOTE — PREPROCEDURE INSTRUCTIONS
"Preadmit appointment: \" Preparing for your Procedure information\" sheet given to patient with verbal and written instructions. Patient instructed to continue prescribed medications through the day before surgery, instructed to take the following medications the day of surgery per anesthesia protocol:  Albuterol INH PRN, Synthroid, Effexor.     Pt states, \"no issues with anesthesia\".  Anesthesia Fasting guidelines handout given to Pt, NPO at Midnight, prior to surgery and clear liquids up to 3 hours prior to surgery then strict NPO until surgery, clear liquids defined for Pt.        All Pt's questions and concerns answered or addressed.     CT scan one hour before the procedure.  "

## 2022-09-01 DIAGNOSIS — R91.1 PULMONARY NODULE 1 CM OR GREATER IN DIAMETER: ICD-10-CM

## 2022-09-02 ENCOUNTER — APPOINTMENT (OUTPATIENT)
Dept: RADIOLOGY | Facility: MEDICAL CENTER | Age: 77
End: 2022-09-02
Attending: INTERNAL MEDICINE
Payer: MEDICARE

## 2022-09-02 ENCOUNTER — ANESTHESIA EVENT (OUTPATIENT)
Dept: SURGERY | Facility: MEDICAL CENTER | Age: 77
End: 2022-09-02
Payer: MEDICARE

## 2022-09-02 ENCOUNTER — ANESTHESIA (OUTPATIENT)
Dept: SURGERY | Facility: MEDICAL CENTER | Age: 77
End: 2022-09-02
Payer: MEDICARE

## 2022-09-02 ENCOUNTER — HOSPITAL ENCOUNTER (OUTPATIENT)
Facility: MEDICAL CENTER | Age: 77
End: 2022-09-02
Attending: INTERNAL MEDICINE | Admitting: INTERNAL MEDICINE
Payer: MEDICARE

## 2022-09-02 VITALS
BODY MASS INDEX: 24.26 KG/M2 | RESPIRATION RATE: 16 BRPM | SYSTOLIC BLOOD PRESSURE: 133 MMHG | OXYGEN SATURATION: 95 % | WEIGHT: 154.54 LBS | DIASTOLIC BLOOD PRESSURE: 70 MMHG | TEMPERATURE: 97.5 F | HEART RATE: 67 BPM | HEIGHT: 67 IN

## 2022-09-02 DIAGNOSIS — R91.1 PULMONARY NODULE 1 CM OR GREATER IN DIAMETER: ICD-10-CM

## 2022-09-02 LAB — PATHOLOGY CONSULT NOTE: NORMAL

## 2022-09-02 PROCEDURE — 700105 HCHG RX REV CODE 258: Performed by: INTERNAL MEDICINE

## 2022-09-02 PROCEDURE — 31628 BRONCHOSCOPY/LUNG BX EACH: CPT | Performed by: INTERNAL MEDICINE

## 2022-09-02 PROCEDURE — 71250 CT THORAX DX C-: CPT

## 2022-09-02 PROCEDURE — 87116 MYCOBACTERIA CULTURE: CPT

## 2022-09-02 PROCEDURE — 88334 PATH CONSLTJ SURG CYTO XM EA: CPT

## 2022-09-02 PROCEDURE — 88360 TUMOR IMMUNOHISTOCHEM/MANUAL: CPT

## 2022-09-02 PROCEDURE — 160048 HCHG OR STATISTICAL LEVEL 1-5: Performed by: INTERNAL MEDICINE

## 2022-09-02 PROCEDURE — 160002 HCHG RECOVERY MINUTES (STAT): Performed by: INTERNAL MEDICINE

## 2022-09-02 PROCEDURE — 88177 CYTP FNA EVAL EA ADDL: CPT

## 2022-09-02 PROCEDURE — 31652 BRONCH EBUS SAMPLNG 1/2 NODE: CPT | Performed by: INTERNAL MEDICINE

## 2022-09-02 PROCEDURE — 700111 HCHG RX REV CODE 636 W/ 250 OVERRIDE (IP): Performed by: ANESTHESIOLOGY

## 2022-09-02 PROCEDURE — 160025 RECOVERY II MINUTES (STATS): Performed by: INTERNAL MEDICINE

## 2022-09-02 PROCEDURE — 31629 BRONCHOSCOPY/NEEDLE BX EACH: CPT | Performed by: INTERNAL MEDICINE

## 2022-09-02 PROCEDURE — 87015 SPECIMEN INFECT AGNT CONCNTJ: CPT

## 2022-09-02 PROCEDURE — 88305 TISSUE EXAM BY PATHOLOGIST: CPT

## 2022-09-02 PROCEDURE — 700101 HCHG RX REV CODE 250: Performed by: ANESTHESIOLOGY

## 2022-09-02 PROCEDURE — 502714 HCHG ROBOTIC SURGERY SERVICES: Performed by: INTERNAL MEDICINE

## 2022-09-02 PROCEDURE — 160035 HCHG PACU - 1ST 60 MINS PHASE I: Performed by: INTERNAL MEDICINE

## 2022-09-02 PROCEDURE — 88333 PATH CONSLTJ SURG CYTO XM 1: CPT | Mod: XU

## 2022-09-02 PROCEDURE — 87102 FUNGUS ISOLATION CULTURE: CPT

## 2022-09-02 PROCEDURE — 160042 HCHG SURGERY MINUTES - EA ADDL 1 MIN LEVEL 5: Performed by: INTERNAL MEDICINE

## 2022-09-02 PROCEDURE — 160009 HCHG ANES TIME/MIN: Performed by: INTERNAL MEDICINE

## 2022-09-02 PROCEDURE — 88342 IMHCHEM/IMCYTCHM 1ST ANTB: CPT | Mod: XU

## 2022-09-02 PROCEDURE — 31627 NAVIGATIONAL BRONCHOSCOPY: CPT | Performed by: INTERNAL MEDICINE

## 2022-09-02 PROCEDURE — 00520 ANES CLOSED CHEST PX NOS: CPT | Performed by: ANESTHESIOLOGY

## 2022-09-02 PROCEDURE — 88173 CYTOPATH EVAL FNA REPORT: CPT

## 2022-09-02 PROCEDURE — 88341 IMHCHEM/IMCYTCHM EA ADD ANTB: CPT | Mod: 91,XU

## 2022-09-02 PROCEDURE — 99100 ANES PT EXTEME AGE<1 YR&>70: CPT | Performed by: ANESTHESIOLOGY

## 2022-09-02 PROCEDURE — 71045 X-RAY EXAM CHEST 1 VIEW: CPT

## 2022-09-02 PROCEDURE — 700101 HCHG RX REV CODE 250: Performed by: INTERNAL MEDICINE

## 2022-09-02 PROCEDURE — 88112 CYTOPATH CELL ENHANCE TECH: CPT | Mod: XU

## 2022-09-02 PROCEDURE — 87205 SMEAR GRAM STAIN: CPT | Mod: 91

## 2022-09-02 PROCEDURE — 160031 HCHG SURGERY MINUTES - 1ST 30 MINS LEVEL 5: Performed by: INTERNAL MEDICINE

## 2022-09-02 PROCEDURE — 87070 CULTURE OTHR SPECIMN AEROBIC: CPT

## 2022-09-02 PROCEDURE — 160046 HCHG PACU - 1ST 60 MINS PHASE II: Performed by: INTERNAL MEDICINE

## 2022-09-02 PROCEDURE — 88172 CYTP DX EVAL FNA 1ST EA SITE: CPT | Mod: XU

## 2022-09-02 PROCEDURE — 31624 DX BRONCHOSCOPE/LAVAGE: CPT | Performed by: INTERNAL MEDICINE

## 2022-09-02 PROCEDURE — 87206 SMEAR FLUORESCENT/ACID STAI: CPT

## 2022-09-02 PROCEDURE — 700105 HCHG RX REV CODE 258: Performed by: ANESTHESIOLOGY

## 2022-09-02 RX ORDER — SODIUM CHLORIDE, SODIUM LACTATE, POTASSIUM CHLORIDE, CALCIUM CHLORIDE 600; 310; 30; 20 MG/100ML; MG/100ML; MG/100ML; MG/100ML
INJECTION, SOLUTION INTRAVENOUS
Status: DISCONTINUED | OUTPATIENT
Start: 2022-09-02 | End: 2022-09-02 | Stop reason: SURG

## 2022-09-02 RX ORDER — OXYCODONE HCL 5 MG/5 ML
5 SOLUTION, ORAL ORAL
Status: DISCONTINUED | OUTPATIENT
Start: 2022-09-02 | End: 2022-09-02 | Stop reason: HOSPADM

## 2022-09-02 RX ORDER — OXYCODONE HCL 5 MG/5 ML
10 SOLUTION, ORAL ORAL
Status: DISCONTINUED | OUTPATIENT
Start: 2022-09-02 | End: 2022-09-02 | Stop reason: HOSPADM

## 2022-09-02 RX ORDER — HYDROMORPHONE HYDROCHLORIDE 1 MG/ML
0.4 INJECTION, SOLUTION INTRAMUSCULAR; INTRAVENOUS; SUBCUTANEOUS
Status: DISCONTINUED | OUTPATIENT
Start: 2022-09-02 | End: 2022-09-02 | Stop reason: HOSPADM

## 2022-09-02 RX ORDER — ALBUTEROL SULFATE 2.5 MG/3ML
2.5 SOLUTION RESPIRATORY (INHALATION)
Status: DISCONTINUED | OUTPATIENT
Start: 2022-09-02 | End: 2022-09-02 | Stop reason: HOSPADM

## 2022-09-02 RX ORDER — HYDRALAZINE HYDROCHLORIDE 20 MG/ML
5 INJECTION INTRAMUSCULAR; INTRAVENOUS
Status: DISCONTINUED | OUTPATIENT
Start: 2022-09-02 | End: 2022-09-02 | Stop reason: HOSPADM

## 2022-09-02 RX ORDER — LIDOCAINE HYDROCHLORIDE 20 MG/ML
INJECTION, SOLUTION EPIDURAL; INFILTRATION; INTRACAUDAL; PERINEURAL PRN
Status: DISCONTINUED | OUTPATIENT
Start: 2022-09-02 | End: 2022-09-02 | Stop reason: SURG

## 2022-09-02 RX ORDER — SODIUM CHLORIDE, SODIUM LACTATE, POTASSIUM CHLORIDE, CALCIUM CHLORIDE 600; 310; 30; 20 MG/100ML; MG/100ML; MG/100ML; MG/100ML
INJECTION, SOLUTION INTRAVENOUS CONTINUOUS
Status: DISCONTINUED | OUTPATIENT
Start: 2022-09-02 | End: 2022-09-02 | Stop reason: HOSPADM

## 2022-09-02 RX ORDER — HALOPERIDOL 5 MG/ML
1 INJECTION INTRAMUSCULAR
Status: DISCONTINUED | OUTPATIENT
Start: 2022-09-02 | End: 2022-09-02 | Stop reason: HOSPADM

## 2022-09-02 RX ORDER — ONDANSETRON 2 MG/ML
INJECTION INTRAMUSCULAR; INTRAVENOUS PRN
Status: DISCONTINUED | OUTPATIENT
Start: 2022-09-02 | End: 2022-09-02 | Stop reason: SURG

## 2022-09-02 RX ORDER — DEXAMETHASONE SODIUM PHOSPHATE 4 MG/ML
INJECTION, SOLUTION INTRA-ARTICULAR; INTRALESIONAL; INTRAMUSCULAR; INTRAVENOUS; SOFT TISSUE PRN
Status: DISCONTINUED | OUTPATIENT
Start: 2022-09-02 | End: 2022-09-02 | Stop reason: SURG

## 2022-09-02 RX ORDER — PHENYLEPHRINE HYDROCHLORIDE 10 MG/ML
INJECTION, SOLUTION INTRAMUSCULAR; INTRAVENOUS; SUBCUTANEOUS PRN
Status: DISCONTINUED | OUTPATIENT
Start: 2022-09-02 | End: 2022-09-02 | Stop reason: SURG

## 2022-09-02 RX ORDER — SODIUM CHLORIDE, SODIUM LACTATE, POTASSIUM CHLORIDE, CALCIUM CHLORIDE 600; 310; 30; 20 MG/100ML; MG/100ML; MG/100ML; MG/100ML
INJECTION, SOLUTION INTRAVENOUS CONTINUOUS
Status: ACTIVE | OUTPATIENT
Start: 2022-09-02 | End: 2022-09-02

## 2022-09-02 RX ORDER — METOPROLOL TARTRATE 1 MG/ML
1 INJECTION, SOLUTION INTRAVENOUS
Status: DISCONTINUED | OUTPATIENT
Start: 2022-09-02 | End: 2022-09-02 | Stop reason: HOSPADM

## 2022-09-02 RX ORDER — HYDROMORPHONE HYDROCHLORIDE 1 MG/ML
0.1 INJECTION, SOLUTION INTRAMUSCULAR; INTRAVENOUS; SUBCUTANEOUS
Status: DISCONTINUED | OUTPATIENT
Start: 2022-09-02 | End: 2022-09-02 | Stop reason: HOSPADM

## 2022-09-02 RX ORDER — ONDANSETRON 2 MG/ML
4 INJECTION INTRAMUSCULAR; INTRAVENOUS
Status: DISCONTINUED | OUTPATIENT
Start: 2022-09-02 | End: 2022-09-02 | Stop reason: HOSPADM

## 2022-09-02 RX ORDER — HYDROMORPHONE HYDROCHLORIDE 1 MG/ML
0.2 INJECTION, SOLUTION INTRAMUSCULAR; INTRAVENOUS; SUBCUTANEOUS
Status: DISCONTINUED | OUTPATIENT
Start: 2022-09-02 | End: 2022-09-02 | Stop reason: HOSPADM

## 2022-09-02 RX ADMIN — ROCURONIUM BROMIDE 100 MG: 10 INJECTION, SOLUTION INTRAVENOUS at 14:05

## 2022-09-02 RX ADMIN — ONDANSETRON 4 MG: 2 INJECTION INTRAMUSCULAR; INTRAVENOUS at 15:28

## 2022-09-02 RX ADMIN — SODIUM CHLORIDE, POTASSIUM CHLORIDE, SODIUM LACTATE AND CALCIUM CHLORIDE: 600; 310; 30; 20 INJECTION, SOLUTION INTRAVENOUS at 13:58

## 2022-09-02 RX ADMIN — LIDOCAINE HYDROCHLORIDE 70 MG: 20 INJECTION, SOLUTION EPIDURAL; INFILTRATION; INTRACAUDAL at 14:05

## 2022-09-02 RX ADMIN — ROCURONIUM BROMIDE 30 MG: 10 INJECTION, SOLUTION INTRAVENOUS at 15:13

## 2022-09-02 RX ADMIN — DEXAMETHASONE SODIUM PHOSPHATE 10 MG: 4 INJECTION, SOLUTION INTRA-ARTICULAR; INTRALESIONAL; INTRAMUSCULAR; INTRAVENOUS; SOFT TISSUE at 14:05

## 2022-09-02 RX ADMIN — FENTANYL CITRATE 100 MCG: 50 INJECTION, SOLUTION INTRAMUSCULAR; INTRAVENOUS at 14:05

## 2022-09-02 RX ADMIN — EPHEDRINE SULFATE 10 MG: 50 INJECTION INTRAMUSCULAR; INTRAVENOUS; SUBCUTANEOUS at 14:12

## 2022-09-02 RX ADMIN — SUGAMMADEX 200 MG: 100 INJECTION, SOLUTION INTRAVENOUS at 15:29

## 2022-09-02 RX ADMIN — SODIUM CHLORIDE, POTASSIUM CHLORIDE, SODIUM LACTATE AND CALCIUM CHLORIDE: 600; 310; 30; 20 INJECTION, SOLUTION INTRAVENOUS at 12:02

## 2022-09-02 RX ADMIN — LIDOCAINE HYDROCHLORIDE 0.5 ML: 10 INJECTION, SOLUTION EPIDURAL; INFILTRATION; INTRACAUDAL; PERINEURAL at 12:02

## 2022-09-02 RX ADMIN — ONDANSETRON 4 MG: 2 INJECTION INTRAMUSCULAR; INTRAVENOUS at 14:10

## 2022-09-02 RX ADMIN — PROPOFOL 160 MG: 10 INJECTION, EMULSION INTRAVENOUS at 14:05

## 2022-09-02 RX ADMIN — ALBUTEROL SULFATE 2.5 MG: 2.5 SOLUTION RESPIRATORY (INHALATION) at 16:01

## 2022-09-02 RX ADMIN — PHENYLEPHRINE HYDROCHLORIDE 50 MCG: 10 INJECTION INTRAVENOUS at 14:12

## 2022-09-02 ASSESSMENT — PAIN SCALES - GENERAL: PAIN_LEVEL: 0

## 2022-09-02 ASSESSMENT — FIBROSIS 4 INDEX: FIB4 SCORE: 1.24

## 2022-09-02 NOTE — ANESTHESIA POSTPROCEDURE EVALUATION
Patient: Alissa Bonilla    Procedure Summary     Date: 09/02/22 Room / Location:  PROCEDURE ROOM / SURGERY [facility]    Anesthesia Start: 1358 Anesthesia Stop: 1542    Procedures:       FIBER OPTICBRONHOSCOPY WITH BRONCHOALVEOLAR LAVAGE, BIOPSY AND FINE NEEDLE ASPIRATION, ENDOBRONCHIAL ULTRASOUND AND NAVIGATION ROBOTICS (Chest)      ENDOBRONCHIAL ULTRASOUND (EBUS) (Chest) Diagnosis: (LUNG NODULE; pending pathology)    Surgeons: Idalia Arguelles M.D. Responsible Provider: Grant Cordero D.O.    Anesthesia Type: general ASA Status: 2          Final Anesthesia Type: general  Last vitals  BP   Blood Pressure : 139/67    Temp   36.5 °C (97.7 °F)    Pulse   68   Resp   20    SpO2   100 %      Anesthesia Post Evaluation    Patient location during evaluation: PACU  Patient participation: complete - patient participated  Level of consciousness: awake and alert  Pain score: 0    Airway patency: patent  Anesthetic complications: no  Cardiovascular status: hemodynamically stable  Respiratory status: acceptable  Hydration status: euvolemic    PONV: none          No notable events documented.     Nurse Pain Score: 0 (NPRS)

## 2022-09-02 NOTE — DISCHARGE INSTRUCTIONS
If any questions arise, call your provider, Dr. Arguelles [phone]. If your provider is not available, please feel free to call the Surgical Center at [phone].    Call you doctor and go to the ER if you are coughing up more than 2 tablespoons of bright red blood.   Call your doctor and go to the ER if you experience acute onset of shortness of breath and/or increased chest pain.   Call your doctor and go to the ER if you develop a fever greater than 101.5 degrees Fahrenheit.    MEDICATIONS: Resume taking daily medication.  Take prescribed pain medication with food.  If no medication is prescribed, you may take non-aspirin pain medication if needed.  PAIN MEDICATION CAN BE VERY CONSTIPATING.  Take a stool softener or laxative such as senokot, pericolace, or milk of magnesia if needed.    What to Expect Post Anesthesia    Rest and take it easy for the first 24 hours.  A responsible adult is recommended to remain with you during that time.  It is normal to feel sleepy.  We encourage you to not do anything that requires balance, judgment or coordination.    FOR 24 HOURS DO NOT:  Drive, operate machinery or run household appliances.  Drink beer or alcoholic beverages.  Make important decisions or sign legal documents.    To avoid nausea, slowly advance diet as tolerated, avoiding spicy or greasy foods for the first day.  Add more substantial food to your diet according to your provider's instructions.  INCREASE FLUIDS AND FIBER TO AVOID CONSTIPATION.    MILD FLU-LIKE SYMPTOMS ARE NORMAL.  YOU MAY EXPERIENCE GENERALIZED MUSCLE ACHES, THROAT IRRITATION, HEADACHE AND/OR SOME NAUSEA.    Flexible Bronchoscopy, Care After  This sheet gives you information about how to care for yourself after your test. Your doctor may also give you more specific instructions. If you have problems or questions, contact your doctor.  Follow these instructions at home:  Eating and drinking  Do not eat or drink anything (not even  water) for 2 hours after your test, or until your numbing medicine (local anesthetic) wears off.  When your numbness is gone and your cough and gag reflexes have come back, you may:  Eat only soft foods.  Slowly drink liquids.  The day after the test, go back to your normal diet.  Driving  Do not drive for 24 hours if you were given a medicine to help you relax (sedative).  Do not drive or use heavy machinery while taking prescription pain medicine.  General instructions    Take over-the-counter and prescription medicines only as told by your doctor.  Return to your normal activities as told. Ask what activities are safe for you.  Do not use any products that have nicotine or tobacco in them. This includes cigarettes and e-cigarettes. If you need help quitting, ask your doctor.  Keep all follow-up visits as told by your doctor. This is important. It is very important if you had a tissue sample (biopsy) taken.  Get help right away if:  You have shortness of breath that gets worse.  You get light-headed.  You feel like you are going to pass out (faint).  You have chest pain.  You cough up:  More than a little blood.  More blood than before.  Summary  Do not eat or drink anything (not even water) for 2 hours after your test, or until your numbing medicine wears off.  Do not use cigarettes. Do not use e-cigarettes.  Get help right away if you have chest pain.  This information is not intended to replace advice given to you by your health care provider. Make sure you discuss any questions you have with your health care provider.  Document Released: 10/15/2010 Document Revised: 11/30/2018 Document Reviewed: 01/05/2018  Elsevier Patient Education © 2020 [employer]

## 2022-09-02 NOTE — ANESTHESIA PROCEDURE NOTES
Airway    Date/Time: 9/2/2022 2:06 PM  Performed by: Grant Cordero D.O.  Authorized by: Grant Cordero D.O.     Location:  OR  Urgency:  Elective  Indications for Airway Management:  Anesthesia      Spontaneous Ventilation: absent    Sedation Level:  Deep  Preoxygenated: Yes    Patient Position:  Sniffing  Mask Difficulty Assessment:  0 - not attempted  Final Airway Type:  Endotracheal airway  Final Endotracheal Airway:  ETT  Cuffed: Yes    Technique Used for Successful ETT Placement:  Direct laryngoscopy    Insertion Site:  Oral  Blade Type:  Alex  Laryngoscope Blade/Videolaryngoscope Blade Size:  3  ETT Size (mm):  8.0  Measured from:  Teeth  ETT to Teeth (cm):  22  Placement Verified by: auscultation and capnometry    Cormack-Lehane Classification:  Grade I - full view of glottis  Number of Attempts at Approach:  1

## 2022-09-02 NOTE — ANESTHESIA TIME REPORT
Anesthesia Start and Stop Event Times     Date Time Event    9/2/2022 1356 Ready for Procedure     1358 Anesthesia Start     1542 Anesthesia Stop        Responsible Staff  09/02/22    Name Role Begin End    Grant Cordero D.O. Anesth 1358 1542        Overtime Reason:  overtime    Comments:

## 2022-09-02 NOTE — ANESTHESIA PREPROCEDURE EVALUATION
Case: [id number] Date/Time: 09/02/22 1245    Procedures:       FIBER OPTICBRONHOSCOPY WITH POSSIBLE WASH, BRUSH, BRONCHOALVEOLAR LAVAGE, BIOPSY AND FINE NEEDLE ASPIRATION, ENDOBRONCHIAL ULTRASOUND AND NAVIGATION ROBOTICS (Chest)      ENDOBRONCHIAL ULTRASOUND (EBUS) (Chest)    Anesthesia type: General    Pre-op diagnosis: LUNG NODULE    Location:  PROCEDURE ROOM / SURGERY [facility]    Surgeons: Idalia Arguelles M.D.          Relevant Problems   PULMONARY   (positive) Asthma, moderate persistent   (positive) Uncomplicated asthma      CARDIAC   (positive) Benign essential HTN      Other   (positive) Smoking greater than 30 pack years       Physical Exam    Airway   Mallampati: II  TM distance: >3 FB  Neck ROM: full       Cardiovascular - normal exam  Rhythm: regular  Rate: normal  (-) murmur     Dental - normal exam           Pulmonary - normal exam  Breath sounds clear to auscultation     Abdominal    Neurological - normal exam                 Anesthesia Plan    ASA 2       Plan - general       Airway plan will be ETT          Induction: intravenous    Postoperative Plan: Postoperative administration of opioids is intended.    Pertinent diagnostic labs and testing reviewed    Informed Consent:    Anesthetic plan and risks discussed with patient.    Use of blood products discussed with: patient whom consented to blood products.

## 2022-09-02 NOTE — PROCEDURES
Fiberoptic Bronchoscopy/Endotracheal Ultrasound(EBUS)/Robitics     Date/Time of Procedure:9/2/2022    Indication(s): RUL lesion    Consent: Informed, written consent was obtained prior to the procedure; risks, benefits, & alternatives were discussed.    Universal Protocol/Time Out: A Time Out with checklist was performed prior to the procedure to ensure correct identification of the patient and procedure.    Pre-Procedure Diagnosis: r/o maligancy    Allergies:Sulfa drugs     Sedation/Analgesia/Anesthesia: Sedation as per anesthesia.    Additional Modalities:    [x] Fluoroscopy: RUL  [x] Radial Ultrasound: RUL  [X] Linear Endobronchial Ultrasound  [X] Rapid On-Site Evaluation RUL and 4R    Route of Entry:  [_] Nasal [_] Oral [X] ET tube [_] Trach [_] LMA      Findings: After the patient is adequately anesthetized (see procedure for anesthesia), the flexible bronchoscope was passed through the endotracheal tube visualizing the distal trachea:  Trachea: distal trachea normal appearing  Patricia: sharp  Right lung: RUL, RML, and RLL all level one subsegments visualized.  Left lung: RIOS, Lingula and LLL , All level one subsegments visualized.     Robotic Navigational Bronchoscopy    Robotic navigation bronchoscopy was performed with Ion platform.  Partial registration was used.    Ion robotic catheter was used to engage the anterior segment of right lung.  Target lesion is about 2 cm in diameter.   Under navigational guidance the ion robotic catheter was advanced to 1.0 cm away from the planned target.     Radial EBUS was performed to confirm that the location of the nodule is eccentric.     Transbronchial needle aspiration was performed with 21 gauge needle through the extended working channel catheter.  Total 8 samples were collected.  Samples sent for pathology.    Transbronchial biopsy was performed with forceps through the extended working channel catheter.  Total 6 samples were collected.  Samples sent for  pathology    Bronchial alveolar lavage was performed the extended working channel catheter.  Instilled 30 cc of NS, suction returned with 10 cc of NS.  Samples sent for cultures and cytology.    ANGELA findings: + regional issue     ENDOBRONCHIAL US    Using the endobronchial ultrasound, a systematic survey of the accessible mediastinal and hilar lymph nodes was performed, notable for lymphadenopathy at stations 4R Then, under direct ultrasound visualization, transbronchial needle aspirations were performed at the following lymph node stations:     1. SIZE OF NEEDLE   22  [employer]     2. STATIONS SAMPLED   4R     3. ANGELA CONFIRMATION  + blood     Specimens:   [X] Bronchoalveolar Lavage (BAL):   [x] Transbronchial Needle Aspiration (TBNA): RUL and 4R  [x] Transbronchial Biopsy (TBBx): RUL  [x] Endobronchial Ultrasound (EBUS) TBNA: 4R    CXR pending     No acute events    EBL: < 5 cc    Impression/Pl\an:  Rul nodule that has increased in size and 4R adenopathy  Concern for malignancy  Will follow up with results

## 2022-09-03 LAB
FUNGUS SPEC FUNGUS STN: NORMAL
GRAM STN SPEC: NORMAL
RHODAMINE-AURAMINE STN SPEC: NORMAL
SIGNIFICANT IND 70042: NORMAL
SITE SITE: NORMAL
SOURCE SOURCE: NORMAL

## 2022-09-04 LAB
BACTERIA SPEC RESP CULT: NORMAL
GRAM STN SPEC: NORMAL
SIGNIFICANT IND 70042: NORMAL
SITE SITE: NORMAL
SOURCE SOURCE: NORMAL

## 2022-09-07 ENCOUNTER — HOSPITAL ENCOUNTER (OUTPATIENT)
Dept: HEMATOLOGY ONCOLOGY | Facility: MEDICAL CENTER | Age: 77
End: 2022-09-07
Attending: NURSE PRACTITIONER
Payer: MEDICARE

## 2022-09-07 VITALS
HEIGHT: 67 IN | BODY MASS INDEX: 24.57 KG/M2 | WEIGHT: 156.53 LBS | DIASTOLIC BLOOD PRESSURE: 62 MMHG | HEART RATE: 65 BPM | OXYGEN SATURATION: 96 % | RESPIRATION RATE: 14 BRPM | TEMPERATURE: 97.5 F | SYSTOLIC BLOOD PRESSURE: 122 MMHG

## 2022-09-07 DIAGNOSIS — C34.90 MALIGNANT NEOPLASM OF UNSPECIFIED PART OF UNSPECIFIED BRONCHUS OR LUNG (HCC): ICD-10-CM

## 2022-09-07 DIAGNOSIS — C34.11 PRIMARY CANCER OF RIGHT UPPER LOBE OF LUNG (HCC): ICD-10-CM

## 2022-09-07 PROCEDURE — 99213 OFFICE O/P EST LOW 20 MIN: CPT | Performed by: NURSE PRACTITIONER

## 2022-09-07 ASSESSMENT — ENCOUNTER SYMPTOMS
WHEEZING: 0
SHORTNESS OF BREATH: 0
COUGH: 1
CHILLS: 0
FEVER: 0

## 2022-09-07 ASSESSMENT — FIBROSIS 4 INDEX: FIB4 SCORE: 1.24

## 2022-09-07 NOTE — PROGRESS NOTES
Subjective     Alissa Bonilla is a 76 y.o. female who presents with Results (Medicare/review results)          HPI    Patient seen today in follow-up for bronchoscopy results.  She presents accompanied by her  for today's visit.    Patient originally seen on 8/22/2022 after abnormal CT noted a right upper lobe lung nodule. First lung cancer screening CT was completed on 11/8/2019.  That CT at that time showed a 2.2 cm groundglass nodule in the right upper lobe with 3 solid nodules in the middle lobe measuring up to 5 mm in size.  There was also an incidental left adrenal adenoma.  Based on these findings it was recommended she undergo a repeat CT scan in approximately 6 months which she did complete on 6/8/2020.  That CT showed part solid groundglass opacity in the right upper lobe measuring 11.2 x 17.3 mm which appeared to be unchanged.  3 nodules in the right middle lobe also noted again which are unchanged.  11.8 mm left adrenal gland nodule which is unchanged.  Follow-up CT completed on 7/19/2022 noted interval enlargement of the partially groundglass and partially solid nodule in the right lung apex measuring 1.7 x 3.2 x 3.1 cm in size.  This previously measured 0.8 x 1.6 x 2.1 cm.  According to the reading radiologist this is concerning for slow-growing low-grade malignancy.  3 nodules in the right middle lobe continue to be unchanged.  Further recommendations given for PET/CT which was completed by PCP.  PET scan completed on 8/10/2022 showed abnormal uptake within the enlarging groundglass and part solid mass in the right lung apex highly suspicious for slow-growing malignancy such as an adenocarcinoma.  This had an SUV of 4.96.  There was some uptake within 2 right paratracheal nodes suspicious for metastatic disease however this may be nonspecific based on size as they measured 7 and 9 mm in size.  Reading radiologist did note some uptake within the left adrenal gland but is most  "consistent with a benign adenoma.  No evidence of metastatic disease noted in the neck, abdomen or pelvis.  Based on these findings I did recommend patient undergo bronchoscopy for tissue diagnosis and staging.  Requesting lymph node and lung nodule be sampled for tissue diagnosis.    Patient presents today to review the results of biopsy.  She tolerated the procedure very well.  No significant clinical changes.  She did have some mild cough after procedure but that has resolved.  No hemoptysis noted.  Overall patient is feeling well.    I did review the pathology report in detail with the patient and her  today.  The right upper lobe lung nodule did come back positive for adenocarcinoma.  It is TTF-1 and Napsin A+.  There was some papillary architecture noted.  I personally spoke with the pulmonologist who stated they were unable to sample a lymph node during the procedure.    Allergies   Allergen Reactions    Sulfa Drugs      \"crazy\"     Current Outpatient Medications on File Prior to Encounter   Medication Sig Dispense Refill    NON SPECIFIED Bio Identical Hormones      azelastine (ASTELIN) 137 MCG/SPRAY nasal spray INSTILL 1 TO 2 SPRAYS INTO AFFECTED NOSTRIL(S) TWICE DAILY 90 mL 3    venlafaxine XR (EFFEXOR XR) 75 MG CAPSULE SR 24 HR Take 1 Capsule by mouth every day for 90 days. 90 Capsule 2    Levomefolate Glucosamine (METHYLFOLATE PO) Take  by mouth.      SELENIUM PO Take  by mouth.      levothyroxine (SYNTHROID) 25 MCG Tab       albuterol 108 (90 Base) MCG/ACT Aero Soln inhalation aerosol INHALE 2 PUFFS BY MOUTH EVERY 4 HOURS AS NEEDED FOR SHORTNESS OF BREATH 8.5 g 0    Cyanocobalamin (B-12) 100 MCG Tab Take  by mouth.      B Complex Vitamins (B COMPLEX-B12 PO) Take 1 Tab by mouth every day.      cholecalciferol (DIALYVITE VITAMIN D 5000) 5000 UNIT Cap Take 5,000 Units by mouth every day.      Ascorbic Acid (VITAMIN C) 1000 MG Tab Take 1 Tab by mouth every day.      NON SPECIFIED Take 1 Tab by mouth " "every day. BIO-FLEX       No current facility-administered medications on file prior to encounter.           Review of Systems   Constitutional:  Negative for chills and fever.   Respiratory:  Positive for cough (minimal cough after procedure but has resolved). Negative for shortness of breath and wheezing.             Objective     /62 (BP Location: Right arm, Patient Position: Sitting, BP Cuff Size: Adult)   Pulse 65   Temp 36.4 °C (97.5 °F) (Temporal)   Resp 14   Ht 1.702 m (5' 7\")   Wt 71 kg (156 lb 8.4 oz)   SpO2 96%   BMI 24.52 kg/m²      Physical Exam  Vitals reviewed.   Constitutional:       General: She is not in acute distress.     Appearance: Normal appearance. She is not diaphoretic.   HENT:      Head: Normocephalic and atraumatic.   Cardiovascular:      Rate and Rhythm: Normal rate and regular rhythm.      Heart sounds: Normal heart sounds. No murmur heard.    No friction rub. No gallop.   Pulmonary:      Effort: Pulmonary effort is normal. No respiratory distress.      Breath sounds: Normal breath sounds. No wheezing.   Neurological:      Mental Status: She is alert.   Psychiatric:         Mood and Affect: Mood normal.         Behavior: Behavior normal.            FINAL DIAGNOSIS:     A. Right upper lobe nodule, fine needle aspiration, slide:          Atypical clusters noted on PAP stain.  See comment.   B. Right upper lobe nodule, fine needle aspiration, core:          Positive for adenocarcinoma.   C. Right upper lobe nodule, forceps with touch prep slides:          Positive for adenocarcinoma, lung primary with papillary           architecture noted.   D. 4R, fine needle aspiration, slide:          Negative for lesional cells and sheets of lymphocytes to suggest           lymph node aspirate.   E. 4R, fine needle aspiration, core:          Negative; bronchial epithelial cells, cartilage and a few           lymphoid aggregates noted.   F. Right upper lobe, bronchoalveolar lavage:         "  No malignancy identified.          Cytology preparations, including cell block, demonstrate benign           bronchial epithelial cells and alveolar macrophages.              Assessment & Plan       1. Primary cancer of right upper lobe of lung (HCC)  Referral to Hematology Oncology    Referral to Radiation Oncology    MR-BRAIN-WITH & W/O      2. Malignant neoplasm of unspecified part of unspecified bronchus or lung (HCC)  MR-BRAIN-WITH & W/O            Patient noted to have a right upper lobe lung nodule positive for non-small cell adenocarcinoma of the lung.  Unfortunately unable to evaluate the lymph node, therefore I am going to request to present patient's case at our lung tumor board scheduled for 9/15/2022.  I will go ahead and refer patient to medical oncology as well and I will have her establish care with Dr. Chatman on 9/15/2022 after tumor board to discuss further plan of care.      I also personally spoke with Dr. Campos with radiation oncology and he has agreed to see the patient as well.  Referrals been placed.    I have ordered an MRI brain for complete staging work-up.    Lastly, I have sent patient's tissue off for [facility] testing for further evaluation.    I discussed this in detail with the patient and her  today and they both verbalized understanding and agreed with the plan.    No further follow-up needed with [facility].

## 2022-09-08 ENCOUNTER — TELEPHONE (OUTPATIENT)
Dept: HEMATOLOGY ONCOLOGY | Facility: MEDICAL CENTER | Age: 77
End: 2022-09-08

## 2022-09-08 ENCOUNTER — HOSPITAL ENCOUNTER (OUTPATIENT)
Dept: RADIOLOGY | Facility: MEDICAL CENTER | Age: 77
End: 2022-09-08
Attending: NURSE PRACTITIONER
Payer: MEDICARE

## 2022-09-08 DIAGNOSIS — C34.90 MALIGNANT NEOPLASM OF UNSPECIFIED PART OF UNSPECIFIED BRONCHUS OR LUNG (HCC): ICD-10-CM

## 2022-09-08 DIAGNOSIS — C34.11 PRIMARY CANCER OF RIGHT UPPER LOBE OF LUNG (HCC): ICD-10-CM

## 2022-09-08 PROCEDURE — 700117 HCHG RX CONTRAST REV CODE 255: Performed by: NURSE PRACTITIONER

## 2022-09-08 PROCEDURE — 70553 MRI BRAIN STEM W/O & W/DYE: CPT

## 2022-09-08 PROCEDURE — A9576 INJ PROHANCE MULTIPACK: HCPCS | Performed by: NURSE PRACTITIONER

## 2022-09-08 RX ADMIN — GADOTERIDOL 15 ML: 279.3 INJECTION, SOLUTION INTRAVENOUS at 13:19

## 2022-09-08 NOTE — TELEPHONE ENCOUNTER
MRI brain negative.  Left voice message for patient to call back.      MR-BRAIN-WITH & W/O    Result Date: 9/8/2022 9/8/2022 12:52 PM HISTORY/REASON FOR EXAM:  Non-small cell lung cancer, staging; New diagnosis of lung cancer; TECHNIQUE/EXAM DESCRIPTION:   MRI of the brain without and with contrast. T1 sagittal, T2 fast spin-echo axial, T1 coronal, FLAIR coronal, diffusion-weighted and apparent diffusion coefficient (ADC map) axial images were obtained of the whole brain. T1 postcontrast axial and T1 postcontrast coronal images were obtained. The study was performed on a [employer] 1.5 Idalmis MRI scanner. 15 mL ProHance contrast was administered intravenously. COMPARISON:  None. FINDINGS: There is no abnormal parenchymal or meningeal contrast enhancement. There is no focal osseous lesion. There is no acute infarct. There is no acute or chronic parenchymal hemorrhage. There is no intra-axial space-occupying lesion. Mild cerebral volume loss is seen. The visualized flow voids of the cerebral vasculature are unremarkable.  There is no large lesion identified in the expected course of the intracranial portions of the cranial nerves. The skull bones are unremarkable. The paranasal sinuses are clear. The extracranial soft tissue including orbits appear grossly normal.     1.  No acute abnormality. 2.  There is no evidence of intracranial metastasis. 3.  Mild age-appropriate volume loss.

## 2022-09-08 NOTE — ADDENDUM NOTE
Encounter addended by: JANELL Rodríguez.PBARRON on: 9/8/2022 8:46 AM   Actions taken: Order list changed, Diagnosis association updated, Clinical Note Signed
Encounter addended by: Wen East A.P.N. on: 9/8/2022 8:49 AM   Actions taken: Actions taken from a BestPractice Advisory, Visit diagnoses modified, Order list changed, Diagnosis association updated, Clinical Note Signed
Render In Strict Bullet Format?: No
Detail Level: Zone
Otc Regimen: Zyrtec 10mg BID

## 2022-09-14 NOTE — PROGRESS NOTES
Follow Up Note: Hematology/Oncology     Primary Care:  Magaly Estes M.D.    CC: Establish care for newly diagnosed lung adenocarcinoma    Current Treatment: N/A    Subjective:   History of Presenting Illness:  Alissa Bonilla is a 76 y.o. female here to establish care for new diagnosis of lung adenocarcinoma.    Is accompanied by her  to today's visit.  Patient had a lung cancer screening CT in 2019 which showed a 2.2 groundglass nodule in the right upper lobe and 3 solid nodules in the middle lobe.  Due to this it was recommended that she have a repeat CT scan 6 months later which was June 2020.  At that time the CT showed a part groundglass opacity in the right upper lobe measuring 11.2 x 17.3 mm which remained unchanged.  Follow-up CT was completed 2 years later in July 2022 which noted an interval enlargement of the groundglass and partially solid nodule in the right upper lobe measuring 1.7 x 3.2 x 3.1 cm in size.  PET scan was completed in August 2022 which showed the aforementioned mass as well as 2 right paratracheal node suspicious for metastatic disease.  Biopsy was performed of the mass and came back to be adenocarcinoma.  Unfortunately pulmonology was unable to sample the lymph node during the procedure.    Patient reports that she is a former smoker.  Her father is was also a smoker.  She denies any alcohol or drug use.  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    Patient is largely asymptomatic.  She does note her sister has lung adenocarcinoma and currently undergoing treatment with pembrolizumab.      Review of Systems   Constitutional:  Negative for chills, fever, malaise/fatigue and weight loss.   HENT:  Negative for congestion, ear pain, nosebleeds and sore throat.    Eyes:  Negative for blurred vision.   Respiratory:  Negative for cough, sputum production, shortness of  "breath and wheezing.    Cardiovascular:  Negative for chest pain, palpitations, orthopnea and leg swelling.   Gastrointestinal:  Negative for abdominal pain, heartburn, nausea and vomiting.   Genitourinary:  Negative for dysuria, frequency and urgency.   Musculoskeletal:  Negative for neck pain.   Neurological:  Negative for dizziness, tingling, tremors, sensory change, focal weakness and headaches.   Endo/Heme/Allergies:  Does not bruise/bleed easily.   Psychiatric/Behavioral:  Negative for depression, memory loss and suicidal ideas.    All other systems reviewed and are negative.    Allergies   Allergen Reactions    Sulfa Drugs      \"crazy\"       Current Outpatient Medications   Medication Sig Dispense Refill    NON SPECIFIED Bio Identical Hormones      azelastine (ASTELIN) 137 MCG/SPRAY nasal spray INSTILL 1 TO 2 SPRAYS INTO AFFECTED NOSTRIL(S) TWICE DAILY 90 mL 3    venlafaxine XR (EFFEXOR XR) 75 MG CAPSULE SR 24 HR Take 1 Capsule by mouth every day for 90 days. 90 Capsule 2    Levomefolate Glucosamine (METHYLFOLATE PO) Take  by mouth.      SELENIUM PO Take  by mouth.      levothyroxine (SYNTHROID) 25 MCG Tab       albuterol 108 (90 Base) MCG/ACT Aero Soln inhalation aerosol INHALE 2 PUFFS BY MOUTH EVERY 4 HOURS AS NEEDED FOR SHORTNESS OF BREATH 8.5 g 0    Cyanocobalamin (B-12) 100 MCG Tab Take  by mouth.      B Complex Vitamins (B COMPLEX-B12 PO) Take 1 Tab by mouth every day.      cholecalciferol (DIALYVITE VITAMIN D 5000) 5000 UNIT Cap Take 5,000 Units by mouth every day.      Ascorbic Acid (VITAMIN C) 1000 MG Tab Take 1 Tab by mouth every day.      NON SPECIFIED Take 1 Tab by mouth every day. BIO-FLEX       No current facility-administered medications for this encounter.        Problem list, medications, and allergies reviewed by myself today in Epic.     Objective:     Vitals:    09/15/22 0902   BP: 130/84   BP Location: Left arm   Patient Position: Sitting   BP Cuff Size: Adult   Pulse: (!) 57   Resp: 16 " "  Temp: 36.4 °C (97.6 °F)   TempSrc: Temporal   SpO2: 95%   Weight: 70.5 kg (155 lb 8.6 oz)   Height: 1.702 m (5' 7.01\")       DESC; KARNOFSKY SCALE WITH ECOG EQUIVALENT: 100, Fully active, able to carry on all pre-disease performed without restriction (ECOG equivalent 0)    DISTRESS LEVEL: no apparent distress    Physical Exam  Constitutional:       General: She is not in acute distress.     Appearance: Normal appearance. She is not ill-appearing.   HENT:      Head: Normocephalic and atraumatic.      Nose: Nose normal.      Mouth/Throat:      Mouth: Mucous membranes are moist.      Pharynx: No oropharyngeal exudate or posterior oropharyngeal erythema.   Eyes:      General: No scleral icterus.     Conjunctiva/sclera: Conjunctivae normal.      Pupils: Pupils are equal, round, and reactive to light.   Cardiovascular:      Rate and Rhythm: Normal rate and regular rhythm.      Pulses: Normal pulses.      Heart sounds: Normal heart sounds. No murmur heard.    No friction rub. No gallop.   Pulmonary:      Effort: Pulmonary effort is normal. No respiratory distress.      Breath sounds: Normal breath sounds. No stridor. No wheezing, rhonchi or rales.   Chest:      Chest wall: No tenderness.   Abdominal:      General: Abdomen is flat. Bowel sounds are normal. There is no distension.      Palpations: Abdomen is soft. There is no mass.      Tenderness: There is no abdominal tenderness. There is no guarding.   Musculoskeletal:         General: No swelling, tenderness or deformity. Normal range of motion.      Cervical back: Normal range of motion and neck supple. No rigidity or tenderness.      Right lower leg: No edema.      Left lower leg: No edema.   Skin:     General: Skin is warm and dry.      Coloration: Skin is not jaundiced or pale.      Findings: No bruising, erythema or rash.   Neurological:      General: No focal deficit present.      Mental Status: She is alert and oriented to person, place, and time. Mental status " is at baseline.      Sensory: No sensory deficit.      Motor: No weakness.      Coordination: Coordination normal.      Gait: Gait normal.   Psychiatric:         Mood and Affect: Mood normal.         Behavior: Behavior normal.         Thought Content: Thought content normal.         Judgment: Judgment normal.         Labs:   Most recent labs reviewed.  Please see the lab tab of chart review    Imaging:   Most recent images below have been independently reviewed by me.     Brain:  1.  No acute abnormality.  2.  There is no evidence of intracranial metastasis.  3.  Mild age-appropriate volume loss.     PET  1.  There is abnormal uptake within the enlarging groundglass and part solid mass in the right lung apex which is highly suspicious for slow growing malignancy such as adenocarcinoma.  2.  Uptake within 2 right paratracheal node suspicious for metastatic disease although nonspecific based on size.  3.  Uptake within the stable left adrenal gland nodule most consistent with a benign adenoma.  4.  No evidence of metastatic disease in the neck, abdomen or pelvis.    Pathology:  A. Right upper lobe nodule, fine needle aspiration, slide:          Atypical clusters noted on PAP stain.  See comment.   B. Right upper lobe nodule, fine needle aspiration, core:          Positive for adenocarcinoma.   C. Right upper lobe nodule, forceps with touch prep slides:          Positive for adenocarcinoma, lung primary with papillary           architecture noted.   D. 4R, fine needle aspiration, slide:          Negative for lesional cells and sheets of lymphocytes to suggest           lymph node aspirate.   E. 4R, fine needle aspiration, core:          Negative; bronchial epithelial cells, cartilage and a few           lymphoid aggregates noted.   F. Right upper lobe, bronchoalveolar lavage:          No malignancy identified.          Cytology preparations, including cell block, demonstrate benign           bronchial epithelial cells  and alveolar macrophages.   Assessment/Plan:     Cancer Staging  Adenocarcinoma of right lung (HCC)  Staging form: Lung, AJCC 8th Edition  - Clinical: Stage IIIA (cT2a, cN2, cM0) - Signed by Georgia Chatman M.D. on 9/15/2022      Ms. Talon Bonilla is a 75 yo F with a new diagnosis of adenocarcinoma of the lung.  She is here to establish care.    Today we had an extensive conversation about the stage of her lung cancer.  She has a R upper lobe mass that measures 3.1 x 1.8 cm which puts her at a T2a.  She additionally has 2 right paratracheal lymph nodes measuring 7 mm and 9 mm.  These are mediastinal lymph nodes, making her N2.  What complicates this case is the fact that these mediastinal lymph nodes have not been biopsied and therefore there is no confirmation that they in fact are malignant.  We discussed the recommendations of the tumor board today which consisted of evaluation of her mediastinal nodes with biopsy.  This will allow for accurate staging.    We discussed that if these lymph nodes are in fact malignant and she has N2 nodes (making her stage IIIa) she would benefit from either concurrent chemoradiation followed by consolidative durvalumab or neoadjuvant chemotherapy followed by potentially surgery.  After discussion of the pros and cons of each of those options, she reports that she would like to be evaluated for surgery and therefore would consider neoadjuvant chemotherapy.  Discussed the Checkpoint 816 trial and that systemic therapy would include nivolumab and a platinum doublet every 3 weeks for 3 cycles.  Patient would likely get cisplatin pemetrexed and nivolumab.  ideally patient would be a surgical candidate and would receive surgery after chemotherapy.    We also discussed that since do not have confirmation that these nodes are in fact malignant she could undergo mediastinal node evaluation.  If her nodes are negative she potentially would be a surgical candidate.  She would likely  receive surgery followed by 4 cycles of chemotherapy with consideration for atezolizumab.  We discussed that the addition of atezolizumab depends on her PD-L1 status.    Plan  -To follow-up with surgery for evaluation of her mediastinal nodes  -Plan to follow depending on staging  -If patient is a stage IIIa we will proceed with neoadjuvant nivolumab and platinum doublet with the goal of surgery  -If the patient is downstage to stage II following evaluation of her mediastinal nodes she would benefit from surgery followed by adjuvant treatment with a platinum doublet plus or minus atezolizumab based on her PD-L1 status  -Patient to follow-up with me after surgery    Adrenal Adenoma  -Adrenal adenoma measures 11 mm.   since it is less than 4 cm it is appropriate to watch it every 6 to 12 months  -If it grows larger than 1 cm in 1 year then I will refer patient to surgery    No follow-ups on file.    Any questions and concerns raised by the patient were addressed and answered. Patient denies any further questions.  Patient encouraged to call the office with any concerns or issues.     Georgia Chatman M.D.  Hematology/Oncology    60 minutes was spent on this patient's case

## 2022-09-15 ENCOUNTER — PATIENT MESSAGE (OUTPATIENT)
Dept: ONCOLOGY | Facility: MEDICAL CENTER | Age: 77
End: 2022-09-15

## 2022-09-15 ENCOUNTER — HOSPITAL ENCOUNTER (OUTPATIENT)
Dept: HEMATOLOGY ONCOLOGY | Facility: MEDICAL CENTER | Age: 77
End: 2022-09-15
Attending: STUDENT IN AN ORGANIZED HEALTH CARE EDUCATION/TRAINING PROGRAM
Payer: MEDICARE

## 2022-09-15 ENCOUNTER — PATIENT OUTREACH (OUTPATIENT)
Dept: ONCOLOGY | Facility: MEDICAL CENTER | Age: 77
End: 2022-09-15

## 2022-09-15 ENCOUNTER — HOSPITAL ENCOUNTER (OUTPATIENT)
Dept: RADIATION ONCOLOGY | Facility: MEDICAL CENTER | Age: 77
End: 2022-09-30
Attending: RADIOLOGY
Payer: MEDICARE

## 2022-09-15 ENCOUNTER — RESEARCH ENCOUNTER (OUTPATIENT)
Dept: HEMATOLOGY ONCOLOGY | Facility: MEDICAL CENTER | Age: 77
End: 2022-09-15

## 2022-09-15 VITALS
BODY MASS INDEX: 24.48 KG/M2 | TEMPERATURE: 97 F | HEIGHT: 67 IN | SYSTOLIC BLOOD PRESSURE: 133 MMHG | WEIGHT: 156 LBS | DIASTOLIC BLOOD PRESSURE: 72 MMHG | RESPIRATION RATE: 18 BRPM | HEART RATE: 57 BPM | OXYGEN SATURATION: 97 %

## 2022-09-15 VITALS
HEIGHT: 67 IN | WEIGHT: 155.53 LBS | BODY MASS INDEX: 24.41 KG/M2 | RESPIRATION RATE: 16 BRPM | TEMPERATURE: 97.6 F | HEART RATE: 57 BPM | OXYGEN SATURATION: 95 % | DIASTOLIC BLOOD PRESSURE: 84 MMHG | SYSTOLIC BLOOD PRESSURE: 130 MMHG

## 2022-09-15 DIAGNOSIS — C34.91 ADENOCARCINOMA OF RIGHT LUNG (HCC): ICD-10-CM

## 2022-09-15 PROCEDURE — 99214 OFFICE O/P EST MOD 30 MIN: CPT | Performed by: RADIOLOGY

## 2022-09-15 PROCEDURE — 99212 OFFICE O/P EST SF 10 MIN: CPT | Performed by: STUDENT IN AN ORGANIZED HEALTH CARE EDUCATION/TRAINING PROGRAM

## 2022-09-15 PROCEDURE — 99205 OFFICE O/P NEW HI 60 MIN: CPT | Performed by: RADIOLOGY

## 2022-09-15 PROCEDURE — 99205 OFFICE O/P NEW HI 60 MIN: CPT | Performed by: STUDENT IN AN ORGANIZED HEALTH CARE EDUCATION/TRAINING PROGRAM

## 2022-09-15 ASSESSMENT — ENCOUNTER SYMPTOMS
DEPRESSION: 0
FOCAL WEAKNESS: 0
VOMITING: 0
SORE THROAT: 0
WHEEZING: 0
BRUISES/BLEEDS EASILY: 0
HEARTBURN: 0
SHORTNESS OF BREATH: 0
NECK PAIN: 0
CHILLS: 0
TINGLING: 0
HEADACHES: 0
MEMORY LOSS: 0
SPUTUM PRODUCTION: 0
WEIGHT LOSS: 0
PALPITATIONS: 0
COUGH: 0
TREMORS: 0
FEVER: 0
SENSORY CHANGE: 0
NAUSEA: 0
ABDOMINAL PAIN: 0
DIZZINESS: 0
ORTHOPNEA: 0
BLURRED VISION: 0

## 2022-09-15 ASSESSMENT — PAIN SCALES - GENERAL
PAINLEVEL: NO PAIN
PAINLEVEL: NO PAIN

## 2022-09-15 ASSESSMENT — FIBROSIS 4 INDEX
FIB4 SCORE: 1.24
FIB4 SCORE: 1.24

## 2022-09-15 NOTE — PROGRESS NOTES
"Patient was seen today in clinic with Dr. Campos for consult.  Vitals signs and weight were obtained and pain assessment was completed.  Allergies and medications were reviewed with the patient.       Vitals/Pain:  Vitals:    09/15/22 1330   BP: 133/72   Pulse: (!) 57   Resp: 18   Temp: 36.1 °C (97 °F)   SpO2: 97%   Weight: 70.8 kg (156 lb)   Height: 1.702 m (5' 7\")   Pain Score: No pain        Allergies:   Sulfa drugs    Current Medications:  Current Outpatient Medications   Medication Sig Dispense Refill    NON SPECIFIED Bio Identical Hormones      azelastine (ASTELIN) 137 MCG/SPRAY nasal spray INSTILL 1 TO 2 SPRAYS INTO AFFECTED NOSTRIL(S) TWICE DAILY 90 mL 3    venlafaxine XR (EFFEXOR XR) 75 MG CAPSULE SR 24 HR Take 1 Capsule by mouth every day for 90 days. 90 Capsule 2    Levomefolate Glucosamine (METHYLFOLATE PO) Take  by mouth.      SELENIUM PO Take  by mouth.      levothyroxine (SYNTHROID) 25 MCG Tab       albuterol 108 (90 Base) MCG/ACT Aero Soln inhalation aerosol INHALE 2 PUFFS BY MOUTH EVERY 4 HOURS AS NEEDED FOR SHORTNESS OF BREATH 8.5 g 0    Cyanocobalamin (B-12) 100 MCG Tab Take  by mouth.      B Complex Vitamins (B COMPLEX-B12 PO) Take 1 Tab by mouth every day.      cholecalciferol (DIALYVITE VITAMIN D 5000) 5000 UNIT Cap Take 5,000 Units by mouth every day.      Ascorbic Acid (VITAMIN C) 1000 MG Tab Take 1 Tab by mouth every day.      NON SPECIFIED Take 1 Tab by mouth every day. BIO-FLEX       No current facility-administered medications for this encounter.         PCP:  Meera Richardson R.N.   "

## 2022-09-15 NOTE — PROGRESS NOTES
Introduction letter and message sent via [employer].  Letter mailed with support service calendar and business card as well.

## 2022-09-15 NOTE — LETTER
[facility]   [address]  [place] [address]  Phone: [phone] - Fax: [phone]              Alissa Bonilla  [address]  [place] [address]     Date: 09/15/22      Dear Alissa,    I am a Cancer Nurse Navigator, a certified oncology nurse. My role is to assess any needs you may have with education, guidance and support. I am available to you and your family through your treatment at [facility].       I am available to address your needs during your journey with the following services:     Care Coordination  I can assist you in facilitating communication between your cancer care treatment team to ensure timely treatment and follow-up.  I can also assist with transition of care back to your primary care provider, or other specialist, as needed.  My goal is to bridge gaps for you throughout the course of your active treatment.       Education Services  Understanding the recommended treatment course by your physician is key. I can provide educational resources personalized to your cancer diagnosis to help you understand your diagnosis and treatment. Please let me know if you would like to receive information about your diagnosis and treatment plan.  I am here to help.     Support Services/Resource Information  [facility] we offer a full scope of support services.  I can assist you with referral information to:  · Cancer Clinical Trials & Research  · Nutrition counseling  · Support groups  · Complementary Therapies such as Mind-Body Techniques Meditation  · Patient Financial Advocates  ·   · [facility], an [employer] affiliate office, our volunteers can assist you with accessing our [employer] library, support services information, head coverings and comfort items  · Community and national resources, included eligibility based yanet assistance and pharmaceutical access programs, if you are in need of additional  information.     [facility] offers services that include:  · Behavioral Health  · Genetic counseling & testing  · Acupuncture  · Lymphedema prevention/treatment program  · Palliative care services.          I hope you have an excellent patient experience.  Please feel free to share with me your comments regarding the care you have received- we value your feedback.    Sincerely,     Juliann Bautista R.N.  Cancer Nurse Navigator    Office: [phone]  Main:  [phone]  Email: [email or web address]

## 2022-09-15 NOTE — CONSULTS
RADIATION ONCOLOGY CONSULT    Patient name:  Alissa Bonilla    Primary Physician:  Magaly Estes M.D. MRN: [id number]  [facility]: [id number]   Referring physician:  Wen East A.P.N.  : 1945, 76 y.o.     DATE OF SERVICE: 9/15/2022    IDENTIFICATION: A 76 y.o. female with recently diagnosed non-small cell lung cancer of the right lung, currently undergoing staging work-up, preliminarily stage IIIa, who comes to review treatment options and initial consultation.  Adenocarcinoma of right lung (HCC)  Staging form: Lung, AJCC 8th Edition  - Clinical: Stage IIIA (cT2a, cN2, cM0) - Signed by Georgia Chatman M.D. on 9/15/2022        She is here at the kind request of Wen Delatorre A.P.N.        HISTORY OF PRESENT ILLNESS:  Subjective     This is a 76-year-old woman with recently diagnosed non-small cell lung cancer comes for initial discussion on treatment options.  In brief, she had a low-dose screening CT done in  that showed a 2.2 cm groundglass nodule in the right upper lobe, as well as 3 solid nodules in the middle lobe.  She had a repeat follow-up scan in 2020 which relieved a part groundglass opacity in the right upper lobe measuring approximately 11 x 17 mm, unchanged.  2 years later in July of this year, follow-up CT noted interval enlargement of the groundglass nodule with partially solid components in the right upper lobe, measuring now 1.7 x 3.2 x 3.1 cm.  PET scan was completed last month as a result that showed this mass to be hypermetabolic, as well as 2 right paratracheal node suspicious for metastatic disease.  Biopsy of the mass was performed with bronchoscopy/endobronchial ultrasound and biopsy, which confirmed adenocarcinoma.  Unfortunately, level 4 lymph nodes were performed during the procedure that were negative, but the suspicious node on the PET scan was not able to be sampled.    She is feeling relatively well, no seen by medical oncology earlier  today, and now sees me for initial discussion and treatment options.  She was discussed at our tumor board today, and plans to see Dr. Ganser from thoracic surgery later this week.  Currently, she is feeling very well, has no pulmonary complaints, no chest wall or rib complaints, has great energy and continues to be quite active, and no neurologic deficits that she can describe.        PROBLEM LIST:  Patient Active Problem List   Diagnosis    Asthma, moderate persistent    Environmental allergies    Recurrent major depressive disorder, in full remission (HCC)    Dyslipidemia    Smoking greater than 30 pack years    Postmenopausal    AK (actinic keratosis)    Benign essential HTN    Neoplasm of uncertain behavior    Uncomplicated asthma    Right ankle pain    Adrenal adenoma, left    Bronchitis    Adenocarcinoma of right lung (HCC)        PAST SURGICAL HISTORY:  Past Surgical History:   Procedure Laterality Date    BRONCHOSCOPY, ROBOT-ASSISTED N/A 9/2/2022    Procedure: FIBER OPTICBRONHOSCOPY WITH BRONCHOALVEOLAR LAVAGE, BIOPSY AND FINE NEEDLE ASPIRATION, ENDOBRONCHIAL ULTRASOUND AND NAVIGATION ROBOTICS;  Surgeon: Idalia Arguelles M.D.;  Location: SURGERY [facility];  Service: Pulmonary Robotic    ENDOBRONCHIAL US ADD-ON N/A 9/2/2022    Procedure: ENDOBRONCHIAL ULTRASOUND (EBUS);  Surgeon: Idalia Arguelles M.D.;  Location: SURGERY [facility];  Service: Pulmonary Robotic    WI BREAST AUGMENTATION WITH IMPLANT  1972       CURRENT MEDICATIONS:  Current Outpatient Medications   Medication Sig Dispense Refill    NON SPECIFIED Bio Identical Hormones      azelastine (ASTELIN) 137 MCG/SPRAY nasal spray INSTILL 1 TO 2 SPRAYS INTO AFFECTED NOSTRIL(S) TWICE DAILY 90 mL 3    venlafaxine XR (EFFEXOR XR) 75 MG CAPSULE SR 24 HR Take 1 Capsule by mouth every day for 90 days. 90 Capsule 2    Levomefolate Glucosamine (METHYLFOLATE PO) Take  by mouth.      SELENIUM PO Take  by mouth.      levothyroxine (SYNTHROID) 25  "MCG Tab       albuterol 108 (90 Base) MCG/ACT Aero Soln inhalation aerosol INHALE 2 PUFFS BY MOUTH EVERY 4 HOURS AS NEEDED FOR SHORTNESS OF BREATH 8.5 g 0    Cyanocobalamin (B-12) 100 MCG Tab Take  by mouth.      B Complex Vitamins (B COMPLEX-B12 PO) Take 1 Tab by mouth every day.      cholecalciferol (DIALYVITE VITAMIN D 5000) 5000 UNIT Cap Take 5,000 Units by mouth every day.      Ascorbic Acid (VITAMIN C) 1000 MG Tab Take 1 Tab by mouth every day.      NON SPECIFIED Take 1 Tab by mouth every day. BIO-FLEX       No current facility-administered medications for this encounter.       ALLERGIES:    Sulfa drugs    FAMILY HISTORY:    family history includes Arthritis in her brother; Cancer in her paternal aunt and paternal uncle; Cancer (age of onset: 81) in her sister; Dementia in her mother; Heart Attack in her mother; Hypertension in her brother and sister.  Sister is undergoing treatment with pembrolizumab at this moment for her lung cancer.    SOCIAL HISTORY:     reports that she quit smoking about 17 years ago. Her smoking use included cigarettes. She started smoking about 54 years ago. She has a 34.00 pack-year smoking history. She has been exposed to tobacco smoke. She has never used smokeless tobacco. She reports current alcohol use of about 8.4 oz per week. She reports current drug use. Drugs: Marijuana and Oral.  Patient currently resides in [place] with her  Steve.  She is retired from a career in [employer].    REVIEW OF SYSTEMS:    A complete review of systems taken. Pertinent items in HPI. All others negative.    PHYSICAL EXAM:    PERFORMANCE STATUS:  ECOG Performance Review 9/15/2022   ECOG Performance Status Fully active, able to carry on all pre-disease performance without restriction   Some recent data might be hidden     Karnofsky Score 9/15/2022   Karnofsky Score 100   Some recent data might be hidden     /72   Pulse (!) 57   Temp 36.1 °C (97 °F)   Resp 18   Ht 1.702 m (5' 7\")  "  Wt 70.8 kg (156 lb)   SpO2 97%   BMI 24.43 kg/m²   Physical Exam  Vitals reviewed.   Constitutional:       Appearance: Normal appearance.   HENT:      Head: Normocephalic.      Mouth/Throat:      Mouth: Mucous membranes are moist.      Pharynx: Oropharynx is clear.   Eyes:      Extraocular Movements: Extraocular movements intact.      Pupils: Pupils are equal, round, and reactive to light.   Cardiovascular:      Rate and Rhythm: Normal rate and regular rhythm.   Pulmonary:      Effort: Pulmonary effort is normal. No respiratory distress.      Breath sounds: Normal breath sounds.   Abdominal:      General: Bowel sounds are normal.      Palpations: Abdomen is soft.   Musculoskeletal:         General: Normal range of motion.   Neurological:      General: No focal deficit present.      Mental Status: She is alert and oriented to person, place, and time.        LABORATORY DATA:   Lab Results   Component Value Date/Time    WBC 6.9 03/29/2022 10:20 AM    RBC 4.65 03/29/2022 10:20 AM    HEMOGLOBIN 14.5 03/29/2022 10:20 AM    HEMATOCRIT 44.0 03/29/2022 10:20 AM    MCV 94.6 03/29/2022 10:20 AM    MCH 31.2 03/29/2022 10:20 AM    MCHC 33.0 (L) 03/29/2022 10:20 AM    RDW 47.4 03/29/2022 10:20 AM    PLATELETCT 305 03/29/2022 10:20 AM    MPV 10.2 03/29/2022 10:20 AM    NEUTSPOLYS 66.70 03/29/2022 10:20 AM    LYMPHOCYTES 24.10 03/29/2022 10:20 AM    MONOCYTES 5.20 03/29/2022 10:20 AM    EOSINOPHILS 3.00 03/29/2022 10:20 AM    BASOPHILS 0.70 03/29/2022 10:20 AM      Lab Results   Component Value Date/Time    SODIUM 137 03/29/2022 10:20 AM    POTASSIUM 4.4 03/29/2022 10:20 AM    CHLORIDE 104 03/29/2022 10:20 AM    CO2 23 03/29/2022 10:20 AM    GLUCOSE 91 03/29/2022 10:20 AM    BUN 14 03/29/2022 10:20 AM    CREATININE 0.75 03/29/2022 10:20 AM           RADIOLOGY DATA:  CT-CHEST (THORAX) W/O    Result Date: 9/2/2022  1.  3.4 cm part solid groundglass opacity right lung apex similar to recent examination. Slowly growing neoplasm not  excluded. 2.  Small nodules within both lungs measuring up to 4.7 mm unchanged. 3.  1.2 cm left adrenal gland nodule unchanged. Lung RADS: 4A - Suspicious: Findings for which additional diagnostic testing and/or tissue sampling is recommended Findings: solid nodule(s) greater than or equal to 8 to less than 15 mm at baseline OR growing less than 8 mm OR new 6 to less than 8 mm part solid nodule(s): greater than or equal to 6 mm with solid component greater than or equal to 6 mm to less than 8 mm OR with a new or growing less than 4 mm solid component endobrachial nodule Management: 3 month LDCT; PET/CT may be used when there is a greater than 8 mm solid component    CT-LUNG CANCER-SCREENING    Result Date: 7/19/2022  1. Interval enlargement of the partially groundglass and partially solid nodule in the right lung apex, measuring 1.7 x 3.2 x 3.1 cm , previously 0.8 x 1.6 x2.1 cm, concerning for slowly growing low-grade malignancy. Further evaluation with PET/CT is recommended 2.  Unchanged 3 solid nodules in the right middle lobe. Lung RADS: 4A - Suspicious: Findings for which additional diagnostic testing and/or tissue sampling is recommended     DS-BONE DENSITY STUDY (DEXA)    Result Date: 7/18/2022  1.  According to the World Health Organization classification, bone mineral density of this patient is osteopenic in left proximal femur in normal in the lumbar spine. 2.  Decrease in left proximal femur bone density and no significant change in lumbar spine bone density 10-year Probability of Fracture: Major Osteoporotic     20.6% Hip     10.7% Population      [place] () Based on left femur neck BMD INTERPRETING LOCATION: [address] [place] [address], [address]    DX-CHEST-LIMITED (1 VIEW)    Result Date: 9/2/2022  No evidence of acute cardiopulmonary process.    MR-BRAIN-WITH & W/O    Result Date: 9/8/2022  1.  No acute abnormality. 2.  There is no evidence of intracranial metastasis. 3.  Mild age-appropriate  volume loss.    DX-PORTABLE FLUORO > 1 HOUR    Result Date: 9/6/2022  Portable fluoroscopy utilized for 3.7 minutes. [facility] [address] [facility] [place], [address]    BP-NRGIB-UHGYH BASE TO MID-THIGH    Result Date: 8/10/2022  1.  There is abnormal uptake within the enlarging heart groundglass and part solid mass in the right lung apex which is highly suspicious for slow growing malignancy such as adenocarcinoma. 2.  Uptake within 2 right paratracheal node suspicious for metastatic disease although nonspecific based on size. 3.  Uptake within the stable left adrenal gland nodule most consistent with a benign adenoma. 4.  No evidence of metastatic disease in the neck, abdomen or pelvis.    MA-SCREENING MAMMO BILAT W/IMPLANTS W/DENVER W/CAD    Result Date: 7/20/2022  1.  Breasts have scattered areas of fibroglandular density, with no radiographic evidence of malignancy. 2.  Screening mammogram in one year is recommended. R2 - CATEGORY 2: BENIGN FINDING(S) Ten to twenty percent of all cancers can be categorized as hereditary and the clinical and financial value of identifying patients and families at risk is well documented. If you have a personal or family history of breast, ovarian, fallopian tube, peritoneal or other cancer, please consult your physician regarding genetic counseling and testing.      IMPRESSION:    A 76 y.o. with recently diagnosed right lung adenocarcinoma, at least clinically T2a, unclear if clinically N0 versus clinically N2 secondary to a hypermetabolic paratracheal node on a recent PET scan, but unable to be sampled effectively on endobronchial ultrasound.  She comes today to discuss additional treatment options.    Adenocarcinoma of right lung (HCC)  Staging form: Lung, AJCC 8th Edition  - Clinical: Stage IIIA (cT2a, cN2, cM0) - Signed by Georgia Chatman M.D. on 9/15/2022        RECOMMENDATIONS:   I had a very extensive conversation with the patient and her  today regarding her  recent diagnosis of non-small cell lung cancer.  She is quite versed in her diagnosis, and her  is quite versed in radiation therapy secondary to his profession as a radiation physicist.  Therefore, our discussion focused on the nuances of her diagnosis, and her treatment options and her prognosis.  I reviewed with her tumor board discussion from earlier this morning focusing around the PET avid mediastinal node that remains to be sampled.      We discussed that if that node is negative, then she certainly has only clinical T2 a disease which can effectively be treated with either SBRT or surgery, and given her good lung function and her good performance status, I think surgery would certainly make sense.  Alternatively, if the mediastinal node is positive, then she has stage III disease, and we reviewed treatment options for concurrent chemoradiation followed by immunotherapy, or neoadjuvant chemotherapy followed by surgical resection.  She is reviewed the pros and cons of both of these with Dr. Chatman earlier, and we again discussed the nuances of each of these approaches during our visit in detail.  She is looking forward to her visit with thoracic surgery, and I described what a cervical mediastinoscopy is and how it would help to sample the lymph node in question which would then help us figure out the final treatment plan thereafter.  Currently, she is leaning towards neoadjuvant chemotherapy followed by surgery if she ends up having stage III disease, which I think is certainly reasonable.  If she ends up being stage II, then in theory she can proceed directly to resection after the mediastinoscopy as well.    For now, she will look forward to her visit with Dr. Ganser, and then finalize a plan thereafter.  Either way, I think she will need to see medical oncology after her mediastinoscopy and/or surgical resection is completed to discuss systemic therapy options, and at this point, unless she decides  on chemoradiation for stage III disease, she does not need to return to radiation therapy.  We will plan to follow-up on an as-needed basis for now, please reach out with questions.    Thank you for the opportunity to participate in her care.  If any questions or comments, please do not hesitate in calling.  In area, approximately 70 minutes were spent on this encounter, including previsit discussion and records review, imaging review, face-to-face encounter, and postvisit documentation.

## 2022-09-16 ENCOUNTER — PATIENT OUTREACH (OUTPATIENT)
Dept: ONCOLOGY | Facility: MEDICAL CENTER | Age: 77
End: 2022-09-16
Payer: MEDICARE

## 2022-09-16 ENCOUNTER — TELEPHONE (OUTPATIENT)
Dept: HEMATOLOGY ONCOLOGY | Facility: MEDICAL CENTER | Age: 77
End: 2022-09-16
Payer: MEDICARE

## 2022-09-16 NOTE — ADDENDUM NOTE
Encounter addended by: Georgia Chatman M.D. on: 9/16/2022 2:41 PM   Actions taken: Clinical Note Signed

## 2022-09-16 NOTE — TELEPHONE ENCOUNTER
[facility] called regarding patients sample they received. They are asking if patient has metastatic disease, they state if she does not then she will need to sign an ABN as Medicare will not cover. Please call [facility] back at [phone] advising if patient has metastatic disease or not

## 2022-09-16 NOTE — PROGRESS NOTES
Pt responding back to Vocent message.  She also provided information that is waiting to hear from surgical office, referral placed that day.  Call placed to [facility]. They have not received referral yet.  Call placed to referral processing.  They pulled referral and will process it and get it out today.  Grateful for assistance processing this quickly.

## 2022-09-20 ENCOUNTER — NON-PROVIDER VISIT (OUTPATIENT)
Dept: SLEEP MEDICINE | Facility: MEDICAL CENTER | Age: 77
End: 2022-09-20
Attending: INTERNAL MEDICINE
Payer: MEDICARE

## 2022-09-20 ENCOUNTER — PATIENT OUTREACH (OUTPATIENT)
Dept: ONCOLOGY | Facility: MEDICAL CENTER | Age: 77
End: 2022-09-20

## 2022-09-20 VITALS — BODY MASS INDEX: 24.63 KG/M2 | HEIGHT: 67 IN | WEIGHT: 156.9 LBS

## 2022-09-20 DIAGNOSIS — R91.1 LUNG NODULE: ICD-10-CM

## 2022-09-20 PROCEDURE — 94726 PLETHYSMOGRAPHY LUNG VOLUMES: CPT | Performed by: INTERNAL MEDICINE

## 2022-09-20 PROCEDURE — 94729 DIFFUSING CAPACITY: CPT | Performed by: INTERNAL MEDICINE

## 2022-09-20 PROCEDURE — 94060 EVALUATION OF WHEEZING: CPT | Performed by: INTERNAL MEDICINE

## 2022-09-20 ASSESSMENT — PULMONARY FUNCTION TESTS
FEV1/FVC_PERCENT_PREDICTED: 92
FEV1/FVC_PREDICTED: 77
FVC_LLN: 2.52
FEV1_PERCENT_PREDICTED: 96
FEV1/FVC_PERCENT_CHANGE: 133
FEV1/FVC_PERCENT_PREDICTED: 94
FEV1_PERCENT_PREDICTED: 85
FEV1/FVC: 71
FEV1/FVC_PERCENT_PREDICTED: 92
FEV1: 1.98
FEV1/FVC_PERCENT_PREDICTED: 96
FEV1/FVC: 73.27
FEV1_PERCENT_CHANGE: 12
FEV1/FVC: 73
FVC_PREDICTED: 3.02
FEV1/FVC_PERCENT_PREDICTED: 76
FVC_PERCENT_PREDICTED: 100
FEV1_PREDICTED: 2.31
FEV1: 2.22
FVC_PERCENT_PREDICTED: 92
FVC: 2.78
FEV1/FVC: 71
FEV1/FVC_PERCENT_CHANGE: 2
FEV1_PERCENT_CHANGE: 9
FEV1/FVC_PERCENT_LLN: 65
FVC: 3.03
FEV1_LLN: 1.93

## 2022-09-20 ASSESSMENT — FIBROSIS 4 INDEX: FIB4 SCORE: 1.24

## 2022-09-20 NOTE — PROCEDURES
Tech: Rae Dietrich, RT  Good patient effort & cooperation.  Test was performed on the [employer] Body Plethysmograph- Elite DX system.  The predicted sets used for Spirometry are GLI-2012, for Lung Volumes are ITS, and for DLCO is GLI 2017.  The results of this test meet the ATS standards for acceptability and repeatability.  The DLCO was uncorrected for Hb.  A bronchodilator of Ventolin HFA 2 puffs via spacer was administered.  DLCO was performed during dilation period.    Interpretation:   There is a borderline mild obstructive ventilatory defect on spirometry with a significant bronchodilator response most pronounced in the FEV1 and mid flow rates.    Lung volumes are normal   Diffusion capacity is preserved  Flow volume loop is consistent with above interpretation.    Compared to prior testing in 2015, the FEV1 is declined significantly from 2.59 L to 1.98 L, or 85% predicted.    IKannan M.D. am the author of this note (PFT interpretation).  __________  Kannan Sims MD  Pulmonary and Critical Care Medicine  [facility]

## 2022-09-20 NOTE — PROGRESS NOTES
On September 20, 2022, Oncology Social Worker Lauren Mcbride attempted telephone contact with pt.  OSW Reed left voicemail message for pt. requesting pt. call back at earliest convenience.  OSW Reed left contact information in voicemail message.

## 2022-09-21 ENCOUNTER — PATIENT OUTREACH (OUTPATIENT)
Dept: ONCOLOGY | Facility: MEDICAL CENTER | Age: 77
End: 2022-09-21
Payer: MEDICARE

## 2022-09-21 NOTE — PROGRESS NOTES
"Pt sent MePlease message.  Reached out to referral processing for referral to be directed to Dr Ganser.  Phone call to patient for follow up, left message.  Return message via MePlease as well.    Pt returned MePlease message and phone call.  Return phone call placed to patient.  She reported saw Dr Ganser yesterday and has surgery scheduled.  She is asking about EKG for the surgery.  Let her know it looks like Dr Ganser just put surgery orders in, so unsure of when she will get a call about that.  Pt does go by \"Loye\".  She was grateful for information and support. Pt states has transportation, denies financial stressors and has  and friends as support system.  Discussed additional resources and information.  Pt has contact information.  Encouraged her to reach out for navigation assistance when needed.  "

## 2022-09-23 ENCOUNTER — PRE-ADMISSION TESTING (OUTPATIENT)
Dept: ADMISSIONS | Facility: MEDICAL CENTER | Age: 77
DRG: 165 | End: 2022-09-23
Attending: SURGERY
Payer: MEDICARE

## 2022-09-23 ENCOUNTER — TELEPHONE (OUTPATIENT)
Dept: RADIATION ONCOLOGY | Facility: MEDICAL CENTER | Age: 77
End: 2022-09-23

## 2022-09-23 DIAGNOSIS — Z01.812 PRE-OPERATIVE LABORATORY EXAMINATION: ICD-10-CM

## 2022-09-23 DIAGNOSIS — Z01.810 PRE-OPERATIVE CARDIOVASCULAR EXAMINATION: ICD-10-CM

## 2022-09-23 LAB
ABO GROUP BLD: NORMAL
ANION GAP SERPL CALC-SCNC: 10 MMOL/L (ref 7–16)
BLD GP AB SCN SERPL QL: NORMAL
BUN SERPL-MCNC: 14 MG/DL (ref 8–22)
CALCIUM SERPL-MCNC: 9 MG/DL (ref 8.5–10.5)
CHLORIDE SERPL-SCNC: 103 MMOL/L (ref 96–112)
CO2 SERPL-SCNC: 23 MMOL/L (ref 20–33)
CREAT SERPL-MCNC: 0.7 MG/DL (ref 0.5–1.4)
EKG IMPRESSION: NORMAL
ERYTHROCYTE [DISTWIDTH] IN BLOOD BY AUTOMATED COUNT: 47.8 FL (ref 35.9–50)
GFR SERPLBLD CREATININE-BSD FMLA CKD-EPI: 90 ML/MIN/1.73 M 2
GLUCOSE SERPL-MCNC: 94 MG/DL (ref 65–99)
HCT VFR BLD AUTO: 40.3 % (ref 37–47)
HGB BLD-MCNC: 13.4 G/DL (ref 12–16)
MCH RBC QN AUTO: 31.8 PG (ref 27–33)
MCHC RBC AUTO-ENTMCNC: 33.3 G/DL (ref 33.6–35)
MCV RBC AUTO: 95.5 FL (ref 81.4–97.8)
PLATELET # BLD AUTO: 303 K/UL (ref 164–446)
PMV BLD AUTO: 10.9 FL (ref 9–12.9)
POTASSIUM SERPL-SCNC: 4 MMOL/L (ref 3.6–5.5)
RBC # BLD AUTO: 4.22 M/UL (ref 4.2–5.4)
RH BLD: NORMAL
SODIUM SERPL-SCNC: 136 MMOL/L (ref 135–145)
WBC # BLD AUTO: 6.4 K/UL (ref 4.8–10.8)

## 2022-09-23 PROCEDURE — 86901 BLOOD TYPING SEROLOGIC RH(D): CPT

## 2022-09-23 PROCEDURE — 36415 COLL VENOUS BLD VENIPUNCTURE: CPT

## 2022-09-23 PROCEDURE — 93005 ELECTROCARDIOGRAM TRACING: CPT

## 2022-09-23 PROCEDURE — 85027 COMPLETE CBC AUTOMATED: CPT

## 2022-09-23 PROCEDURE — 80048 BASIC METABOLIC PNL TOTAL CA: CPT

## 2022-09-23 PROCEDURE — 93010 ELECTROCARDIOGRAM REPORT: CPT | Performed by: INTERNAL MEDICINE

## 2022-09-23 PROCEDURE — 86850 RBC ANTIBODY SCREEN: CPT

## 2022-09-23 PROCEDURE — 86900 BLOOD TYPING SEROLOGIC ABO: CPT

## 2022-09-23 NOTE — TELEPHONE ENCOUNTER
Nutrition Services: Telephone Encounter     RD called for initial nutrition consultation. Pt answers, states is having surgery on 10/3 and prefers to speak with RD on 10/4. RD agreeable and provided contact information.     RD to follow-up  Please contact -[phone]

## 2022-09-26 ENCOUNTER — HOSPITAL ENCOUNTER (INPATIENT)
Facility: MEDICAL CENTER | Age: 77
LOS: 2 days | DRG: 165 | End: 2022-09-28
Attending: SURGERY | Admitting: SURGERY
Payer: MEDICARE

## 2022-09-26 ENCOUNTER — APPOINTMENT (OUTPATIENT)
Dept: RADIOLOGY | Facility: MEDICAL CENTER | Age: 77
DRG: 165 | End: 2022-09-26
Attending: PHYSICIAN ASSISTANT
Payer: MEDICARE

## 2022-09-26 ENCOUNTER — ANESTHESIA EVENT (OUTPATIENT)
Dept: SURGERY | Facility: MEDICAL CENTER | Age: 77
DRG: 165 | End: 2022-09-26
Payer: MEDICARE

## 2022-09-26 ENCOUNTER — ANESTHESIA (OUTPATIENT)
Dept: SURGERY | Facility: MEDICAL CENTER | Age: 77
DRG: 165 | End: 2022-09-26
Payer: MEDICARE

## 2022-09-26 DIAGNOSIS — G89.18 POSTOPERATIVE PAIN: ICD-10-CM

## 2022-09-26 PROBLEM — C34.91 PRIMARY LUNG CANCER, RIGHT (HCC): Status: ACTIVE | Noted: 2022-09-26

## 2022-09-26 LAB — ABO + RH BLD: NORMAL

## 2022-09-26 PROCEDURE — 36415 COLL VENOUS BLD VENIPUNCTURE: CPT

## 2022-09-26 PROCEDURE — 160029 HCHG SURGERY MINUTES - 1ST 30 MINS LEVEL 4: Performed by: SURGERY

## 2022-09-26 PROCEDURE — 160002 HCHG RECOVERY MINUTES (STAT): Performed by: SURGERY

## 2022-09-26 PROCEDURE — A9270 NON-COVERED ITEM OR SERVICE: HCPCS

## 2022-09-26 PROCEDURE — 160048 HCHG OR STATISTICAL LEVEL 1-5: Performed by: SURGERY

## 2022-09-26 PROCEDURE — 88331 PATH CONSLTJ SURG 1 BLK 1SPC: CPT

## 2022-09-26 PROCEDURE — 88309 TISSUE EXAM BY PATHOLOGIST: CPT

## 2022-09-26 PROCEDURE — 88305 TISSUE EXAM BY PATHOLOGIST: CPT | Mod: 59

## 2022-09-26 PROCEDURE — 07B74ZZ EXCISION OF THORAX LYMPHATIC, PERCUTANEOUS ENDOSCOPIC APPROACH: ICD-10-PCS | Performed by: SURGERY

## 2022-09-26 PROCEDURE — 110371 HCHG SHELL REV 272: Performed by: SURGERY

## 2022-09-26 PROCEDURE — 71045 X-RAY EXAM CHEST 1 VIEW: CPT

## 2022-09-26 PROCEDURE — 700105 HCHG RX REV CODE 258: Performed by: ANESTHESIOLOGY

## 2022-09-26 PROCEDURE — 700101 HCHG RX REV CODE 250: Performed by: SURGERY

## 2022-09-26 PROCEDURE — 770001 HCHG ROOM/CARE - MED/SURG/GYN PRIV*

## 2022-09-26 PROCEDURE — 88312 SPECIAL STAINS GROUP 1: CPT

## 2022-09-26 PROCEDURE — 700111 HCHG RX REV CODE 636 W/ 250 OVERRIDE (IP): Performed by: ANESTHESIOLOGY

## 2022-09-26 PROCEDURE — 99100 ANES PT EXTEME AGE<1 YR&>70: CPT | Performed by: ANESTHESIOLOGY

## 2022-09-26 PROCEDURE — 700111 HCHG RX REV CODE 636 W/ 250 OVERRIDE (IP): Performed by: PHYSICIAN ASSISTANT

## 2022-09-26 PROCEDURE — 700101 HCHG RX REV CODE 250: Performed by: ANESTHESIOLOGY

## 2022-09-26 PROCEDURE — 700105 HCHG RX REV CODE 258: Performed by: PHYSICIAN ASSISTANT

## 2022-09-26 PROCEDURE — 88307 TISSUE EXAM BY PATHOLOGIST: CPT | Mod: 59

## 2022-09-26 PROCEDURE — 700102 HCHG RX REV CODE 250 W/ 637 OVERRIDE(OP)

## 2022-09-26 PROCEDURE — A9270 NON-COVERED ITEM OR SERVICE: HCPCS | Performed by: ANESTHESIOLOGY

## 2022-09-26 PROCEDURE — 0BTC4ZZ RESECTION OF RIGHT UPPER LUNG LOBE, PERCUTANEOUS ENDOSCOPIC APPROACH: ICD-10-PCS | Performed by: SURGERY

## 2022-09-26 PROCEDURE — 160035 HCHG PACU - 1ST 60 MINS PHASE I: Performed by: SURGERY

## 2022-09-26 PROCEDURE — 700102 HCHG RX REV CODE 250 W/ 637 OVERRIDE(OP): Performed by: ANESTHESIOLOGY

## 2022-09-26 PROCEDURE — 700105 HCHG RX REV CODE 258: Performed by: SURGERY

## 2022-09-26 PROCEDURE — 00541 ANES THRCM PX 1 LUNG VNTJ: CPT | Performed by: ANESTHESIOLOGY

## 2022-09-26 PROCEDURE — 88332 PATH CONSLTJ SURG EA ADD BLK: CPT

## 2022-09-26 PROCEDURE — 160041 HCHG SURGERY MINUTES - EA ADDL 1 MIN LEVEL 4: Performed by: SURGERY

## 2022-09-26 PROCEDURE — 160009 HCHG ANES TIME/MIN: Performed by: SURGERY

## 2022-09-26 RX ORDER — SODIUM CHLORIDE, SODIUM LACTATE, POTASSIUM CHLORIDE, CALCIUM CHLORIDE 600; 310; 30; 20 MG/100ML; MG/100ML; MG/100ML; MG/100ML
INJECTION, SOLUTION INTRAVENOUS CONTINUOUS
Status: DISCONTINUED | OUTPATIENT
Start: 2022-09-26 | End: 2022-09-26 | Stop reason: HOSPADM

## 2022-09-26 RX ORDER — HYDROCODONE BITARTRATE AND ACETAMINOPHEN 5; 325 MG/1; MG/1
1-2 TABLET ORAL EVERY 4 HOURS PRN
Status: DISCONTINUED | OUTPATIENT
Start: 2022-09-26 | End: 2022-09-28 | Stop reason: HOSPADM

## 2022-09-26 RX ORDER — OXYCODONE HCL 5 MG/5 ML
10 SOLUTION, ORAL ORAL
Status: COMPLETED | OUTPATIENT
Start: 2022-09-26 | End: 2022-09-26

## 2022-09-26 RX ORDER — METOPROLOL TARTRATE 1 MG/ML
1 INJECTION, SOLUTION INTRAVENOUS
Status: DISCONTINUED | OUTPATIENT
Start: 2022-09-26 | End: 2022-09-26 | Stop reason: HOSPADM

## 2022-09-26 RX ORDER — HALOPERIDOL 5 MG/ML
1 INJECTION INTRAMUSCULAR
Status: DISCONTINUED | OUTPATIENT
Start: 2022-09-26 | End: 2022-09-26 | Stop reason: HOSPADM

## 2022-09-26 RX ORDER — ONDANSETRON 2 MG/ML
INJECTION INTRAMUSCULAR; INTRAVENOUS PRN
Status: DISCONTINUED | OUTPATIENT
Start: 2022-09-26 | End: 2022-09-26 | Stop reason: SURG

## 2022-09-26 RX ORDER — ALBUTEROL SULFATE 2.5 MG/3ML
2.5 SOLUTION RESPIRATORY (INHALATION)
Status: DISCONTINUED | OUTPATIENT
Start: 2022-09-26 | End: 2022-09-26 | Stop reason: HOSPADM

## 2022-09-26 RX ORDER — HYDROMORPHONE HYDROCHLORIDE 1 MG/ML
0.4 INJECTION, SOLUTION INTRAMUSCULAR; INTRAVENOUS; SUBCUTANEOUS
Status: DISCONTINUED | OUTPATIENT
Start: 2022-09-26 | End: 2022-09-26

## 2022-09-26 RX ORDER — SODIUM CHLORIDE, SODIUM LACTATE, POTASSIUM CHLORIDE, CALCIUM CHLORIDE 600; 310; 30; 20 MG/100ML; MG/100ML; MG/100ML; MG/100ML
INJECTION, SOLUTION INTRAVENOUS CONTINUOUS
Status: ACTIVE | OUTPATIENT
Start: 2022-09-26 | End: 2022-09-27

## 2022-09-26 RX ORDER — SODIUM CHLORIDE, SODIUM LACTATE, POTASSIUM CHLORIDE, CALCIUM CHLORIDE 600; 310; 30; 20 MG/100ML; MG/100ML; MG/100ML; MG/100ML
INJECTION, SOLUTION INTRAVENOUS
Status: DISCONTINUED | OUTPATIENT
Start: 2022-09-26 | End: 2022-09-26 | Stop reason: SURG

## 2022-09-26 RX ORDER — ONDANSETRON 4 MG/1
4 TABLET, ORALLY DISINTEGRATING ORAL EVERY 4 HOURS PRN
Status: DISCONTINUED | OUTPATIENT
Start: 2022-09-26 | End: 2022-09-28 | Stop reason: HOSPADM

## 2022-09-26 RX ORDER — ONDANSETRON 2 MG/ML
4 INJECTION INTRAMUSCULAR; INTRAVENOUS EVERY 4 HOURS PRN
Status: DISCONTINUED | OUTPATIENT
Start: 2022-09-26 | End: 2022-09-28 | Stop reason: HOSPADM

## 2022-09-26 RX ORDER — SCOLOPAMINE TRANSDERMAL SYSTEM 1 MG/1
PATCH, EXTENDED RELEASE TRANSDERMAL
Status: COMPLETED
Start: 2022-09-26 | End: 2022-09-26

## 2022-09-26 RX ORDER — ONDANSETRON 2 MG/ML
4 INJECTION INTRAMUSCULAR; INTRAVENOUS
Status: DISCONTINUED | OUTPATIENT
Start: 2022-09-26 | End: 2022-09-26

## 2022-09-26 RX ORDER — LIDOCAINE HYDROCHLORIDE 20 MG/ML
INJECTION, SOLUTION EPIDURAL; INFILTRATION; INTRACAUDAL; PERINEURAL PRN
Status: DISCONTINUED | OUTPATIENT
Start: 2022-09-26 | End: 2022-09-26 | Stop reason: SURG

## 2022-09-26 RX ORDER — AZELASTINE 1 MG/ML
1 SPRAY, METERED NASAL
COMMUNITY
End: 2022-10-24 | Stop reason: SDUPTHER

## 2022-09-26 RX ORDER — ROCURONIUM BROMIDE 10 MG/ML
INJECTION, SOLUTION INTRAVENOUS PRN
Status: DISCONTINUED | OUTPATIENT
Start: 2022-09-26 | End: 2022-09-26 | Stop reason: SURG

## 2022-09-26 RX ORDER — DIPHENHYDRAMINE HYDROCHLORIDE 50 MG/ML
25 INJECTION INTRAMUSCULAR; INTRAVENOUS EVERY 6 HOURS PRN
Status: DISCONTINUED | OUTPATIENT
Start: 2022-09-26 | End: 2022-09-28 | Stop reason: HOSPADM

## 2022-09-26 RX ORDER — ENALAPRILAT 1.25 MG/ML
2.5 INJECTION INTRAVENOUS EVERY 6 HOURS PRN
Status: DISCONTINUED | OUTPATIENT
Start: 2022-09-26 | End: 2022-09-28 | Stop reason: HOSPADM

## 2022-09-26 RX ORDER — HYDRALAZINE HYDROCHLORIDE 20 MG/ML
5 INJECTION INTRAMUSCULAR; INTRAVENOUS
Status: DISCONTINUED | OUTPATIENT
Start: 2022-09-26 | End: 2022-09-26 | Stop reason: HOSPADM

## 2022-09-26 RX ORDER — FAMOTIDINE 20 MG/1
20 TABLET, FILM COATED ORAL 2 TIMES DAILY
Status: DISCONTINUED | OUTPATIENT
Start: 2022-09-26 | End: 2022-09-28 | Stop reason: HOSPADM

## 2022-09-26 RX ORDER — VENLAFAXINE HYDROCHLORIDE 75 MG/1
75 CAPSULE, EXTENDED RELEASE ORAL DAILY
Status: DISCONTINUED | OUTPATIENT
Start: 2022-09-27 | End: 2022-09-28 | Stop reason: HOSPADM

## 2022-09-26 RX ORDER — DEXAMETHASONE SODIUM PHOSPHATE 4 MG/ML
INJECTION, SOLUTION INTRA-ARTICULAR; INTRALESIONAL; INTRAMUSCULAR; INTRAVENOUS; SOFT TISSUE PRN
Status: DISCONTINUED | OUTPATIENT
Start: 2022-09-26 | End: 2022-09-26 | Stop reason: SURG

## 2022-09-26 RX ORDER — OXYCODONE HCL 5 MG/5 ML
5 SOLUTION, ORAL ORAL
Status: COMPLETED | OUTPATIENT
Start: 2022-09-26 | End: 2022-09-26

## 2022-09-26 RX ORDER — HYDROMORPHONE HYDROCHLORIDE 1 MG/ML
0.2 INJECTION, SOLUTION INTRAMUSCULAR; INTRAVENOUS; SUBCUTANEOUS
Status: DISCONTINUED | OUTPATIENT
Start: 2022-09-26 | End: 2022-09-26

## 2022-09-26 RX ORDER — CEFAZOLIN SODIUM 1 G/3ML
INJECTION, POWDER, FOR SOLUTION INTRAMUSCULAR; INTRAVENOUS PRN
Status: DISCONTINUED | OUTPATIENT
Start: 2022-09-26 | End: 2022-09-26 | Stop reason: SURG

## 2022-09-26 RX ORDER — HYDRALAZINE HYDROCHLORIDE 20 MG/ML
10 INJECTION INTRAMUSCULAR; INTRAVENOUS EVERY 6 HOURS PRN
Status: DISCONTINUED | OUTPATIENT
Start: 2022-09-26 | End: 2022-09-28 | Stop reason: HOSPADM

## 2022-09-26 RX ORDER — PHENYLEPHRINE HCL IN 0.9% NACL 0.5 MG/5ML
SYRINGE (ML) INTRAVENOUS PRN
Status: DISCONTINUED | OUTPATIENT
Start: 2022-09-26 | End: 2022-09-26 | Stop reason: SURG

## 2022-09-26 RX ORDER — KETOROLAC TROMETHAMINE 30 MG/ML
15 INJECTION, SOLUTION INTRAMUSCULAR; INTRAVENOUS EVERY 6 HOURS PRN
Status: DISCONTINUED | OUTPATIENT
Start: 2022-09-26 | End: 2022-09-28 | Stop reason: HOSPADM

## 2022-09-26 RX ORDER — ENOXAPARIN SODIUM 100 MG/ML
40 INJECTION SUBCUTANEOUS DAILY
Status: DISCONTINUED | OUTPATIENT
Start: 2022-09-27 | End: 2022-09-28 | Stop reason: HOSPADM

## 2022-09-26 RX ORDER — HYDROMORPHONE HYDROCHLORIDE 1 MG/ML
0.1 INJECTION, SOLUTION INTRAMUSCULAR; INTRAVENOUS; SUBCUTANEOUS
Status: DISCONTINUED | OUTPATIENT
Start: 2022-09-26 | End: 2022-09-26

## 2022-09-26 RX ORDER — MORPHINE SULFATE 4 MG/ML
1-5 INJECTION INTRAVENOUS
Status: DISCONTINUED | OUTPATIENT
Start: 2022-09-26 | End: 2022-09-28 | Stop reason: HOSPADM

## 2022-09-26 RX ORDER — SODIUM CHLORIDE, SODIUM LACTATE, POTASSIUM CHLORIDE, CALCIUM CHLORIDE 600; 310; 30; 20 MG/100ML; MG/100ML; MG/100ML; MG/100ML
INJECTION, SOLUTION INTRAVENOUS CONTINUOUS
Status: ACTIVE | OUTPATIENT
Start: 2022-09-26 | End: 2022-09-26

## 2022-09-26 RX ORDER — LEVOTHYROXINE SODIUM 0.03 MG/1
25 TABLET ORAL
Status: DISCONTINUED | OUTPATIENT
Start: 2022-09-27 | End: 2022-09-28 | Stop reason: HOSPADM

## 2022-09-26 RX ORDER — ALBUTEROL SULFATE 90 UG/1
2 AEROSOL, METERED RESPIRATORY (INHALATION) EVERY 4 HOURS PRN
Status: DISCONTINUED | OUTPATIENT
Start: 2022-09-26 | End: 2022-09-28 | Stop reason: HOSPADM

## 2022-09-26 RX ORDER — DIPHENHYDRAMINE HCL 25 MG
25 TABLET ORAL EVERY 6 HOURS PRN
Status: DISCONTINUED | OUTPATIENT
Start: 2022-09-26 | End: 2022-09-28 | Stop reason: HOSPADM

## 2022-09-26 RX ADMIN — DEXAMETHASONE SODIUM PHOSPHATE 10 MG: 4 INJECTION, SOLUTION INTRA-ARTICULAR; INTRALESIONAL; INTRAMUSCULAR; INTRAVENOUS; SOFT TISSUE at 12:54

## 2022-09-26 RX ADMIN — HYDROMORPHONE HYDROCHLORIDE 0.2 MG: 1 INJECTION, SOLUTION INTRAMUSCULAR; INTRAVENOUS; SUBCUTANEOUS at 18:32

## 2022-09-26 RX ADMIN — ONDANSETRON 4 MG: 2 INJECTION INTRAMUSCULAR; INTRAVENOUS at 12:54

## 2022-09-26 RX ADMIN — EPHEDRINE SULFATE 10 MG: 50 INJECTION, SOLUTION INTRAVENOUS at 13:06

## 2022-09-26 RX ADMIN — SCOPALAMINE 1 PATCH: 1 PATCH, EXTENDED RELEASE TRANSDERMAL at 15:40

## 2022-09-26 RX ADMIN — Medication 100 MCG: at 13:28

## 2022-09-26 RX ADMIN — SODIUM CHLORIDE, POTASSIUM CHLORIDE, SODIUM LACTATE AND CALCIUM CHLORIDE: 600; 310; 30; 20 INJECTION, SOLUTION INTRAVENOUS at 21:21

## 2022-09-26 RX ADMIN — ROCURONIUM BROMIDE 50 MG: 10 INJECTION, SOLUTION INTRAVENOUS at 12:54

## 2022-09-26 RX ADMIN — ROCURONIUM BROMIDE 20 MG: 10 INJECTION, SOLUTION INTRAVENOUS at 14:00

## 2022-09-26 RX ADMIN — METHOCARBAMOL 500 MG: 100 INJECTION INTRAMUSCULAR; INTRAVENOUS at 16:49

## 2022-09-26 RX ADMIN — SUGAMMADEX 200 MG: 100 INJECTION, SOLUTION INTRAVENOUS at 15:26

## 2022-09-26 RX ADMIN — ONDANSETRON 4 MG: 2 INJECTION INTRAMUSCULAR; INTRAVENOUS at 15:25

## 2022-09-26 RX ADMIN — CEFAZOLIN 2 G: 330 INJECTION, POWDER, FOR SOLUTION INTRAMUSCULAR; INTRAVENOUS at 12:54

## 2022-09-26 RX ADMIN — FENTANYL CITRATE 50 MCG: 50 INJECTION, SOLUTION INTRAMUSCULAR; INTRAVENOUS at 13:15

## 2022-09-26 RX ADMIN — SODIUM CHLORIDE, POTASSIUM CHLORIDE, SODIUM LACTATE AND CALCIUM CHLORIDE: 600; 310; 30; 20 INJECTION, SOLUTION INTRAVENOUS at 11:36

## 2022-09-26 RX ADMIN — EPHEDRINE SULFATE 10 MG: 50 INJECTION, SOLUTION INTRAVENOUS at 13:02

## 2022-09-26 RX ADMIN — FENTANYL CITRATE 75 MCG: 50 INJECTION, SOLUTION INTRAMUSCULAR; INTRAVENOUS at 13:12

## 2022-09-26 RX ADMIN — FENTANYL CITRATE 125 MCG: 50 INJECTION, SOLUTION INTRAMUSCULAR; INTRAVENOUS at 12:54

## 2022-09-26 RX ADMIN — LIDOCAINE HYDROCHLORIDE 0.5 ML: 10 INJECTION, SOLUTION EPIDURAL; INFILTRATION; INTRACAUDAL; PERINEURAL at 11:35

## 2022-09-26 RX ADMIN — FAMOTIDINE 20 MG: 10 INJECTION INTRAVENOUS at 21:21

## 2022-09-26 RX ADMIN — Medication 100 MCG: at 14:48

## 2022-09-26 RX ADMIN — LIDOCAINE HYDROCHLORIDE 70 MG: 20 INJECTION, SOLUTION EPIDURAL; INFILTRATION; INTRACAUDAL at 12:54

## 2022-09-26 RX ADMIN — EPHEDRINE SULFATE 10 MG: 50 INJECTION, SOLUTION INTRAVENOUS at 12:58

## 2022-09-26 RX ADMIN — OXYCODONE HYDROCHLORIDE 5 MG: 5 SOLUTION ORAL at 16:30

## 2022-09-26 RX ADMIN — ONDANSETRON 4 MG: 2 INJECTION INTRAMUSCULAR; INTRAVENOUS at 15:26

## 2022-09-26 RX ADMIN — ROCURONIUM BROMIDE 30 MG: 10 INJECTION, SOLUTION INTRAVENOUS at 14:20

## 2022-09-26 RX ADMIN — OXYCODONE HYDROCHLORIDE 5 MG: 5 SOLUTION ORAL at 17:00

## 2022-09-26 RX ADMIN — PROPOFOL 160 MG: 10 INJECTION, EMULSION INTRAVENOUS at 12:54

## 2022-09-26 RX ADMIN — EPHEDRINE SULFATE 10 MG: 50 INJECTION, SOLUTION INTRAVENOUS at 13:00

## 2022-09-26 RX ADMIN — SODIUM CHLORIDE, POTASSIUM CHLORIDE, SODIUM LACTATE AND CALCIUM CHLORIDE: 600; 310; 30; 20 INJECTION, SOLUTION INTRAVENOUS at 12:46

## 2022-09-26 RX ADMIN — FENTANYL CITRATE 50 MCG: 50 INJECTION, SOLUTION INTRAMUSCULAR; INTRAVENOUS at 15:31

## 2022-09-26 ASSESSMENT — PAIN DESCRIPTION - PAIN TYPE
TYPE: SURGICAL PAIN

## 2022-09-26 ASSESSMENT — PATIENT HEALTH QUESTIONNAIRE - PHQ9
2. FEELING DOWN, DEPRESSED, IRRITABLE, OR HOPELESS: NOT AT ALL
1. LITTLE INTEREST OR PLEASURE IN DOING THINGS: NOT AT ALL
SUM OF ALL RESPONSES TO PHQ9 QUESTIONS 1 AND 2: 0

## 2022-09-26 ASSESSMENT — FIBROSIS 4 INDEX
FIB4 SCORE: 1.25
FIB4 SCORE: 1.25

## 2022-09-26 ASSESSMENT — PAIN SCALES - GENERAL: PAIN_LEVEL: 2

## 2022-09-26 NOTE — ANESTHESIA PREPROCEDURE EVALUATION
Case: [id number] Date/Time: 09/26/22 [id number]    Procedures:       VIDEO ASSISTED THORACOSCOPY, POSSIBLE RIGHT UPPER LOBECTOMY, NODE DISSECTION WITH MEDIASTINOSCOPY LYMPH NODE BIOPSY (Flank)      LOBECTOMY (Flank)      MEDIASTINOSCOPY (Chest)    Anesthesia type: General    Pre-op diagnosis: PRIMARY MALIGNANT NEOPLASM OF RIGHT LUNG    Location: [facility] OR 11 / SURGERY [facility]    Surgeons: John H Ganser, M.D.          Relevant Problems   PULMONARY   (positive) Asthma, moderate persistent   (positive) Uncomplicated asthma      CARDIAC   (positive) Benign essential HTN      Other   (positive) Adenocarcinoma of right lung (HCC)       Physical Exam    Airway   Mallampati: II  TM distance: >3 FB  Neck ROM: full       Cardiovascular - normal exam  Rhythm: regular  Rate: normal  (-) murmur     Dental - normal exam           Pulmonary - normal exam  Breath sounds clear to auscultation     Abdominal    Neurological - normal exam                 Anesthesia Plan    ASA 2       Plan - general       Airway plan will be ETT          Induction: intravenous    Postoperative Plan: Postoperative administration of opioids is intended.    Pertinent diagnostic labs and testing reviewed    Informed Consent:    Anesthetic plan and risks discussed with patient.    Use of blood products discussed with: patient whom consented to blood products.

## 2022-09-26 NOTE — OR NURSING
1540 patient arrived from OR, report received, attached to monitoring. VSS, patient oxygenating well on 8 L simple mask, dressing to anterior neck; gauze and tegaderm; clean/dry/intact, dressing to right lateral chest; gauze and medipore tape with chest tube in place; connected to 20 cm of suction, working wnl, oral airway in place patient asleep      1605 oral airway dc    1610 patient denies pain and nausea at this time, tolerates sips of water    1620 Xray at bedside    1625 telephone updates to spouse, no answer will try again later, text msg sent    1630 patient co pain, medicated per EMAR    1656 telephone updates to spouse, Steve, questions answered    1737 patient with tolerable pain, denies nausea, vss, awaiting room placement     1802 spouse at bedside to visit with patient     1830 patient drinking cranberry juice and eating crackers, tolerating well, co pain, medicated per EMAR    1930 patient eating some of dinner, ambulated to bathroom, able to void    2008 patient reading on ipad, vss, pain tolerable, denies nausea, telephone updates to spouse Steve, including patient room #, now awaiting room clean    2100 Patient drinking tea, continues to await room clean, vss    2215 ERAS 10 L oxymask for 6 hrs complete, patient placed on 1 L NC due to desating while sleeping    2246 report to Clare RN, patient going to T430    2300 patient transported to room with this RN and SUSAN Bustamante, vss, pain tolerable, denies nausea, states readiness to go to room, all belongings with patient and accounted for, safety ensured, care transferred.

## 2022-09-26 NOTE — ANESTHESIA PROCEDURE NOTES
Airway    Date/Time: 9/26/2022 2:00 PM  Performed by: Grant Cordero D.O.  Authorized by: Grant Cordero D.O.         Sedation Level:  Deep  Mask Difficulty Assessment:  0 - not attempted  Final Airway Type:  Endotracheal airway  Final Endotracheal Airway:  ETT - double lumen left with ONE LUNG VENTILATION  Technique Used for Successful ETT Placement:  Exchange catheter    Blade Type:  Alex  Laryngoscope Blade/Videolaryngoscope Blade Size:  4  ETT Double Lumen (fr):  37  Measured from:  Teeth  ETT to Teeth (cm):  30  Placement Verified by: bronchoscopy, palpation of cuff and single lung ventilation    Number of Attempts at Approach:  2   Exchange catheter used to switch single lumen ETT for 37 R CHANG.  Difficulty positioning CHANG in correct position.  Removed and replaced over exchange catheter with 37 L CHANG.  Position confirmed with bronchoscopy.

## 2022-09-26 NOTE — ANESTHESIA PROCEDURE NOTES
Airway    Date/Time: 9/26/2022 12:55 PM  Performed by: Grant Cordero D.O.  Authorized by: Grant Cordero D.O.     Location:  OR  Urgency:  Elective  Indications for Airway Management:  Anesthesia      Spontaneous Ventilation: absent    Sedation Level:  Deep  Preoxygenated: Yes    Patient Position:  Sniffing  Mask Difficulty Assessment:  1 - vent by mask  Final Airway Type:  Endotracheal airway  Final Endotracheal Airway:  ETT  Cuffed: Yes    Technique Used for Successful ETT Placement:  Direct laryngoscopy    Insertion Site:  Oral  Blade Type:  Alex  Laryngoscope Blade/Videolaryngoscope Blade Size:  3  ETT Size (mm):  7.0  Measured from:  Teeth  ETT to Teeth (cm):  22  Placement Verified by: auscultation and capnometry    Cormack-Lehane Classification:  Grade I - full view of glottis  Number of Attempts at Approach:  1

## 2022-09-26 NOTE — OR SURGEON
Immediate Post OP Note    PreOp Diagnosis: Right upper lobe lung cancer      PostOp Diagnosis: Same      Procedure(s):  VIDEO ASSISTED THORACOSCOPY, RIGHT UPPER LOBECTOMY, NODE DISSECTION WITH MEDIASTINOSCOPY LYMPH NODE BIOPSY - Wound Class: Clean Contaminated  LOBECTOMY - Wound Class: Clean Contaminated  MEDIASTINOSCOPY - Wound Class: Clean    Surgeon(s):  John H Ganser, M.D.    Anesthesiologist/Type of Anesthesia:  Anesthesiologist: Grant Cordero D.O./General    Surgical Staff:  Assistant: MICHAEL Kaur  Circulator: Jose M Garcia R.N.; Kathy Joseph R.N.  Relief Circulator: Tess Sharma R.N.  Scrub Person: Magdaleno Mendez    Specimens removed if any:  ID Type Source Tests Collected by Time Destination   A : RIGHT PARATRACHEAL LYMPH NODE Tissue Lymph Node PATHOLOGY SPECIMEN John H Ganser, M.D. 9/26/2022  1:16 PM    B : Right Hilar Node, Station 10 Tissue Lymph Node PATHOLOGY SPECIMEN John H Ganser, M.D. 9/26/2022  2:50 PM    C : Right Subcarinal Node, Station 7  Tissue Lymph Node PATHOLOGY SPECIMEN John H Ganser, M.D. 9/26/2022  3:17 PM    D : Right Upper Paratracheal Node, Station 2 Tissue Lymph Node PATHOLOGY SPECIMEN John H Ganser, M.D. 9/26/2022  3:19 PM    E : Right Upper Lobe, Lung  Tissue Lung PATHOLOGY SPECIMEN John H Ganser, M.D. 9/26/2022  3:20 PM        Estimated Blood Loss: 0    Findings: Mediastinal nodes benign, granulomatous    Complications: 0        9/26/2022 3:34 PM John H Ganser, M.D.

## 2022-09-26 NOTE — ANESTHESIA TIME REPORT
Anesthesia Start and Stop Event Times     Date Time Event    9/26/2022 1238 Ready for Procedure     1246 Anesthesia Start     1545 Anesthesia Stop        Responsible Staff  09/26/22    Name Role Begin End    Grant Cordero D.O. Anesth 1246 1542        Overtime Reason:  no overtime (within assigned shift)    Comments:

## 2022-09-27 ENCOUNTER — APPOINTMENT (OUTPATIENT)
Dept: RADIOLOGY | Facility: MEDICAL CENTER | Age: 77
DRG: 165 | End: 2022-09-27
Attending: PHYSICIAN ASSISTANT
Payer: MEDICARE

## 2022-09-27 LAB
ANION GAP SERPL CALC-SCNC: 9 MMOL/L (ref 7–16)
BUN SERPL-MCNC: 9 MG/DL (ref 8–22)
CALCIUM SERPL-MCNC: 8.3 MG/DL (ref 8.5–10.5)
CHLORIDE SERPL-SCNC: 101 MMOL/L (ref 96–112)
CO2 SERPL-SCNC: 22 MMOL/L (ref 20–33)
CREAT SERPL-MCNC: 0.64 MG/DL (ref 0.5–1.4)
ERYTHROCYTE [DISTWIDTH] IN BLOOD BY AUTOMATED COUNT: 45.9 FL (ref 35.9–50)
GFR SERPLBLD CREATININE-BSD FMLA CKD-EPI: 92 ML/MIN/1.73 M 2
GLUCOSE SERPL-MCNC: 144 MG/DL (ref 65–99)
HCT VFR BLD AUTO: 37.8 % (ref 37–47)
HGB BLD-MCNC: 12.6 G/DL (ref 12–16)
MCH RBC QN AUTO: 31.5 PG (ref 27–33)
MCHC RBC AUTO-ENTMCNC: 33.3 G/DL (ref 33.6–35)
MCV RBC AUTO: 94.5 FL (ref 81.4–97.8)
PLATELET # BLD AUTO: 257 K/UL (ref 164–446)
PMV BLD AUTO: 10.3 FL (ref 9–12.9)
POTASSIUM SERPL-SCNC: 4.3 MMOL/L (ref 3.6–5.5)
RBC # BLD AUTO: 4 M/UL (ref 4.2–5.4)
SODIUM SERPL-SCNC: 132 MMOL/L (ref 135–145)
WBC # BLD AUTO: 9.6 K/UL (ref 4.8–10.8)

## 2022-09-27 PROCEDURE — 71045 X-RAY EXAM CHEST 1 VIEW: CPT

## 2022-09-27 PROCEDURE — A9270 NON-COVERED ITEM OR SERVICE: HCPCS | Performed by: PHYSICIAN ASSISTANT

## 2022-09-27 PROCEDURE — 770001 HCHG ROOM/CARE - MED/SURG/GYN PRIV*

## 2022-09-27 PROCEDURE — 85027 COMPLETE CBC AUTOMATED: CPT

## 2022-09-27 PROCEDURE — 700111 HCHG RX REV CODE 636 W/ 250 OVERRIDE (IP): Performed by: PHYSICIAN ASSISTANT

## 2022-09-27 PROCEDURE — RXMED WILLOW AMBULATORY MEDICATION CHARGE: Performed by: PHYSICIAN ASSISTANT

## 2022-09-27 PROCEDURE — 80048 BASIC METABOLIC PNL TOTAL CA: CPT

## 2022-09-27 PROCEDURE — 700102 HCHG RX REV CODE 250 W/ 637 OVERRIDE(OP): Performed by: PHYSICIAN ASSISTANT

## 2022-09-27 RX ORDER — HYDROCODONE BITARTRATE AND ACETAMINOPHEN 5; 325 MG/1; MG/1
1 TABLET ORAL EVERY 6 HOURS PRN
Qty: 15 TABLET | Refills: 0 | Status: SHIPPED | OUTPATIENT
Start: 2022-09-27 | End: 2022-10-02

## 2022-09-27 RX ADMIN — LEVOTHYROXINE SODIUM 25 MCG: 0.03 TABLET ORAL at 06:10

## 2022-09-27 RX ADMIN — HYDROCODONE BITARTRATE AND ACETAMINOPHEN 2 TABLET: 5; 325 TABLET ORAL at 11:11

## 2022-09-27 RX ADMIN — ENOXAPARIN SODIUM 40 MG: 40 INJECTION SUBCUTANEOUS at 08:15

## 2022-09-27 RX ADMIN — VENLAFAXINE HYDROCHLORIDE 75 MG: 75 CAPSULE, EXTENDED RELEASE ORAL at 08:15

## 2022-09-27 RX ADMIN — HYDROCODONE BITARTRATE AND ACETAMINOPHEN 2 TABLET: 5; 325 TABLET ORAL at 15:09

## 2022-09-27 RX ADMIN — HYDROCODONE BITARTRATE AND ACETAMINOPHEN 1 TABLET: 5; 325 TABLET ORAL at 20:02

## 2022-09-27 RX ADMIN — FAMOTIDINE 20 MG: 20 TABLET, FILM COATED ORAL at 17:37

## 2022-09-27 RX ADMIN — HYDROCODONE BITARTRATE AND ACETAMINOPHEN 2 TABLET: 5; 325 TABLET ORAL at 00:06

## 2022-09-27 RX ADMIN — HYDROCODONE BITARTRATE AND ACETAMINOPHEN 2 TABLET: 5; 325 TABLET ORAL at 06:09

## 2022-09-27 RX ADMIN — FAMOTIDINE 20 MG: 20 TABLET, FILM COATED ORAL at 06:10

## 2022-09-27 ASSESSMENT — ENCOUNTER SYMPTOMS
SHORTNESS OF BREATH: 0
COUGH: 1
CHILLS: 0
FEVER: 0

## 2022-09-27 ASSESSMENT — PAIN DESCRIPTION - PAIN TYPE
TYPE: ACUTE PAIN
TYPE: ACUTE PAIN
TYPE: SURGICAL PAIN
TYPE: SURGICAL PAIN
TYPE: ACUTE PAIN;SURGICAL PAIN
TYPE: ACUTE PAIN
TYPE: SURGICAL PAIN

## 2022-09-27 NOTE — DISCHARGE INSTRUCTIONS
1. Discharge home when alert, comfortable, ambulatory, and tolerating PO well.  2. F/U with Dr. Ganser in 1-2 weeks  3. Pt counseled re: diet , activity, wound care, I.S., and home med's  4. May shower tomorrow over Tegaderms.  5. Remove Tegaderms in 4 days.  6. No baths, hot tubs, soaks for 14 days.  7. No lifting >15 lbs for 1-2 wks  8. No driving for 4-5 days       CALL IF YOU:   (1) fevers greater than 101.0 degrees F;    (2) Unusual chest or leg pain, redness or swelling behind the knee or in the calf muscle;  (3) Drainage or fluid from incision that may be foul smelling, increased tenderness or soreness at the wound or the wound edges are no longer together, redness or swelling at the incision site.    (4) Please do not hesitate to call with any other questions. ([phone]).       Incision Care, Adult  An incision is a surgical cut that is made through your skin. Most incisions are closed after surgery. Your incision may be closed with stitches (sutures), staples, skin glue, or adhesive strips. You may need to return to your health care provider to have sutures or staples removed. This may occur several days to several weeks after your surgery. The incision needs to be cared for properly to prevent infection.  How to care for your incision  Incision care    Follow instructions from your health care provider about how to take care of your incision. Make sure you:  Wash your hands with soap and water before you change the bandage (dressing). If soap and water are not available, use hand .  Change your dressing as told by your health care provider.  Leave sutures, skin glue, or adhesive strips in place. These skin closures may need to stay in place for 2 weeks or longer. If adhesive strip edges start to loosen and curl up, you may trim the loose edges. Do not remove adhesive strips completely unless your health care provider tells you to do that.  Check your incision area every day for signs of  infection. Check for:  More redness, swelling, or pain.  More fluid or blood.  Warmth.  Pus or a bad smell.  Ask your health care provider how to clean the incision. This may include:  Using mild soap and water.  Using a clean towel to pat the incision dry after cleaning it.  Applying a cream or ointment. Do this only as told by your health care provider.  Covering the incision with a clean dressing.  Ask your health care provider when you can leave the incision uncovered.  Do not take baths, swim, or use a hot tub until your health care provider approves. Ask your health care provider if you can take showers. You may only be allowed to take sponge baths for bathing.  Medicines  If you were prescribed an antibiotic medicine, cream, or ointment, take or apply the antibiotic as told by your health care provider. Do not stop taking or applying the antibiotic even if your condition improves.  Take over-the-counter and prescription medicines only as told by your health care provider.  General instructions  Limit movement around your incision to improve healing.  Avoid straining, lifting, or exercise for the first month, or for as long as told by your health care provider.  Follow instructions from your health care provider about returning to your normal activities.  Ask your health care provider what activities are safe.  Protect your incision from the sun when you are outside for the first 6 months, or for as long as told by your health care provider. Apply sunscreen around the scar or cover it up.  Keep all follow-up visits as told by your health care provider. This is important.  Contact a health care provider if:  Your have more redness, swelling, or pain around the incision.  You have more fluid or blood coming from the incision.  Your incision feels warm to the touch.  You have pus or a bad smell coming from the incision.  You have a fever or shaking chills.  You are nauseous or you vomit.  You are dizzy.  Your  sutures or staples come undone.  Get help right away if:  You have a red streak coming from your incision.  Your incision bleeds through the dressing and the bleeding does not stop with gentle pressure.  The edges of your incision open up and separate.  You have severe pain.  You have a rash.  You are confused.  You faint.  You have trouble breathing and a fast heartbeat.  This information is not intended to replace advice given to you by your health care provider. Make sure you discuss any questions you have with your health care provider.  Document Released: 07/07/2006 Document Revised: 12/20/2019 Document Reviewed: 07/05/2017  Elsevier Patient Education © 2020 [employer]

## 2022-09-27 NOTE — ANESTHESIA POSTPROCEDURE EVALUATION
Patient: Alissa Bonilla    Procedure Summary     Date: 09/26/22 Room / Location: [place] [address] / SURGERY [facility]    Anesthesia Start: 1246 Anesthesia Stop: 1545    Procedures:       VIDEO ASSISTED THORACOSCOPY, RIGHT UPPER LOBECTOMY, NODE DISSECTION WITH MEDIASTINOSCOPY LYMPH NODE BIOPSY (Right: Flank)      LOBECTOMY (Right: Flank)      MEDIASTINOSCOPY (Chest) Diagnosis: (PRIMARY MALIGNANT NEOPLASM OF RIGHT LUNG)    Surgeons: John H Ganser, M.D. Responsible Provider: Grant Cordero D.O.    Anesthesia Type: general ASA Status: 2          Final Anesthesia Type: general  Last vitals  BP   Blood Pressure : (!) 142/63    Temp   36.2 °C (97.1 °F)    Pulse   69   Resp   18    SpO2   98 %      Anesthesia Post Evaluation    Patient location during evaluation: PACU  Patient participation: complete - patient participated  Level of consciousness: awake and alert  Pain score: 2    Airway patency: patent  Anesthetic complications: no  Cardiovascular status: hemodynamically stable  Respiratory status: acceptable  Hydration status: euvolemic    PONV: none          No notable events documented.     Nurse Pain Score: 3 (NPRS)

## 2022-09-27 NOTE — OP REPORT
DATE OF SERVICE:  09/26/2022     PREOPERATIVE DIAGNOSIS:  Right upper lobe lung cancer.     POSTOPERATIVE DIAGNOSIS:  Right upper lobe lung cancer.     PROCEDURES:  1.  Video mediastinoscopy with node biopsy.  2.  Right thoracoscopy with upper lobectomy.  3.  Mediastinal lymphadenectomy.     INDICATIONS:  The patient is a 76-year-old female who was initially found to   have a lung abnormality in 2019 on a screening CT.  There were delays due to   COVID and she had a repeat scan done in 7/2022 showing enlargement from 2.2 to   3.2 cm.  PET scan showed uptake in the mass, but also uptake in the right   distal paratracheal nodes.  Bronchoscopic biopsy revealed adenocarcinoma and   endobronchial ultrasound of the mediastinal nodes was nondiagnostic.  She did   undergo pulmonary function testing that would allow for lobectomy.     PLAN:  A mediastinoscopy and if these are negative proceeding with lobectomy   and node dissection were outlined in detail.  Questions answered.  She wished   to proceed.     FINDINGS:  On mediastinoscopy, several nodes at the site of PET abnormality   were harvested with significant biopsies and frozen section of these showed   noncaseating granulomatous inflammation with no evidence of malignancy.  On   thoracoscopy, there was no evidence of pleural effusion or pleural nodularity   or spread outside the lung.     DESCRIPTION OF PROCEDURE:  The patient identified, general anesthetic   administered.  Her neck was prepped and draped in the usual sterile fashion.    Local anesthesia 0.5% Marcaine with epinephrine was injected.  Small incision   was made transversely at suprasternal notch and dissection carried through the   subcutaneous tissues with cautery.  The strap muscles were divided in the   midline and a plane created in the pretracheal space.  The video   mediastinoscope was inserted under direct vision and advanced.  Sparing use of   cautery was used to divide tissues to allow the  mediastinoscope to be passed   all the way down to the shaniqua.  Coming back up from that short distance on   the right, there were several enlarged nodes and biopsy of at least 3   different nodes was sent for pathology.  The first to just appeared   anthracotic, but a third one definitely appeared more abnormal and this was at   the site of the large mediastinal node.  Frozen section of that showed   noncaseating granulomatous inflammation and was negative for malignancy.    Hemostasis was assured.  The scope was withdrawn and the strap muscles were   approximated with 3-0 Vicryl.  Subcutaneous tissue was closed with 3-0 Vicryl   and skin with a running 4-0 Vicryl subcuticular suture.  Sterile dressing was   applied.     The endotracheal tube was swapped out for a double lumen tube.  This was   positioned bronchoscopically by anesthesia.  The patient was placed in the   left lateral decubitus position.  Her right chest prepped and draped in the   usual sterile fashion.  Local anesthesia was injected prior to making skin   incision.  Small incision was made in the midaxillary line approximately sixth   intercostal space and a 5 mm blunt trocar and camera inserted.  Anterior,   posterior upper chest 5 mm trocars were placed, a 12 mm trocar placed   anteriorly just above the diaphragm in approximately the eighth intercostal   space.  Inspection of the chest showed no evidence of pleural effusion or   nodularity.  She had very complete major fissure.  The nodes posteriorly in   the fissure behind the artery were dissected out and sent as station 10 lymph   nodes.  The anterior hilar pleura was then divided and the middle lobe vein    from the upper lobe vein.  Nodes between the artery and vein were   sent with the station 10 lymph nodes.  Dissection was carried over the apex of   the hilum.  The apical arterial branch was circumferentially dissected and   divided with the stapler using a gray load with good  control.  The upper lobe   vein was then able to be circumferentially dissected and divided with the   stapler using gray load.  Nodes around the bronchus were dissected out and   sent with the station 10 nodes.  The bronchus was circumferentially dissected   and divided with the stapler using a gold load.  The minor fissure was then   completed with a single firing of stapler using a white load.  Specimen was   left in the chest to be removed later.     Subcarinal nodes were dissected out and sent separately and these appeared   soft and benign.  Upper right paratracheal nodes were then dissected out and   sent as station 2.  Station 4 nodes have been sent previously.  The chest was   irrigated and hemostasis assured.     The 12 mm trocar site was enlarged slightly and a 15 mm endoscopic bag was   placed and the specimen was placed in the bag and withdrawn.  Palpation   confirmed the mass in the center of the specimen.  The lung was expanded.    There was no evidence of air leak.  A 19-Yakut Izaiah drain was passed through   the midaxillary line incision angled towards the apex and secured to skin   with silk.  The small accessory incision was closed at muscle level with   running 0 Vicryl and skin incisions with 4-0 Vicryl subcuticular sutures.    Sterile dressings were applied.  The tube attached to pleural suction device   and the patient returned to recovery in stable condition.     Sponge and needle counts were correct x2 at the end of the procedure.     ESTIMATED BLOOD LOSS:  Minimal.        ______________________________  JOHN H. GANSER, MD [facility]    DD:  09/26/2022 15:44  DT:  09/26/2022 16:56    Job#:  [id number]    CC:Magaly Estes MD(User)  Baldo Campos MD(User)  MD Georgia Perez MD(User)

## 2022-09-27 NOTE — PROGRESS NOTES
Pt is AxOx4; on supplemental oxygen.  Denies SOB/chest pain/numbness or tingling.  Pain to R flank and incision site; PRN pain meds in use.  Chest tube to suction to R upper flank. CT to -20mmhg suction; patent; no air leak. Drg CDI; changed per protocol  LB 9/26 pta per pt; +void   Denies N/V. Tolerating diet  X1 assist to ambulate.  SCDs on to BLE; lovenox in use.

## 2022-09-27 NOTE — PROGRESS NOTES
Chest tube removed by MICHAEL Cooney.  Pt remains on 1 liter via NC, 93% SPo2. Pt remains on pulse ox.   Dressing CDI.

## 2022-09-27 NOTE — CARE PLAN
Problem: Pain - Standard  Goal: Alleviation of pain or a reduction in pain to the patient’s comfort goal  Outcome: Progressing  Flowsheets  Taken 9/27/2022 1257  OB Pain Intervention:   Medication - See MAR   Emotional support   Rest   Relaxation technique   Repositioned  Taken 9/27/2022 1256  Non Verbal Scale:   Calm   Unlabored Breathing  Pain Rating Scale (NPRS): 5  Note: PRN and scheduled medication in place.  Encouraged rest and repositioning. IS education.      Problem: Knowledge Deficit - Standard  Goal: Patient and family/care givers will demonstrate understanding of plan of care, disease process/condition, diagnostic tests and medications  Outcome: Progressing   The patient is Stable - Low risk of patient condition declining or worsening    Shift Goals  Clinical Goals: pain control; ambulate; CT mgmt  Patient Goals: pain control; CT mgmt    Progress made toward(s) clinical / shift goals:  Pain control, rest. IS education. Monitoring VS, RR, Chest tube, encourage rest, ambulation as tolerated.     Patient is not progressing towards the following goals:

## 2022-09-27 NOTE — PROGRESS NOTES
4 Eyes Skin Assessment Completed by SUSAN Das and SUSAN Mora.    Head WDL  Ears WDL  Nose WDL  Mouth WDL  Neck Incision to mid neck  Breast/Chest Incision; Chest tube to R flank/chest and second incision   Shoulder Blades WDL  Spine WDL  (R) Arm/Elbow/Hand WDL  (L) Arm/Elbow/Hand WDL  Abdomen WDL  Groin WDL  Coccyx/Buttocks WDL  (R) Leg WDL  (L) Leg WDL  (R) Heel/Foot/Toe WDL  (L) Heel/Foot/Toe WDL      Devices In Places Pulse Ox, SCD's, and Nasal Cannula      Interventions In Place NC W/Ear Foams, Pillows, and Pressure Redistribution Mattress    Possible Skin Injury No    Pictures Uploaded Into Epic N/A  Wound Consult Placed N/A

## 2022-09-28 ENCOUNTER — PHARMACY VISIT (OUTPATIENT)
Dept: PHARMACY | Facility: MEDICAL CENTER | Age: 77
End: 2022-09-28
Payer: MEDICARE

## 2022-09-28 ENCOUNTER — TELEPHONE (OUTPATIENT)
Dept: HEMATOLOGY ONCOLOGY | Facility: MEDICAL CENTER | Age: 77
End: 2022-09-28
Payer: MEDICARE

## 2022-09-28 VITALS
BODY MASS INDEX: 24.67 KG/M2 | RESPIRATION RATE: 16 BRPM | TEMPERATURE: 97.3 F | OXYGEN SATURATION: 96 % | HEIGHT: 67 IN | HEART RATE: 80 BPM | DIASTOLIC BLOOD PRESSURE: 67 MMHG | SYSTOLIC BLOOD PRESSURE: 125 MMHG | WEIGHT: 157.19 LBS

## 2022-09-28 LAB
FUNGUS SPEC CULT: NORMAL
FUNGUS SPEC FUNGUS STN: NORMAL
SIGNIFICANT IND 70042: NORMAL
SITE SITE: NORMAL
SOURCE SOURCE: NORMAL

## 2022-09-28 PROCEDURE — A9270 NON-COVERED ITEM OR SERVICE: HCPCS | Performed by: PHYSICIAN ASSISTANT

## 2022-09-28 PROCEDURE — 94640 AIRWAY INHALATION TREATMENT: CPT

## 2022-09-28 PROCEDURE — 700111 HCHG RX REV CODE 636 W/ 250 OVERRIDE (IP): Performed by: PHYSICIAN ASSISTANT

## 2022-09-28 PROCEDURE — 700101 HCHG RX REV CODE 250: Performed by: PHYSICIAN ASSISTANT

## 2022-09-28 PROCEDURE — 700102 HCHG RX REV CODE 250 W/ 637 OVERRIDE(OP): Performed by: PHYSICIAN ASSISTANT

## 2022-09-28 RX ORDER — IPRATROPIUM BROMIDE AND ALBUTEROL SULFATE 2.5; .5 MG/3ML; MG/3ML
3 SOLUTION RESPIRATORY (INHALATION)
Status: DISCONTINUED | OUTPATIENT
Start: 2022-09-28 | End: 2022-09-28 | Stop reason: HOSPADM

## 2022-09-28 RX ADMIN — VENLAFAXINE HYDROCHLORIDE 75 MG: 75 CAPSULE, EXTENDED RELEASE ORAL at 04:48

## 2022-09-28 RX ADMIN — ALBUTEROL SULFATE 2 PUFF: 90 AEROSOL, METERED RESPIRATORY (INHALATION) at 04:46

## 2022-09-28 RX ADMIN — IPRATROPIUM BROMIDE AND ALBUTEROL SULFATE 3 ML: .5; 2.5 SOLUTION RESPIRATORY (INHALATION) at 11:10

## 2022-09-28 RX ADMIN — LEVOTHYROXINE SODIUM 25 MCG: 0.03 TABLET ORAL at 04:48

## 2022-09-28 RX ADMIN — ENOXAPARIN SODIUM 40 MG: 40 INJECTION SUBCUTANEOUS at 04:47

## 2022-09-28 RX ADMIN — FAMOTIDINE 20 MG: 20 TABLET, FILM COATED ORAL at 04:48

## 2022-09-28 ASSESSMENT — COGNITIVE AND FUNCTIONAL STATUS - GENERAL
DRESSING REGULAR UPPER BODY CLOTHING: A LITTLE
DAILY ACTIVITIY SCORE: 20
STANDING UP FROM CHAIR USING ARMS: A LITTLE
DRESSING REGULAR LOWER BODY CLOTHING: A LITTLE
SUGGESTED CMS G CODE MODIFIER MOBILITY: CJ
TOILETING: A LITTLE
WALKING IN HOSPITAL ROOM: A LITTLE
SUGGESTED CMS G CODE MODIFIER DAILY ACTIVITY: CJ
HELP NEEDED FOR BATHING: A LITTLE
MOBILITY SCORE: 22

## 2022-09-28 NOTE — PROGRESS NOTES
"Pt went to the bathroom O2 sats dropped to 87% ambulating to the bathroom. Pt stated that  she felt like she was having an asthma attack. PRN albuterol inhaler given. RT also notified when pt stated \"I don't feel like I'm opening up like I should\" after the inhaler was given. RT assessed the pt and reminder given to pt to use incentive spirometer. Pt sating at 97% on 3 L. Will continue to monior pt closely.  Pt would also like to discuss going home with oxygen.  "

## 2022-09-28 NOTE — TELEPHONE ENCOUNTER
Pt called this morning to let us know that she has an appointment tomorrow with Dr. Chatman and is wondering if she needs to come in for that appointment as she just had surgery or if we can reschedule for a later time.

## 2022-09-28 NOTE — PROGRESS NOTES
Assumed care of patient at 0645. Bedside report received. Assessment complete.    AA&Ox4.     Denies pain at this time.     Skin per flow sheets.    Tolerating regular diet. Denies N/V.    + void. Last BM 9/26/2022.    Pt ambulates x SBA.    Plan of care discussed, all questions answered. Educated on the importance of calling before getting OOB and pt verbalizes understanding. Educated regarding importance of oral care. Oral care kit at bedside. Call light is within reach, treaded slipper socks on, bed in lowest/ locked position, hourly rounding in place, all needs met at this time.

## 2022-09-28 NOTE — CARE PLAN
"The patient is Stable - Low risk of patient condition declining or worsening    Shift Goals  Clinical Goals: pain control; ambulate; CT mgmt  Patient Goals: pain control; CT mgmt    Progress made toward(s) clinical / shift goals:  Patient rested through the night, pain meds given as needed. Pt on 1L of O2 encouraged pt to try weaning off O2, but pt preferred to keep the O2 on stating. \" I have to have it.\"    Patient is not progressing towards the following goals:N/A      "

## 2022-09-28 NOTE — PROGRESS NOTES
Patient to be discharged today - patient aware and agreeable to plan. D/c instructions reviewed with patient - ?'s/concerns answered. Patient educated on pain regimen, s&s of blood clots/infection, and weight restrictions.

## 2022-09-28 NOTE — PROGRESS NOTES
Pt stated she had an asthma attack this morning and feels like she might need O2 for home.    Walking O2 completed and documented.     MICHAEL Cooney updated.    Nebulizer ordered for pt prior to discharge.     Pt states she is feeling better now and feels good to discharge.

## 2022-09-29 ENCOUNTER — HOSPITAL ENCOUNTER (OUTPATIENT)
Dept: HEMATOLOGY ONCOLOGY | Facility: MEDICAL CENTER | Age: 77
End: 2022-09-29
Attending: STUDENT IN AN ORGANIZED HEALTH CARE EDUCATION/TRAINING PROGRAM
Payer: MEDICARE

## 2022-09-29 DIAGNOSIS — C34.91 PRIMARY LUNG CANCER, RIGHT (HCC): ICD-10-CM

## 2022-09-29 PROCEDURE — 99213 OFFICE O/P EST LOW 20 MIN: CPT | Mod: 95 | Performed by: STUDENT IN AN ORGANIZED HEALTH CARE EDUCATION/TRAINING PROGRAM

## 2022-09-29 ASSESSMENT — ENCOUNTER SYMPTOMS
BLURRED VISION: 0
SORE THROAT: 0
ABDOMINAL PAIN: 0
TREMORS: 0
HEADACHES: 0
VOMITING: 0
WEIGHT LOSS: 0
COUGH: 0
NAUSEA: 0
SPUTUM PRODUCTION: 0
PALPITATIONS: 0
DEPRESSION: 0
NECK PAIN: 0
ORTHOPNEA: 0
TINGLING: 0
BRUISES/BLEEDS EASILY: 0
WHEEZING: 0
FEVER: 0
DIZZINESS: 0
SHORTNESS OF BREATH: 0
FOCAL WEAKNESS: 0
MEMORY LOSS: 0
SENSORY CHANGE: 0
HEARTBURN: 0
CHILLS: 0

## 2022-09-29 NOTE — PROGRESS NOTES
Follow Up Note: Hematology/Oncology     Primary Care:  Magaly Estes M.D.    CC: Establish care for newly diagnosed lung adenocarcinoma    Current Treatment: N/A    This evaluation was conducted via Zoom using secure and encrypted videoconferencing technology. The patient was in their home in the [facility].    The patient's identity was confirmed and verbal consent was obtained for this virtual visit.     Subjective:   History of Presenting Illness:  Alissa Bonilla is a 76 y.o. female here to establish care for new diagnosis of lung adenocarcinoma.    Is accompanied by her  to today's visit.  Patient had a lung cancer screening CT in 2019 which showed a 2.2 groundglass nodule in the right upper lobe and 3 solid nodules in the middle lobe.  Due to this it was recommended that she have a repeat CT scan 6 months later which was June 2020.  At that time the CT showed a part groundglass opacity in the right upper lobe measuring 11.2 x 17.3 mm which remained unchanged.  Follow-up CT was completed 2 years later in July 2022 which noted an interval enlargement of the groundglass and partially solid nodule in the right upper lobe measuring 1.7 x 3.2 x 3.1 cm in size.  PET scan was completed in August 2022 which showed the aforementioned mass as well as 2 right paratracheal node suspicious for metastatic disease.  Biopsy was performed of the mass and came back to be adenocarcinoma.  Unfortunately pulmonology was unable to sample the lymph node during the procedure.    Patient reports that she is a former smoker.  Her father is was also a smoker.  She denies any alcohol or drug use.  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    Patient is largely asymptomatic.  She does note her sister has lung adenocarcinoma and currently undergoing treatment with pembrolizumab.    Interval history    Patient  "underwent surgery with Dr. Ganser.  She was able to have the mass removed which was 3.6 cm.  All lymph nodes evaluated were negative for malignancy.  She had 22 lymph nodes evaluated.    Patient reports that she is feeling well without any complaints she is trying to use incentive spirometry as prescribed.      Review of Systems   Constitutional:  Negative for chills, fever, malaise/fatigue and weight loss.   HENT:  Negative for congestion, ear pain, nosebleeds and sore throat.    Eyes:  Negative for blurred vision.   Respiratory:  Negative for cough, sputum production, shortness of breath and wheezing.    Cardiovascular:  Negative for chest pain, palpitations, orthopnea and leg swelling.   Gastrointestinal:  Negative for abdominal pain, heartburn, nausea and vomiting.   Genitourinary:  Negative for dysuria, frequency and urgency.   Musculoskeletal:  Negative for neck pain.   Neurological:  Negative for dizziness, tingling, tremors, sensory change, focal weakness and headaches.   Endo/Heme/Allergies:  Does not bruise/bleed easily.   Psychiatric/Behavioral:  Negative for depression, memory loss and suicidal ideas.    All other systems reviewed and are negative.    Allergies   Allergen Reactions    Sulfa Drugs      \"crazy\"       Current Outpatient Medications   Medication Sig Dispense Refill    HYDROcodone-acetaminophen (NORCO) 5-325 MG Tab per tablet Take 1 Tablet by mouth every 6 hours as needed (surgical pain) for up to 4 days. 15 Tablet 0    azelastine (ASTELIN) 137 MCG/SPRAY nasal spray Administer 1 Spray into affected nostril(S) every day.      NON SPECIFIED Take 1 Tablet by mouth every day. Bio Identical Hormones      venlafaxine XR (EFFEXOR XR) 75 MG CAPSULE SR 24 HR Take 1 Capsule by mouth every day for 90 days. 90 Capsule 2    Levomefolate Glucosamine (METHYLFOLATE PO) Take 1 Tablet by mouth every day.      SELENIUM PO Take 1 Tablet by mouth every day.      levothyroxine (SYNTHROID) 25 MCG Tab Take 25 mcg by " mouth every morning on an empty stomach.      albuterol 108 (90 Base) MCG/ACT Aero Soln inhalation aerosol INHALE 2 PUFFS BY MOUTH EVERY 4 HOURS AS NEEDED FOR SHORTNESS OF BREATH 8.5 g 0    Cyanocobalamin (B-12) 100 MCG Tab Take 1 Tablet by mouth every day.      B Complex Vitamins (B COMPLEX-B12 PO) Take 1 Tab by mouth every day.      cholecalciferol (DIALYVITE VITAMIN D 5000) 5000 UNIT Cap Take 5,000 Units by mouth every day.      Ascorbic Acid (VITAMIN C) 1000 MG Tab Take 1 Tab by mouth every day.      NON SPECIFIED Take 1 Tab by mouth every day. BIO-FLEX       No current facility-administered medications for this encounter.        Problem list, medications, and allergies reviewed by myself today in Epic.     Objective:     There were no vitals filed for this visit.      DESC; KARNOFSKY SCALE WITH ECOG EQUIVALENT: 100, Fully active, able to carry on all pre-disease performed without restriction (ECOG equivalent 0)    DISTRESS LEVEL: no apparent distress    Physical Exam: Not done due to telemedicine visit      Labs:   Most recent labs reviewed.  Please see the lab tab of chart review    Imaging:   Most recent images below have been independently reviewed by me.     Brain:  1.  No acute abnormality.  2.  There is no evidence of intracranial metastasis.  3.  Mild age-appropriate volume loss.     PET  1.  There is abnormal uptake within the enlarging groundglass and part solid mass in the right lung apex which is highly suspicious for slow growing malignancy such as adenocarcinoma.  2.  Uptake within 2 right paratracheal node suspicious for metastatic disease although nonspecific based on size.  3.  Uptake within the stable left adrenal gland nodule most consistent with a benign adenoma.  4.  No evidence of metastatic disease in the neck, abdomen or pelvis.    Pathology:  A. Right upper lobe nodule, fine needle aspiration, slide:          Atypical clusters noted on PAP stain.  See comment.   B. Right upper lobe  nodule, fine needle aspiration, core:          Positive for adenocarcinoma.   C. Right upper lobe nodule, forceps with touch prep slides:          Positive for adenocarcinoma, lung primary with papillary           architecture noted.   D. 4R, fine needle aspiration, slide:          Negative for lesional cells and sheets of lymphocytes to suggest           lymph node aspirate.   E. 4R, fine needle aspiration, core:          Negative; bronchial epithelial cells, cartilage and a few           lymphoid aggregates noted.   F. Right upper lobe, bronchoalveolar lavage:          No malignancy identified.          Cytology preparations, including cell block, demonstrate benign           bronchial epithelial cells and alveolar macrophages.     Path from 9/26    A. Right paratracheal lymph node:          Negative for carcinoma in five lymph nodes (0/5)          Non-necrotizing granulomatous lymphadenitis, negative for           morphologic evidence of acid fast and fungal organisms by           AFB/GMS stains, see comment   B. Right hilar node, station 10:          Negative for carcinoma in four lymph nodes (0/4)          Non-necrotizing granulomatous lymphadenitis   C. Right subcarinal node, station 7:          Negative for carcinoma in one lymph node (0/1)          Non-necrotizing granulomatous lymphadenitis   D. Right upper paratracheal node, station 2:          Negative for carcinoma in one lymph node (0/1)          Non-necrotizing granulomatous lymphadenitis   E. Right upper lobe, lung:          Invasive papillary adenocarcinoma, moderately differentiated,           3.6 cm          Margins of resection are negative for carcinoma          Negative for carcinoma in eleven lymph nodes (0/11)          Non-necrotizing granulomatous lymphadenitis, see comment          See synoptic report for details   Assessment/Plan:     Cancer Staging  Adenocarcinoma of right lung (HCC)  Staging form: Lung, AJCC 8th Edition  - Clinical: Stage  IB (cT2a, cN0, cM0) - Signed by Georgia Chatman M.D. on 9/29/2022      Ms. Talon Bonilla is a 77 yo F with a new diagnosis of adenocarcinoma of the lung.  She recently underwent a lobectomy with Dr. Ganser.  Final pathology reports adenocarcinoma 3.6 cm with negative lymph nodes.    Today we discussed interpretation of the pathology report ordered by Dr. Ganser at length.  I discussed with the patient and her  that given that her mass is 3.6 cm with no lymph node involvement she would be staged as a stage Ib.  This requires no adjuvant treatment unless patient meets criteria for high risk malignancy.  We discussed the high risk features consist of poorly differentiated tumors which she does not have hers is moderately differentiated.  We discussed that vascular invasion or visceral pleural involvement is a reason to consider adjuvant therapy which she does not have.  We discussed that patients with a wedge resection are considered high risk however her resection was a lobectomy.  Her tumor is less than 4 cm.  4 is advised that we just undergo surveillance in her case.    Surveillance will consist of H&P and chest CT scan every 6 months for 3 years and then a low-dose noncontrast enhanced chest CT annually.  If there is recurrence found on the scans we will undergo a PET scan and a brain MRI.    Plan  -CT scan in 6 months  -patient to see me after scans    Adrenal Adenoma  -Adrenal adenoma measures 11 mm.   since it is less than 4 cm it is appropriate to watch it every 6 to 12 months  -If it grows larger than 1 cm in 1 year then I will refer patient to surgery    No follow-ups on file.    Any questions and concerns raised by the patient were addressed and answered. Patient denies any further questions.  Patient encouraged to call the office with any concerns or issues.     Georgia Chatman M.D.  Hematology/Oncology

## 2022-10-05 ENCOUNTER — OFFICE VISIT (OUTPATIENT)
Dept: SLEEP MEDICINE | Facility: MEDICAL CENTER | Age: 77
End: 2022-10-05
Payer: MEDICARE

## 2022-10-05 VITALS
WEIGHT: 154 LBS | HEIGHT: 67 IN | DIASTOLIC BLOOD PRESSURE: 84 MMHG | BODY MASS INDEX: 24.17 KG/M2 | SYSTOLIC BLOOD PRESSURE: 118 MMHG | OXYGEN SATURATION: 96 % | HEART RATE: 64 BPM

## 2022-10-05 DIAGNOSIS — C80.1 ADENOCARCINOMA (HCC): ICD-10-CM

## 2022-10-05 DIAGNOSIS — J45.20 MILD INTERMITTENT ASTHMA WITHOUT COMPLICATION: ICD-10-CM

## 2022-10-05 PROCEDURE — 99213 OFFICE O/P EST LOW 20 MIN: CPT | Performed by: INTERNAL MEDICINE

## 2022-10-05 RX ORDER — TIOTROPIUM BROMIDE AND OLODATEROL 3.124; 2.736 UG/1; UG/1
2 SPRAY, METERED RESPIRATORY (INHALATION) DAILY
Qty: 2 EACH | Refills: 0 | COMMUNITY
Start: 2022-10-05 | End: 2022-10-24 | Stop reason: SDUPTHER

## 2022-10-05 ASSESSMENT — ENCOUNTER SYMPTOMS
PND: 0
HEARTBURN: 0
WEAKNESS: 0
FALLS: 0
PALPITATIONS: 0
COUGH: 0
DIAPHORESIS: 0
CONSTIPATION: 0
PHOTOPHOBIA: 0
CHILLS: 0
SPEECH CHANGE: 0
HEMOPTYSIS: 0
HEADACHES: 0
WHEEZING: 0
SHORTNESS OF BREATH: 0
CLAUDICATION: 0
BACK PAIN: 0
DOUBLE VISION: 0
FOCAL WEAKNESS: 0
STRIDOR: 0
ORTHOPNEA: 0
TREMORS: 0
BLURRED VISION: 0
NECK PAIN: 0
WEIGHT LOSS: 0
SPUTUM PRODUCTION: 0
MYALGIAS: 0
SORE THROAT: 0
EYE PAIN: 0
ABDOMINAL PAIN: 0
DIARRHEA: 0
FEVER: 0
VOMITING: 0
SINUS PAIN: 0
NAUSEA: 0
DEPRESSION: 0
DIZZINESS: 0
EYE DISCHARGE: 0
EYE REDNESS: 0

## 2022-10-05 ASSESSMENT — FIBROSIS 4 INDEX: FIB4 SCORE: 1.47

## 2022-10-06 ENCOUNTER — TELEPHONE (OUTPATIENT)
Dept: RADIATION ONCOLOGY | Facility: MEDICAL CENTER | Age: 77
End: 2022-10-06
Payer: MEDICARE

## 2022-10-06 NOTE — TELEPHONE ENCOUNTER
Nutrition Services: Telephone Encounter     RD called for nutrition check-in, pt's significant other answered. States pt stepped out for a bit. Agreeable to take down RD number for pt ot reach out as needed.     RD available PRN  Please contact [phone]

## 2022-10-10 NOTE — DISCHARGE SUMMARY
Discharge Summary    CHIEF COMPLAINT ON ADMISSION  No chief complaint on file.      Reason for Admission  Primary lung cancer, right (HCC)     Admission Date  9/26/2022    CODE STATUS  Prior    HPI & HOSPITAL COURSE  This is a 76 y.o. female here with lung cancer. Uptake in right paratracheal node on PET scan. EBUS biopsy nondiagnostic. Mediastinoscopy showed noncaseating granulomas in node so proceeded with lobectomy and node dissection. Final path T2aN0 moderately differentiated adenocarcinoma. No complications. Chest tube removed and discharged POD-2.      Therefore, she is discharged in good and stable condition to home with close outpatient follow-up.    The patient met 2-midnight criteria for an inpatient stay at the time of discharge.    Discharge Date  9/28/2022    FOLLOW UP ITEMS POST DISCHARGE  None    DISCHARGE DIAGNOSES  Lung cancer    FOLLOW UP  Future Appointments   Date Time Provider Department Center   2/13/2023  1:50 PM Jadyn Martel M.D. [facility] None   3/29/2023 11:30 AM [facility] CT [address] [facility] [place]   4/6/2023  2:00 PM Georgia Chatman M.D. [facility] None     John H Ganser, M.D.  [address]  [facility] [id number] [phone]    Schedule an appointment as soon as possible for a visit in 1 week(s)      Magaly Estes M.D.  [address] [facility] [address]  [facility] [id number] [phone]            MEDICATIONS ON DISCHARGE     Medication List        CONTINUE taking these medications        Instructions   albuterol 108 (90 Base) MCG/ACT Aers inhalation aerosol   INHALE 2 PUFFS BY MOUTH EVERY 4 HOURS AS NEEDED FOR SHORTNESS OF BREATH     azelastine 137 MCG/SPRAY nasal spray  Commonly known as: ASTELIN   Administer 1 Spray into affected nostril(S) every day.  Dose: 1 Spray     B COMPLEX-B12 PO   Take 1 Tab by mouth every day.  Dose: 1 Tablet     B-12 100 MCG Tabs   Take 1 Tablet by mouth every day.  Dose: 1 Tablet     Dialyvite Vitamin D 5000 5000 UNIT Caps  Generic drug:  "cholecalciferol   Take 5,000 Units by mouth every day.  Dose: 5,000 Units     levothyroxine 25 MCG Tabs  Commonly known as: SYNTHROID   Take 25 mcg by mouth every morning on an empty stomach.  Dose: 25 mcg     METHYLFOLATE PO   Take 1 Tablet by mouth every day.  Dose: 1 Tablet     NON SPECIFIED   Take 1 Tab by mouth every day. BIO-FLEX  Dose: 1 Tablet     NON SPECIFIED   Take 1 Tablet by mouth every day. Bio Identical Hormones  Dose: 1 Tablet     SELENIUM PO   Take 1 Tablet by mouth every day.  Dose: 1 Tablet     venlafaxine XR 75 MG Cp24  Commonly known as: EFFEXOR XR   Take 1 Capsule by mouth every day for 90 days.  Dose: 75 mg     Vitamin C 1000 MG Tabs   Take 1 Tab by mouth every day.  Dose: 1 Tablet            ASK your doctor about these medications        Instructions   HYDROcodone-acetaminophen 5-325 MG Tabs per tablet  Commonly known as: NORCO  Ask about: Should I take this medication?   Take 1 Tablet by mouth every 6 hours as needed (surgical pain) for up to 4 days.  Dose: 1 Tablet              Allergies  Allergies   Allergen Reactions    Sulfa Drugs      \"crazy\"       DIET  No orders of the defined types were placed in this encounter.      ACTIVITY  As tolerated.  Weight bearing as tolerated    CONSULTATIONS  None    PROCEDURES  VATS lobectomy    LABORATORY  Lab Results   Component Value Date    SODIUM 132 (L) 09/27/2022    POTASSIUM 4.3 09/27/2022    CHLORIDE 101 09/27/2022    CO2 22 09/27/2022    GLUCOSE 144 (H) 09/27/2022    BUN 9 09/27/2022    CREATININE 0.64 09/27/2022        Lab Results   Component Value Date    WBC 9.6 09/27/2022    HEMOGLOBIN 12.6 09/27/2022    HEMATOCRIT 37.8 09/27/2022    PLATELETCT 257 09/27/2022          "

## 2022-10-16 LAB
MYCOBACTERIUM SPEC CULT: NORMAL
RHODAMINE-AURAMINE STN SPEC: NORMAL
SIGNIFICANT IND 70042: NORMAL
SITE SITE: NORMAL
SOURCE SOURCE: NORMAL

## 2022-10-17 ENCOUNTER — PATIENT MESSAGE (OUTPATIENT)
Dept: MEDICAL GROUP | Facility: LAB | Age: 77
End: 2022-10-17
Payer: MEDICARE

## 2022-10-17 DIAGNOSIS — E03.9 ACQUIRED HYPOTHYROIDISM: ICD-10-CM

## 2022-10-19 ENCOUNTER — HOSPITAL ENCOUNTER (EMERGENCY)
Facility: MEDICAL CENTER | Age: 77
End: 2022-10-19
Attending: EMERGENCY MEDICINE
Payer: MEDICARE

## 2022-10-19 ENCOUNTER — OFFICE VISIT (OUTPATIENT)
Dept: MEDICAL GROUP | Facility: LAB | Age: 77
End: 2022-10-19
Payer: MEDICARE

## 2022-10-19 ENCOUNTER — APPOINTMENT (OUTPATIENT)
Dept: RADIOLOGY | Facility: MEDICAL CENTER | Age: 77
End: 2022-10-19
Attending: EMERGENCY MEDICINE
Payer: MEDICARE

## 2022-10-19 VITALS
OXYGEN SATURATION: 94 % | RESPIRATION RATE: 20 BRPM | WEIGHT: 155 LBS | HEIGHT: 67 IN | TEMPERATURE: 97.4 F | HEART RATE: 80 BPM | DIASTOLIC BLOOD PRESSURE: 68 MMHG | SYSTOLIC BLOOD PRESSURE: 112 MMHG | BODY MASS INDEX: 24.33 KG/M2

## 2022-10-19 VITALS
HEART RATE: 69 BPM | BODY MASS INDEX: 24.15 KG/M2 | RESPIRATION RATE: 20 BRPM | TEMPERATURE: 98.2 F | HEIGHT: 67 IN | WEIGHT: 153.88 LBS | OXYGEN SATURATION: 93 % | SYSTOLIC BLOOD PRESSURE: 148 MMHG | DIASTOLIC BLOOD PRESSURE: 76 MMHG

## 2022-10-19 DIAGNOSIS — R59.9 ADENOPATHY: ICD-10-CM

## 2022-10-19 DIAGNOSIS — R06.09 DYSPNEA ON EXERTION: ICD-10-CM

## 2022-10-19 DIAGNOSIS — J18.9 PNEUMONIA OF RIGHT LOWER LOBE DUE TO INFECTIOUS ORGANISM: ICD-10-CM

## 2022-10-19 DIAGNOSIS — R06.02 SHORTNESS OF BREATH: ICD-10-CM

## 2022-10-19 LAB
ALBUMIN SERPL BCP-MCNC: 4.2 G/DL (ref 3.2–4.9)
ALBUMIN/GLOB SERPL: 1.4 G/DL
ALP SERPL-CCNC: 92 U/L (ref 30–99)
ALT SERPL-CCNC: 15 U/L (ref 2–50)
ANION GAP SERPL CALC-SCNC: 13 MMOL/L (ref 7–16)
AST SERPL-CCNC: 16 U/L (ref 12–45)
BASOPHILS # BLD AUTO: 0.8 % (ref 0–1.8)
BASOPHILS # BLD: 0.06 K/UL (ref 0–0.12)
BILIRUB SERPL-MCNC: 0.3 MG/DL (ref 0.1–1.5)
BUN SERPL-MCNC: 9 MG/DL (ref 8–22)
CALCIUM SERPL-MCNC: 9.1 MG/DL (ref 8.4–10.2)
CHLORIDE SERPL-SCNC: 103 MMOL/L (ref 96–112)
CO2 SERPL-SCNC: 22 MMOL/L (ref 20–33)
CREAT SERPL-MCNC: 0.68 MG/DL (ref 0.5–1.4)
EKG IMPRESSION: NORMAL
EOSINOPHIL # BLD AUTO: 0.67 K/UL (ref 0–0.51)
EOSINOPHIL NFR BLD: 8.7 % (ref 0–6.9)
ERYTHROCYTE [DISTWIDTH] IN BLOOD BY AUTOMATED COUNT: 43.1 FL (ref 35.9–50)
GFR SERPLBLD CREATININE-BSD FMLA CKD-EPI: 91 ML/MIN/1.73 M 2
GLOBULIN SER CALC-MCNC: 3 G/DL (ref 1.9–3.5)
GLUCOSE SERPL-MCNC: 101 MG/DL (ref 65–99)
HCT VFR BLD AUTO: 41.8 % (ref 37–47)
HGB BLD-MCNC: 13.8 G/DL (ref 12–16)
IMM GRANULOCYTES # BLD AUTO: 0.01 K/UL (ref 0–0.11)
IMM GRANULOCYTES NFR BLD AUTO: 0.1 % (ref 0–0.9)
LYMPHOCYTES # BLD AUTO: 1.92 K/UL (ref 1–4.8)
LYMPHOCYTES NFR BLD: 24.9 % (ref 22–41)
MCH RBC QN AUTO: 31.1 PG (ref 27–33)
MCHC RBC AUTO-ENTMCNC: 33 G/DL (ref 33.6–35)
MCV RBC AUTO: 94.1 FL (ref 81.4–97.8)
MONOCYTES # BLD AUTO: 0.52 K/UL (ref 0–0.85)
MONOCYTES NFR BLD AUTO: 6.7 % (ref 0–13.4)
NEUTROPHILS # BLD AUTO: 4.53 K/UL (ref 2–7.15)
NEUTROPHILS NFR BLD: 58.8 % (ref 44–72)
NRBC # BLD AUTO: 0 K/UL
NRBC BLD-RTO: 0 /100 WBC
PLATELET # BLD AUTO: 390 K/UL (ref 164–446)
PMV BLD AUTO: 10 FL (ref 9–12.9)
POTASSIUM SERPL-SCNC: 4.2 MMOL/L (ref 3.6–5.5)
PROT SERPL-MCNC: 7.2 G/DL (ref 6–8.2)
RBC # BLD AUTO: 4.44 M/UL (ref 4.2–5.4)
SODIUM SERPL-SCNC: 138 MMOL/L (ref 135–145)
TROPONIN T SERPL-MCNC: 7 NG/L (ref 6–19)
WBC # BLD AUTO: 7.7 K/UL (ref 4.8–10.8)

## 2022-10-19 PROCEDURE — 71045 X-RAY EXAM CHEST 1 VIEW: CPT

## 2022-10-19 PROCEDURE — 99215 OFFICE O/P EST HI 40 MIN: CPT | Performed by: PHYSICIAN ASSISTANT

## 2022-10-19 PROCEDURE — 0240U HCHG SARS-COV-2 COVID-19 NFCT DS RESP RNA 3 TRGT MIC: CPT

## 2022-10-19 PROCEDURE — 85025 COMPLETE CBC W/AUTO DIFF WBC: CPT

## 2022-10-19 PROCEDURE — A9270 NON-COVERED ITEM OR SERVICE: HCPCS | Performed by: EMERGENCY MEDICINE

## 2022-10-19 PROCEDURE — C9803 HOPD COVID-19 SPEC COLLECT: HCPCS | Performed by: EMERGENCY MEDICINE

## 2022-10-19 PROCEDURE — 36415 COLL VENOUS BLD VENIPUNCTURE: CPT

## 2022-10-19 PROCEDURE — 700102 HCHG RX REV CODE 250 W/ 637 OVERRIDE(OP): Performed by: EMERGENCY MEDICINE

## 2022-10-19 PROCEDURE — 99284 EMERGENCY DEPT VISIT MOD MDM: CPT

## 2022-10-19 PROCEDURE — 80053 COMPREHEN METABOLIC PANEL: CPT

## 2022-10-19 PROCEDURE — 84484 ASSAY OF TROPONIN QUANT: CPT

## 2022-10-19 PROCEDURE — 93005 ELECTROCARDIOGRAM TRACING: CPT | Performed by: EMERGENCY MEDICINE

## 2022-10-19 PROCEDURE — 700117 HCHG RX CONTRAST REV CODE 255: Performed by: EMERGENCY MEDICINE

## 2022-10-19 PROCEDURE — 71275 CT ANGIOGRAPHY CHEST: CPT

## 2022-10-19 RX ORDER — DOXYCYCLINE 100 MG/1
100 TABLET ORAL ONCE
Status: COMPLETED | OUTPATIENT
Start: 2022-10-19 | End: 2022-10-19

## 2022-10-19 RX ORDER — AMOXICILLIN AND CLAVULANATE POTASSIUM 875; 125 MG/1; MG/1
1 TABLET, FILM COATED ORAL ONCE
Status: COMPLETED | OUTPATIENT
Start: 2022-10-19 | End: 2022-10-19

## 2022-10-19 RX ORDER — AMOXICILLIN AND CLAVULANATE POTASSIUM 875; 125 MG/1; MG/1
1 TABLET, FILM COATED ORAL 2 TIMES DAILY
Qty: 10 TABLET | Refills: 0 | Status: SHIPPED | OUTPATIENT
Start: 2022-10-19 | End: 2022-10-24

## 2022-10-19 RX ORDER — DOXYCYCLINE 100 MG/1
100 CAPSULE ORAL 2 TIMES DAILY
Qty: 10 CAPSULE | Refills: 0 | Status: SHIPPED | OUTPATIENT
Start: 2022-10-19 | End: 2022-10-24

## 2022-10-19 RX ADMIN — AMOXICILLIN AND CLAVULANATE POTASSIUM 1 TABLET: 875; 125 TABLET, FILM COATED ORAL at 19:21

## 2022-10-19 RX ADMIN — DOXYCYCLINE 100 MG: 100 TABLET, FILM COATED ORAL at 19:21

## 2022-10-19 RX ADMIN — IOHEXOL 75 ML: 350 INJECTION, SOLUTION INTRAVENOUS at 18:30

## 2022-10-19 ASSESSMENT — FIBROSIS 4 INDEX
FIB4 SCORE: 1.47
FIB4 SCORE: 1.47

## 2022-10-19 NOTE — PROGRESS NOTES
Subjective:     CC: shortness of breath    HPI:   Alissa here today with     76-year-old female here today with history of stage IIa adenocarcinoma of the right lung who underwent recent right upper lobectomy on 9/26/2022 with complaint of shortness of breath    Started feeling SOB Sunday.  Patient reports that she mostly feels fine at rest but with any exertion she developed severe shortness of breath and coughing as well as associated chest pain.  She states symptoms improve with rest and deep breaths although does note pain with very deep breaths.  Denies leg swelling  or discoloration    No hemoptysis but notes that she is coughing up phlegm    No complications with surgery    Not currently on blood thinners.  Denies previous history of blood clots/PE/DVT      ROS:  ROS negative except as indicated above  Current Outpatient Medications Ordered in Epic   Medication Sig Dispense Refill    Tiotropium Bromide-Olodaterol (STIOLTO RESPIMAT) 2.5-2.5 MCG/ACT Aero Soln Take 2 Puffs by mouth every day. 2 Each 0    azelastine (ASTELIN) 137 MCG/SPRAY nasal spray Administer 1 Spray into affected nostril(S) every day.      NON SPECIFIED Take 1 Tablet by mouth every day. Bio Identical Hormones      venlafaxine XR (EFFEXOR XR) 75 MG CAPSULE SR 24 HR Take 1 Capsule by mouth every day for 90 days. 90 Capsule 2    Levomefolate Glucosamine (METHYLFOLATE PO) Take 1 Tablet by mouth every day.      SELENIUM PO Take 1 Tablet by mouth every day.      levothyroxine (SYNTHROID) 25 MCG Tab Take 25 mcg by mouth every morning on an empty stomach.      albuterol 108 (90 Base) MCG/ACT Aero Soln inhalation aerosol INHALE 2 PUFFS BY MOUTH EVERY 4 HOURS AS NEEDED FOR SHORTNESS OF BREATH 8.5 g 0    Cyanocobalamin (B-12) 100 MCG Tab Take 1 Tablet by mouth every day.      B Complex Vitamins (B COMPLEX-B12 PO) Take 1 Tab by mouth every day.      cholecalciferol (DIALYVITE VITAMIN D 5000) 5000 UNIT Cap Take 5,000 Units by mouth every day.       "Ascorbic Acid (VITAMIN C) 1000 MG Tab Take 1 Tab by mouth every day.      NON SPECIFIED Take 1 Tab by mouth every day. BIO-FLEX       No current Epic-ordered facility-administered medications on file.       Objective:     Exam:  /68   Pulse 80   Temp 36.3 °C (97.4 °F)   Resp 20   Ht 1.702 m (5' 7\")   Wt 70.3 kg (155 lb)   SpO2 94%   BMI 24.28 kg/m²  Body mass index is 24.28 kg/m².    Gen: Alert and oriented, No apparent distress.  Neck: Neck is supple without lymphadenopathy.  Lungs: Normal effort, decreased lung sounds right upper lobe region, + wheezes throughout, no rhonchi, or rales  CV: Regular rate and rhythm. No murmurs, rubs, or gallops.  Ext: No clubbing, cyanosis, edema.    Assessment & Plan:     76 y.o. female with the following -     1. Shortness of breath  Acute condition  Given recent cancer treatment and surgery concern of blood clot/PE.  Patient directed to go to [facility] ER for further evaluation.  Patient expressed agreement understanding with this  - Referral to [facility] Emergency Department      I spent a total of 10 minutes with record review, exam, communication with the patient, communication with other providers, and documentation of this encounter.      No follow-ups on file.    Please note that this dictation was created using voice recognition software. I have made every reasonable attempt to correct obvious errors, but there may be errors of grammar and possibly content that I did not discover before finalizing the note.        "

## 2022-10-20 LAB
FLUAV RNA SPEC QL NAA+PROBE: NEGATIVE
FLUBV RNA SPEC QL NAA+PROBE: NEGATIVE
SARS-COV-2 RNA RESP QL NAA+PROBE: NOTDETECTED
SPECIMEN SOURCE: NORMAL

## 2022-10-20 NOTE — ED PROVIDER NOTES
ED Provider Note    CHIEF COMPLAINT  Chief Complaint   Patient presents with    Shortness of Breath     R lobe removed in lung 3 weeks ago and sob today.       HPI  Alissa Bonilla is a 76 y.o. female who presents short of breath, worse with exertion.  She states since Sunday he has had cough with exertion, fatigue, shortness of breath with exertion.  She stated the week before and was doing well.  Approximate month ago had pneumonectomy for lung cancer, stated she had recovered well since the operation.  Patient stated she did have more activity than usual today before onset of symptoms, she denies any chest pain or pleurisy.  No hemoptysis.  No dizziness or sweats.  Patient denies fever.    REVIEW OF SYSTEMS    Constitutional: Fatigue  Respiratory: Shortness of breath with exertion, cough with exertion.  No pleuritic chest pain  Cardiac: No chest pain, no syncope  Gastrointestinal: No abdominal pain, no vomiting  Musculoskeletal: No acute neck or back pain.  Chronic discomfort at site of her incision since surgery for right upper lobe pneumonectomy  Neurologic: No headache, no acute numbness or weakness  Skin: No peripheral swelling  Vascular: Denies history of DVT or pulmonary embolism     All other systems are negative.       PAST MEDICAL HISTORY  Past Medical History:   Diagnosis Date    Anesthesia     Asthma     Cancer (HCC)     Lung    Cough     Depression     PONV (postoperative nausea and vomiting)     Shortness of breath     Tobacco use 09/17/2019       FAMILY HISTORY  Family History   Problem Relation Age of Onset    Dementia Mother     Heart Attack Mother     Cancer Sister 81        Lung Cancer, smoker    Hypertension Sister     Lung Disease Sister     Hypertension Brother     Arthritis Brother         RA    Cancer Paternal Aunt         Lung    Cancer Paternal Uncle         Lung       SOCIAL HISTORY  Social History     Socioeconomic History    Marital status:     Highest education  level: Bachelor's degree (e.g., BA, AB, BS)   Tobacco Use    Smoking status: Former     Packs/day: 1.00     Years: 34.00     Pack years: 34.00     Types: Cigarettes     Start date: 1968     Quit date: 2005     Years since quittin.8     Passive exposure: Past    Smokeless tobacco: Never   Vaping Use    Vaping Use: Never used   Substance and Sexual Activity    Alcohol use: Yes     Alcohol/week: 1.2 oz     Types: 2 Glasses of wine per week     Comment: 2/day    Drug use: Yes     Types: Marijuana     Comment: 1 gummy every month maybe - last used 2022    Sexual activity: Yes     Partners: Male     Comment: I am menopausal     Social Determinants of Health     Financial Resource Strain: Low Risk     Difficulty of Paying Living Expenses: Not hard at all   Food Insecurity: No Food Insecurity    Worried About Running Out of Food in the Last Year: Never true    Ran Out of Food in the Last Year: Never true   Transportation Needs: No Transportation Needs    Lack of Transportation (Medical): No    Lack of Transportation (Non-Medical): No   Physical Activity: Sufficiently Active    Days of Exercise per Week: 5 days    Minutes of Exercise per Session: 30 min   Stress: No Stress Concern Present    Feeling of Stress : Only a little   Social Connections: Moderately Integrated    Frequency of Communication with Friends and Family: Twice a week    Frequency of Social Gatherings with Friends and Family: Once a week    Attends [employer] Services: Never    Active Member of Clubs or Organizations: Yes    Attends Club or Organization Meetings: More than 4 times per year    Marital Status:    Housing Stability: Low Risk     Unable to Pay for Housing in the Last Year: No    Number of Places Lived in the Last Year: 1    Unstable Housing in the Last Year: No       SURGICAL HISTORY  Past Surgical History:   Procedure Laterality Date    IA THORACOSCOPY,DX NO BX Right 2022    Procedure: VIDEO ASSISTED THORACOSCOPY,  "RIGHT UPPER LOBECTOMY, NODE DISSECTION WITH MEDIASTINOSCOPY LYMPH NODE BIOPSY;  Surgeon: John H Ganser, M.D.;  Location: SURGERY [facility];  Service: General    LOBECTOMY Right 9/26/2022    Procedure: LOBECTOMY;  Surgeon: John H Ganser, M.D.;  Location: [facility];  Service: General    MEDIASTINOSCOPY  9/26/2022    Procedure: MEDIASTINOSCOPY;  Surgeon: John H Ganser, M.D.;  Location: [facility];  Service: General    BRONCHOSCOPY, ROBOT-ASSISTED N/A 9/2/2022    Procedure: FIBER OPTICBRONHOSCOPY WITH BRONCHOALVEOLAR LAVAGE, BIOPSY AND FINE NEEDLE ASPIRATION, ENDOBRONCHIAL ULTRASOUND AND NAVIGATION ROBOTICS;  Surgeon: Idalia Arguelles M.D.;  Location: [facility];  Service: Pulmonary Robotic    ENDOBRONCHIAL US ADD-ON N/A 9/2/2022    Procedure: ENDOBRONCHIAL ULTRASOUND (EBUS);  Surgeon: Idalia Arguelles M.D.;  Location: [facility];  Service: Pulmonary Robotic    WA BREAST AUGMENTATION WITH IMPLANT  1972       CURRENT MEDICATIONS  Home Medications    **Home medications have not yet been reviewed for this encounter**         ALLERGIES  Allergies   Allergen Reactions    Sulfa Drugs      \"crazy\"       PHYSICAL EXAM  VITAL SIGNS: /72   Pulse 75   Temp 36 °C (96.8 °F) (Temporal)   Resp (!) 22   Ht 1.702 m (5' 7\")   Wt 69.8 kg (153 lb 14.1 oz)   SpO2 96%   BMI 24.10 kg/m²   Constitutional:  Non-toxic appearance.   HENT: No facial swelling  Eyes: Anicteric, no conjunctivitis.     Cardiovascular: Normal heart rate, Normal rhythm  Pulmonary:  No wheezing, No rales.  Diminished breath sounds right upper back.  No crackles, no wheezing  Gastrointestinal: Soft, No tenderness, No masses  Skin: Warm, Dry, No cyanosis.   Neurologic: Speech is clear, follows commands, facial expression is symmetrical.  Strength intact.  Psychiatric: Affect normal,  Mood normal.  Patient is calm and cooperative  Musculoskeletal: No chest wall tenderness, no flank " tenderness    EKG/Labs  Results for orders placed or performed during the hospital encounter of 10/19/22   CBC with Differential   Result Value Ref Range    WBC 7.7 4.8 - 10.8 K/uL    RBC 4.44 4.20 - 5.40 M/uL    Hemoglobin 13.8 12.0 - 16.0 g/dL    Hematocrit 41.8 37.0 - 47.0 %    MCV 94.1 81.4 - 97.8 fL    MCH 31.1 27.0 - 33.0 pg    MCHC 33.0 (L) 33.6 - 35.0 g/dL    RDW 43.1 35.9 - 50.0 fL    Platelet Count 390 164 - 446 K/uL    MPV 10.0 9.0 - 12.9 fL    Neutrophils-Polys 58.80 44.00 - 72.00 %    Lymphocytes 24.90 22.00 - 41.00 %    Monocytes 6.70 0.00 - 13.40 %    Eosinophils 8.70 (H) 0.00 - 6.90 %    Basophils 0.80 0.00 - 1.80 %    Immature Granulocytes 0.10 0.00 - 0.90 %    Nucleated RBC 0.00 /100 WBC    Neutrophils (Absolute) 4.53 2.00 - 7.15 K/uL    Lymphs (Absolute) 1.92 1.00 - 4.80 K/uL    Monos (Absolute) 0.52 0.00 - 0.85 K/uL    Eos (Absolute) 0.67 (H) 0.00 - 0.51 K/uL    Baso (Absolute) 0.06 0.00 - 0.12 K/uL    Immature Granulocytes (abs) 0.01 0.00 - 0.11 K/uL    NRBC (Absolute) 0.00 K/uL   Comp Metabolic Panel   Result Value Ref Range    Sodium 138 135 - 145 mmol/L    Potassium 4.2 3.6 - 5.5 mmol/L    Chloride 103 96 - 112 mmol/L    Co2 22 20 - 33 mmol/L    Anion Gap 13.0 7.0 - 16.0    Glucose 101 (H) 65 - 99 mg/dL    Bun 9 8 - 22 mg/dL    Creatinine 0.68 0.50 - 1.40 mg/dL    Calcium 9.1 8.4 - 10.2 mg/dL    AST(SGOT) 16 12 - 45 U/L    ALT(SGPT) 15 2 - 50 U/L    Alkaline Phosphatase 92 30 - 99 U/L    Total Bilirubin 0.3 0.1 - 1.5 mg/dL    Albumin 4.2 3.2 - 4.9 g/dL    Total Protein 7.2 6.0 - 8.2 g/dL    Globulin 3.0 1.9 - 3.5 g/dL    A-G Ratio 1.4 g/dL   TROPONIN   Result Value Ref Range    Troponin T 7 6 - 19 ng/L   ESTIMATED GFR   Result Value Ref Range    GFR (CKD-EPI) 91 >60 mL/min/1.73 m 2         RADIOLOGY/PROCEDURES  CT-CTA CHEST PULMONARY ARTERY W/ RECONS   Final Result         1. Groundglass opacities with early consolidation in the right lower lobe, suggestive of pneumonia.   2. Right upper  lobectomy. Several small nodules in the hyperinflated middle lobe measure up to 3 mm and are nonspecific. They do follow-up.   3. Small right pleural effusion. Nonspecific small pocket of air within a 3.2 cm pocket of pleural fluid along the posterior right hemidiaphragm.   4. Several mildly enlarged right hilar and lower paratracheal nodes, reactive or neoplastic. They can be followed.   5. Stable left adrenal adenoma.   6. Small hiatal hernia.      DX-CHEST-PORTABLE (1 VIEW)   Final Result      1.  Small persistent right pleural effusion. Persistent atelectasis in the right lower lung.   2.  Left lung is clear.               COURSE & MEDICAL DECISION MAKING  Pertinent Labs & Imaging studies reviewed. (See chart for details)  Patient high risk for pulmonary embolism given recent surgery and history of cancer, with 3 days of shortness of breath with exertion, cough with exertion, CT scan of the chest obtained negative for pulmonary embolism.  It did show nonspecific 3 mm nodules, also right hilar and lower paratracheal nodes either reactive or neoplastic.  Patient advised of the need for follow-up with her oncologist regarding both findings and for repeat imaging as deemed necessary.  CT scan also shows evidence of groundglass opacities and early consolidation in the right lower lobe concerning for pneumonia.  Patient has been COVID tested, results are pending.  She is placed on Augmentin and doxycycline given comorbid conditions for treatment of pneumonia.  Pulse oximetry 95 to 98% on room air while in the emergency department.  She is in no respiratory distress at this time, does not require inpatient hospitalization at this time.  Patient is given return precautions, well-appearing upon discharge however.    FINAL IMPRESSION     1. Dyspnea on exertion        2. Pneumonia of right lower lobe due to infectious organism  amoxicillin-clavulanate (AUGMENTIN) 875-125 MG Tab    doxycycline (MONODOX) 100 MG capsule       3. Adenopathy                        Electronically signed by: Compa Crow M.D., 10/19/2022 5:58 PM

## 2022-10-20 NOTE — DISCHARGE INSTRUCTIONS
Follow-up with your oncologist, recommendation by the radiologist reading your CAT scan is for follow-up imaging regarding several small lung nodules (3 mm), and adenopathy seen on the CT scan today.  Return for worsening shortness of breath or any concerns

## 2022-10-21 ENCOUNTER — TELEPHONE (OUTPATIENT)
Dept: HEMATOLOGY ONCOLOGY | Facility: MEDICAL CENTER | Age: 77
End: 2022-10-21
Payer: MEDICARE

## 2022-10-21 NOTE — TELEPHONE ENCOUNTER
Received a MyChart message from patient asking to review her CT scan from the emergency room.  Her emergency room doctor asked her to reach out to me regarding the enlarged lymph nodes.    Personally reviewed the CT scan and discussed the review with the patient on the phone today.  As I told the patient is impossible to determine if the lymph nodes are malignant or reactive but I favor reactive in the setting of active pneumonia.    Patient will be monitored closely during her lung cancer surveillance.  Patient will have a CT scan in March.  But I informed her that if she notices any worsening symptoms or has any questions do not hesitate in calling the office.  Patient voiced understanding

## 2022-10-24 ENCOUNTER — HOSPITAL ENCOUNTER (OUTPATIENT)
Dept: LAB | Facility: MEDICAL CENTER | Age: 77
End: 2022-10-24
Attending: FAMILY MEDICINE
Payer: MEDICARE

## 2022-10-24 ENCOUNTER — PATIENT MESSAGE (OUTPATIENT)
Dept: SLEEP MEDICINE | Facility: MEDICAL CENTER | Age: 77
End: 2022-10-24
Payer: MEDICARE

## 2022-10-24 DIAGNOSIS — J45.40 MODERATE PERSISTENT ASTHMA WITHOUT COMPLICATION: ICD-10-CM

## 2022-10-24 DIAGNOSIS — E03.9 ACQUIRED HYPOTHYROIDISM: ICD-10-CM

## 2022-10-24 LAB
T3 SERPL-MCNC: 97.2 NG/DL (ref 60–181)
T4 FREE SERPL-MCNC: 1.01 NG/DL (ref 0.93–1.7)
TSH SERPL DL<=0.005 MIU/L-ACNC: 7.25 UIU/ML (ref 0.38–5.33)

## 2022-10-24 PROCEDURE — 84439 ASSAY OF FREE THYROXINE: CPT

## 2022-10-24 PROCEDURE — 84480 ASSAY TRIIODOTHYRONINE (T3): CPT

## 2022-10-24 PROCEDURE — 36415 COLL VENOUS BLD VENIPUNCTURE: CPT

## 2022-10-24 PROCEDURE — 84443 ASSAY THYROID STIM HORMONE: CPT

## 2022-10-24 RX ORDER — ALBUTEROL SULFATE 90 UG/1
2 AEROSOL, METERED RESPIRATORY (INHALATION) EVERY 4 HOURS PRN
Qty: 8.5 EACH | Refills: 11 | Status: SHIPPED | OUTPATIENT
Start: 2022-10-24 | End: 2023-08-17 | Stop reason: SDUPTHER

## 2022-10-24 RX ORDER — AZELASTINE 1 MG/ML
1 SPRAY, METERED NASAL
Qty: 30 ML | Refills: 6 | Status: SHIPPED | OUTPATIENT
Start: 2022-10-24 | End: 2023-06-05 | Stop reason: SDUPTHER

## 2022-10-24 RX ORDER — TIOTROPIUM BROMIDE AND OLODATEROL 3.124; 2.736 UG/1; UG/1
2 SPRAY, METERED RESPIRATORY (INHALATION) DAILY
Qty: 3 EACH | Refills: 3 | Status: SHIPPED | OUTPATIENT
Start: 2022-10-24 | End: 2022-11-01

## 2022-10-25 LAB — PATHOLOGY CONSULT NOTE: NORMAL

## 2022-11-01 ENCOUNTER — OFFICE VISIT (OUTPATIENT)
Dept: MEDICAL GROUP | Facility: LAB | Age: 77
End: 2022-11-01
Payer: MEDICARE

## 2022-11-01 ENCOUNTER — HOSPITAL ENCOUNTER (OUTPATIENT)
Dept: RADIOLOGY | Facility: MEDICAL CENTER | Age: 77
End: 2022-11-01
Attending: FAMILY MEDICINE
Payer: MEDICARE

## 2022-11-01 ENCOUNTER — HOSPITAL ENCOUNTER (OUTPATIENT)
Dept: LAB | Facility: MEDICAL CENTER | Age: 77
End: 2022-11-01
Attending: FAMILY MEDICINE
Payer: MEDICARE

## 2022-11-01 VITALS
TEMPERATURE: 97.5 F | BODY MASS INDEX: 23.39 KG/M2 | HEIGHT: 67 IN | OXYGEN SATURATION: 93 % | WEIGHT: 149 LBS | HEART RATE: 73 BPM | RESPIRATION RATE: 12 BRPM | SYSTOLIC BLOOD PRESSURE: 98 MMHG | DIASTOLIC BLOOD PRESSURE: 68 MMHG

## 2022-11-01 DIAGNOSIS — Z23 NEED FOR VACCINATION: ICD-10-CM

## 2022-11-01 DIAGNOSIS — E03.9 ACQUIRED HYPOTHYROIDISM: ICD-10-CM

## 2022-11-01 DIAGNOSIS — D48.9 NEOPLASM OF UNCERTAIN BEHAVIOR: ICD-10-CM

## 2022-11-01 DIAGNOSIS — R06.02 SHORTNESS OF BREATH: ICD-10-CM

## 2022-11-01 PROBLEM — C34.91 PRIMARY MALIGNANT NEOPLASM OF RIGHT LUNG (HCC): Status: ACTIVE | Noted: 2022-09-20

## 2022-11-01 PROBLEM — F32.A DEPRESSIVE DISORDER: Status: ACTIVE | Noted: 2022-09-20

## 2022-11-01 LAB
BASOPHILS # BLD AUTO: 1.5 % (ref 0–1.8)
BASOPHILS # BLD: 0.11 K/UL (ref 0–0.12)
EOSINOPHIL # BLD AUTO: 0.92 K/UL (ref 0–0.51)
EOSINOPHIL NFR BLD: 12.6 % (ref 0–6.9)
ERYTHROCYTE [DISTWIDTH] IN BLOOD BY AUTOMATED COUNT: 42.3 FL (ref 35.9–50)
HCT VFR BLD AUTO: 42.5 % (ref 37–47)
HGB BLD-MCNC: 13.9 G/DL (ref 12–16)
IMM GRANULOCYTES # BLD AUTO: 0.01 K/UL (ref 0–0.11)
IMM GRANULOCYTES NFR BLD AUTO: 0.1 % (ref 0–0.9)
LYMPHOCYTES # BLD AUTO: 1.72 K/UL (ref 1–4.8)
LYMPHOCYTES NFR BLD: 23.6 % (ref 22–41)
MCH RBC QN AUTO: 30.6 PG (ref 27–33)
MCHC RBC AUTO-ENTMCNC: 32.7 G/DL (ref 33.6–35)
MCV RBC AUTO: 93.6 FL (ref 81.4–97.8)
MONOCYTES # BLD AUTO: 0.48 K/UL (ref 0–0.85)
MONOCYTES NFR BLD AUTO: 6.6 % (ref 0–13.4)
NEUTROPHILS # BLD AUTO: 4.06 K/UL (ref 2–7.15)
NEUTROPHILS NFR BLD: 55.6 % (ref 44–72)
NRBC # BLD AUTO: 0 K/UL
NRBC BLD-RTO: 0 /100 WBC
PLATELET # BLD AUTO: 407 K/UL (ref 164–446)
PMV BLD AUTO: 10.4 FL (ref 9–12.9)
RBC # BLD AUTO: 4.54 M/UL (ref 4.2–5.4)
TSH SERPL DL<=0.005 MIU/L-ACNC: 2.85 UIU/ML (ref 0.38–5.33)
WBC # BLD AUTO: 7.3 K/UL (ref 4.8–10.8)

## 2022-11-01 PROCEDURE — 90662 IIV NO PRSV INCREASED AG IM: CPT | Performed by: FAMILY MEDICINE

## 2022-11-01 PROCEDURE — 85025 COMPLETE CBC W/AUTO DIFF WBC: CPT

## 2022-11-01 PROCEDURE — 71046 X-RAY EXAM CHEST 2 VIEWS: CPT

## 2022-11-01 PROCEDURE — 99214 OFFICE O/P EST MOD 30 MIN: CPT | Mod: 25 | Performed by: FAMILY MEDICINE

## 2022-11-01 PROCEDURE — 84443 ASSAY THYROID STIM HORMONE: CPT

## 2022-11-01 PROCEDURE — G0008 ADMIN INFLUENZA VIRUS VAC: HCPCS | Performed by: FAMILY MEDICINE

## 2022-11-01 PROCEDURE — 36415 COLL VENOUS BLD VENIPUNCTURE: CPT

## 2022-11-01 ASSESSMENT — FIBROSIS 4 INDEX: FIB4 SCORE: 0.78

## 2022-11-01 NOTE — PROGRESS NOTES
Subjective:     Chief Complaint   Patient presents with    Follow-Up         HPI:   Alissa presents today with hospital follow up post pneumonia. Has been having some SOB as well since lung cancer diagnosis and surgery, right lung lobectomy.   Finished her abx course. Still lethargic, tired. Still coughing, lots of phlegm. SOB is better. Albuterol inhaler is helpful.   Using tylenol regularly as well. Without it she feels crappy, needs nap, in bed a lot.     Next oncology appt April. Reported cancer free, every 6 months follow up.     Current Outpatient Medications Ordered in Epic   Medication Sig Dispense Refill    azelastine (ASTELIN) 137 MCG/SPRAY nasal spray Administer 1 Spray into affected nostril(S) every day. 30 mL 6    albuterol 108 (90 Base) MCG/ACT Aero Soln inhalation aerosol Inhale 2 Puffs every four hours as needed for Shortness of Breath. 8.5 Each 11    Tiotropium Bromide-Olodaterol (STIOLTO RESPIMAT) 2.5-2.5 MCG/ACT Aero Soln Take 2 Puffs by mouth every day. 3 Each 3    NON SPECIFIED Take 1 Tablet by mouth every day. Bio Identical Hormones      venlafaxine XR (EFFEXOR XR) 75 MG CAPSULE SR 24 HR Take 1 Capsule by mouth every day for 90 days. 90 Capsule 2    Levomefolate Glucosamine (METHYLFOLATE PO) Take 1 Tablet by mouth every day.      SELENIUM PO Take 1 Tablet by mouth every day.      levothyroxine (SYNTHROID) 25 MCG Tab Take 25 mcg by mouth every morning on an empty stomach.      Cyanocobalamin (B-12) 100 MCG Tab Take 1 Tablet by mouth every day.      B Complex Vitamins (B COMPLEX-B12 PO) Take 1 Tab by mouth every day.      cholecalciferol (DIALYVITE VITAMIN D 5000) 5000 UNIT Cap Take 5,000 Units by mouth every day.      Ascorbic Acid (VITAMIN C) 1000 MG Tab Take 1 Tab by mouth every day.      NON SPECIFIED Take 1 Tab by mouth every day. BIO-FLEX       No current Epic-ordered facility-administered medications on file.         ROS:  Gen: no fevers/chills, no changes in weight  Eyes: no changes in  "vision  ENT: no sore throat, no hearing loss, no bloody nose  Pulm: no sob, no cough  CV: no chest pain, no palpitations  GI: no nausea/vomiting, no diarrhea  : no dysuria  MSk: no myalgias  Skin: no rash  Neuro: no headaches, no numbness/tingling  Heme/Lymph: no easy bruising      Objective:     Exam:  BP (!) 98/68 (BP Location: Left arm, Patient Position: Sitting, BP Cuff Size: Large adult)   Pulse 73   Temp 36.4 °C (97.5 °F)   Resp 12   Ht 1.702 m (5' 7\")   Wt 67.6 kg (149 lb)   SpO2 93%   BMI 23.34 kg/m²  Body mass index is 23.34 kg/m².    Gen: Alert and oriented, No apparent distress.  Neck: Neck is supple without lymphadenopathy.  Lungs: Normal effort, CTA bilaterally, no wheezes, rhonchi, or rales  CV: Regular rate and rhythm. No murmurs, rubs, or gallops.  Ext: No clubbing, cyanosis, edema.      Assessment & Plan:     76 y.o. female with the following -     1. Acquired hypothyroidism  Discussed elevated TSH on recent lab.  We will get this rechecked.  This could be related to her recent illness as well as surgery as this is a stress hormone.  She continues to complain of poor stamina and not being back to normal but she is reminded that she did have a major procedure with lung cancer removal.  - CBC WITH DIFFERENTIAL; Future  - TSH WITH REFLEX TO FT4; Future    2. Shortness of breath  Says she doing a little bit better.  We will definitely get a recheck of her chest x-ray to ensure resolution of some of the findings seen on CT.  May need to get repeat CT depending upon what we find here.  - DX-CHEST-2 VIEWS; Future    3. Neoplasm of uncertain behavior  Will get her CBC rechecked to ensure there is no anemia or need for treatment otherwise contributing to her symptoms.  - CBC WITH DIFFERENTIAL; Future    4. Need for vaccination    - Influenza Vaccine, High Dose (65+ Only)            No follow-ups on file.    Please note that this dictation was created using voice recognition software. I have made " every reasonable attempt to correct obvious errors, but I expect that there are errors of grammar and possibly content that I did not discover before finalizing the note.

## 2022-11-09 ENCOUNTER — OFFICE VISIT (OUTPATIENT)
Dept: MEDICAL GROUP | Facility: LAB | Age: 77
End: 2022-11-09
Payer: MEDICARE

## 2022-11-09 ENCOUNTER — PATIENT MESSAGE (OUTPATIENT)
Dept: HEALTH INFORMATION MANAGEMENT | Facility: OTHER | Age: 77
End: 2022-11-09

## 2022-11-09 VITALS
OXYGEN SATURATION: 95 % | WEIGHT: 150 LBS | HEIGHT: 67 IN | RESPIRATION RATE: 20 BRPM | HEART RATE: 74 BPM | BODY MASS INDEX: 23.54 KG/M2 | DIASTOLIC BLOOD PRESSURE: 72 MMHG | SYSTOLIC BLOOD PRESSURE: 100 MMHG | TEMPERATURE: 97.1 F

## 2022-11-09 DIAGNOSIS — R05.3 CHRONIC COUGH: ICD-10-CM

## 2022-11-09 DIAGNOSIS — K21.9 GASTROESOPHAGEAL REFLUX DISEASE WITHOUT ESOPHAGITIS: ICD-10-CM

## 2022-11-09 PROCEDURE — 99214 OFFICE O/P EST MOD 30 MIN: CPT | Performed by: PHYSICIAN ASSISTANT

## 2022-11-09 RX ORDER — OMEPRAZOLE 20 MG/1
20 CAPSULE, DELAYED RELEASE ORAL DAILY
Qty: 14 CAPSULE | Refills: 0 | Status: SHIPPED | OUTPATIENT
Start: 2022-11-09 | End: 2022-11-21

## 2022-11-09 RX ORDER — METHYLPREDNISOLONE 4 MG/1
TABLET ORAL
Qty: 21 TABLET | Refills: 0 | Status: SHIPPED | OUTPATIENT
Start: 2022-11-09 | End: 2022-12-08

## 2022-11-09 RX ORDER — BENZONATATE 100 MG/1
100 CAPSULE ORAL 3 TIMES DAILY PRN
Qty: 21 CAPSULE | Refills: 0 | Status: SHIPPED | OUTPATIENT
Start: 2022-11-09 | End: 2022-11-16

## 2022-11-09 ASSESSMENT — FIBROSIS 4 INDEX: FIB4 SCORE: 0.75

## 2022-11-09 NOTE — PROGRESS NOTES
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  Upon work-up in ER patient was found to have right lobe pneumonia.  She was treated with Augmentin and doxycycline.  Patient was somewhat delayed and filling the doxycycline prescription but has since completed both prescriptions as directed.    Patient follow-up chest x-ray 11/01/2022 which noted resolution of pneumonia and postsurgical changes    At this time she reports feeling fatigued easily, SOB with activity although improved compared to when she was in the ER. She reports improvement of symptoms with reclining. Dry cough in the AM, improved with inhaler use.      Denies chest pain. Denies fever, chills N/V/D. Denies sick contacts. Denies sinus congestion, sinus pressure, does note some posterior drainage and acid reflux symptoms occasionally    ROS:  ROS negative except as indicated above    Current Outpatient Medications Ordered in Epic   Medication Sig Dispense Refill    benzonatate (TESSALON) 100 MG Cap Take 1 Capsule by mouth 3 times a day as needed for Cough for up to 7 days. 21 Capsule 0    methylPREDNISolone (MEDROL DOSEPAK) 4 MG Tablet Therapy Pack As directed on the packaging label. 21 Tablet 0    omeprazole (PRILOSEC) 20 MG delayed-release capsule Take 1 Capsule by mouth every day for 14 days. 14 Capsule 0    azelastine (ASTELIN) 137 MCG/SPRAY nasal spray Administer 1 Spray into affected nostril(S) every day. 30 mL 6    albuterol 108 (90 Base) MCG/ACT Aero Soln inhalation aerosol Inhale 2 Puffs every four hours as needed for Shortness of Breath. 8.5 Each 11    NON SPECIFIED Take 1 Tablet by mouth every day. Bio Identical Hormones      venlafaxine XR (EFFEXOR XR) 75 MG CAPSULE SR 24 HR Take 1  "Capsule by mouth every day for 90 days. 90 Capsule 2    Levomefolate Glucosamine (METHYLFOLATE PO) Take 1 Tablet by mouth every day.      SELENIUM PO Take 1 Tablet by mouth every day.      levothyroxine (SYNTHROID) 25 MCG Tab Take 25 mcg by mouth every morning on an empty stomach.      Cyanocobalamin (B-12) 100 MCG Tab Take 1 Tablet by mouth every day.      B Complex Vitamins (B COMPLEX-B12 PO) Take 1 Tab by mouth every day.      cholecalciferol (DIALYVITE VITAMIN D 5000) 5000 UNIT Cap Take 5,000 Units by mouth every day.      Ascorbic Acid (VITAMIN C) 1000 MG Tab Take 1 Tab by mouth every day.      NON SPECIFIED Take 1 Tab by mouth every day. BIO-FLEX       No current Epic-ordered facility-administered medications on file.         Objective:     Exam:  /72   Pulse 74   Temp 36.2 °C (97.1 °F)   Resp 20   Ht 1.702 m (5' 7\")   Wt 68 kg (150 lb)   SpO2 95%   BMI 23.49 kg/m²  Body mass index is 23.49 kg/m².    Gen: Alert and oriented, No apparent distress.  Neck: Neck is supple without lymphadenopathy.  Lungs: Normal effort, CTA bilaterally, no wheezes, rhonchi, or rales  CV: Regular rate and rhythm. No murmurs, rubs, or gallops.  Ext: No clubbing, cyanosis, edema.    Assessment & Plan:     76 y.o. female with the following -     1. Chronic cough  Improvement since previous evaluation but still bothersome cough, shortness of breath, fatigue symptoms.  Reviewed expected course of recovery with both pneumonia and recent right lobectomy.  We will trial short burst of steroids and cough medication see if we can provide some symptomatic relief.  Reviewed red flags and indications for ER with patient  - benzonatate (TESSALON) 100 MG Cap; Take 1 Capsule by mouth 3 times a day as needed for Cough for up to 7 days.  Dispense: 21 Capsule; Refill: 0  - methylPREDNISolone (MEDROL DOSEPAK) 4 MG Tablet Therapy Pack; As directed on the packaging label.  Dispense: 21 Tablet; Refill: 0    2. Gastroesophageal reflux disease " without esophagitis  Patient does endorse some acid reflux symptoms we will trial a PPI this may be a contributing factor to her recurrent cough.  - omeprazole (PRILOSEC) 20 MG delayed-release capsule; Take 1 Capsule by mouth every day for 14 days.  Dispense: 14 Capsule; Refill: 0      I spent a total of 17 minutes with record review, exam, communication with the patient, communication with other providers, and documentation of this encounter.      Return if symptoms worsen or fail to improve.    Please note that this dictation was created using voice recognition software. I have made every reasonable attempt to correct obvious errors, but there may be errors of grammar and possibly content that I did not discover before finalizing the note.

## 2022-11-20 DIAGNOSIS — K21.9 GASTROESOPHAGEAL REFLUX DISEASE WITHOUT ESOPHAGITIS: ICD-10-CM

## 2022-11-21 RX ORDER — OMEPRAZOLE 20 MG/1
20 CAPSULE, DELAYED RELEASE ORAL DAILY
Qty: 90 CAPSULE | Refills: 0 | Status: SHIPPED | OUTPATIENT
Start: 2022-11-21 | End: 2022-12-12

## 2022-11-21 NOTE — TELEPHONE ENCOUNTER
Received request via: Pharmacy  11/9/22  Was the patient seen in the last year in this department? Yes    Does the patient have an active prescription (recently filled or refills available) for medication(s) requested? No    Does the patient have [facility] and need 100 day supply (blood pressure, diabetes and cholesterol meds only)?

## 2022-12-06 ENCOUNTER — PATIENT MESSAGE (OUTPATIENT)
Dept: MEDICAL GROUP | Facility: LAB | Age: 77
End: 2022-12-06
Payer: MEDICARE

## 2022-12-06 DIAGNOSIS — R05.3 CHRONIC COUGH: ICD-10-CM

## 2022-12-06 DIAGNOSIS — C34.91 ADENOCARCINOMA OF RIGHT LUNG (HCC): ICD-10-CM

## 2022-12-08 ENCOUNTER — HOSPITAL ENCOUNTER (OUTPATIENT)
Dept: RADIOLOGY | Facility: MEDICAL CENTER | Age: 77
End: 2022-12-08
Attending: FAMILY MEDICINE
Payer: MEDICARE

## 2022-12-08 DIAGNOSIS — R05.3 CHRONIC COUGH: ICD-10-CM

## 2022-12-08 DIAGNOSIS — C34.91 ADENOCARCINOMA OF RIGHT LUNG (HCC): ICD-10-CM

## 2022-12-08 PROCEDURE — 71250 CT THORAX DX C-: CPT

## 2022-12-08 RX ORDER — LEVOFLOXACIN 750 MG/1
750 TABLET, FILM COATED ORAL DAILY
Qty: 7 TABLET | Refills: 0 | Status: SHIPPED | OUTPATIENT
Start: 2022-12-08 | End: 2022-12-15

## 2022-12-08 RX ORDER — FUROSEMIDE 20 MG/1
20 TABLET ORAL DAILY
Qty: 7 TABLET | Refills: 0 | Status: SHIPPED | OUTPATIENT
Start: 2022-12-08 | End: 2022-12-12

## 2022-12-12 ENCOUNTER — OFFICE VISIT (OUTPATIENT)
Dept: SLEEP MEDICINE | Facility: MEDICAL CENTER | Age: 77
End: 2022-12-12
Payer: MEDICARE

## 2022-12-12 VITALS
BODY MASS INDEX: 23.39 KG/M2 | HEART RATE: 74 BPM | OXYGEN SATURATION: 92 % | HEIGHT: 67 IN | SYSTOLIC BLOOD PRESSURE: 106 MMHG | RESPIRATION RATE: 16 BRPM | DIASTOLIC BLOOD PRESSURE: 64 MMHG | WEIGHT: 149 LBS

## 2022-12-12 DIAGNOSIS — J90 PLEURAL EFFUSION: ICD-10-CM

## 2022-12-12 DIAGNOSIS — R05.9 COUGH, UNSPECIFIED TYPE: ICD-10-CM

## 2022-12-12 DIAGNOSIS — J98.11 COLLAPSE OF LUNG TISSUE: ICD-10-CM

## 2022-12-12 DIAGNOSIS — R91.8 LUNG NODULES: ICD-10-CM

## 2022-12-12 DIAGNOSIS — Z87.891 FORMER SMOKER: ICD-10-CM

## 2022-12-12 DIAGNOSIS — C80.1 ADENOCARCINOMA (HCC): ICD-10-CM

## 2022-12-12 PROCEDURE — 99214 OFFICE O/P EST MOD 30 MIN: CPT | Performed by: INTERNAL MEDICINE

## 2022-12-12 RX ORDER — UMECLIDINIUM BROMIDE AND VILANTEROL TRIFENATATE 62.5; 25 UG/1; UG/1
1 POWDER RESPIRATORY (INHALATION) DAILY
Qty: 2 EACH | Refills: 0 | COMMUNITY
Start: 2022-12-12 | End: 2022-12-27 | Stop reason: SDUPTHER

## 2022-12-12 RX ORDER — VENLAFAXINE HYDROCHLORIDE 75 MG/1
75 CAPSULE, EXTENDED RELEASE ORAL
COMMUNITY
Start: 2022-12-03 | End: 2023-06-01

## 2022-12-12 RX ORDER — PANTOPRAZOLE SODIUM 40 MG/1
40 TABLET, DELAYED RELEASE ORAL DAILY
Qty: 90 TABLET | Refills: 3 | Status: SHIPPED | OUTPATIENT
Start: 2022-12-12 | End: 2023-11-27 | Stop reason: SDUPTHER

## 2022-12-12 RX ORDER — FUROSEMIDE 20 MG/1
20 TABLET ORAL DAILY
Qty: 7 TABLET | Refills: 0 | Status: SHIPPED | OUTPATIENT
Start: 2022-12-12 | End: 2022-12-19

## 2022-12-12 ASSESSMENT — ENCOUNTER SYMPTOMS
CLAUDICATION: 0
PHOTOPHOBIA: 0
VOMITING: 0
WEAKNESS: 0
DIARRHEA: 0
COUGH: 1
NAUSEA: 0
PALPITATIONS: 0
DEPRESSION: 0
FALLS: 0
HEADACHES: 0
STRIDOR: 0
MYALGIAS: 0
NECK PAIN: 0
TREMORS: 0
SHORTNESS OF BREATH: 1
DIZZINESS: 0
SINUS PAIN: 0
SPUTUM PRODUCTION: 0
EYE DISCHARGE: 0
SPEECH CHANGE: 0
ORTHOPNEA: 0
WEIGHT LOSS: 0
ABDOMINAL PAIN: 0
BLURRED VISION: 0
HEMOPTYSIS: 0
DOUBLE VISION: 0
DIAPHORESIS: 0
EYE REDNESS: 0
SORE THROAT: 0
PND: 0
FOCAL WEAKNESS: 0
WHEEZING: 1
HEARTBURN: 0
FEVER: 0
EYE PAIN: 0
CONSTIPATION: 0
BACK PAIN: 0
CHILLS: 0

## 2022-12-12 ASSESSMENT — FIBROSIS 4 INDEX: FIB4 SCORE: 0.76

## 2022-12-12 NOTE — PROGRESS NOTES
Chief Complaint   Patient presents with    Follow-Up     Last seen 10/5/22         HPI: This patient is a 77 y.o. female whom is followed in our clinic for hx of lung Ca  last seen by me on 10/5/22.  PMHs includes mild RAD on prn albuterol only and PND with associated chronic productive cough. She is a former smoker with 34 pk year hx and quit in roughly 2005. Pt lung cancer screening CT in 11/2019 showing RUL 2.2 cm GGO. Repeat CT in 6/2020 showed stability however no surveillance imaging was performed in 2021 and CT 7/2022 showed increase in RUL GGO to up to 3 cm in diameter now with solid component. A PET form 8/10 showed low level FDG uptake with SUV of 4.86 and mild uptake in 9 mm paratracheal LN as well as smaller 7 mm paratracheal node. She had bronchscopy with transbronchial bx demonstrating adenoCa however there was unsuccessful attempt to biopsy LN with EBUS and she underwent thoracoscopic right upper lobectomy with mediastinoscopy by Dr Ganser which revealed no e/o metastatic disease on 9/26/22 and I saw her shortly thereafter on 10/5. She is also followed by oncology whom she saw via telemedicine on 9/29 at which point routine surveillance imaging was ordered for stage Ib disease however she developed a RLL pna noted on CTA in ED on 10/9 when she presented with SOB for which she was tx with augmentin and doxycycline. Sxs persisted and she developed a cough with copious mucous production as well as wheezing worse at night. Sxs improve with elevation of her HOB. She does have a hx of GERD. She also c/o globus sensation. A repeat CT done 12/8/22 for persistent sxs shows resolution of prior pna but some patchy areas of GGO throughout her RLL, worsening collapse of her RML with some nodules there up to 6 mm in size and narrowing of that airway with associated calicification not seen by me on the CT in October. Her esophagus is dilated and she has small hiatal hernia. She is current only levofloxacin and ?  Lasix for small R pleural effusion. No fever. +dizziness. BP today if 106/64.    Past Medical History:   Diagnosis Date    Anesthesia     Asthma     Cancer (HCC)     Lung    Cough     Depression     PONV (postoperative nausea and vomiting)     Shortness of breath     Tobacco use 2019       Social History     Socioeconomic History    Marital status:      Spouse name: Not on file    Number of children: Not on file    Years of education: Not on file    Highest education level: Bachelor's degree (e.g., BA, AB, BS)   Occupational History    Not on file   Tobacco Use    Smoking status: Former     Packs/day: 1.00     Years: 34.00     Pack years: 34.00     Types: Cigarettes     Start date: 1968     Quit date: 2005     Years since quittin.9     Passive exposure: Past    Smokeless tobacco: Never   Vaping Use    Vaping Use: Never used   Substance and Sexual Activity    Alcohol use: Yes     Alcohol/week: 1.2 oz     Types: 2 Glasses of wine per week     Comment: 2/day    Drug use: Yes     Types: Marijuana     Comment: 1 gummy every month maybe - last used 2022    Sexual activity: Yes     Partners: Male     Comment: I am menopausal   Other Topics Concern    Not on file   Social History Narrative    Not on file     Social Determinants of Health     Financial Resource Strain: Low Risk     Difficulty of Paying Living Expenses: Not hard at all   Food Insecurity: No Food Insecurity    Worried About Running Out of Food in the Last Year: Never true    Ran Out of Food in the Last Year: Never true   Transportation Needs: No Transportation Needs    Lack of Transportation (Medical): No    Lack of Transportation (Non-Medical): No   Physical Activity: Sufficiently Active    Days of Exercise per Week: 5 days    Minutes of Exercise per Session: 30 min   Stress: No Stress Concern Present    Feeling of Stress : Only a little   Social Connections: Moderately Integrated    Frequency of Communication with Friends and  Family: Twice a week    Frequency of Social Gatherings with Friends and Family: Once a week    Attends Confucianism Services: Never    Active Member of Clubs or Organizations: Yes    Attends Club or Organization Meetings: More than 4 times per year    Marital Status:    Intimate Partner Violence: Not on file   Housing Stability: Low Risk     Unable to Pay for Housing in the Last Year: No    Number of Places Lived in the Last Year: 1    Unstable Housing in the Last Year: No       Family History   Problem Relation Age of Onset    Dementia Mother     Heart Attack Mother     Cancer Sister 81        Lung Cancer, smoker    Hypertension Sister     Lung Disease Sister     Hypertension Brother     Arthritis Brother         RA    Cancer Paternal Aunt         Lung    Cancer Paternal Uncle         Lung       Current Outpatient Medications on File Prior to Visit   Medication Sig Dispense Refill    venlafaxine XR (EFFEXOR XR) 75 MG CAPSULE SR 24 HR Take 75 mg by mouth every day.      levoFLOXacin (LEVAQUIN) 750 MG tablet Take 1 Tablet by mouth every day for 7 days. 7 Tablet 0    azelastine (ASTELIN) 137 MCG/SPRAY nasal spray Administer 1 Spray into affected nostril(S) every day. 30 mL 6    albuterol 108 (90 Base) MCG/ACT Aero Soln inhalation aerosol Inhale 2 Puffs every four hours as needed for Shortness of Breath. 8.5 Each 11    NON SPECIFIED Take 1 Tablet by mouth every day. Bio Identical Hormones      Levomefolate Glucosamine (METHYLFOLATE PO) Take 1 Tablet by mouth every day.      SELENIUM PO Take 1 Tablet by mouth every day.      levothyroxine (SYNTHROID) 25 MCG Tab Take 25 mcg by mouth every morning on an empty stomach.      Cyanocobalamin (B-12) 100 MCG Tab Take 1 Tablet by mouth every day.      B Complex Vitamins (B COMPLEX-B12 PO) Take 1 Tab by mouth every day.      cholecalciferol (DIALYVITE VITAMIN D 5000) 5000 UNIT Cap Take 5,000 Units by mouth every day.      Ascorbic Acid (VITAMIN C) 1000 MG Tab Take 1 Tab by  "mouth every day.      NON SPECIFIED Take 1 Tab by mouth every day. BIO-FLEX       No current facility-administered medications on file prior to visit.       Sulfa drugs      ROS:   Review of Systems   Constitutional:  Negative for chills, diaphoresis, fever, malaise/fatigue and weight loss.   HENT:  Negative for congestion, ear discharge, ear pain, hearing loss, nosebleeds, sinus pain, sore throat and tinnitus.    Eyes:  Negative for blurred vision, double vision, photophobia, pain, discharge and redness.   Respiratory:  Positive for cough, shortness of breath and wheezing. Negative for hemoptysis, sputum production and stridor.    Cardiovascular:  Negative for chest pain, palpitations, orthopnea, claudication, leg swelling and PND.   Gastrointestinal:  Negative for abdominal pain, constipation, diarrhea, heartburn, nausea and vomiting.   Genitourinary:  Negative for dysuria and urgency.   Musculoskeletal:  Negative for back pain, falls, joint pain, myalgias and neck pain.   Skin:  Negative for itching and rash.   Neurological:  Negative for dizziness, tremors, speech change, focal weakness, weakness and headaches.   Endo/Heme/Allergies:  Negative for environmental allergies.   Psychiatric/Behavioral:  Negative for depression.      /64 (BP Location: Right arm, Patient Position: Sitting, BP Cuff Size: Adult)   Pulse 74   Resp 16   Ht 1.702 m (5' 7\")   Wt 67.6 kg (149 lb)   SpO2 92%   Physical Exam  Constitutional:       General: She is not in acute distress.     Appearance: Normal appearance. She is well-developed and normal weight.   HENT:      Head: Normocephalic and atraumatic.      Right Ear: External ear normal.      Left Ear: External ear normal.      Nose: Nose normal. No congestion.      Mouth/Throat:      Mouth: Mucous membranes are moist.      Pharynx: Oropharynx is clear. No oropharyngeal exudate.   Eyes:      General: No scleral icterus.     Extraocular Movements: Extraocular movements intact. "      Conjunctiva/sclera: Conjunctivae normal.      Pupils: Pupils are equal, round, and reactive to light.   Neck:      Vascular: No JVD.      Trachea: No tracheal deviation.   Cardiovascular:      Rate and Rhythm: Normal rate and regular rhythm.      Heart sounds: Normal heart sounds. No murmur heard.    No friction rub. No gallop.   Pulmonary:      Effort: Pulmonary effort is normal. No accessory muscle usage or respiratory distress.      Breath sounds: Normal breath sounds. No wheezing or rales.   Abdominal:      General: There is no distension.      Palpations: Abdomen is soft.      Tenderness: There is no abdominal tenderness.   Musculoskeletal:         General: No tenderness or deformity. Normal range of motion.      Cervical back: Normal range of motion and neck supple.      Right lower leg: No edema.      Left lower leg: No edema.   Lymphadenopathy:      Cervical: No cervical adenopathy.   Skin:     General: Skin is warm and dry.      Findings: No rash.      Nails: There is no clubbing.   Neurological:      Mental Status: She is alert and oriented to person, place, and time.      Cranial Nerves: No cranial nerve deficit.      Gait: Gait normal.   Psychiatric:         Behavior: Behavior normal.       PFTs as reviewed by me personally: PFTs showed borderline obstruction 9/20/22.     Imaging as reviewed by me personally:  as per HPI    Assessment:  1. Cough, unspecified type        2. Adenocarcinoma (HCC)  Bronchoscopy      3. Pleural effusion        4. Collapse of lung tissue        5. Lung nodules        6. Former smoker            Plan:  Chronic following her surgery and at this point I suspect that her RLL pna, chronic cough and globus sensation are related to GERD that has worsened following her surgery and anatomical adjustments. I will start her on pantoprazole 40 mg, discussed lifestyle modifications: upright after meals, spacing last meal and bed by 2 hours and elevation of HOB, consider GI  evaluation  I suspect what we are seeing on her CT is post-operative however ? Airway obstruction to RML. We will proceed with airway evaluation with bronchoscopy  This is physiologic ie fluid filling space that has a void due to pneumonectomy and lack of expansion into space. I do not feel lasix is necessary however we will proceed with bronchoscopy to see if there is obstruction not allowing full lung expansion of the RML  4.   See discussion above; bronchoscopy  5.   Routine f/u in 6 mos  6.   Tobacco free  Return for p[t has f/u in February.

## 2022-12-12 NOTE — TELEPHONE ENCOUNTER
Received request via: Pharmacy  11/1/2022lov  Was the patient seen in the last year in this department? Yes    Does the patient have an active prescription (recently filled or refills available) for medication(s) requested? No    Does the patient have half-way Plus and need 100 day supply (blood pressure, diabetes and cholesterol meds only)? Patient does not have SCP

## 2022-12-14 ENCOUNTER — PRE-ADMISSION TESTING (OUTPATIENT)
Dept: ADMISSIONS | Facility: MEDICAL CENTER | Age: 77
End: 2022-12-14
Attending: INTERNAL MEDICINE
Payer: MEDICARE

## 2022-12-14 NOTE — PREPROCEDURE INSTRUCTIONS
Pre-admit appointment completed.  Pt instructed to continue regularly prescribed medications through the day before surgery. Pt instructed to take the following medications the day of surgery with a sip of water, per anesthesia protocol;  synthroid, protonix, Ellipta, and if needed-albuterol MDI.    MET's= <4. Gets winded with exertion due to asthma and lung issues S/P pneumonia x2 after lobectomy 10/2022.

## 2022-12-15 ENCOUNTER — HOSPITAL ENCOUNTER (OUTPATIENT)
Facility: MEDICAL CENTER | Age: 77
End: 2022-12-15
Attending: INTERNAL MEDICINE | Admitting: INTERNAL MEDICINE
Payer: MEDICARE

## 2022-12-15 ENCOUNTER — ANESTHESIA EVENT (OUTPATIENT)
Dept: SURGERY | Facility: MEDICAL CENTER | Age: 77
End: 2022-12-15
Payer: MEDICARE

## 2022-12-15 ENCOUNTER — APPOINTMENT (OUTPATIENT)
Dept: RADIOLOGY | Facility: MEDICAL CENTER | Age: 77
End: 2022-12-15
Attending: INTERNAL MEDICINE
Payer: MEDICARE

## 2022-12-15 ENCOUNTER — ANESTHESIA (OUTPATIENT)
Dept: SURGERY | Facility: MEDICAL CENTER | Age: 77
End: 2022-12-15
Payer: MEDICARE

## 2022-12-15 VITALS
OXYGEN SATURATION: 90 % | TEMPERATURE: 98.1 F | BODY MASS INDEX: 23.55 KG/M2 | DIASTOLIC BLOOD PRESSURE: 73 MMHG | RESPIRATION RATE: 16 BRPM | SYSTOLIC BLOOD PRESSURE: 121 MMHG | HEIGHT: 67 IN | HEART RATE: 66 BPM | WEIGHT: 150.02 LBS

## 2022-12-15 LAB
APPEARANCE FLD: NORMAL
BASOPHILS NFR FLD: 2 %
BODY FLD TYPE: NORMAL
COLOR FLD: COLORLESS
CYTOLOGY REG CYTOL: NORMAL
EOSINOPHIL NFR FLD: 4 %
HISTIOCYTES NFR FLD: 12 %
LYMPHOCYTES NFR FLD: 26 %
MONONUC CELLS NFR FLD: 44 %
NEUTROPHILS NFR FLD: 12 %
PATHOLOGY CONSULT NOTE: NORMAL
WBC OTHER NFR FLD: 0 %

## 2022-12-15 PROCEDURE — 160046 HCHG PACU - 1ST 60 MINS PHASE II: Performed by: INTERNAL MEDICINE

## 2022-12-15 PROCEDURE — 160048 HCHG OR STATISTICAL LEVEL 1-5: Performed by: INTERNAL MEDICINE

## 2022-12-15 PROCEDURE — 87116 MYCOBACTERIA CULTURE: CPT

## 2022-12-15 PROCEDURE — 89240 UNLISTED MISC PATH TEST: CPT

## 2022-12-15 PROCEDURE — 160029 HCHG SURGERY MINUTES - 1ST 30 MINS LEVEL 4: Performed by: INTERNAL MEDICINE

## 2022-12-15 PROCEDURE — 700105 HCHG RX REV CODE 258: Performed by: INTERNAL MEDICINE

## 2022-12-15 PROCEDURE — 88305 TISSUE EXAM BY PATHOLOGIST: CPT

## 2022-12-15 PROCEDURE — 700101 HCHG RX REV CODE 250: Performed by: ANESTHESIOLOGY

## 2022-12-15 PROCEDURE — 88112 CYTOPATH CELL ENHANCE TECH: CPT

## 2022-12-15 PROCEDURE — 87205 SMEAR GRAM STAIN: CPT | Mod: 91

## 2022-12-15 PROCEDURE — 160041 HCHG SURGERY MINUTES - EA ADDL 1 MIN LEVEL 4: Performed by: INTERNAL MEDICINE

## 2022-12-15 PROCEDURE — 160009 HCHG ANES TIME/MIN: Performed by: INTERNAL MEDICINE

## 2022-12-15 PROCEDURE — 87102 FUNGUS ISOLATION CULTURE: CPT

## 2022-12-15 PROCEDURE — 71045 X-RAY EXAM CHEST 1 VIEW: CPT

## 2022-12-15 PROCEDURE — 160036 HCHG PACU - EA ADDL 30 MINS PHASE I: Performed by: INTERNAL MEDICINE

## 2022-12-15 PROCEDURE — 160002 HCHG RECOVERY MINUTES (STAT): Performed by: INTERNAL MEDICINE

## 2022-12-15 PROCEDURE — 160025 RECOVERY II MINUTES (STATS): Performed by: INTERNAL MEDICINE

## 2022-12-15 PROCEDURE — 99100 ANES PT EXTEME AGE<1 YR&>70: CPT | Performed by: ANESTHESIOLOGY

## 2022-12-15 PROCEDURE — 31624 DX BRONCHOSCOPE/LAVAGE: CPT | Performed by: INTERNAL MEDICINE

## 2022-12-15 PROCEDURE — 700111 HCHG RX REV CODE 636 W/ 250 OVERRIDE (IP): Performed by: ANESTHESIOLOGY

## 2022-12-15 PROCEDURE — 87015 SPECIMEN INFECT AGNT CONCNTJ: CPT

## 2022-12-15 PROCEDURE — 87206 SMEAR FLUORESCENT/ACID STAI: CPT

## 2022-12-15 PROCEDURE — 00520 ANES CLOSED CHEST PX NOS: CPT | Performed by: ANESTHESIOLOGY

## 2022-12-15 PROCEDURE — 87070 CULTURE OTHR SPECIMN AEROBIC: CPT

## 2022-12-15 PROCEDURE — 160035 HCHG PACU - 1ST 60 MINS PHASE I: Performed by: INTERNAL MEDICINE

## 2022-12-15 RX ORDER — SODIUM CHLORIDE, SODIUM LACTATE, POTASSIUM CHLORIDE, CALCIUM CHLORIDE 600; 310; 30; 20 MG/100ML; MG/100ML; MG/100ML; MG/100ML
INJECTION, SOLUTION INTRAVENOUS CONTINUOUS
Status: DISCONTINUED | OUTPATIENT
Start: 2022-12-15 | End: 2022-12-15 | Stop reason: HOSPADM

## 2022-12-15 RX ORDER — MEPERIDINE HYDROCHLORIDE 25 MG/ML
6.25 INJECTION INTRAMUSCULAR; INTRAVENOUS; SUBCUTANEOUS
Status: DISCONTINUED | OUTPATIENT
Start: 2022-12-15 | End: 2022-12-15 | Stop reason: HOSPADM

## 2022-12-15 RX ORDER — LIDOCAINE HYDROCHLORIDE 20 MG/ML
INJECTION, SOLUTION EPIDURAL; INFILTRATION; INTRACAUDAL; PERINEURAL PRN
Status: DISCONTINUED | OUTPATIENT
Start: 2022-12-15 | End: 2022-12-15 | Stop reason: SURG

## 2022-12-15 RX ORDER — ONDANSETRON 2 MG/ML
INJECTION INTRAMUSCULAR; INTRAVENOUS PRN
Status: DISCONTINUED | OUTPATIENT
Start: 2022-12-15 | End: 2022-12-15 | Stop reason: SURG

## 2022-12-15 RX ORDER — ONDANSETRON 2 MG/ML
4 INJECTION INTRAMUSCULAR; INTRAVENOUS
Status: DISCONTINUED | OUTPATIENT
Start: 2022-12-15 | End: 2022-12-15 | Stop reason: HOSPADM

## 2022-12-15 RX ORDER — OXYCODONE HCL 5 MG/5 ML
5 SOLUTION, ORAL ORAL
Status: DISCONTINUED | OUTPATIENT
Start: 2022-12-15 | End: 2022-12-15 | Stop reason: HOSPADM

## 2022-12-15 RX ORDER — OXYCODONE HCL 5 MG/5 ML
10 SOLUTION, ORAL ORAL
Status: DISCONTINUED | OUTPATIENT
Start: 2022-12-15 | End: 2022-12-15 | Stop reason: HOSPADM

## 2022-12-15 RX ORDER — HALOPERIDOL 5 MG/ML
1 INJECTION INTRAMUSCULAR
Status: DISCONTINUED | OUTPATIENT
Start: 2022-12-15 | End: 2022-12-15 | Stop reason: HOSPADM

## 2022-12-15 RX ORDER — DIPHENHYDRAMINE HYDROCHLORIDE 50 MG/ML
12.5 INJECTION INTRAMUSCULAR; INTRAVENOUS
Status: DISCONTINUED | OUTPATIENT
Start: 2022-12-15 | End: 2022-12-15 | Stop reason: HOSPADM

## 2022-12-15 RX ADMIN — SODIUM CHLORIDE, POTASSIUM CHLORIDE, SODIUM LACTATE AND CALCIUM CHLORIDE: 600; 310; 30; 20 INJECTION, SOLUTION INTRAVENOUS at 13:17

## 2022-12-15 RX ADMIN — ONDANSETRON 4 MG: 2 INJECTION INTRAMUSCULAR; INTRAVENOUS at 14:20

## 2022-12-15 RX ADMIN — Medication 100 MG: at 14:20

## 2022-12-15 RX ADMIN — LIDOCAINE HYDROCHLORIDE 100 MG: 20 INJECTION, SOLUTION EPIDURAL; INFILTRATION; INTRACAUDAL at 14:20

## 2022-12-15 RX ADMIN — PROPOFOL 200 MG: 10 INJECTION, EMULSION INTRAVENOUS at 14:20

## 2022-12-15 RX ADMIN — FENTANYL CITRATE 25 MCG: 50 INJECTION, SOLUTION INTRAMUSCULAR; INTRAVENOUS at 15:27

## 2022-12-15 ASSESSMENT — FIBROSIS 4 INDEX: FIB4 SCORE: 0.76

## 2022-12-15 ASSESSMENT — PAIN DESCRIPTION - PAIN TYPE: TYPE: SURGICAL PAIN

## 2022-12-15 NOTE — ANESTHESIA PROCEDURE NOTES
Airway    Date/Time: 12/15/2022 2:20 PM  Performed by: Dano Palmer M.D.  Authorized by: Dano Palmer M.D.     Location:  OR  Urgency:  Elective  Indications for Airway Management:  Anesthesia      Spontaneous Ventilation: absent    Sedation Level:  Deep  Preoxygenated: Yes    Patient Position:  Sniffing  Final Airway Type:  Endotracheal airway  Final Endotracheal Airway:  ETT  Cuffed: Yes    Technique Used for Successful ETT Placement:  Direct laryngoscopy    Insertion Site:  Oral  Blade Type:  Alex  Laryngoscope Blade/Videolaryngoscope Blade Size:  3  ETT Size (mm):  7.5  Measured from:  Teeth  ETT to Teeth (cm):  22  Placement Verified by: auscultation and capnometry    Cormack-Lehane Classification:  Grade I - full view of glottis  Number of Attempts at Approach:  1

## 2022-12-15 NOTE — ANESTHESIA TIME REPORT
Anesthesia Start and Stop Event Times     Date Time Event    12/15/2022 1407 Ready for Procedure     1419 Anesthesia Start     1505 Anesthesia Stop        Responsible Staff  12/15/22    Name Role Begin End    Dano Palmer M.D. Anesth 1419 1505        Overtime Reason:  overtime    Comments:

## 2022-12-15 NOTE — ANESTHESIA PREPROCEDURE EVALUATION
Case: [id number] Date/Time: 12/15/22 1345    Procedure: FIBER OPTIC BRONCHOSCOPY WITH POSSIBLE WASH, BRUSH, BRONCHOALVEOLAR LAVAGE, BIOPSY AND FINE NEEDLE ASPIRATION    Anesthesia type: General    Pre-op diagnosis: ADENOCARCINOMA    Location:  PROCEDURE ROOM / SURGERY [facility]    Surgeons: Aislinn Alvarez D.O.          Relevant Problems   PULMONARY   (positive) Asthma   (positive) Asthma, moderate persistent      CARDIAC   (positive) Benign essential HTN      ENDO   (positive) Hypothyroidism       Physical Exam    Airway   Mallampati: II  TM distance: >3 FB  Neck ROM: full       Cardiovascular - normal exam  Rhythm: regular  Rate: normal  (-) murmur     Dental - normal exam           Pulmonary - normal exam  Breath sounds clear to auscultation     Abdominal    Neurological - normal exam                 Anesthesia Plan    ASA 2       Plan - general       Airway plan will be ETT          Induction: intravenous    Postoperative Plan: Postoperative administration of opioids is intended.    Pertinent diagnostic labs and testing reviewed    Informed Consent:    Anesthetic plan and risks discussed with patient.    Use of blood products discussed with: patient whom consented to blood products.

## 2022-12-15 NOTE — ANESTHESIA POSTPROCEDURE EVALUATION
Patient: Alissa Bonilla    Procedure Summary     Date: 12/15/22 Room / Location:  PROCEDURE ROOM / SURGERY [facility]    Anesthesia Start: 1419 Anesthesia Stop: 1454    Procedure: FIBER OPTIC BRONCHOSCOPY WITH WASH, AND BIOPSY Diagnosis: (ADENOCARCINOMA)    Surgeons: Aislinn Alvarez D.O. Responsible Provider: Dano Palmer M.D.    Anesthesia Type: general ASA Status: 2          Final Anesthesia Type: general  Last vitals  BP   Blood Pressure : 121/80    Temp   36.3 °C (97.3 °F)    Pulse   72   Resp   16    SpO2   94 %      Anesthesia Post Evaluation    Patient location during evaluation: PACU  Patient participation: complete - patient participated  Level of consciousness: awake and alert    Airway patency: patent  Anesthetic complications: no  Cardiovascular status: hemodynamically stable  Respiratory status: acceptable  Hydration status: euvolemic    PONV: none          There were no known notable events for this encounter.     Nurse Pain Score: 0 (NPRS)

## 2022-12-15 NOTE — PROCEDURES
Bronchoscopy Procedure Note      Results/Findings: tracheal polyp x2    Specimen: RML BAL, biopsy of polyp      Location: [facility] Endoscopy Suite    Date of Operation: 12/15/2022     Attending Physician Performing Procedure: Aislinn Alvarez D.O.     Pre-op Diagnosis: cough    Post-op Diagnosis: same    Anesthesia: General, administered by Dr Palmer    Operation:   Diagnostic flexible fiberoptic bronchoscopy, with bronchoalveolar lavage, endobronchial biopsies    Estimated Blood Loss: none    Complications: none    Indications and History:  The risks, benefits, complications, treatment options and expected outcomes were discussed with the patient. The possibilities of reaction to medication, pulmonary aspiration, perforation of a viscus, bleeding, failure to diagnose a condition and creating a complication requiring transfusion or operation were discussed with the patient who freely signed the consent.    Description of Procedure:    The patient was seen in the Pre-op area and interval H&P was verified. The patient was taken to the endoscopy suite, identified as Alissa Bonilla and a Time Out was held and the above information confirmed. After the induction of general anesthesia the bronchoscope was passed through the ETT .  Careful inspection of the tracheal lumen was accomplished. The scope was sequentially passed into the left main and then left upper and lower bronchi and segmental bronchi. The scope was then withdrawn and advanced into the right main bronchus and then into the RML, and RLL bronchi and segmental bronchi.    Findings:    Trachea: two polyps approx 1 cm proximal to the shaniqua  Shaniqua: normal bronchial mucosa  Right main bronchus: normal bronchial mucosa  Right upper lobe bronchus: surgical absent, well healed scar seen  Right middle lobe bronchus: lateral segment collapsed, opened with saline administration   Right lower lobe bronchus: normal bronchial mucosa  Left main  bronchus: normal bronchial mucosa  Left upper lobe bronchus: normal bronchial mucosa  Left lower lobe bronchus: normal bronchial mucosa    Bronchoalveolar lavage was subsequently performed in the lateral segment of the RML. Lavage specimen was clear in gross appearance.    Endobronchial biopsy of the polyps was performed.      CXR was used to confirm no post-procedural pneumothorax.    The Patient was subsequently taken to the PACU in satisfactory condition.    Steve, , was notified of the results of the procedure who will be driving patient home after recovery in PACU.          Aislinn Alvarez,    Pulmonary and Critical Care

## 2022-12-15 NOTE — DISCHARGE INSTRUCTIONS
If any questions arise, call your provider.  If your provider is not available, please feel free to call the [facility] at [phone].    MEDICATIONS: Resume taking daily medication.  Take prescribed pain medication with food.  If no medication is prescribed, you may take non-aspirin pain medication if needed.  PAIN MEDICATION CAN BE VERY CONSTIPATING.  Take a stool softener or laxative such as senokot, pericolace, or milk of magnesia if needed.    What to Expect Post Anesthesia    Rest and take it easy for the first 24 hours.  A responsible adult is recommended to remain with you during that time.  It is normal to feel sleepy.  We encourage you to not do anything that requires balance, judgment or coordination.    FOR 24 HOURS DO NOT:  Drive, operate machinery or run household appliances.  Drink beer or alcoholic beverages.  Make important decisions or sign legal documents.    To avoid nausea, slowly advance diet as tolerated, avoiding spicy or greasy foods for the first day.  Add more substantial food to your diet according to your provider's instructions.  INCREASE FLUIDS AND FIBER TO AVOID CONSTIPATION.    MILD FLU-LIKE SYMPTOMS ARE NORMAL.  YOU MAY EXPERIENCE GENERALIZED MUSCLE ACHES, THROAT IRRITATION, HEADACHE AND/OR SOME NAUSEA.    Chronic Bronchitis, Adult  Chronic bronchitis is long-lasting inflammation of the tubes that carry air into your lungs (bronchial tubes). This is inflammation that occurs:  On most days of the week.  For at least three months at a time.  Over a period of two years in a row.  When the bronchial tubes are inflamed, they start to produce mucus. The inflammation and buildup of mucus make it more difficult to breathe. Chronic bronchitis is usually a permanent problem. It is one type of chronic obstructive pulmonary disease (COPD). People with chronic bronchitis are more likely to get frequent colds or respiratory infections.  What are the causes?  Chronic bronchitis most often  occurs in people who:  Have chronic, severe asthma.  Have a history of smoking.  Have asthma and smoke.  Have certain lung diseases.  Have had long-term exposure to certain irritating fumes or chemicals.  What are the signs or symptoms?  Symptoms of chronic bronchitis may include:  A cough that brings up mucus (productive cough).  Shortness of breath.  Loud breathing (wheezing).  Chest discomfort.  Frequent (recurring) colds or respiratory infections.  Certain things can trigger chronic bronchitis symptoms or make them worse, such as:  Infections.  Stopping certain medicines.  Smoking.  Exposure to chemicals.  How is this diagnosed?  This condition may be diagnosed based on:  Your symptoms and medical history.  A physical exam.  A chest X-ray.  Lung (pulmonary) function tests.  How is this treated?  There is no cure for chronic bronchitis, but treatment can help control your symptoms. Treatment may include:  Using a cool mist vaporizer or humidifier to make it easier to breathe.  Drinking more fluids. Drinking more makes your mucus thinner, which may make it easier to breathe.  Lifestyle changes, such as eating a healthier diet and getting more exercise.  Medicines, such as:  Inhalers to improve air flow in and out of your lungs.  Antibiotics to treat any bacterial infections you have, such as:  Lung infection (pneumonia).  Sinus infection.  A sudden, severe (acute) episode of bronchitis.  Oxygen therapy.  Preventing infections by keeping up to date on vaccinations, including the pneumonia and flu vaccines.  Pulmonary rehabilitation. This is a program that helps you manage your breathing problems and improve your quality of life. It may last for up to 4-12 weeks and may include exercise programs, education, counseling, and treatment support.  Follow these instructions at home:  Medicines  Take over-the-counter and prescription medicines only as told by your health care provider.  If you were prescribed an antibiotic  medicine, take it as told by your health care provider. Do not stop taking the antibiotic even if you start to feel better.  Preventing infections  Get vaccinations as told by your health care provider. Make sure you get a flu shot (influenza vaccine) every year.  Wash your hands often with soap and water. If soap and water are not available, use hand .  Avoid contact with people who have symptoms of a cold or the flu.  Managing symptoms    Do not smoke, and avoid secondhand smoke. Exposure to cigarette smoke or irritating chemicals will make bronchitis worse. If you smoke and you need help quitting, ask your health care provider. Quitting smoking will help your lungs heal faster.  Use an inhaler, cool mist vaporizer, or humidifier as told by your health care provider.  Avoid pollen, dust, animal dander, molds, smoke, and other things that cause shortness of breath or wheezing attacks.  Use oxygen therapy at home as directed. Follow instructions from your health care provider about how to use oxygen safely and take precautions to prevent fire. Make sure you never smoke while using oxygen or allow others to smoke in your home.  Do not wait to get medical care if you have any concerning symptoms or trouble breathing. Waiting could cause permanent injury and may be life threatening.  General instructions  Talk with your health care provider about what activities are safe for you and about possible exercise routines. Regular exercise is very important to help you feel better.  Drink enough fluids to keep your urine pale yellow.  Keep all follow-up visits as told by your health care provider. This is important.  Contact a health care provider if:  You have coughing or shortness of breath that gets worse.  You have muscle aches.  You have chest pain.  Your mucus seems to get thicker.  Your mucus changes from clear or white to yellow, green, gray, or bloody.  Get help right away if:  Your usual medicines do not  stop your wheezing.  You have severe difficulty breathing.  These symptoms may represent a serious problem that is an emergency. Do not wait to see if the symptoms will go away. Get medical help right away. Call your local emergency services (911 in the [place] Do not drive yourself to the hospital.  Summary  Chronic bronchitis is long-lasting inflammation of the tubes that carry air into your lungs (bronchial tubes).  Chronic bronchitis is usually a permanent problem. It is one type of chronic obstructive pulmonary disease (COPD).  There is no cure for chronic bronchitis, but treatment can help control your symptoms.  Do not smoke, and avoid secondhand smoke. Exposure to cigarette smoke or irritating chemicals will make bronchitis worse.  This information is not intended to replace advice given to you by your health care provider. Make sure you discuss any questions you have with your health care provider.  Document Released: 10/05/2007 Document Revised: 10/10/2019 Document Reviewed: 11/07/2018  [employer] Patient Education © 2020 [employer]

## 2022-12-15 NOTE — OR NURSING
1450: Pt arrived to PACU. Respirations even and unlabored. VSS. Dressing clean, dry and intact.      1500: +productive cough, denies nausea, respirations even and unlabored between coughing episodes    1515: no change in status, continues to have episodes of coughing, denies nausea    1527: c/o lung pain 5/10 and given fentanyl 25mcg    1530: pain and coughing improving, respirations even and unlabored, denies nausea    1540: X-ray at bedside    1550: Report given to Felicita RN for break relief, pt speaking in complete sentences, respirations even and unlabored, coughing improved    1625: report received from Kacie DAVIS, restorations even and unlabored, denies pain/nausea, transported to stage II

## 2022-12-16 LAB
GRAM STN SPEC: NORMAL
RHODAMINE-AURAMINE STN SPEC: NORMAL
SIGNIFICANT IND 70042: NORMAL
SIGNIFICANT IND 70042: NORMAL
SITE SITE: NORMAL
SITE SITE: NORMAL
SOURCE SOURCE: NORMAL
SOURCE SOURCE: NORMAL

## 2022-12-16 NOTE — OR NURSING
1641: Pt to stage 2 from PACU. Dressed w/ help of CNA. Denies pain or nausea. Tolerating small sips of clears    1645: Discharge instructions provided to pt and family, all questions answered.     1700: PIV removed, tip intact. Discharged to care of family after uneventful PACU stay via wheelchair by CNA.

## 2022-12-17 LAB
BACTERIA SPEC RESP CULT: NORMAL
FUNGUS SPEC FUNGUS STN: NORMAL
GRAM STN SPEC: NORMAL
SIGNIFICANT IND 70042: NORMAL
SIGNIFICANT IND 70042: NORMAL
SITE SITE: NORMAL
SITE SITE: NORMAL
SOURCE SOURCE: NORMAL
SOURCE SOURCE: NORMAL

## 2022-12-21 ENCOUNTER — TELEPHONE (OUTPATIENT)
Dept: SLEEP MEDICINE | Facility: MEDICAL CENTER | Age: 77
End: 2022-12-21
Payer: MEDICARE

## 2022-12-21 NOTE — TELEPHONE ENCOUNTER
Called to notify patient about her bronchoscopy results. She was unavailable at home and on her cell. Left message and told her that I would try to call her back later.     Aislinn Alvarez, DO   Pulmonary and Critical Care

## 2023-02-13 ENCOUNTER — OFFICE VISIT (OUTPATIENT)
Dept: SLEEP MEDICINE | Facility: MEDICAL CENTER | Age: 78
End: 2023-02-13
Payer: MEDICARE

## 2023-02-13 VITALS
BODY MASS INDEX: 23.54 KG/M2 | RESPIRATION RATE: 16 BRPM | SYSTOLIC BLOOD PRESSURE: 120 MMHG | OXYGEN SATURATION: 92 % | DIASTOLIC BLOOD PRESSURE: 74 MMHG | HEART RATE: 69 BPM | HEIGHT: 67 IN | WEIGHT: 150 LBS

## 2023-02-13 DIAGNOSIS — C80.1 ADENOCARCINOMA (HCC): ICD-10-CM

## 2023-02-13 DIAGNOSIS — Z87.891 FORMER SMOKER: ICD-10-CM

## 2023-02-13 DIAGNOSIS — R91.8 LUNG NODULES: ICD-10-CM

## 2023-02-13 DIAGNOSIS — K21.9 GASTROESOPHAGEAL REFLUX DISEASE, UNSPECIFIED WHETHER ESOPHAGITIS PRESENT: ICD-10-CM

## 2023-02-13 DIAGNOSIS — J45.40 MODERATE PERSISTENT ASTHMA WITHOUT COMPLICATION: ICD-10-CM

## 2023-02-13 DIAGNOSIS — J98.11 COLLAPSE OF LUNG TISSUE: ICD-10-CM

## 2023-02-13 PROCEDURE — 99214 OFFICE O/P EST MOD 30 MIN: CPT | Performed by: INTERNAL MEDICINE

## 2023-02-13 RX ORDER — ALBUTEROL SULFATE 2.5 MG/3ML
2.5 SOLUTION RESPIRATORY (INHALATION) EVERY 4 HOURS PRN
Qty: 120 ML | Refills: 11 | Status: SHIPPED | OUTPATIENT
Start: 2023-02-13 | End: 2023-02-23 | Stop reason: SDUPTHER

## 2023-02-13 RX ORDER — FLUTICASONE FUROATE, UMECLIDINIUM BROMIDE AND VILANTEROL TRIFENATATE 100; 62.5; 25 UG/1; UG/1; UG/1
1 POWDER RESPIRATORY (INHALATION) DAILY
Qty: 3 EACH | Refills: 3 | Status: SHIPPED | OUTPATIENT
Start: 2023-02-13 | End: 2023-07-06

## 2023-02-13 RX ORDER — FLUTICASONE FUROATE, UMECLIDINIUM BROMIDE AND VILANTEROL TRIFENATATE 100; 62.5; 25 UG/1; UG/1; UG/1
1 POWDER RESPIRATORY (INHALATION) DAILY
Qty: 2 EACH | Refills: 0 | COMMUNITY
Start: 2023-02-13 | End: 2023-07-06

## 2023-02-13 ASSESSMENT — ENCOUNTER SYMPTOMS
SORE THROAT: 0
MYALGIAS: 0
COUGH: 0
ORTHOPNEA: 0
SINUS PAIN: 0
DOUBLE VISION: 0
WEIGHT LOSS: 0
DIARRHEA: 0
CHILLS: 0
SPUTUM PRODUCTION: 0
WHEEZING: 0
FEVER: 0
ABDOMINAL PAIN: 0
TREMORS: 0
HEMOPTYSIS: 0
PHOTOPHOBIA: 0
HEADACHES: 0
FALLS: 0
BACK PAIN: 0
EYE PAIN: 0
EYE REDNESS: 0
CONSTIPATION: 0
EYE DISCHARGE: 0
FOCAL WEAKNESS: 0
PND: 0
SPEECH CHANGE: 0
VOMITING: 0
NECK PAIN: 0
DEPRESSION: 0
SHORTNESS OF BREATH: 0
WEAKNESS: 0
DIAPHORESIS: 0
DIZZINESS: 0
CLAUDICATION: 0
PALPITATIONS: 0
NAUSEA: 0
BLURRED VISION: 0
STRIDOR: 0
HEARTBURN: 0

## 2023-02-13 ASSESSMENT — FIBROSIS 4 INDEX: FIB4 SCORE: 0.76

## 2023-02-13 NOTE — PROGRESS NOTES
Chief Complaint   Patient presents with    Follow-Up     LAST SEEN 12/12/22    Results     Bronch 12/15/22 Aislinn Alvarez DO          HPI: This patient is a 77 y.o. female whom is followed in our clinic for asthma and recent hx of resection of RUL adenoCa last seen by me on 12/2/22.  PMHx includes asthma for which she was previously on only prn GILMER and PND with associated chronic productive cough. She is a former smoker with 34 pk year hx and quit in roughly 2005. Pt lung cancer screening CT in 11/2019 showing RUL 2.2 cm GGO. Repeat CT in 6/2020 showed stability however no surveillance imaging was performed in 2021 and CT 7/2022 showed increase in RUL GGO to up to 3 cm in diameter now with solid component. A PET form 8/10 showed low level FDG uptake with SUV of 4.86 and mild uptake in 9 mm paratracheal LN as well as smaller 7 mm paratracheal node. She had bronchscopy with transbronchial bx demonstrating adenoCa however there was unsuccessful attempt to biopsy LN with EBUS and she underwent thoracoscopic right upper lobectomy with mediastinoscopy by Dr Ganser which revealed no e/o metastatic disease on 9/26/22 and I saw her shortly thereafter on 10/5. She is also followed by oncology whom she saw via telemedicine on 9/29 at which point routine surveillance imaging was ordered for stage Ib disease however she developed a RLL pna noted on CTA in ED on 10/9 when she presented with SOB for which she was tx with augmentin and doxycycline. Sxs persisted and she developed a cough with copious mucous production as well as wheezing worse at night. Sxs improve with elevation of her HOB and PPI for GERD. Repeat CT chest on 12/8/22 fshowed resolution of prior pna but some patchy areas of GGO throughout her RLL, worsening collapse of her RML with some nodules there up to 6 mm in size and narrowing of that airway with associated calicification not seen by me on the CT in October. Her esophagus is dilated and she has small  hiatal hernia. Bronchoscopy was performed on 12/15/22 with BAL for RML collapse. BAL fluid was benign. 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 She felt markedly improved immediately after the bronchoscopy however cough which is worse at night or when exposed to cold air has returned as well as LARKIN. Cough is so severe that it sometimes causes post-tussive emesis. She is using albuterol >4x daily in addition to Anoro. No antibiotics since December. She does have copious, thick mucous that is sometimes difficult to clear. Mucinex and manual cupping has helped but only temporarily.    Past Medical History:   Diagnosis Date    Anesthesia     PONV    Asthma     Bronchitis     last time approx 2012.    Cancer (HCC) 10/2022    Lung-had lobectomy. No chemo or radiation.    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 No chemo or radiation.    Cough     Chronic since pneumonia 11/2022    Depression     Disorder of thyroid     GERD (gastroesophageal reflux disease)     taking pantoprazole    Hiatus hernia syndrome     mild    Pain 12/14/2022    Throat and chest due to Pneumonia (11/2022 & 12/2022).    Pneumonia 11/2022    x2(end of November and early December) after lobectomy.    PONV (postoperative nausea and vomiting)     Shortness of breath     12/14/22-after pneumonia x2 (11/2022 & 12/2022) S/P lobectomy.    Snoring     Tobacco use 09/17/2019    Urinary incontinence     a little stress incontinence.       Social History     Socioeconomic History    Marital status:      Spouse name: Not on file    Number of children: Not on file    Years of education: Not on file    Highest education level: Bachelor's degree (e.g., BA, AB, BS)   Occupational History    Not on file   Tobacco Use    Smoking status: Former     Packs/day: 1.00     Years: 34.00     Pack years: 34.00     Types: Cigarettes     Start date: 1/1/1968     Quit date: 1/1/2005      Years since quittin.1     Passive exposure: Past    Smokeless tobacco: Never   Vaping Use    Vaping Use: Never used   Substance and Sexual Activity    Alcohol use: Not Currently     Alcohol/week: 8.4 - 12.6 oz     Types: 14 - 21 Glasses of wine per week     Comment: 2/day    Drug use: Not Currently     Types: Marijuana     Comment: 1 gummy every month maybe - last 2022    Sexual activity: Yes     Partners: Male     Comment: I am menopausal   Other Topics Concern    Not on file   Social History Narrative    Not on file     Social Determinants of Health     Financial Resource Strain: Low Risk     Difficulty of Paying Living Expenses: Not hard at all   Food Insecurity: No Food Insecurity    Worried About Running Out of Food in the Last Year: Never true    Ran Out of Food in the Last Year: Never true   Transportation Needs: No Transportation Needs    Lack of Transportation (Medical): No    Lack of Transportation (Non-Medical): No   Physical Activity: Sufficiently Active    Days of Exercise per Week: 5 days    Minutes of Exercise per Session: 30 min   Stress: No Stress Concern Present    Feeling of Stress : Only a little   Social Connections: Moderately Integrated    Frequency of Communication with Friends and Family: Twice a week    Frequency of Social Gatherings with Friends and Family: Once a week    Attends Buddhism Services: Never    Active Member of Clubs or Organizations: Yes    Attends Club or Organization Meetings: More than 4 times per year    Marital Status:    Intimate Partner Violence: Not on file   Housing Stability: Low Risk     Unable to Pay for Housing in the Last Year: No    Number of Places Lived in the Last Year: 1    Unstable Housing in the Last Year: No       Family History   Problem Relation Age of Onset    Dementia Mother     Heart Attack Mother     Cancer Sister 81        Lung Cancer, smoker    Hypertension Sister     Lung Disease Sister     Hypertension Brother     Arthritis  Brother         RA    Cancer Paternal Aunt         Lung    Cancer Paternal Uncle         Lung       Current Outpatient Medications on File Prior to Visit   Medication Sig Dispense Refill    umeclidinium-vilanterol (ANORO ELLIPTA) 62.5-25 MCG/ACT AEROSOL POWDER, BREATH ACTIVATED inhaler Inhale 1 Puff every day. 1 Each 3    venlafaxine XR (EFFEXOR XR) 75 MG CAPSULE SR 24 HR Take 75 mg by mouth every day.      pantoprazole (PROTONIX) 40 MG Tablet Delayed Response Take 1 Tablet by mouth every day. 90 Tablet 3    azelastine (ASTELIN) 137 MCG/SPRAY nasal spray Administer 1 Spray into affected nostril(S) every day. (Patient taking differently: Administer 1 Spray into affected nostril(S) 1 time a day as needed.) 30 mL 6    albuterol 108 (90 Base) MCG/ACT Aero Soln inhalation aerosol Inhale 2 Puffs every four hours as needed for Shortness of Breath. 8.5 Each 11    Levomefolate Glucosamine (METHYLFOLATE PO) Take 1 Tablet by mouth every day.      SELENIUM PO Take 1 Tablet by mouth every day.      levothyroxine (SYNTHROID) 25 MCG Tab Take 25 mcg by mouth every morning on an empty stomach.      Cyanocobalamin (B-12) 100 MCG Tab Take 1 Tablet by mouth every day.      B Complex Vitamins (B COMPLEX-B12 PO) Take 1 Tab by mouth every day.      cholecalciferol (DIALYVITE VITAMIN D 5000) 5000 UNIT Cap Take 5,000 Units by mouth every day.      Ascorbic Acid (VITAMIN C) 1000 MG Tab Take 1 Tab by mouth every day.      NON SPECIFIED Take 1 Tab by mouth every day. BIO-FLEX      NON SPECIFIED Take 1 Tablet by mouth every day. Bio Identical Hormones       No current facility-administered medications on file prior to visit.       Other environmental and Sulfa drugs      ROS:   Review of Systems   Constitutional:  Negative for chills, diaphoresis, fever, malaise/fatigue and weight loss.   HENT:  Negative for congestion, ear discharge, ear pain, hearing loss, nosebleeds, sinus pain, sore throat and tinnitus.    Eyes:  Negative for blurred  "vision, double vision, photophobia, pain, discharge and redness.   Respiratory:  Negative for cough, hemoptysis, sputum production, shortness of breath, wheezing and stridor.    Cardiovascular:  Negative for chest pain, palpitations, orthopnea, claudication, leg swelling and PND.   Gastrointestinal:  Negative for abdominal pain, constipation, diarrhea, heartburn, nausea and vomiting.   Genitourinary:  Negative for dysuria and urgency.   Musculoskeletal:  Negative for back pain, falls, joint pain, myalgias and neck pain.   Skin:  Negative for itching and rash.   Neurological:  Negative for dizziness, tremors, speech change, focal weakness, weakness and headaches.   Endo/Heme/Allergies:  Negative for environmental allergies.   Psychiatric/Behavioral:  Negative for depression.      /74   Pulse 69   Resp 16   Ht 1.702 m (5' 7\")   Wt 68 kg (150 lb)   SpO2 92%   Physical Exam  Constitutional:       General: She is not in acute distress.     Appearance: Normal appearance. She is well-developed and normal weight.   HENT:      Head: Normocephalic and atraumatic.      Right Ear: External ear normal.      Left Ear: External ear normal.      Nose: Nose normal. No congestion.      Mouth/Throat:      Mouth: Mucous membranes are moist.      Pharynx: Oropharynx is clear. No oropharyngeal exudate.   Eyes:      General: No scleral icterus.     Extraocular Movements: Extraocular movements intact.      Conjunctiva/sclera: Conjunctivae normal.      Pupils: Pupils are equal, round, and reactive to light.   Neck:      Vascular: No JVD.      Trachea: No tracheal deviation.   Cardiovascular:      Rate and Rhythm: Normal rate and regular rhythm.      Heart sounds: Normal heart sounds. No murmur heard.    No friction rub. No gallop.   Pulmonary:      Effort: Pulmonary effort is normal. No accessory muscle usage or respiratory distress.      Breath sounds: Normal breath sounds. No wheezing or rales.   Abdominal:      General: There " is no distension.      Palpations: Abdomen is soft.      Tenderness: There is no abdominal tenderness.   Musculoskeletal:         General: No tenderness or deformity. Normal range of motion.      Cervical back: Normal range of motion and neck supple.      Right lower leg: No edema.      Left lower leg: No edema.   Lymphadenopathy:      Cervical: No cervical adenopathy.   Skin:     General: Skin is warm and dry.      Findings: No rash.      Nails: There is no clubbing.   Neurological:      Mental Status: She is alert and oriented to person, place, and time.      Cranial Nerves: No cranial nerve deficit.      Gait: Gait normal.   Psychiatric:         Behavior: Behavior normal.       PFTs as reviewed by me personally: PFTs pre-operatively in September were essentially normal with borderline air flow obstruction    Imaging as reviewed by me personally:  as per hPI    Assessment:  1. Moderate persistent asthma without complication  DME Other    DME Nebulizer      2. Adenocarcinoma (HCC)  CT-CHEST ([facility]) W/O      3. Collapse of lung tissue  CT-CHEST ([facility]) W/O    DME Other    DME Nebulizer      4. Lung nodules  CT-CHEST ([facility]OR[facility]) W/O      5. Former smoker        6. Gastroesophageal reflux disease, unspecified whether esophagitis present            Plan:  Chronic dx and sxs suggest worsening RAD but I suspect this is also due to intermittent RML collapse. We will treat for that as per below and add ICS to anoro by changing to trelegy  S/p resection of stage Ib disease. Surveillance imaging.  This is new since resection of adenoCa. She has difficulty clearing mucous which is likely contributing signficantly. Patient has had productive cough for longer than 4 months, and CPT is inappropriate because patient she failed it as sole therapy. Patient has tried/failed manual chest physiotherapy and recurrent sxs despite recent bronchoscopy. Please send order for Vest. We will provide nebulizer to be used with vest with  albuterol twice daily. Mucinex prior to treatments. Acapella valve can be used as needed between therapy and we will repeat CT next month to evaluate progress. Covered Diagnoses COPD.   I suspect the newer nodules were possibly aspiration. Repeat CT next month.   Tobacco free. Encouraged ongoing abstinence  There was some concern for aspiration PNA. No recurrent pna and pt has improved GERD sxs on PPI; she is also sleeping with SOB elevated    Return in about 4 months (around 6/13/2023) for ct chest in March; please put pt on cancellation list .

## 2023-02-13 NOTE — PATIENT INSTRUCTIONS
Mucinex; guaifenesin 600 mg up to twice daily before breathing treatmetn    Nebulize albuterol twice daily with chest vest; can increase to 3-4x if necessary    Acapella/flutter valve as much as needed

## 2023-02-20 ENCOUNTER — PATIENT MESSAGE (OUTPATIENT)
Dept: SLEEP MEDICINE | Facility: MEDICAL CENTER | Age: 78
End: 2023-02-20
Payer: MEDICARE

## 2023-02-20 DIAGNOSIS — J45.40 MODERATE PERSISTENT ASTHMA WITHOUT COMPLICATION: ICD-10-CM

## 2023-02-20 DIAGNOSIS — R05.9 COUGH, UNSPECIFIED TYPE: ICD-10-CM

## 2023-02-23 RX ORDER — ALBUTEROL SULFATE 2.5 MG/3ML
2.5 SOLUTION RESPIRATORY (INHALATION) EVERY 4 HOURS PRN
Qty: 120 ML | Refills: 11 | Status: SHIPPED | OUTPATIENT
Start: 2023-02-23

## 2023-03-11 ENCOUNTER — PATIENT MESSAGE (OUTPATIENT)
Dept: MEDICAL GROUP | Facility: LAB | Age: 78
End: 2023-03-11
Payer: MEDICARE

## 2023-03-11 DIAGNOSIS — R30.0 DYSURIA: ICD-10-CM

## 2023-03-13 ENCOUNTER — HOSPITAL ENCOUNTER (OUTPATIENT)
Dept: RADIOLOGY | Facility: MEDICAL CENTER | Age: 78
End: 2023-03-13
Attending: INTERNAL MEDICINE
Payer: MEDICARE

## 2023-03-13 ENCOUNTER — HOSPITAL ENCOUNTER (OUTPATIENT)
Facility: MEDICAL CENTER | Age: 78
End: 2023-03-13
Attending: FAMILY MEDICINE
Payer: MEDICARE

## 2023-03-13 DIAGNOSIS — C80.1 ADENOCARCINOMA (HCC): ICD-10-CM

## 2023-03-13 DIAGNOSIS — R30.0 DYSURIA: ICD-10-CM

## 2023-03-13 DIAGNOSIS — R91.8 LUNG NODULES: ICD-10-CM

## 2023-03-13 DIAGNOSIS — J98.11 COLLAPSE OF LUNG TISSUE: ICD-10-CM

## 2023-03-13 LAB
APPEARANCE UR: CLEAR
BACTERIA #/AREA URNS HPF: NEGATIVE /HPF
BILIRUB UR QL STRIP.AUTO: NEGATIVE
COLOR UR: YELLOW
EPI CELLS #/AREA URNS HPF: ABNORMAL /HPF
GLUCOSE UR STRIP.AUTO-MCNC: NEGATIVE MG/DL
HYALINE CASTS #/AREA URNS LPF: ABNORMAL /LPF
KETONES UR STRIP.AUTO-MCNC: NEGATIVE MG/DL
LEUKOCYTE ESTERASE UR QL STRIP.AUTO: ABNORMAL
MICRO URNS: ABNORMAL
NITRITE UR QL STRIP.AUTO: NEGATIVE
PH UR STRIP.AUTO: 6 [PH] (ref 5–8)
PROT UR QL STRIP: NEGATIVE MG/DL
RBC # URNS HPF: ABNORMAL /HPF
RBC UR QL AUTO: NEGATIVE
SP GR UR STRIP.AUTO: 1.01
UROBILINOGEN UR STRIP.AUTO-MCNC: 0.2 MG/DL
WBC #/AREA URNS HPF: ABNORMAL /HPF

## 2023-03-13 PROCEDURE — 81001 URINALYSIS AUTO W/SCOPE: CPT

## 2023-03-13 PROCEDURE — 71250 CT THORAX DX C-: CPT

## 2023-03-14 ENCOUNTER — PATIENT MESSAGE (OUTPATIENT)
Dept: SLEEP MEDICINE | Facility: MEDICAL CENTER | Age: 78
End: 2023-03-14
Payer: MEDICARE

## 2023-03-14 DIAGNOSIS — J98.11 COLLAPSE OF LUNG TISSUE: ICD-10-CM

## 2023-03-14 DIAGNOSIS — R05.9 COUGH, UNSPECIFIED TYPE: ICD-10-CM

## 2023-03-20 ENCOUNTER — APPOINTMENT (OUTPATIENT)
Dept: MEDICAL GROUP | Facility: LAB | Age: 78
End: 2023-03-20
Payer: MEDICARE

## 2023-04-06 ENCOUNTER — HOSPITAL ENCOUNTER (OUTPATIENT)
Dept: HEMATOLOGY ONCOLOGY | Facility: MEDICAL CENTER | Age: 78
End: 2023-04-06
Attending: STUDENT IN AN ORGANIZED HEALTH CARE EDUCATION/TRAINING PROGRAM
Payer: MEDICARE

## 2023-04-06 VITALS
RESPIRATION RATE: 14 BRPM | BODY MASS INDEX: 24.2 KG/M2 | WEIGHT: 154.21 LBS | DIASTOLIC BLOOD PRESSURE: 80 MMHG | TEMPERATURE: 97.9 F | SYSTOLIC BLOOD PRESSURE: 126 MMHG | OXYGEN SATURATION: 95 % | HEART RATE: 72 BPM | HEIGHT: 67 IN

## 2023-04-06 DIAGNOSIS — Z00.6 RESEARCH STUDY PATIENT: ICD-10-CM

## 2023-04-06 DIAGNOSIS — C34.91 PRIMARY MALIGNANT NEOPLASM OF RIGHT LUNG (HCC): ICD-10-CM

## 2023-04-06 PROCEDURE — 99212 OFFICE O/P EST SF 10 MIN: CPT

## 2023-04-06 PROCEDURE — 99214 OFFICE O/P EST MOD 30 MIN: CPT | Performed by: STUDENT IN AN ORGANIZED HEALTH CARE EDUCATION/TRAINING PROGRAM

## 2023-04-06 ASSESSMENT — PATIENT HEALTH QUESTIONNAIRE - PHQ9
SUM OF ALL RESPONSES TO PHQ9 QUESTIONS 1 AND 2: 0
1. LITTLE INTEREST OR PLEASURE IN DOING THINGS: NOT AT ALL
2. FEELING DOWN, DEPRESSED, IRRITABLE, OR HOPELESS: NOT AT ALL

## 2023-04-06 ASSESSMENT — ENCOUNTER SYMPTOMS
NECK PAIN: 0
ABDOMINAL PAIN: 0
MEMORY LOSS: 0
WHEEZING: 0
BRUISES/BLEEDS EASILY: 0
SENSORY CHANGE: 0
DEPRESSION: 0
HEARTBURN: 0
SHORTNESS OF BREATH: 0
DIZZINESS: 0
ORTHOPNEA: 0
FEVER: 0
VOMITING: 0
HEADACHES: 0
PALPITATIONS: 0
BLURRED VISION: 0
COUGH: 0
SPUTUM PRODUCTION: 0
TINGLING: 0
SORE THROAT: 0
NAUSEA: 0
FOCAL WEAKNESS: 0
CHILLS: 0
WEIGHT LOSS: 0
TREMORS: 0

## 2023-04-06 ASSESSMENT — FIBROSIS 4 INDEX: FIB4 SCORE: 0.76

## 2023-04-06 NOTE — ADDENDUM NOTE
Encounter addended by: Vonda Barragan, Med Ass't on: 4/6/2023 2:53 PM   Actions taken: Charge Capture section accepted

## 2023-04-06 NOTE — PROGRESS NOTES
Follow Up Note: Hematology/Oncology     Primary Care:  Magaly Estes M.D.    CC: lung adenocarcinoma    Current Treatment: N/A  Previous Treatment:  lobectomy        Subjective:   History of Presenting Illness:  Alissa Bonilla is a 76 y.o. female here to establish care for new diagnosis of lung adenocarcinoma.    Is accompanied by her  to today's visit.  Patient had a lung cancer screening CT in 2019 which showed a 2.2 groundglass nodule in the right upper lobe and 3 solid nodules in the middle lobe.  Due to this it was recommended that she have a repeat CT scan 6 months later which was June 2020.  At that time the CT showed a part groundglass opacity in the right upper lobe measuring 11.2 x 17.3 mm which remained unchanged.  Follow-up CT was completed 2 years later in July 2022 which noted an interval enlargement of the groundglass and partially solid nodule in the right upper lobe measuring 1.7 x 3.2 x 3.1 cm in size.  PET scan was completed in August 2022 which showed the aforementioned mass as well as 2 right paratracheal node suspicious for metastatic disease.  Biopsy was performed of the mass and came back to be adenocarcinoma.  Unfortunately pulmonology was unable to sample the lymph node during the procedure.    Patient reports that she is a former smoker.  Her father is was also a smoker.  She denies any alcohol or drug use.  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    Patient is largely asymptomatic.  She does note her sister has lung adenocarcinoma and currently undergoing treatment with pembrolizumab.    Interval history    She is attempting to be very active.  She walks 0.5miles BID.  She continues to ride horses 4-5x/week.     QOL was suffering a few months ago, and she was placed on nebulizer and chest compression device.  She uses it BID.    Patient has  has plans to go  "to [place] in the fall.       Review of Systems   Constitutional:  Negative for chills, fever, malaise/fatigue and weight loss.   HENT:  Negative for congestion, ear pain, nosebleeds and sore throat.    Eyes:  Negative for blurred vision.   Respiratory:  Negative for cough, sputum production, shortness of breath and wheezing.    Cardiovascular:  Negative for chest pain, palpitations, orthopnea and leg swelling.   Gastrointestinal:  Negative for abdominal pain, heartburn, nausea and vomiting.   Genitourinary:  Negative for dysuria, frequency and urgency.   Musculoskeletal:  Negative for neck pain.   Neurological:  Negative for dizziness, tingling, tremors, sensory change, focal weakness and headaches.   Endo/Heme/Allergies:  Does not bruise/bleed easily.   Psychiatric/Behavioral:  Negative for depression, memory loss and suicidal ideas.    All other systems reviewed and are negative.    Allergies   Allergen Reactions    Other Environmental      Russian rhoda and other pollens.    Sulfa Drugs      \"crazy\"       Current Outpatient Medications   Medication Sig Dispense Refill    albuterol (PROVENTIL) 2.5mg/3ml Nebu Soln solution for nebulization Take 3 mL by nebulization every four hours as needed for Shortness of Breath. 120 mL 11    fluticasone-umeclidin-vilant (TRELEGY ELLIPTA) 100-62.5-25 MCG/ACT AEROSOL POWDER, BREATH ACTIVATED inhalation Inhale 1 Inhalation every day. 3 Each 3    fluticasone-umeclidin-vilant (TRELEGY ELLIPTA) 100-62.5-25 MCG/ACT AEROSOL POWDER, BREATH ACTIVATED inhalation Inhale 1 Inhalation every day. 2 Each 0    venlafaxine XR (EFFEXOR XR) 75 MG CAPSULE SR 24 HR Take 75 mg by mouth every day.      pantoprazole (PROTONIX) 40 MG Tablet Delayed Response Take 1 Tablet by mouth every day. 90 Tablet 3    azelastine (ASTELIN) 137 MCG/SPRAY nasal spray Administer 1 Spray into affected nostril(S) every day. (Patient taking differently: Administer 1 Spray into affected nostril(S) 1 time a day as needed.) 30 " "mL 6    albuterol 108 (90 Base) MCG/ACT Aero Soln inhalation aerosol Inhale 2 Puffs every four hours as needed for Shortness of Breath. 8.5 Each 11    Levomefolate Glucosamine (METHYLFOLATE PO) Take 1 Tablet by mouth every day.      SELENIUM PO Take 1 Tablet by mouth every day.      levothyroxine (SYNTHROID) 25 MCG Tab Take 25 mcg by mouth every morning on an empty stomach.      Cyanocobalamin (B-12) 100 MCG Tab Take 1 Tablet by mouth every day.      B Complex Vitamins (B COMPLEX-B12 PO) Take 1 Tab by mouth every day.      cholecalciferol (DIALYVITE VITAMIN D 5000) 5000 UNIT Cap Take 5,000 Units by mouth every day.      Ascorbic Acid (VITAMIN C) 1000 MG Tab Take 1 Tab by mouth every day.      NON SPECIFIED Take 1 Tab by mouth every day. BIO-FLEX      umeclidinium-vilanterol (ANORO ELLIPTA) 62.5-25 MCG/ACT AEROSOL POWDER, BREATH ACTIVATED inhaler Inhale 1 Puff every day. 1 Each 3    NON SPECIFIED Take 1 Tablet by mouth every day. Bio Identical Hormones       No current facility-administered medications for this encounter.        Problem list, medications, and allergies reviewed by myself today in Epic.     Objective:     Vitals:    04/06/23 1402   Weight: 70 kg (154 lb 3.4 oz)   Height: 1.702 m (5' 7\")         DESC; KARNOFSKY SCALE WITH ECOG EQUIVALENT: 100, Fully active, able to carry on all pre-disease performed without restriction (ECOG equivalent 0)    DISTRESS LEVEL: no apparent distress    Physical Exam: Not done due to telemedicine visit      Labs:   Most recent labs reviewed.  Please see the lab tab of chart review    Imaging:   Most recent images below have been independently reviewed by me.     CT Chest  1.  Right upper lobectomy. No new metastatic disease in the chest.  2.  Progressive volume loss in the middle lobe. No masslike lesions.  3.  Stable small groundglass opacities in the right lower lobe periphery, immediately inferior to the right upper lobe, likely postinfectious/postinflammatory or related " to prior treatment.  4.  No new pulmonary nodules or masses.  5.  Right lower paratracheal node now measures 1 cm, slightly smaller since prior. All other thoracic nodes are not enlarged by size criteria.  6.  Stable left adrenal nodule.    Brain:  1.  No acute abnormality.  2.  There is no evidence of intracranial metastasis.  3.  Mild age-appropriate volume loss.     PET  1.  There is abnormal uptake within the enlarging groundglass and part solid mass in the right lung apex which is highly suspicious for slow growing malignancy such as adenocarcinoma.  2.  Uptake within 2 right paratracheal node suspicious for metastatic disease although nonspecific based on size.  3.  Uptake within the stable left adrenal gland nodule most consistent with a benign adenoma.  4.  No evidence of metastatic disease in the neck, abdomen or pelvis.    Pathology:  A. Right upper lobe nodule, fine needle aspiration, slide:          Atypical clusters noted on PAP stain.  See comment.   B. Right upper lobe nodule, fine needle aspiration, core:          Positive for adenocarcinoma.   C. Right upper lobe nodule, forceps with touch prep slides:          Positive for adenocarcinoma, lung primary with papillary           architecture noted.   D. 4R, fine needle aspiration, slide:          Negative for lesional cells and sheets of lymphocytes to suggest           lymph node aspirate.   E. 4R, fine needle aspiration, core:          Negative; bronchial epithelial cells, cartilage and a few           lymphoid aggregates noted.   F. Right upper lobe, bronchoalveolar lavage:          No malignancy identified.          Cytology preparations, including cell block, demonstrate benign           bronchial epithelial cells and alveolar macrophages.     Path from 9/26    A. Right paratracheal lymph node:          Negative for carcinoma in five lymph nodes (0/5)          Non-necrotizing granulomatous lymphadenitis, negative for           morphologic  evidence of acid fast and fungal organisms by           AFB/GMS stains, see comment   B. Right hilar node, station 10:          Negative for carcinoma in four lymph nodes (0/4)          Non-necrotizing granulomatous lymphadenitis   C. Right subcarinal node, station 7:          Negative for carcinoma in one lymph node (0/1)          Non-necrotizing granulomatous lymphadenitis   D. Right upper paratracheal node, station 2:          Negative for carcinoma in one lymph node (0/1)          Non-necrotizing granulomatous lymphadenitis   E. Right upper lobe, lung:          Invasive papillary adenocarcinoma, moderately differentiated,           3.6 cm          Margins of resection are negative for carcinoma          Negative for carcinoma in eleven lymph nodes (0/11)          Non-necrotizing granulomatous lymphadenitis, see comment          See synoptic report for details   Assessment/Plan:      Cancer Staging   Adenocarcinoma of right lung (HCC)  Staging form: Lung, AJCC 8th Edition  - Clinical: Stage IB (cT2a, cN0, cM0) - Signed by Georgia Chatman M.D. on 9/29/2022      Ms. Talon Bonilla is a 75 yo F with a new diagnosis of adenocarcinoma of the lung.  She recently underwent a lobectomy with Dr. Ganser.  Final pathology reports adenocarcinoma 3.6 cm with negative lymph nodes. Her Scan at 6 months shows no concern for metastatic disease in the chest.    Surveillance will consist of H&P and chest CT scan every 6 months for 3 years and then a low-dose noncontrast enhanced chest CT annually.  If there is recurrence found on the scans we will undergo a PET scan and a brain MRI.    Plan  -CT scan in 6 months  -patient to see me after scans    Adrenal Adenoma  -Stable (11mm prior, now 10mm)  -since it is less than 4 cm it is appropriate to watch it every 6 to 12 months  -If it grows larger than 1 cm in 1 year then I will refer patient to surgery    No follow-ups on file.    Any questions and concerns raised by the patient  were addressed and answered. Patient denies any further questions.  Patient encouraged to call the office with any concerns or issues.     Georgia Chatman M.D.  Hematology/Oncology    31 minutes spent on this case

## 2023-04-21 ENCOUNTER — RESEARCH ENCOUNTER (OUTPATIENT)
Dept: RESEARCH | Facility: WORKSITE | Age: 78
End: 2023-04-21
Payer: MEDICARE

## 2023-04-21 NOTE — RESEARCH NOTE
Patient did not receive invitation email from [employer] as part of [employer] [place] [employer]. Patient was contacted to help assist with [employer] account and accessing [employer] results.

## 2023-05-22 ENCOUNTER — TELEPHONE (OUTPATIENT)
Dept: SLEEP MEDICINE | Facility: MEDICAL CENTER | Age: 78
End: 2023-05-22
Payer: MEDICARE

## 2023-05-23 NOTE — TELEPHONE ENCOUNTER
SURGICAL CLEARANCE REQUEST SCANNED IN MEDIA.       1. Caller Name: Alissa Bonilla                 Call Back Number: [phone] (home)       Patient approves a detailed voicemail message: N\A    2.  What is the procedure: DOES NOT DAY    3.  When is the procedure scheduled: DOES NOT SAY    4.  Who is the Surgeon: DOES NOT SAY    5. Has the patient completed any screening tests for the procedure: UNKNOWN    6. Has PCP received a written request from Surgeon: UNKNOWN     7. Patient scheduled for an appointment for this clearance?:  no    8. Last OV: 2/13/23 DR. HAIR     9. Next OV: 7/14/23 Dr. Hair     10. Last CXR: 12/15/22    11. Last PFT: 9/20/22     12. Last CT 3/13/23     Current Medications  Current Outpatient Medications   Medication Sig Dispense Refill    albuterol (PROVENTIL) 2.5mg/3ml Nebu Soln solution for nebulization Take 3 mL by nebulization every four hours as needed for Shortness of Breath. 120 mL 11    fluticasone-umeclidin-vilant (TRELEGY ELLIPTA) 100-62.5-25 MCG/ACT AEROSOL POWDER, BREATH ACTIVATED inhalation Inhale 1 Inhalation every day. 3 Each 3    fluticasone-umeclidin-vilant (TRELEGY ELLIPTA) 100-62.5-25 MCG/ACT AEROSOL POWDER, BREATH ACTIVATED inhalation Inhale 1 Inhalation every day. 2 Each 0    umeclidinium-vilanterol (ANORO ELLIPTA) 62.5-25 MCG/ACT AEROSOL POWDER, BREATH ACTIVATED inhaler Inhale 1 Puff every day. 1 Each 3    venlafaxine XR (EFFEXOR XR) 75 MG CAPSULE SR 24 HR Take 75 mg by mouth every day.      pantoprazole (PROTONIX) 40 MG Tablet Delayed Response Take 1 Tablet by mouth every day. 90 Tablet 3    azelastine (ASTELIN) 137 MCG/SPRAY nasal spray Administer 1 Spray into affected nostril(S) every day. (Patient taking differently: Administer 1 Spray into affected nostril(S) 1 time a day as needed.) 30 mL 6    albuterol 108 (90 Base) MCG/ACT Aero Soln inhalation aerosol Inhale 2 Puffs every four hours as needed for Shortness of Breath. 8.5 Each 11    NON SPECIFIED  Take 1 Tablet by mouth every day. Bio Identical Hormones      Levomefolate Glucosamine (METHYLFOLATE PO) Take 1 Tablet by mouth every day.      SELENIUM PO Take 1 Tablet by mouth every day.      levothyroxine (SYNTHROID) 25 MCG Tab Take 25 mcg by mouth every morning on an empty stomach.      Cyanocobalamin (B-12) 100 MCG Tab Take 1 Tablet by mouth every day.      B Complex Vitamins (B COMPLEX-B12 PO) Take 1 Tab by mouth every day.      cholecalciferol (DIALYVITE VITAMIN D 5000) 5000 UNIT Cap Take 5,000 Units by mouth every day.      Ascorbic Acid (VITAMIN C) 1000 MG Tab Take 1 Tab by mouth every day.      NON SPECIFIED Take 1 Tab by mouth every day. BIO-FLEX       No current facility-administered medications for this visit.       Please advise.

## 2023-05-25 ENCOUNTER — TELEPHONE (OUTPATIENT)
Dept: SLEEP MEDICINE | Facility: MEDICAL CENTER | Age: 78
End: 2023-05-25
Payer: MEDICARE

## 2023-05-25 NOTE — TELEPHONE ENCOUNTER
"I am caught up with my messages and I have only received a message from [facility] regarding the request. I have note received anything from the patient.         VOICEMAIL  1. Caller Name: Bonnie                       Call Back Number: [phone]    2. Message: would like to confirm surgical clearance was received. Requesting completion \"time sensitive.\"     3. Patient approves office to leave a detailed voicemail/[employer] message: N\A      Left her a vm informing the clearance was received and is currently in review. Once completed by Dr. Martel will send.   "

## 2023-05-25 NOTE — TELEPHONE ENCOUNTER
Pt called and was following up. She has left multiple messages and has not heard back from our office. Pt upset and wants you to send all the info.needed for surgical clearance from Dr. Martel to her plastic surgeon Chano Stauffer MD, for her surgery on 6/9/23. Please, call pt on her cell phone today if you have any questions for her about this.

## 2023-05-25 NOTE — TELEPHONE ENCOUNTER
Phone Number Called: [phone] (home)     Call outcome: Left detailed message for patient. Informed to call back with any additional questions.    Message: informed I have not received anything from her. Did receive the call from Bonnie with [facility]. Clearance is currently in review. Dr. Martel is in clinic assisting patient once she is able to complete it will be sent.

## 2023-06-01 RX ORDER — VENLAFAXINE HYDROCHLORIDE 75 MG/1
CAPSULE, EXTENDED RELEASE ORAL
Qty: 90 CAPSULE | Refills: 3 | Status: SHIPPED | OUTPATIENT
Start: 2023-06-01 | End: 2023-07-13 | Stop reason: SDUPTHER

## 2023-06-01 NOTE — TELEPHONE ENCOUNTER
Received request via: Pharmacy    Was the patient seen in the last year in this department? Yes  LOV 11/09/2022  Does the patient have an active prescription (recently filled or refills available) for medication(s) requested? No    Does the patient have [facility] Plus and need 100 day supply (blood pressure, diabetes and cholesterol meds only)? Medication is not for cholesterol, blood pressure or diabetes and Patient does not have SCP

## 2023-06-05 DIAGNOSIS — R05.9 COUGH, UNSPECIFIED TYPE: ICD-10-CM

## 2023-06-05 RX ORDER — AZELASTINE 1 MG/ML
1 SPRAY, METERED NASAL
Qty: 30 ML | Refills: 6 | Status: SHIPPED | OUTPATIENT
Start: 2023-06-05

## 2023-06-05 NOTE — TELEPHONE ENCOUNTER
Have we ever prescribed this med? Yes.  If yes, what date?  10/24/2022    Last OV: 2/13/2023 - Dr. Martel     Next OV: 7/14/2023 - Dr. Martel     DX:  asthma    Medications: azelastine (ASTELIN) 137 MCG/SPRAY nasal spray

## 2023-06-27 LAB
APOB+LDLR+PCSK9 GENE MUT ANL BLD/T: NOT DETECTED
BRCA1+BRCA2 DEL+DUP + FULL MUT ANL BLD/T: NOT DETECTED
MLH1+MSH2+MSH6+PMS2 GN DEL+DUP+FUL M: NOT DETECTED

## 2023-07-05 ENCOUNTER — TELEPHONE (OUTPATIENT)
Dept: MEDICAL GROUP | Facility: LAB | Age: 78
End: 2023-07-05
Payer: MEDICARE

## 2023-07-05 RX ORDER — OXYCODONE AND ACETAMINOPHEN 7.5; 325 MG/1; MG/1
1 TABLET ORAL EVERY 6 HOURS PRN
COMMUNITY
Start: 2023-06-02 | End: 2023-07-05

## 2023-07-05 RX ORDER — PROMETHAZINE HYDROCHLORIDE 25 MG/1
25 TABLET ORAL EVERY 6 HOURS PRN
COMMUNITY
Start: 2023-05-31 | End: 2023-07-06

## 2023-07-05 RX ORDER — CEPHALEXIN 500 MG/1
500 CAPSULE ORAL 4 TIMES DAILY
COMMUNITY
Start: 2023-05-31 | End: 2023-07-05

## 2023-07-05 RX ORDER — SCOLOPAMINE TRANSDERMAL SYSTEM 1 MG/1
PATCH, EXTENDED RELEASE TRANSDERMAL
COMMUNITY
Start: 2023-06-03 | End: 2023-07-05

## 2023-07-05 NOTE — TELEPHONE ENCOUNTER
ANNUAL WELLNESS VISIT PRE-VISIT PLANNING    1.  Reviewed notes from the last office visit: Yes    2.  If any orders were ordered or intended to be done prior to visit (i.e. 6 mos follow-up), do we have results/consult notes or has patient scheduled?          Labs - Labs ordered, completed on 3/13/23 and results are in chart.  Note: If patient appointment is for lab review and patient did not complete labs, check with provider if OK to reschedule patient until labs completed.         Imaging - Imaging was not ordered at last office visit.         Referrals - No referrals were ordered at last office visit.    3.  Immunizations were updated in Epic using Reconcile Outside Information activity? Yes    4.  Patient is due for the following Health Maintenance Topics:   Health Maintenance Due   Topic Date Due    Annual Wellness Visit  06/15/2023     5.  Reviewed/Updated the following with patient:          Preferred Pharmacy? Yes          Preferred Lab? Yes          Preferred Communication? Yes          Allergies? Yes          Medications? YES. Was Abstract Encounter opened and chart updated? YES    6.  Care Team Updated:          DME Company (gait device, O2, CPAP, etc.): N\A          Other Specialists (eye doctor, derm, GYN, cardiology, endo, etc): YES    7.  Patient was advised: “This is a free wellness visit. The provider will screen for medical conditions to help you stay healthy. If you have other concerns to address you may be asked to discuss these at a separate visit or there may be an additional fee.”     8.  AHA ([employer]) form printed for Provider? N/A

## 2023-07-06 ENCOUNTER — OFFICE VISIT (OUTPATIENT)
Dept: MEDICAL GROUP | Facility: LAB | Age: 78
End: 2023-07-06
Payer: MEDICARE

## 2023-07-06 ENCOUNTER — TELEPHONE (OUTPATIENT)
Dept: MEDICAL GROUP | Facility: LAB | Age: 78
End: 2023-07-06

## 2023-07-06 VITALS
HEART RATE: 66 BPM | BODY MASS INDEX: 23.07 KG/M2 | SYSTOLIC BLOOD PRESSURE: 102 MMHG | WEIGHT: 147 LBS | DIASTOLIC BLOOD PRESSURE: 64 MMHG | HEIGHT: 67 IN | TEMPERATURE: 97 F | OXYGEN SATURATION: 97 % | RESPIRATION RATE: 12 BRPM

## 2023-07-06 DIAGNOSIS — E55.9 VITAMIN D DEFICIENCY: ICD-10-CM

## 2023-07-06 DIAGNOSIS — E78.5 DYSLIPIDEMIA: ICD-10-CM

## 2023-07-06 DIAGNOSIS — R73.09 ELEVATED GLUCOSE: ICD-10-CM

## 2023-07-06 DIAGNOSIS — E03.9 ACQUIRED HYPOTHYROIDISM: ICD-10-CM

## 2023-07-06 DIAGNOSIS — C34.91 ADENOCARCINOMA OF RIGHT LUNG (HCC): ICD-10-CM

## 2023-07-06 DIAGNOSIS — Z00.00 ENCOUNTER FOR MEDICARE ANNUAL WELLNESS EXAM: ICD-10-CM

## 2023-07-06 DIAGNOSIS — F33.42 RECURRENT MAJOR DEPRESSIVE DISORDER, IN FULL REMISSION (HCC): ICD-10-CM

## 2023-07-06 DIAGNOSIS — J45.40 MODERATE PERSISTENT ASTHMA WITHOUT COMPLICATION: ICD-10-CM

## 2023-07-06 PROCEDURE — 3078F DIAST BP <80 MM HG: CPT | Performed by: FAMILY MEDICINE

## 2023-07-06 PROCEDURE — G0439 PPPS, SUBSEQ VISIT: HCPCS | Performed by: FAMILY MEDICINE

## 2023-07-06 PROCEDURE — 3074F SYST BP LT 130 MM HG: CPT | Performed by: FAMILY MEDICINE

## 2023-07-06 RX ORDER — HYDROCODONE BITARTRATE AND ACETAMINOPHEN 5; 325 MG/1; MG/1
TABLET ORAL
COMMUNITY
End: 2023-07-06

## 2023-07-06 RX ORDER — FLUTICASONE PROPIONATE 110 UG/1
1 AEROSOL, METERED RESPIRATORY (INHALATION) 2 TIMES DAILY
Qty: 12 G | Refills: 3 | Status: SHIPPED | OUTPATIENT
Start: 2023-07-06 | End: 2023-07-06

## 2023-07-06 RX ORDER — BECLOMETHASONE DIPROPIONATE HFA 40 UG/1
1 AEROSOL, METERED RESPIRATORY (INHALATION) 2 TIMES DAILY
Qty: 10.6 G | Refills: 1 | Status: SHIPPED | OUTPATIENT
Start: 2023-07-06 | End: 2023-07-11

## 2023-07-06 ASSESSMENT — PATIENT HEALTH QUESTIONNAIRE - PHQ9
2. FEELING DOWN, DEPRESSED, IRRITABLE, OR HOPELESS: NOT AT ALL
9. THOUGHTS THAT YOU WOULD BE BETTER OFF DEAD, OR OF HURTING YOURSELF: NOT AT ALL
5. POOR APPETITE OR OVEREATING: NOT AT ALL
CLINICAL INTERPRETATION OF PHQ2 SCORE: 0
8. MOVING OR SPEAKING SO SLOWLY THAT OTHER PEOPLE COULD HAVE NOTICED. OR THE OPPOSITE, BEING SO FIGETY OR RESTLESS THAT YOU HAVE BEEN MOVING AROUND A LOT MORE THAN USUAL: NOT AT ALL
SUM OF ALL RESPONSES TO PHQ9 QUESTIONS 1 AND 2: 0
3. TROUBLE FALLING OR STAYING ASLEEP OR SLEEPING TOO MUCH: NOT AT ALL
4. FEELING TIRED OR HAVING LITTLE ENERGY: NOT AT ALL
1. LITTLE INTEREST OR PLEASURE IN DOING THINGS: NOT AT ALL
SUM OF ALL RESPONSES TO PHQ QUESTIONS 1-9: 0
7. TROUBLE CONCENTRATING ON THINGS, SUCH AS READING THE NEWSPAPER OR WATCHING TELEVISION: NOT AT ALL
6. FEELING BAD ABOUT YOURSELF - OR THAT YOU ARE A FAILURE OR HAVE LET YOURSELF OR YOUR FAMILY DOWN: NOT AL ALL

## 2023-07-06 ASSESSMENT — FIBROSIS 4 INDEX: FIB4 SCORE: 0.76

## 2023-07-06 ASSESSMENT — ENCOUNTER SYMPTOMS: GENERAL WELL-BEING: FAIR

## 2023-07-06 ASSESSMENT — ACTIVITIES OF DAILY LIVING (ADL): BATHING_REQUIRES_ASSISTANCE: 0

## 2023-07-06 NOTE — PROGRESS NOTES
"Chief Complaint   Patient presents with    Annual Exam       HPI:  Alissa is a 77 y.o. here for Medicare Annual Wellness Visit  Has been having wore cough. Flonase, zyrtec, now zyrtejanine. Using rescue inhaler, azelastine. Does feel better after rescue inhaler. Seeing allergist in September.   Coughing is so bad she gags. Not SOB at res. If she really pushes things she gets dyspneic. Otherwise not bad sob.     Diet: Avoiding red meat. \"Mediterranean\" she states. Avoiding sulfites. Plans to stop today and see how she feels.   Exercise: recovering from breast reduction and implants. Started riding her horse again yesterday. Follows up with Dr Xiomy alfaro.   Sleep: good unless she is coughing. Lots of noise from nose with sleeping.     Postmenopausal.   Mammo: July 2022.   DEXA: July 2022, increased frax score.   Immunizations: UTD      Patient Active Problem List    Diagnosis Date Noted    Primary malignant neoplasm of right lung (HCC) 09/20/2022    Hypothyroidism 09/20/2022    Depressive disorder 09/20/2022    Adenocarcinoma of right lung (HCC) 09/15/2022    Bronchitis 06/14/2022    Adrenal adenoma, left 11/12/2019    Right ankle pain 08/15/2018    Asthma 08/14/2018    Benign essential HTN 07/06/2017    Neoplasm of uncertain behavior 07/06/2017    AK (actinic keratosis) 06/28/2017    Smoking greater than 30 pack years 09/01/2016    Postmenopausal 09/01/2016    Asthma, moderate persistent 02/26/2015    Environmental allergies 02/26/2015    Recurrent major depressive disorder, in full remission (HCC) 02/26/2015    Dyslipidemia 02/26/2015       Current Outpatient Medications   Medication Sig Dispense Refill    fluticasone (FLOVENT HFA) 110 MCG/ACT Aerosol Inhale 1 Puff 2 times a day. 12 g 3    azelastine (ASTELIN) 137 MCG/SPRAY nasal spray Administer 1 Spray into affected nostril(S) every day. 30 mL 6    venlafaxine XR (EFFEXOR XR) 75 MG CAPSULE SR 24 HR TAKE 1 CAPSULE BY MOUTH EVERY DAY 90 Capsule 3    albuterol " (PROVENTIL) 2.5mg/3ml Nebu Soln solution for nebulization Take 3 mL by nebulization every four hours as needed for Shortness of Breath. 120 mL 11    pantoprazole (PROTONIX) 40 MG Tablet Delayed Response Take 1 Tablet by mouth every day. 90 Tablet 3    albuterol 108 (90 Base) MCG/ACT Aero Soln inhalation aerosol Inhale 2 Puffs every four hours as needed for Shortness of Breath. 8.5 Each 11    Levomefolate Glucosamine (METHYLFOLATE PO) Take 1 Tablet by mouth every day.      SELENIUM PO Take 1 Tablet by mouth every day.      levothyroxine (SYNTHROID) 25 MCG Tab Take 25 mcg by mouth every morning on an empty stomach.      Cyanocobalamin (B-12) 100 MCG Tab Take 1 Tablet by mouth every day.      B Complex Vitamins (B COMPLEX-B12 PO) Take 1 Tab by mouth every day.      cholecalciferol (DIALYVITE VITAMIN D 5000) 5000 UNIT Cap Take 5,000 Units by mouth every day.      Ascorbic Acid (VITAMIN C) 1000 MG Tab Take 1 Tab by mouth every day.       No current facility-administered medications for this visit.        Patient is taking medications as noted in medication list.  Current supplements as per medication list.     Allergies: Other environmental, Sulfa drugs, and Sulfites    Current social contact/activities: active    Is patient current with immunizations? Yes.    She  reports that she quit smoking about 18 years ago. Her smoking use included cigarettes. She started smoking about 55 years ago. She has a 34.00 pack-year smoking history. She has been exposed to tobacco smoke. She has never used smokeless tobacco. She reports that she does not currently use alcohol after a past usage of about 8.4 - 12.6 oz of alcohol per week. She reports that she does not currently use drugs after having used the following drugs: Marijuana.  Counseling given: Not Answered      ROS:    Gait: Uses no assistive device   Ostomy: No   Other tubes: No   Amputations: No   Chronic oxygen use No   Last eye exam  2023  Wears hearing aids: No   :  Reports urinary leakage during the last 6 months that has somewhat interfered with their daily activities or sleep.    Screening:    Depression Screening  Little interest or pleasure in doing things?  0 - not at all  Feeling down, depressed, or hopeless? 0 - not at all  Trouble falling or staying asleep, or sleeping too much?     Feeling tired or having little energy?     Poor appetite or overeating?     Feeling bad about yourself - or that you are a failure or have let yourself or your family down?    Trouble concentrating on things, such as reading the newspaper or watching television?    Moving or speaking so slowly that other people could have noticed.  Or the opposite - being so fidgety or restless that you have been moving around a lot more than usual?     Thoughts that you would be better off dead, or of hurting yourself?     Patient Health Questionnaire Score:      If depressive symptoms identified deferred to follow up visit unless specifically addressed in assessment and plan.    Interpretation of PHQ-9 Total Score   Score Severity   1-4 No Depression   5-9 Mild Depression   10-14 Moderate Depression   15-19 Moderately Severe Depression   20-27 Severe Depression    Screening for Cognitive Impairment  Three Minute Recall (Banana, Sunrise, Chair)  3/3    Draw clock face with all 12 numbers and set the hands to show 20 past 8.  Yes    If cognitive concerns identified, deferred for follow up unless specifically addressed in assessment and plan.    Fall Risk Assessment  Has the patient had two or more falls in the last year or any fall with injury in the last year?  No  If fall risk identified, deferred for follow up unless specifically addressed in assessment and plan.    Safety Assessment  Throw rugs on floor.  No  Handrails on all stairs.  Yes  Good lighting in all hallways.  Yes  Difficulty hearing.  No  Patient counseled about all safety risks that were identified.    Functional Assessment ADLs  Are there  any barriers preventing you from cooking for yourself or meeting nutritional needs?  No.    Are there any barriers preventing you from driving safely or obtaining transportation?  No.    Are there any barriers preventing you from using a telephone or calling for help?  No.    Are there any barriers preventing you from shopping?  No.    Are there any barriers preventing you from taking care of your own finances?  No.    Are there any barriers preventing you from managing your medications?  No.    Are there any barriers preventing you from showering, bathing or dressing yourself?  No.    Are you currently engaging in any exercise or physical activity?  Yes.     What is your perception of your health?  Fair.    Advance Care Planning  Do you have an Advance Directive, Living Will, Durable Power of , or POLST? No               Health Maintenance Summary            Postponed - COVID-19 Vaccine (4 - Booster for Pfizer series) Postponed until 8/26/2023      10/13/2021  Imm Admin: PFIZER PURPLE CAP SARS-COV-2 VACCINATION (12+)    03/06/2021  Imm Admin: PFIZER PURPLE CAP SARS-COV-2 VACCINATION (12+)    02/16/2021  Imm Admin: PFIZER PURPLE CAP SARS-COV-2 VACCINATION (12+)              IMM INFLUENZA (1) Next due on 9/1/2023 11/01/2022  Imm Admin: Influenza Vaccine Adult HD    10/21/2021  Imm Admin: Influenza Vaccine Adult HD    11/01/2020  Imm Admin: Influenza Vaccine Adult HD    11/10/2019  Imm Admin: Influenza Vaccine Adult HD    11/15/2018  Imm Admin: Influenza Vaccine Adult HD    Only the first 5 history entries have been loaded, but more history exists.              Annual Wellness Visit (Every 366 Days) Next due on 7/6/2024 07/06/2023  Visit Dx: Encounter for Medicare annual wellness exam    06/14/2022  Prob Dx: Medicare annual wellness visit, initial    04/14/2021  Subsequent Annual Wellness Visit - Includes PPPS ()    04/14/2021  Visit Dx: Medicare annual wellness visit, subsequent    04/14/2021   Reason not specified    Only the first 5 history entries have been loaded, but more history exists.              BONE DENSITY (Every 2 Years) Next due on 7/18/2024 07/18/2022  DS-BONE DENSITY STUDY (DEXA)    09/09/2016  DS-BONE DENSITY STUDY (DEXA)              IMM DTaP/Tdap/Td Vaccine (2 - Td or Tdap) Next due on 7/6/2027 07/06/2017  Imm Admin: Tdap Vaccine              COLORECTAL CANCER SCREENING (COLONOSCOPY - Every 5 Years) Next due on 4/10/2028      04/10/2023  AMB EXTERNAL COLONOSCOPY RESULTS    04/10/2023  AMB EXTERNAL COLONOSCOPY RESULTS    01/09/2018  REFERRAL TO GI FOR COLONOSCOPY              IMM PNEUMOCOCCAL VACCINE: 65+ Years (Series Information) Completed      06/28/2017  Imm Admin: Pneumococcal polysaccharide vaccine (PPSV-23)    11/23/2015  Imm Admin: Pneumococcal Conjugate Vaccine (Prevnar/PCV-13)              HEPATITIS C SCREENING  Completed      09/20/2019  Hepatitis C Antibody component of HEP C VIRUS ANTIBODY              IMM ZOSTER VACCINES (Series Information) Completed      04/14/2021  Imm Admin: Zoster Vaccine Recombinant (RZV) (SHINGRIX)    11/12/2019  Imm Admin: Zoster Vaccine Recombinant (RZV) (SHINGRIX)    07/06/2017  Imm Admin: Zoster Vaccine Live (ZVL) (Zostavax) - HISTORICAL DATA              HPV Vaccines (Series Information) Aged Out      No completion history exists for this topic.              IMM MENINGOCOCCAL ACWY VACCINE (Series Information) Aged Out      No completion history exists for this topic.              Discontinued - MAMMOGRAM  Discontinued        Frequency changed to Never automatically (Topic No Longer Applies)    07/19/2022  MA-SCREENING MAMMO BILAT W/IMPLANTS W/DENVER W/CAD    10/14/2019  MA-MAMMO SCREEN BILAT IMPLANTS DENVER CAD    09/07/2018  MA-MAMMO SCREEN BILAT IMPLANTS DENVER CAD    07/17/2017  MA-MAMMO SCREEN BILAT IMPLANTS DENVER CAD    Only the first 5 history entries have been loaded, but more history exists.              Discontinued - CERVICAL CANCER  SCREENING  Discontinued        Frequency changed to Never automatically (Topic No Longer Applies)    2017  Done    2017  THINPREP PAP W/HPV AND CTNG    2017  PATHOLOGY GYN SPECIMEN              Discontinued - IMM HEP B VACCINE  Discontinued      No completion history exists for this topic.              Discontinued - LUNG CANCER SCREENING  Scheduled for 10/6/2023        Frequency changed to Never automatically (Topic No Longer Applies)    2023  CT-CHEST (THORAX) W/O    2022  CT-CHEST (THORAX) W/O    10/19/2022  CT-CTA CHEST PULMONARY ARTERY W/ RECONS    2022  CT-CHEST (THORAX) W/O    Only the first 5 history entries have been loaded, but more history exists.                    Patient Care Team:  Magaly Estes M.D. as PCP - General (Family Medicine)  Yo King O.D. as Consulting Physician (Optometry)  Kannan Kessler D.O. (Endocrinology)  Lauren Mcbride, LSW, MSW as   [facility] as Respiratory Therapist  Jadyn Martel M.D. (Pulmonary Medicine)  Georgia Chatman M.D. (Hematology and Oncology)    Social History     Tobacco Use    Smoking status: Former     Packs/day: 1.00     Years: 34.00     Pack years: 34.00     Types: Cigarettes     Start date: 1968     Quit date: 2005     Years since quittin.5     Passive exposure: Past    Smokeless tobacco: Never   Vaping Use    Vaping Use: Never used   Substance Use Topics    Alcohol use: Not Currently     Alcohol/week: 8.4 - 12.6 oz     Types: 14 - 21 Glasses of wine per week     Comment: 2/day    Drug use: Not Currently     Types: Marijuana     Comment: 1 gummy every month maybe - last 2022     Family History   Problem Relation Age of Onset    Dementia Mother     Heart Attack Mother     Cancer Sister 81        Lung Cancer, smoker    Hypertension Sister     Lung Disease Sister     Hypertension Brother     Arthritis Brother         RA    Cancer Paternal Aunt         Lung    Cancer  Paternal Uncle         Lung     She  has a past medical history of Anesthesia, Asthma, Bronchitis, Cancer (HCC) (10/2022), 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    She has no past medical history of Painful breathing, Sputum production, or Wheezing.   Past Surgical History:   Procedure Laterality Date    DE BRONCHOSCOPY,DIAGNOSTIC  12/15/2022    Procedure: FIBER OPTIC BRONCHOSCOPY WITH WASH, AND BIOPSY;  Surgeon: Aislinn Alvarez D.O.;  Location: [facility];  Service: Pulmonary    DE THORACOSCOPY,DX NO BX Right 09/26/2022    Procedure: VIDEO ASSISTED THORACOSCOPY, RIGHT UPPER LOBECTOMY, NODE DISSECTION WITH MEDIASTINOSCOPY LYMPH NODE BIOPSY;  Surgeon: John H Ganser, M.D.;  Location: [facility];  Service: General    LOBECTOMY Right 09/26/2022    Procedure: LOBECTOMY;  Surgeon: John H Ganser, M.D.;  Location: [facility];  Service: General    MEDIASTINOSCOPY  09/26/2022    Procedure: MEDIASTINOSCOPY;  Surgeon: John H Ganser, M.D.;  Location: [facility];  Service: General    BRONCHOSCOPY, ROBOT-ASSISTED N/A 09/02/2022    Procedure: FIBER OPTICBRONHOSCOPY WITH BRONCHOALVEOLAR LAVAGE, BIOPSY AND FINE NEEDLE ASPIRATION, ENDOBRONCHIAL ULTRASOUND AND NAVIGATION ROBOTICS;  Surgeon: Idalia Arguelles M.D.;  Location: [facility];  Service: Pulmonary Robotic    ENDOBRONCHIAL US ADD-ON N/A 09/02/2022    Procedure: ENDOBRONCHIAL ULTRASOUND (EBUS);  Surgeon: Idalia Arguelles M.D.;  Location: [facility];  Service: Pulmonary Robotic    COLONOSCOPY  2019    MAMMOPLASTY AUGMENTATION Bilateral 2017    DE BREAST AUGMENTATION WITH IMPLANT  01/01/1972       Exam:   /64 (BP Location:  "Left arm, Patient Position: Sitting, BP Cuff Size: Adult)   Pulse 66   Temp 36.1 °C (97 °F)   Resp 12   Ht 1.702 m (5' 7\")   Wt 66.7 kg (147 lb)   SpO2 97%  Body mass index is 23.02 kg/m².    Hearing excellent.    Dentition good  Alert, oriented in no acute distress  Eye contact is good, speech goal directed, affect calm  RRR, bilateral diffuse wheezing .   No edema noted. No clubbing.      Assessment and Plan. The following treatment and monitoring plan is recommended:    1. Encounter for Medicare annual wellness exam    2. Recurrent major depressive disorder, in full remission (HCC)    3. Adenocarcinoma of right lung (HCC)  - CBC WITH DIFFERENTIAL; Future    4. Moderate persistent asthma without complication  - fluticasone (FLOVENT HFA) 110 MCG/ACT Aerosol; Inhale 1 Puff 2 times a day.  Dispense: 12 g; Refill: 3    5. Acquired hypothyroidism  - TSH WITH REFLEX TO FT4; Future    6. Vitamin D deficiency  - VITAMIN D,25 HYDROXY (DEFICIENCY); Future    7. Dyslipidemia  - Comp Metabolic Panel; Future  - HEMOGLOBIN A1C; Future  - Lipid Profile; Future    8. Elevated glucose  - HEMOGLOBIN A1C; Future  Discussed the flare of her asthma related to allergies.  She does have sound diffusely wheezy throughout both lungs.  We will go and try to get her on a inhaled steroid to see how much this helps.  May need to consider an oral or IM steroid if she continues to have issues.  She will continue on all of her allergy medications for the interim as well.  She does follow-up with pulmonology and Dr. Martel in the next couple weeks.    Services suggested: No services needed at this time  Health Care Screening recommendations as per orders if indicated.  Referrals offered: PT/OT/Nutrition counseling/Behavioral Health/Smoking cessation as per orders if indicated.    Discussion today about general wellness and lifestyle habits:    Prevent falls and reduce trip hazards; Cautioned about securing or removing rugs.  Have a working " fire alarm and carbon monoxide detector;   Engage in regular physical activity and social activities.     Follow-up: No follow-ups on file.

## 2023-07-10 ENCOUNTER — PATIENT MESSAGE (OUTPATIENT)
Dept: MEDICAL GROUP | Facility: LAB | Age: 78
End: 2023-07-10
Payer: MEDICARE

## 2023-07-11 ENCOUNTER — PATIENT MESSAGE (OUTPATIENT)
Dept: MEDICAL GROUP | Facility: LAB | Age: 78
End: 2023-07-11
Payer: MEDICARE

## 2023-07-11 ENCOUNTER — PATIENT MESSAGE (OUTPATIENT)
Dept: MEDICAL GROUP | Facility: LAB | Age: 78
End: 2023-07-11

## 2023-07-11 ENCOUNTER — OFFICE VISIT (OUTPATIENT)
Dept: URGENT CARE | Facility: CLINIC | Age: 78
End: 2023-07-11
Payer: MEDICARE

## 2023-07-11 VITALS
RESPIRATION RATE: 16 BRPM | HEART RATE: 71 BPM | HEIGHT: 67 IN | TEMPERATURE: 96.5 F | WEIGHT: 150 LBS | DIASTOLIC BLOOD PRESSURE: 70 MMHG | SYSTOLIC BLOOD PRESSURE: 110 MMHG | BODY MASS INDEX: 23.54 KG/M2 | OXYGEN SATURATION: 95 %

## 2023-07-11 DIAGNOSIS — Z91.09 ENVIRONMENTAL ALLERGIES: ICD-10-CM

## 2023-07-11 DIAGNOSIS — J45.901 EXACERBATION OF ASTHMA, UNSPECIFIED ASTHMA SEVERITY, UNSPECIFIED WHETHER PERSISTENT: ICD-10-CM

## 2023-07-11 DIAGNOSIS — F17.210 SMOKING GREATER THAN 30 PACK YEARS: ICD-10-CM

## 2023-07-11 PROCEDURE — 3074F SYST BP LT 130 MM HG: CPT | Performed by: FAMILY MEDICINE

## 2023-07-11 PROCEDURE — 3078F DIAST BP <80 MM HG: CPT | Performed by: FAMILY MEDICINE

## 2023-07-11 PROCEDURE — 99214 OFFICE O/P EST MOD 30 MIN: CPT | Performed by: FAMILY MEDICINE

## 2023-07-11 RX ORDER — PREDNISONE 10 MG/1
30 TABLET ORAL EVERY MORNING
Qty: 15 TABLET | Refills: 0 | Status: SHIPPED | OUTPATIENT
Start: 2023-07-11 | End: 2023-07-14

## 2023-07-11 RX ORDER — FLUTICASONE PROPIONATE 110 UG/1
1 AEROSOL, METERED RESPIRATORY (INHALATION) 2 TIMES DAILY
Qty: 12 G | Refills: 1 | Status: SHIPPED | OUTPATIENT
Start: 2023-07-11 | End: 2024-01-10

## 2023-07-11 RX ORDER — BENZONATATE 200 MG/1
200 CAPSULE ORAL 3 TIMES DAILY PRN
Qty: 30 CAPSULE | Refills: 0 | Status: SHIPPED | OUTPATIENT
Start: 2023-07-11 | End: 2023-11-07

## 2023-07-11 ASSESSMENT — FIBROSIS 4 INDEX: FIB4 SCORE: 0.76

## 2023-07-11 ASSESSMENT — ENCOUNTER SYMPTOMS: COUGH: 1

## 2023-07-12 NOTE — PROGRESS NOTES
Subjective     Alissa Bonilla is a 77 y.o. female who presents with Cough (X 2 weeks, hurts to cough, patient states coughing does get so bad she feels like possible pneumonia )      - This is a pleasant and nontoxic appearing 77 y.o. person who has come to the walk-in clinic today for:    #1) ~2 weeks w/ dry cough. Sometimes inhaler helps. Hx asthma/allergies and feels this is acting up as has been a bad year for this.  Feels ok otherwise, no nvfc/cp/sob.    Hx Lung CA w/ lobectomy. Remote smoker      ALLERGIES:  Other environmental, Sulfa drugs, and Sulfites     PMH:  Past Medical History:   Diagnosis Date    Anesthesia     PONV    Asthma     Bronchitis     last time approx 2012.    Cancer (HCC) 10/2022    Lung-had lobectomy. No chemo or radiation.    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 No chemo or radiation.    Cough     Chronic since pneumonia 11/2022    Depression     Disorder of thyroid     GERD (gastroesophageal reflux disease)     taking pantoprazole    Hiatus hernia syndrome     mild    Pain 12/14/2022    Throat and chest due to Pneumonia (11/2022 & 12/2022).    Pneumonia 11/2022    x2(end of November and early December) after lobectomy.    PONV (postoperative nausea and vomiting)     Shortness of breath     12/14/22-after pneumonia x2 (11/2022 & 12/2022) S/P lobectomy.    Snoring     Tobacco use 09/17/2019    Urinary incontinence     a little stress incontinence.        PSH:  Past Surgical History:   Procedure Laterality Date    MD BRONCHOSCOPY,DIAGNOSTIC  12/15/2022    Procedure: FIBER OPTIC BRONCHOSCOPY WITH WASH, AND BIOPSY;  Surgeon: Aislinn Alvarez D.O.;  Location: [facility];  Service: Pulmonary    MD THORACOSCOPY,DX NO BX Right 09/26/2022    Procedure: VIDEO ASSISTED THORACOSCOPY, RIGHT UPPER LOBECTOMY, NODE DISSECTION WITH MEDIASTINOSCOPY LYMPH NODE BIOPSY;  Surgeon: John H Ganser, M.D.;  Location: SURGERY [facility];  Service: General     LOBECTOMY Right 09/26/2022    Procedure: LOBECTOMY;  Surgeon: John H Ganser, M.D.;  Location: SURGERY [facility];  Service: General    MEDIASTINOSCOPY  09/26/2022    Procedure: MEDIASTINOSCOPY;  Surgeon: John H Ganser, M.D.;  Location: [facility];  Service: General    BRONCHOSCOPY, ROBOT-ASSISTED N/A 09/02/2022    Procedure: FIBER OPTICBRONHOSCOPY WITH BRONCHOALVEOLAR LAVAGE, BIOPSY AND FINE NEEDLE ASPIRATION, ENDOBRONCHIAL ULTRASOUND AND NAVIGATION ROBOTICS;  Surgeon: Idalia Arugelles M.D.;  Location: [facility];  Service: Pulmonary Robotic    ENDOBRONCHIAL US ADD-ON N/A 09/02/2022    Procedure: ENDOBRONCHIAL ULTRASOUND (EBUS);  Surgeon: Idalia Arguelles M.D.;  Location: [facility];  Service: Pulmonary Robotic    COLONOSCOPY  2019    MAMMOPLASTY AUGMENTATION Bilateral 2017    OK BREAST AUGMENTATION WITH IMPLANT  01/01/1972       MEDS:    Current Outpatient Medications:     fluticasone (FLOVENT HFA) 110 MCG/ACT Aerosol, Inhale 1 Puff 2 times a day., Disp: 12 g, Rfl: 1    predniSONE (DELTASONE) 10 MG Tab, Take 3 Tablets by mouth every morning for 5 days., Disp: 15 Tablet, Rfl: 0    benzonatate (TESSALON) 200 MG capsule, Take 1 Capsule by mouth 3 times a day as needed for Cough., Disp: 30 Capsule, Rfl: 0    azelastine (ASTELIN) 137 MCG/SPRAY nasal spray, Administer 1 Spray into affected nostril(S) every day., Disp: 30 mL, Rfl: 6    venlafaxine XR (EFFEXOR XR) 75 MG CAPSULE SR 24 HR, TAKE 1 CAPSULE BY MOUTH EVERY DAY, Disp: 90 Capsule, Rfl: 3    albuterol (PROVENTIL) 2.5mg/3ml Nebu Soln solution for nebulization, Take 3 mL by nebulization every four hours as needed for Shortness of Breath., Disp: 120 mL, Rfl: 11    pantoprazole (PROTONIX) 40 MG Tablet Delayed Response, Take 1 Tablet by mouth every day., Disp: 90 Tablet, Rfl: 3    albuterol 108 (90 Base) MCG/ACT Aero Soln inhalation aerosol, Inhale 2 Puffs every four hours as needed for Shortness of Breath., Disp: 8.5 Each,  "Rfl: 11    Levomefolate Glucosamine (METHYLFOLATE PO), Take 1 Tablet by mouth every day., Disp: , Rfl:     SELENIUM PO, Take 1 Tablet by mouth every day., Disp: , Rfl:     levothyroxine (SYNTHROID) 25 MCG Tab, Take 25 mcg by mouth every morning on an empty stomach., Disp: , Rfl:     Cyanocobalamin (B-12) 100 MCG Tab, Take 1 Tablet by mouth every day., Disp: , Rfl:     B Complex Vitamins (B COMPLEX-B12 PO), Take 1 Tab by mouth every day., Disp: , Rfl:     cholecalciferol (DIALYVITE VITAMIN D 5000) 5000 UNIT Cap, Take 5,000 Units by mouth every day., Disp: , Rfl:     Ascorbic Acid (VITAMIN C) 1000 MG Tab, Take 1 Tab by mouth every day., Disp: , Rfl:     ** I have documented what I find to be significant in regards to past medical, social, family and surgical history  in my HPI or under PMH/PSH/FH review section, otherwise it is noncontributory **         HPI    Review of Systems   Respiratory:  Positive for cough.    All other systems reviewed and are negative.             Objective     /70 (BP Location: Left arm, Patient Position: Sitting, BP Cuff Size: Large adult)   Pulse 71   Temp 35.8 °C (96.5 °F)   Resp 16   Ht 1.702 m (5' 7\")   Wt 68 kg (150 lb)   SpO2 95%   BMI 23.49 kg/m²      Physical Exam  Vitals and nursing note reviewed.   Constitutional:       General: She is not in acute distress.     Appearance: Normal appearance. She is well-developed.   HENT:      Head: Normocephalic.      Mouth/Throat:      Mouth: Mucous membranes are moist.      Pharynx: Oropharynx is clear.   Cardiovascular:      Heart sounds: Normal heart sounds. No murmur heard.  Pulmonary:      Effort: Pulmonary effort is normal. No respiratory distress.      Breath sounds: Wheezing (faint exp wheeze) present. No rhonchi or rales.   Neurological:      Mental Status: She is alert.      Motor: No abnormal muscle tone.   Psychiatric:         Mood and Affect: Mood normal.         Behavior: Behavior normal.         Assessment & Plan "     1. Exacerbation of asthma, unspecified asthma severity, unspecified whether persistent  predniSONE (DELTASONE) 10 MG Tab    benzonatate (TESSALON) 200 MG capsule      2. Environmental allergies        3. Smoking greater than 30 pack years          Trial of above. Shared decision making. If not improving in 3-4 days will RTC for x-rays and further treatment as needed.     - Dx, plan & d/c instructions discussed   - Rest, stay hydrated    Follow up with your regular primary care providers office for a recheck on today's visit. ER if not improving or if feeling/getting worse.   Any realistic side effects of medications that may have been given today reviewed.     Patient left in stable condition     Pertinent prior office visit notes in [employer] have been reviewed by me today on day of this visit.

## 2023-07-13 RX ORDER — VENLAFAXINE HYDROCHLORIDE 75 MG/1
75 CAPSULE, EXTENDED RELEASE ORAL
Qty: 90 CAPSULE | Refills: 3 | Status: SHIPPED | OUTPATIENT
Start: 2023-07-13 | End: 2023-11-07

## 2023-07-13 RX ORDER — LEVOTHYROXINE SODIUM 0.03 MG/1
25 TABLET ORAL
Qty: 90 TABLET | Refills: 3 | Status: SHIPPED | OUTPATIENT
Start: 2023-07-13

## 2023-07-14 ENCOUNTER — OFFICE VISIT (OUTPATIENT)
Dept: SLEEP MEDICINE | Facility: MEDICAL CENTER | Age: 78
End: 2023-07-14
Attending: INTERNAL MEDICINE
Payer: MEDICARE

## 2023-07-14 VITALS
SYSTOLIC BLOOD PRESSURE: 114 MMHG | HEART RATE: 62 BPM | DIASTOLIC BLOOD PRESSURE: 76 MMHG | HEIGHT: 67 IN | BODY MASS INDEX: 23.39 KG/M2 | WEIGHT: 149 LBS | OXYGEN SATURATION: 95 %

## 2023-07-14 DIAGNOSIS — J98.11 COLLAPSE OF LUNG TISSUE: ICD-10-CM

## 2023-07-14 DIAGNOSIS — Z87.891 FORMER SMOKER: ICD-10-CM

## 2023-07-14 DIAGNOSIS — C80.1 ADENOCARCINOMA (HCC): ICD-10-CM

## 2023-07-14 DIAGNOSIS — K21.9 GASTROESOPHAGEAL REFLUX DISEASE, UNSPECIFIED WHETHER ESOPHAGITIS PRESENT: ICD-10-CM

## 2023-07-14 DIAGNOSIS — J45.40 MODERATE PERSISTENT ASTHMA WITHOUT COMPLICATION: ICD-10-CM

## 2023-07-14 PROCEDURE — 3078F DIAST BP <80 MM HG: CPT | Performed by: INTERNAL MEDICINE

## 2023-07-14 PROCEDURE — 3074F SYST BP LT 130 MM HG: CPT | Performed by: INTERNAL MEDICINE

## 2023-07-14 PROCEDURE — 99212 OFFICE O/P EST SF 10 MIN: CPT | Performed by: INTERNAL MEDICINE

## 2023-07-14 PROCEDURE — 99214 OFFICE O/P EST MOD 30 MIN: CPT | Performed by: INTERNAL MEDICINE

## 2023-07-14 ASSESSMENT — ENCOUNTER SYMPTOMS
SPUTUM PRODUCTION: 0
CONSTIPATION: 0
HEADACHES: 0
FOCAL WEAKNESS: 0
EYE PAIN: 0
NAUSEA: 0
MYALGIAS: 0
HEMOPTYSIS: 0
DIZZINESS: 0
DOUBLE VISION: 0
EYE DISCHARGE: 0
ORTHOPNEA: 0
TREMORS: 0
COUGH: 1
NECK PAIN: 0
DEPRESSION: 0
FEVER: 0
CHILLS: 0
PND: 0
SORE THROAT: 0
VOMITING: 0
BLURRED VISION: 0
BACK PAIN: 0
SINUS PAIN: 0
DIARRHEA: 0
PHOTOPHOBIA: 0
FALLS: 0
SHORTNESS OF BREATH: 0
CLAUDICATION: 0
PALPITATIONS: 0
STRIDOR: 0
WEAKNESS: 0
SPEECH CHANGE: 0
DIAPHORESIS: 0
EYE REDNESS: 0
ABDOMINAL PAIN: 0
WEIGHT LOSS: 0
HEARTBURN: 0
WHEEZING: 0

## 2023-07-14 ASSESSMENT — FIBROSIS 4 INDEX: FIB4 SCORE: 0.76

## 2023-07-14 NOTE — PROGRESS NOTES
Chief Complaint   Patient presents with    Follow-Up     LAST SEEN 2/23/23, CT CHEST 3/13/23         HPI: This patient is a 77 y.o. 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  PMHx includes asthma for which she was previously on only prn GILMER and PND with associated chronic productive cough. She is a former smoker with 34 pk year hx and quit in roughly 2005. Pt lung cancer screening CT in 11/2019 showing RUL 2.2 cm GGO. Repeat CT in 6/2020 showed stability however no surveillance imaging was performed in 2021 and CT 7/2022 showed increase in RUL GGO to up to 3 cm in diameter now with solid component. A PET form 8/10 showed low level FDG uptake with SUV of 4.86 and mild uptake in 9 mm paratracheal LN as well as smaller 7 mm paratracheal node. She had bronchscopy with transbronchial bx demonstrating adenoCa however there was unsuccessful attempt to biopsy LN with EBUS and she underwent thoracoscopic right upper lobectomy with mediastinoscopy by Dr Ganser which revealed no e/o metastatic disease on 9/26/22 and I saw her shortly thereafter on 10/5/22. She is also followed by oncology whom she saw 4/6/23 and surveillance CT has been ordered. Her last CT chest from March showed post-op changes with chronic, progressive volume loss of RML. No mass. Minimal GGO in RLL which appeared improved to me compared to 12/2022 scan which was done after tx for RLL PNA last fall. She does have fairly severe GERD and we were concerned for aspiration pna 2/2 GERD. She actually had a bronchoscopy on 12/15/22 and BAL fluid was benign. 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 She felt markedly improved immediately after the bronchoscopy however she has continued to have cough throughout the day and often at night. No recurrent pNA. She was recently started on flovent 110 in addition to nebulized GILMER  which we started with chest vest therapy for post-operative airway clearance. The flovent has helped but she stopped chest vest due to cost of rental. She does still use nebulized GILMER. Overall today she feels markedly improved than just 3 days ago when seen in .    Past Medical History:   Diagnosis Date    Anesthesia     PONV    Asthma     Bronchitis     last time approx .    Cancer (HCC) 10/2022    Lung-had lobectomy. No chemo or radiation.    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 No chemo or radiation.    Cough     Chronic since pneumonia 2022    Depression     Disorder of thyroid     GERD (gastroesophageal reflux disease)     taking pantoprazole    Hiatus hernia syndrome     mild    Pain 2022    Throat and chest due to Pneumonia (2022 & 2022).    Pneumonia 11/2022    x2(end of November and early December) after lobectomy.    PONV (postoperative nausea and vomiting)     Shortness of breath     22-after pneumonia x2 (2022 & 2022) S/P lobectomy.    Snoring     Tobacco use 2019    Urinary incontinence     a little stress incontinence.       Social History     Socioeconomic History    Marital status:      Spouse name: Not on file    Number of children: Not on file    Years of education: Not on file    Highest education level: Bachelor's degree (e.g., BA, AB, BS)   Occupational History    Not on file   Tobacco Use    Smoking status: Former     Packs/day: 1.00     Years: 34.00     Pack years: 34.00     Types: Cigarettes     Start date: 1968     Quit date: 2005     Years since quittin.5     Passive exposure: Past    Smokeless tobacco: Never   Vaping Use    Vaping Use: Never used   Substance and Sexual Activity    Alcohol use: Not Currently     Alcohol/week: 8.4 - 12.6 oz     Types: 14 - 21 Glasses of wine per week     Comment: 2/day    Drug use: Not Currently     Types: Marijuana     Comment: 1 gummy every month maybe - last  11/2022    Sexual activity: Yes     Partners: Male     Comment: I am menopausal   Other Topics Concern    Not on file   Social History Narrative    Not on file     Social Determinants of Health     Financial Resource Strain: Low Risk  (8/27/2022)    Overall Financial Resource Strain (CARDIA)     Difficulty of Paying Living Expenses: Not hard at all   Food Insecurity: No Food Insecurity (8/27/2022)    Hunger Vital Sign     Worried About Running Out of Food in the Last Year: Never true     Ran Out of Food in the Last Year: Never true   Transportation Needs: No Transportation Needs (8/27/2022)    PRAPARE - Transportation     Lack of Transportation (Medical): No     Lack of Transportation (Non-Medical): No   Physical Activity: Sufficiently Active (8/27/2022)    Exercise Vital Sign     Days of Exercise per Week: 5 days     Minutes of Exercise per Session: 30 min   Stress: No Stress Concern Present (8/27/2022)    [place] [employer] - Occupational Stress Questionnaire     Feeling of Stress : Only a little   Social Connections: Moderately Integrated (8/27/2022)    Social Connection and Isolation Panel [NHANES]     Frequency of Communication with Friends and Family: Twice a week     Frequency of Social Gatherings with Friends and Family: Once a week     Attends [employer] Services: Never     Active Member of Clubs or Organizations: Yes     Attends Club or Organization Meetings: More than 4 times per year     Marital Status:    Intimate Partner Violence: Not on file   Housing Stability: Low Risk  (8/27/2022)    Housing Stability Vital Sign     Unable to Pay for Housing in the Last Year: No     Number of Places Lived in the Last Year: 1     Unstable Housing in the Last Year: No       Family History   Problem Relation Age of Onset    Dementia Mother     Heart Attack Mother     Cancer Sister 81        Lung Cancer, smoker    Hypertension Sister     Lung Disease Sister     Hypertension Brother      Arthritis Brother         RA    Cancer Paternal Aunt         Lung    Cancer Paternal Uncle         Lung       Current Outpatient Medications on File Prior to Visit   Medication Sig Dispense Refill    levothyroxine (SYNTHROID) 25 MCG Tab Take 1 Tablet by mouth every morning on an empty stomach. 90 Tablet 3    venlafaxine XR (EFFEXOR XR) 75 MG CAPSULE SR 24 HR Take 1 Capsule by mouth every day. 90 Capsule 3    fluticasone (FLOVENT HFA) 110 MCG/ACT Aerosol Inhale 1 Puff 2 times a day. 12 g 1    benzonatate (TESSALON) 200 MG capsule Take 1 Capsule by mouth 3 times a day as needed for Cough. 30 Capsule 0    azelastine (ASTELIN) 137 MCG/SPRAY nasal spray Administer 1 Spray into affected nostril(S) every day. 30 mL 6    albuterol (PROVENTIL) 2.5mg/3ml Nebu Soln solution for nebulization Take 3 mL by nebulization every four hours as needed for Shortness of Breath. 120 mL 11    pantoprazole (PROTONIX) 40 MG Tablet Delayed Response Take 1 Tablet by mouth every day. 90 Tablet 3    albuterol 108 (90 Base) MCG/ACT Aero Soln inhalation aerosol Inhale 2 Puffs every four hours as needed for Shortness of Breath. 8.5 Each 11    Levomefolate Glucosamine (METHYLFOLATE PO) Take 1 Tablet by mouth every day.      Cyanocobalamin (B-12) 100 MCG Tab Take 1 Tablet by mouth every day.      B Complex Vitamins (B COMPLEX-B12 PO) Take 1 Tab by mouth every day.      cholecalciferol (DIALYVITE VITAMIN D 5000) 5000 UNIT Cap Take 5,000 Units by mouth every day.      Ascorbic Acid (VITAMIN C) 1000 MG Tab Take 1 Tab by mouth every day.      predniSONE (DELTASONE) 10 MG Tab Take 3 Tablets by mouth every morning for 5 days. (Patient not taking: Reported on 7/14/2023) 15 Tablet 0    SELENIUM PO Take 1 Tablet by mouth every day. (Patient not taking: Reported on 7/14/2023)       No current facility-administered medications on file prior to visit.       Other environmental, Sulfa drugs, and Sulfites      ROS:   Review of Systems   Constitutional:  Negative  "for chills, diaphoresis, fever, malaise/fatigue and weight loss.   HENT:  Negative for congestion, ear discharge, ear pain, hearing loss, nosebleeds, sinus pain, sore throat and tinnitus.    Eyes:  Negative for blurred vision, double vision, photophobia, pain, discharge and redness.   Respiratory:  Positive for cough. Negative for hemoptysis, sputum production, shortness of breath, wheezing and stridor.    Cardiovascular:  Negative for chest pain, palpitations, orthopnea, claudication, leg swelling and PND.   Gastrointestinal:  Negative for abdominal pain, constipation, diarrhea, heartburn, nausea and vomiting.   Genitourinary:  Negative for dysuria and urgency.   Musculoskeletal:  Negative for back pain, falls, joint pain, myalgias and neck pain.   Skin:  Negative for itching and rash.   Neurological:  Negative for dizziness, tremors, speech change, focal weakness, weakness and headaches.   Endo/Heme/Allergies:  Negative for environmental allergies.   Psychiatric/Behavioral:  Negative for depression.        /76 (BP Location: Right arm, Patient Position: Sitting, BP Cuff Size: Adult)   Pulse 62   Ht 1.702 m (5' 7\")   Wt 67.6 kg (149 lb)   SpO2 95%   Physical Exam  Constitutional:       General: She is not in acute distress.     Appearance: Normal appearance. She is well-developed and normal weight.   HENT:      Head: Normocephalic and atraumatic.      Right Ear: External ear normal.      Left Ear: External ear normal.      Nose: Nose normal. No congestion.      Mouth/Throat:      Mouth: Mucous membranes are moist.      Pharynx: Oropharynx is clear. No oropharyngeal exudate.   Eyes:      General: No scleral icterus.     Extraocular Movements: Extraocular movements intact.      Conjunctiva/sclera: Conjunctivae normal.      Pupils: Pupils are equal, round, and reactive to light.   Neck:      Vascular: No JVD.      Trachea: No tracheal deviation.   Cardiovascular:      Rate and Rhythm: Normal rate and regular " rhythm.      Heart sounds: Normal heart sounds. No murmur heard.     No friction rub. No gallop.   Pulmonary:      Effort: Pulmonary effort is normal. No accessory muscle usage or respiratory distress.      Breath sounds: Normal breath sounds. No wheezing or rales.   Abdominal:      General: There is no distension.      Palpations: Abdomen is soft.      Tenderness: There is no abdominal tenderness.   Musculoskeletal:         General: No tenderness or deformity. Normal range of motion.      Cervical back: Normal range of motion and neck supple.      Right lower leg: No edema.      Left lower leg: No edema.   Lymphadenopathy:      Cervical: No cervical adenopathy.   Skin:     General: Skin is warm and dry.      Findings: No rash.      Nails: There is no clubbing.   Neurological:      Mental Status: She is alert and oriented to person, place, and time.      Cranial Nerves: No cranial nerve deficit.      Gait: Gait normal.   Psychiatric:         Behavior: Behavior normal.         PFTs as reviewed by me personally: as per HPI    Imaging as reviewed by me personally:  as per hPI    Assessment:  1. Moderate persistent asthma without complication        2. Adenocarcinoma (HCC)        3. Collapse of lung tissue        4. Former smoker        5. Gastroesophageal reflux disease, unspecified whether esophagitis present            Plan:  Chronic but has been worse the past 9  mos and I suspect GERD. We discussed minimizing etoh, elevating HOB. She is on PPI. Continue flovent and prn GILMER  S/p resection with no e/o recurrence. Surveillance imaging ordered by oncology  This is likely going to be chronic related to her surgery. Currently no clinical complications. Continue supportive care  Tobacco free  See above. Discussed lifestyle modifications such as meal timing, elevating HOB and minimizing ETOH  Return in about 6 months (around 1/14/2024).

## 2023-07-20 ENCOUNTER — HOSPITAL ENCOUNTER (OUTPATIENT)
Dept: LAB | Facility: MEDICAL CENTER | Age: 78
End: 2023-07-20
Attending: FAMILY MEDICINE
Payer: MEDICARE

## 2023-07-20 DIAGNOSIS — E78.5 DYSLIPIDEMIA: ICD-10-CM

## 2023-07-20 DIAGNOSIS — E55.9 VITAMIN D DEFICIENCY: ICD-10-CM

## 2023-07-20 DIAGNOSIS — R73.09 ELEVATED GLUCOSE: ICD-10-CM

## 2023-07-20 DIAGNOSIS — E03.9 ACQUIRED HYPOTHYROIDISM: ICD-10-CM

## 2023-07-20 DIAGNOSIS — C34.91 ADENOCARCINOMA OF RIGHT LUNG (HCC): ICD-10-CM

## 2023-07-20 LAB
25(OH)D3 SERPL-MCNC: 60 NG/ML (ref 30–100)
ALBUMIN SERPL BCP-MCNC: 4.4 G/DL (ref 3.2–4.9)
ALBUMIN/GLOB SERPL: 1.8 G/DL
ALP SERPL-CCNC: 72 U/L (ref 30–99)
ALT SERPL-CCNC: 19 U/L (ref 2–50)
ANION GAP SERPL CALC-SCNC: 10 MMOL/L (ref 7–16)
AST SERPL-CCNC: 25 U/L (ref 12–45)
BASOPHILS # BLD AUTO: 1.2 % (ref 0–1.8)
BASOPHILS # BLD: 0.08 K/UL (ref 0–0.12)
BILIRUB SERPL-MCNC: 0.6 MG/DL (ref 0.1–1.5)
BUN SERPL-MCNC: 19 MG/DL (ref 8–22)
CALCIUM ALBUM COR SERPL-MCNC: 8.8 MG/DL (ref 8.5–10.5)
CALCIUM SERPL-MCNC: 9.1 MG/DL (ref 8.5–10.5)
CHLORIDE SERPL-SCNC: 103 MMOL/L (ref 96–112)
CHOLEST SERPL-MCNC: 251 MG/DL (ref 100–199)
CO2 SERPL-SCNC: 23 MMOL/L (ref 20–33)
CREAT SERPL-MCNC: 0.76 MG/DL (ref 0.5–1.4)
EOSINOPHIL # BLD AUTO: 0.52 K/UL (ref 0–0.51)
EOSINOPHIL NFR BLD: 8.1 % (ref 0–6.9)
ERYTHROCYTE [DISTWIDTH] IN BLOOD BY AUTOMATED COUNT: 50.4 FL (ref 35.9–50)
EST. AVERAGE GLUCOSE BLD GHB EST-MCNC: 100 MG/DL
GFR SERPLBLD CREATININE-BSD FMLA CKD-EPI: 81 ML/MIN/1.73 M 2
GLOBULIN SER CALC-MCNC: 2.4 G/DL (ref 1.9–3.5)
GLUCOSE SERPL-MCNC: 86 MG/DL (ref 65–99)
HBA1C MFR BLD: 5.1 % (ref 4–5.6)
HCT VFR BLD AUTO: 42.3 % (ref 37–47)
HDLC SERPL-MCNC: 81 MG/DL
HGB BLD-MCNC: 13.7 G/DL (ref 12–16)
IMM GRANULOCYTES # BLD AUTO: 0.02 K/UL (ref 0–0.11)
IMM GRANULOCYTES NFR BLD AUTO: 0.3 % (ref 0–0.9)
LDLC SERPL CALC-MCNC: 155 MG/DL
LYMPHOCYTES # BLD AUTO: 2.08 K/UL (ref 1–4.8)
LYMPHOCYTES NFR BLD: 32.4 % (ref 22–41)
MCH RBC QN AUTO: 30.6 PG (ref 27–33)
MCHC RBC AUTO-ENTMCNC: 32.4 G/DL (ref 32.2–35.5)
MCV RBC AUTO: 94.4 FL (ref 81.4–97.8)
MONOCYTES # BLD AUTO: 0.41 K/UL (ref 0–0.85)
MONOCYTES NFR BLD AUTO: 6.4 % (ref 0–13.4)
NEUTROPHILS # BLD AUTO: 3.31 K/UL (ref 1.82–7.42)
NEUTROPHILS NFR BLD: 51.6 % (ref 44–72)
NRBC # BLD AUTO: 0 K/UL
NRBC BLD-RTO: 0 /100 WBC (ref 0–0.2)
PLATELET # BLD AUTO: 319 K/UL (ref 164–446)
PMV BLD AUTO: 10.7 FL (ref 9–12.9)
POTASSIUM SERPL-SCNC: 4.6 MMOL/L (ref 3.6–5.5)
PROT SERPL-MCNC: 6.8 G/DL (ref 6–8.2)
RBC # BLD AUTO: 4.48 M/UL (ref 4.2–5.4)
SODIUM SERPL-SCNC: 136 MMOL/L (ref 135–145)
TRIGL SERPL-MCNC: 77 MG/DL (ref 0–149)
TSH SERPL DL<=0.005 MIU/L-ACNC: 2.45 UIU/ML (ref 0.38–5.33)
WBC # BLD AUTO: 6.4 K/UL (ref 4.8–10.8)

## 2023-07-20 PROCEDURE — 80061 LIPID PANEL: CPT

## 2023-07-20 PROCEDURE — 80053 COMPREHEN METABOLIC PANEL: CPT

## 2023-07-20 PROCEDURE — 82306 VITAMIN D 25 HYDROXY: CPT

## 2023-07-20 PROCEDURE — 84443 ASSAY THYROID STIM HORMONE: CPT

## 2023-07-20 PROCEDURE — 85025 COMPLETE CBC W/AUTO DIFF WBC: CPT

## 2023-07-20 PROCEDURE — 36415 COLL VENOUS BLD VENIPUNCTURE: CPT

## 2023-07-20 PROCEDURE — 83036 HEMOGLOBIN GLYCOSYLATED A1C: CPT | Mod: GA

## 2023-07-24 RX ORDER — ROSUVASTATIN CALCIUM 5 MG/1
5 TABLET, COATED ORAL EVERY EVENING
Qty: 90 TABLET | Refills: 3 | Status: SHIPPED | OUTPATIENT
Start: 2023-07-24

## 2023-08-17 DIAGNOSIS — J45.40 MODERATE PERSISTENT ASTHMA WITHOUT COMPLICATION: ICD-10-CM

## 2023-08-17 RX ORDER — ALBUTEROL SULFATE 90 UG/1
2 AEROSOL, METERED RESPIRATORY (INHALATION) EVERY 4 HOURS PRN
Qty: 8.5 EACH | Refills: 11 | Status: SHIPPED | OUTPATIENT
Start: 2023-08-17

## 2023-08-17 NOTE — TELEPHONE ENCOUNTER
Have we ever prescribed this med? Yes.  If yes, what date? 10/24/22     Last OV: 7/14/23 Dr. Martel    Next OV: no pending appt    DX: Moderate persistent asthma without complication [J45.40]    Medications: albuterol 108 (90 Base) MCG/ACT Aero Soln inhalation aerosol

## 2023-09-12 ENCOUNTER — PATIENT MESSAGE (OUTPATIENT)
Dept: MEDICAL GROUP | Facility: LAB | Age: 78
End: 2023-09-12
Payer: MEDICARE

## 2023-09-12 DIAGNOSIS — M25.551 RIGHT HIP PAIN: ICD-10-CM

## 2023-10-04 DIAGNOSIS — C34.91 PRIMARY MALIGNANT NEOPLASM OF RIGHT LUNG (HCC): ICD-10-CM

## 2023-10-06 ENCOUNTER — APPOINTMENT (OUTPATIENT)
Dept: RADIOLOGY | Facility: MEDICAL CENTER | Age: 78
End: 2023-10-06
Attending: STUDENT IN AN ORGANIZED HEALTH CARE EDUCATION/TRAINING PROGRAM
Payer: MEDICARE

## 2023-10-12 ENCOUNTER — APPOINTMENT (OUTPATIENT)
Dept: RADIOLOGY | Facility: MEDICAL CENTER | Age: 78
End: 2023-10-12
Attending: STUDENT IN AN ORGANIZED HEALTH CARE EDUCATION/TRAINING PROGRAM
Payer: MEDICARE

## 2023-10-14 ENCOUNTER — TELEPHONE (OUTPATIENT)
Dept: MEDICAL GROUP | Facility: PHYSICIAN GROUP | Age: 78
End: 2023-10-14
Payer: MEDICARE

## 2023-10-14 NOTE — TELEPHONE ENCOUNTER
She reports she has had COVID for the last 10 days.     She is wondering how long her illness will last and if this is typical. We discussed that I am unable to predict how long her COVID course will last and the illness looks different for each person. It appears her last COVID vaccine was 2 years ago and she stated she had concerns about the vaccine. I addressed her concerns to the best of my ability. I also explained that unfortunately, it is too late for medication as she has been ill for more than 5 days. We discussed OTC options to manage her symptoms but that this point it is just a matter of time. I also explained she can wait up to 90 days after her infection to get the COVID booster.

## 2023-10-19 ENCOUNTER — HOSPITAL ENCOUNTER (OUTPATIENT)
Dept: LAB | Facility: MEDICAL CENTER | Age: 78
End: 2023-10-19
Attending: STUDENT IN AN ORGANIZED HEALTH CARE EDUCATION/TRAINING PROGRAM
Payer: MEDICARE

## 2023-10-19 ENCOUNTER — HOSPITAL ENCOUNTER (OUTPATIENT)
Dept: RADIOLOGY | Facility: MEDICAL CENTER | Age: 78
End: 2023-10-19
Attending: STUDENT IN AN ORGANIZED HEALTH CARE EDUCATION/TRAINING PROGRAM
Payer: MEDICARE

## 2023-10-19 DIAGNOSIS — C34.91 PRIMARY MALIGNANT NEOPLASM OF RIGHT LUNG (HCC): ICD-10-CM

## 2023-10-19 LAB
ALBUMIN SERPL BCP-MCNC: 4 G/DL (ref 3.2–4.9)
ALBUMIN/GLOB SERPL: 1.5 G/DL
ALP SERPL-CCNC: 75 U/L (ref 30–99)
ALT SERPL-CCNC: 22 U/L (ref 2–50)
ANION GAP SERPL CALC-SCNC: 12 MMOL/L (ref 7–16)
AST SERPL-CCNC: 25 U/L (ref 12–45)
BILIRUB SERPL-MCNC: 0.9 MG/DL (ref 0.1–1.5)
BUN SERPL-MCNC: 16 MG/DL (ref 8–22)
CALCIUM ALBUM COR SERPL-MCNC: 9.4 MG/DL (ref 8.5–10.5)
CALCIUM SERPL-MCNC: 9.4 MG/DL (ref 8.4–10.2)
CHLORIDE SERPL-SCNC: 100 MMOL/L (ref 96–112)
CO2 SERPL-SCNC: 22 MMOL/L (ref 20–33)
CREAT SERPL-MCNC: 0.81 MG/DL (ref 0.5–1.4)
GFR SERPLBLD CREATININE-BSD FMLA CKD-EPI: 75 ML/MIN/1.73 M 2
GLOBULIN SER CALC-MCNC: 2.7 G/DL (ref 1.9–3.5)
GLUCOSE SERPL-MCNC: 98 MG/DL (ref 65–99)
POTASSIUM SERPL-SCNC: 4.9 MMOL/L (ref 3.6–5.5)
PROT SERPL-MCNC: 6.7 G/DL (ref 6–8.2)
SODIUM SERPL-SCNC: 134 MMOL/L (ref 135–145)

## 2023-10-19 PROCEDURE — 71260 CT THORAX DX C+: CPT

## 2023-10-19 PROCEDURE — 700117 HCHG RX CONTRAST REV CODE 255: Performed by: STUDENT IN AN ORGANIZED HEALTH CARE EDUCATION/TRAINING PROGRAM

## 2023-10-19 PROCEDURE — 80053 COMPREHEN METABOLIC PANEL: CPT

## 2023-10-19 PROCEDURE — 36415 COLL VENOUS BLD VENIPUNCTURE: CPT

## 2023-10-19 RX ADMIN — IOHEXOL 75 ML: 350 INJECTION, SOLUTION INTRAVENOUS at 15:39

## 2023-10-20 ENCOUNTER — PATIENT MESSAGE (OUTPATIENT)
Dept: HEMATOLOGY ONCOLOGY | Facility: MEDICAL CENTER | Age: 78
End: 2023-10-20
Payer: MEDICARE

## 2023-10-20 ENCOUNTER — PATIENT MESSAGE (OUTPATIENT)
Dept: SLEEP MEDICINE | Facility: MEDICAL CENTER | Age: 78
End: 2023-10-20
Payer: MEDICARE

## 2023-10-20 DIAGNOSIS — J90 PLEURAL EFFUSION: ICD-10-CM

## 2023-10-20 DIAGNOSIS — R91.8 LUNG NODULES: ICD-10-CM

## 2023-10-20 DIAGNOSIS — C80.1 ADENOCARCINOMA (HCC): ICD-10-CM

## 2023-10-20 NOTE — PROGRESS NOTES
I called the patient to review her CT scan, which showed    1.  Right upper lobectomy is again noted.  2.  Improved volume loss in the middle lobe.  3.  A few small nonspecific 4 mm lung nodules as described for which continued follow-up for oncologic guidelines is recommended.  4.  Slightly smaller mediastinal lymph nodes.  5.  Stable left adrenal nodule.    I informed patient per NCCN guidelines I would like to see her now (at the 6 month alfred) to conduct a H&P.  She agrees and will make an appt.    Georgia Chatman MD  Hematology/Oncology

## 2023-11-06 ENCOUNTER — APPOINTMENT (OUTPATIENT)
Dept: MEDICAL GROUP | Facility: LAB | Age: 78
End: 2023-11-06
Payer: MEDICARE

## 2023-11-07 ENCOUNTER — OFFICE VISIT (OUTPATIENT)
Dept: MEDICAL GROUP | Facility: LAB | Age: 78
End: 2023-11-07
Payer: MEDICARE

## 2023-11-07 VITALS
OXYGEN SATURATION: 95 % | HEIGHT: 67 IN | TEMPERATURE: 97 F | SYSTOLIC BLOOD PRESSURE: 112 MMHG | RESPIRATION RATE: 18 BRPM | BODY MASS INDEX: 23.54 KG/M2 | WEIGHT: 150 LBS | HEART RATE: 71 BPM | DIASTOLIC BLOOD PRESSURE: 58 MMHG

## 2023-11-07 DIAGNOSIS — J45.31 MILD PERSISTENT ASTHMA WITH EXACERBATION: ICD-10-CM

## 2023-11-07 DIAGNOSIS — J40 BRONCHITIS: ICD-10-CM

## 2023-11-07 PROCEDURE — 3078F DIAST BP <80 MM HG: CPT | Performed by: FAMILY MEDICINE

## 2023-11-07 PROCEDURE — 99214 OFFICE O/P EST MOD 30 MIN: CPT | Performed by: FAMILY MEDICINE

## 2023-11-07 PROCEDURE — 3074F SYST BP LT 130 MM HG: CPT | Performed by: FAMILY MEDICINE

## 2023-11-07 RX ORDER — AZITHROMYCIN 500 MG/1
500 TABLET, FILM COATED ORAL DAILY
Qty: 5 TABLET | Refills: 0 | Status: SHIPPED | OUTPATIENT
Start: 2023-11-07 | End: 2023-11-12

## 2023-11-07 RX ORDER — PREDNISONE 20 MG/1
TABLET ORAL
Qty: 18 TABLET | Refills: 1 | Status: SHIPPED | OUTPATIENT
Start: 2023-11-07 | End: 2024-02-27

## 2023-11-07 ASSESSMENT — FIBROSIS 4 INDEX: FIB4 SCORE: 1.25

## 2023-11-07 NOTE — PROGRESS NOTES
Subjective:     Chief Complaint   Patient presents with    Follow-Up     Post covid         HPI:   Alissa presents today with post covid lingering symptoms. Covid in early October. CT scan done, ordered by pulm, 10/19/23. Things looked stable, ok. No acute concerns. H/o lobectomy due to mass.   Continues to have SOB, fatigue, coughing up phlegm. PFTs September 2022 showed mild obstructive pattern.   Was previously back to dressage horse back riding. Now cant do it for more than 5 minutes without being SOB.       Current Outpatient Medications Ordered in Epic   Medication Sig Dispense Refill    rosuvastatin (CRESTOR) 5 MG Tab Take 1 Tablet by mouth every evening. 90 Tablet 3    albuterol 108 (90 Base) MCG/ACT Aero Soln inhalation aerosol Inhale 2 Puffs every four hours as needed for Shortness of Breath. 8.5 Each 11    levothyroxine (SYNTHROID) 25 MCG Tab Take 1 Tablet by mouth every morning on an empty stomach. 90 Tablet 3    venlafaxine XR (EFFEXOR XR) 75 MG CAPSULE SR 24 HR Take 1 Capsule by mouth every day. 90 Capsule 3    fluticasone (FLOVENT HFA) 110 MCG/ACT Aerosol Inhale 1 Puff 2 times a day. 12 g 1    benzonatate (TESSALON) 200 MG capsule Take 1 Capsule by mouth 3 times a day as needed for Cough. 30 Capsule 0    azelastine (ASTELIN) 137 MCG/SPRAY nasal spray Administer 1 Spray into affected nostril(S) every day. 30 mL 6    albuterol (PROVENTIL) 2.5mg/3ml Nebu Soln solution for nebulization Take 3 mL by nebulization every four hours as needed for Shortness of Breath. 120 mL 11    pantoprazole (PROTONIX) 40 MG Tablet Delayed Response Take 1 Tablet by mouth every day. 90 Tablet 3    Levomefolate Glucosamine (METHYLFOLATE PO) Take 1 Tablet by mouth every day.      Cyanocobalamin (B-12) 100 MCG Tab Take 1 Tablet by mouth every day.      B Complex Vitamins (B COMPLEX-B12 PO) Take 1 Tab by mouth every day.      cholecalciferol (DIALYVITE VITAMIN D 5000) 5000 UNIT Cap Take 5,000 Units by mouth every day.       "Ascorbic Acid (VITAMIN C) 1000 MG Tab Take 1 Tab by mouth every day.       No current [facility]-ordered facility-administered medications on file.         ROS:  Gen: no fevers/chills, no changes in weight  Eyes: no changes in vision  ENT: no sore throat, no hearing loss, no bloody nose  Pulm: no sob, no cough  CV: no chest pain, no palpitations  GI: no nausea/vomiting, no diarrhea  : no dysuria  MSk: no myalgias  Skin: no rash  Neuro: no headaches, no numbness/tingling  Heme/Lymph: no easy bruising      Objective:     Exam:  /58 (BP Location: Right arm, Patient Position: Sitting, BP Cuff Size: Adult)   Pulse 71   Temp 36.1 °C (97 °F)   Resp 18   Ht 1.702 m (5' 7\")   Wt 68 kg (150 lb)   SpO2 95%   BMI 23.49 kg/m²  Body mass index is 23.49 kg/m².    Gen: Alert and oriented, No apparent distress.  Neck: Neck is supple without lymphadenopathy.  Lungs: Normal effort, CTA bilaterally, no wheezes, rhonchi, or rales.  She does sound a bit tight like is harder to get a deep breath.  Deep breathing does prompt coughing at times as well.  CV: Regular rate and rhythm. No murmurs, rubs, or gallops.  Ext: No clubbing, cyanosis, edema.      Assessment & Plan:     77 y.o. female with the following -     1. Bronchitis  We will go and start treating this like bronchitis or an asthma exacerbation.  We did discuss use of steroids as well as an antibiotic.  May consider changing her inhaler from a straight steroid inhaler to something with a long-acting beta agonist as well.  - predniSONE (DELTASONE) 20 MG Tab; 3 tabs daily for 3 days, 2 tabs daily for 3 days, 1 tab daily for 3 days  Dispense: 18 Tablet; Refill: 1  - azithromycin (ZITHROMAX) 500 MG tablet; Take 1 Tablet by mouth every day for 5 days.  Dispense: 5 Tablet; Refill: 0    2. Mild persistent asthma with exacerbation  She will let me know how she is doing before the weekend so we can adjust things as needed.  We did discuss continuing to use her nebulizer but that " this you should diminish with time.  - predniSONE (DELTASONE) 20 MG Tab; 3 tabs daily for 3 days, 2 tabs daily for 3 days, 1 tab daily for 3 days  Dispense: 18 Tablet; Refill: 1  - azithromycin (ZITHROMAX) 500 MG tablet; Take 1 Tablet by mouth every day for 5 days.  Dispense: 5 Tablet; Refill: 0            No follow-ups on file.    Please note that this dictation was created using voice recognition software. I have made every reasonable attempt to correct obvious errors, but I expect that there are errors of grammar and possibly content that I did not discover before finalizing the note.

## 2023-11-09 ENCOUNTER — APPOINTMENT (OUTPATIENT)
Dept: MEDICAL GROUP | Facility: LAB | Age: 78
End: 2023-11-09
Payer: MEDICARE

## 2023-11-14 ENCOUNTER — HOSPITAL ENCOUNTER (OUTPATIENT)
Dept: HEMATOLOGY ONCOLOGY | Facility: MEDICAL CENTER | Age: 78
End: 2023-11-14
Attending: STUDENT IN AN ORGANIZED HEALTH CARE EDUCATION/TRAINING PROGRAM
Payer: MEDICARE

## 2023-11-14 VITALS
DIASTOLIC BLOOD PRESSURE: 70 MMHG | BODY MASS INDEX: 23.89 KG/M2 | HEART RATE: 65 BPM | HEIGHT: 67 IN | OXYGEN SATURATION: 94 % | WEIGHT: 152.23 LBS | SYSTOLIC BLOOD PRESSURE: 110 MMHG | TEMPERATURE: 97.9 F

## 2023-11-14 DIAGNOSIS — C34.91 PRIMARY MALIGNANT NEOPLASM OF RIGHT LUNG (HCC): ICD-10-CM

## 2023-11-14 PROCEDURE — 99212 OFFICE O/P EST SF 10 MIN: CPT | Performed by: STUDENT IN AN ORGANIZED HEALTH CARE EDUCATION/TRAINING PROGRAM

## 2023-11-14 PROCEDURE — 99214 OFFICE O/P EST MOD 30 MIN: CPT | Performed by: STUDENT IN AN ORGANIZED HEALTH CARE EDUCATION/TRAINING PROGRAM

## 2023-11-14 ASSESSMENT — FIBROSIS 4 INDEX: FIB4 SCORE: 1.25

## 2023-11-14 ASSESSMENT — ENCOUNTER SYMPTOMS
MEMORY LOSS: 0
COUGH: 0
NECK PAIN: 0
SORE THROAT: 0
TREMORS: 0
SPUTUM PRODUCTION: 0
FOCAL WEAKNESS: 0
FEVER: 0
PALPITATIONS: 0
WEIGHT LOSS: 0
WHEEZING: 0
NAUSEA: 0
ORTHOPNEA: 0
HEARTBURN: 0
VOMITING: 0
SHORTNESS OF BREATH: 0
DIZZINESS: 0
BLURRED VISION: 0
TINGLING: 0
DEPRESSION: 0
CHILLS: 0
BRUISES/BLEEDS EASILY: 0
SENSORY CHANGE: 0
ABDOMINAL PAIN: 0
HEADACHES: 0

## 2023-11-14 NOTE — PROGRESS NOTES
Follow Up Note: Hematology/Oncology     Primary Care:  Magaly Estes M.D.    CC: lung adenocarcinoma    Current Treatment: N/A  Previous Treatment:  lobectomy        Subjective:   History of Presenting Illness:  Alissa Bonilla is a 77 y.o. female here to establish care for new diagnosis of lung adenocarcinoma.    Is accompanied by her  to today's visit.  Patient had a lung cancer screening CT in 2019 which showed a 2.2 groundglass nodule in the right upper lobe and 3 solid nodules in the middle lobe.  Due to this it was recommended that she have a repeat CT scan 6 months later which was June 2020.  At that time the CT showed a part groundglass opacity in the right upper lobe measuring 11.2 x 17.3 mm which remained unchanged.  Follow-up CT was completed 2 years later in July 2022 which noted an interval enlargement of the groundglass and partially solid nodule in the right upper lobe measuring 1.7 x 3.2 x 3.1 cm in size.  PET scan was completed in August 2022 which showed the aforementioned mass as well as 2 right paratracheal node suspicious for metastatic disease.  Biopsy was performed of the mass and came back to be adenocarcinoma.  Unfortunately pulmonology was unable to sample the lymph node during the procedure.    Patient reports that she is a former smoker.  Her father is was also a smoker.  She denies any alcohol or drug use.  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    Patient is largely asymptomatic.  She does note her sister has lung adenocarcinoma and currently undergoing treatment with pembrolizumab.    Interval history    She is attempting to be very active.     Today we went over her CT Chest, which again showed RU lobectomy.  She has a small nonspecific 4 mm lung nodules. Slightly smaller mediastinal lymph nodes.  Stable left adrenal nodule.    She is hoping to show horses  "again next year, and her goal is a silver medal.      Review of Systems   Constitutional:  Negative for chills, fever, malaise/fatigue and weight loss.   HENT:  Negative for congestion, ear pain, nosebleeds and sore throat.    Eyes:  Negative for blurred vision.   Respiratory:  Negative for cough, sputum production, shortness of breath and wheezing.    Cardiovascular:  Negative for chest pain, palpitations, orthopnea and leg swelling.   Gastrointestinal:  Negative for abdominal pain, heartburn, nausea and vomiting.   Genitourinary:  Negative for dysuria, frequency and urgency.   Musculoskeletal:  Negative for neck pain.   Neurological:  Negative for dizziness, tingling, tremors, sensory change, focal weakness and headaches.   Endo/Heme/Allergies:  Does not bruise/bleed easily.   Psychiatric/Behavioral:  Negative for depression, memory loss and suicidal ideas.    All other systems reviewed and are negative.      Allergies   Allergen Reactions    Other Environmental      Russian rhoda and other pollens.    Sulfa Drugs      \"crazy\"    Sulfites      Asthma        Current Outpatient Medications   Medication Sig Dispense Refill    predniSONE (DELTASONE) 20 MG Tab 3 tabs daily for 3 days, 2 tabs daily for 3 days, 1 tab daily for 3 days 18 Tablet 1    albuterol 108 (90 Base) MCG/ACT Aero Soln inhalation aerosol Inhale 2 Puffs every four hours as needed for Shortness of Breath. 8.5 Each 11    rosuvastatin (CRESTOR) 5 MG Tab Take 1 Tablet by mouth every evening. 90 Tablet 3    levothyroxine (SYNTHROID) 25 MCG Tab Take 1 Tablet by mouth every morning on an empty stomach. 90 Tablet 3    fluticasone (FLOVENT HFA) 110 MCG/ACT Aerosol Inhale 1 Puff 2 times a day. 12 g 1    azelastine (ASTELIN) 137 MCG/SPRAY nasal spray Administer 1 Spray into affected nostril(S) every day. 30 mL 6    albuterol (PROVENTIL) 2.5mg/3ml Nebu Soln solution for nebulization Take 3 mL by nebulization every four hours as needed for Shortness of Breath. 120 " "mL 11    pantoprazole (PROTONIX) 40 MG Tablet Delayed Response Take 1 Tablet by mouth every day. 90 Tablet 3    Levomefolate Glucosamine (METHYLFOLATE PO) Take 1 Tablet by mouth every day.      Cyanocobalamin (B-12) 100 MCG Tab Take 1 Tablet by mouth every day.      B Complex Vitamins (B COMPLEX-B12 PO) Take 1 Tab by mouth every day.      cholecalciferol (DIALYVITE VITAMIN D 5000) 5000 UNIT Cap Take 5,000 Units by mouth every day.      Ascorbic Acid (VITAMIN C) 1000 MG Tab Take 1 Tab by mouth every day.       No current facility-administered medications for this encounter.        Problem list, medications, and allergies reviewed by myself today in Epic.     Objective:     Vitals:    11/14/23 1335   BP: 110/70   BP Location: Right arm   Patient Position: Sitting   BP Cuff Size: Adult   Pulse: 65   Temp: 36.6 °C (97.9 °F)   TempSrc: Temporal   SpO2: 94%   Weight: 69 kg (152 lb 3.6 oz)   Height: 1.702 m (5' 7.01\")         DESC; KARNOFSKY SCALE WITH ECOG EQUIVALENT: 100, Fully active, able to carry on all pre-disease performed without restriction (ECOG equivalent 0)    DISTRESS LEVEL: no apparent distress    Physical Exam:   Physical Exam  Constitutional:       General: She is not in acute distress.     Appearance: Normal appearance. She is not ill-appearing.   HENT:      Head: Normocephalic and atraumatic.      Nose: Nose normal.      Mouth/Throat:      Mouth: Mucous membranes are moist.      Pharynx: No oropharyngeal exudate or posterior oropharyngeal erythema.   Eyes:      General: No scleral icterus.     Conjunctiva/sclera: Conjunctivae normal.      Pupils: Pupils are equal, round, and reactive to light.   Cardiovascular:      Rate and Rhythm: Normal rate and regular rhythm.      Pulses: Normal pulses.      Heart sounds: Normal heart sounds. No murmur heard.     No friction rub. No gallop.   Pulmonary:      Effort: Pulmonary effort is normal. No respiratory distress.      Breath sounds: No stridor. Wheezing (RUL) " present. No rhonchi or rales.   Chest:      Chest wall: No tenderness.   Abdominal:      General: Abdomen is flat. Bowel sounds are normal. There is no distension.      Palpations: Abdomen is soft. There is no mass.      Tenderness: There is no abdominal tenderness. There is no guarding.   Musculoskeletal:         General: No swelling, tenderness or deformity. Normal range of motion.      Cervical back: Normal range of motion and neck supple. No rigidity or tenderness.      Right lower leg: No edema.      Left lower leg: No edema.   Skin:     General: Skin is warm and dry.      Coloration: Skin is not jaundiced or pale.      Findings: No bruising, erythema or rash.   Neurological:      General: No focal deficit present.      Mental Status: She is alert and oriented to person, place, and time. Mental status is at baseline.      Sensory: No sensory deficit.      Motor: No weakness.      Coordination: Coordination normal.      Gait: Gait normal.   Psychiatric:         Mood and Affect: Mood normal.         Behavior: Behavior normal.         Thought Content: Thought content normal.         Judgment: Judgment normal.             Labs:   Most recent labs reviewed.  Please see the lab tab of chart review    Imaging:   Most recent images below have been independently reviewed by me.     CT Chest  1.  Right upper lobectomy is again noted.  2.  Improved volume loss in the middle lobe.  3.  A few small nonspecific 4 mm lung nodules as described for which continued follow-up for oncologic guidelines is recommended.  4.  Slightly smaller mediastinal lymph nodes.  5.  Stable left adrenal nodule.    CT Chest  1.  Right upper lobectomy. No new metastatic disease in the chest.  2.  Progressive volume loss in the middle lobe. No masslike lesions.  3.  Stable small groundglass opacities in the right lower lobe periphery, immediately inferior to the right upper lobe, likely postinfectious/postinflammatory or related to prior  treatment.  4.  No new pulmonary nodules or masses.  5.  Right lower paratracheal node now measures 1 cm, slightly smaller since prior. All other thoracic nodes are not enlarged by size criteria.  6.  Stable left adrenal nodule.    Brain:  1.  No acute abnormality.  2.  There is no evidence of intracranial metastasis.  3.  Mild age-appropriate volume loss.     PET  1.  There is abnormal uptake within the enlarging groundglass and part solid mass in the right lung apex which is highly suspicious for slow growing malignancy such as adenocarcinoma.  2.  Uptake within 2 right paratracheal node suspicious for metastatic disease although nonspecific based on size.  3.  Uptake within the stable left adrenal gland nodule most consistent with a benign adenoma.  4.  No evidence of metastatic disease in the neck, abdomen or pelvis.    Pathology:  A. Right upper lobe nodule, fine needle aspiration, slide:          Atypical clusters noted on PAP stain.  See comment.   B. Right upper lobe nodule, fine needle aspiration, core:          Positive for adenocarcinoma.   C. Right upper lobe nodule, forceps with touch prep slides:          Positive for adenocarcinoma, lung primary with papillary           architecture noted.   D. 4R, fine needle aspiration, slide:          Negative for lesional cells and sheets of lymphocytes to suggest           lymph node aspirate.   E. 4R, fine needle aspiration, core:          Negative; bronchial epithelial cells, cartilage and a few           lymphoid aggregates noted.   F. Right upper lobe, bronchoalveolar lavage:          No malignancy identified.          Cytology preparations, including cell block, demonstrate benign           bronchial epithelial cells and alveolar macrophages.     Path from 9/26    A. Right paratracheal lymph node:          Negative for carcinoma in five lymph nodes (0/5)          Non-necrotizing granulomatous lymphadenitis, negative for           morphologic evidence of acid  fast and fungal organisms by           AFB/GMS stains, see comment   B. Right hilar node, station 10:          Negative for carcinoma in four lymph nodes (0/4)          Non-necrotizing granulomatous lymphadenitis   C. Right subcarinal node, station 7:          Negative for carcinoma in one lymph node (0/1)          Non-necrotizing granulomatous lymphadenitis   D. Right upper paratracheal node, station 2:          Negative for carcinoma in one lymph node (0/1)          Non-necrotizing granulomatous lymphadenitis   E. Right upper lobe, lung:          Invasive papillary adenocarcinoma, moderately differentiated,           3.6 cm          Margins of resection are negative for carcinoma          Negative for carcinoma in eleven lymph nodes (0/11)          Non-necrotizing granulomatous lymphadenitis, see comment          See synoptic report for details   Assessment/Plan:      Cancer Staging   Adenocarcinoma of right lung (HCC)  Staging form: Lung, AJCC 8th Edition  - Clinical: Stage IB (cT2a, cN0, cM0) - Signed by Georgia Chatman M.D. on 9/29/2022      Ms. Talon Bonilla is a 76 yo F with a new diagnosis of adenocarcinoma of the lung.  She recently underwent a lobectomy with Dr. Ganser.  Final pathology reports adenocarcinoma 3.6 cm with negative lymph nodes. Her Scan at 6 months shows no concern for metastatic disease in the chest.    Surveillance will consist of H&P and chest CT scan every 6 months for 3 years and then a low-dose noncontrast enhanced chest CT annually.  If there is recurrence found on the scans we will undergo a PET scan and a brain MRI.    Plan  -CT scan in 6 months  -patient to see me after scans    Adrenal Adenoma  -Stable (11mm prior, now 10mm)  -since it is less than 4 cm it is appropriate to watch it every 6 to 12 months  -If it grows larger than 1 cm in 1 year then I will refer patient to surgery    No follow-ups on file.    Any questions and concerns raised by the patient were addressed and  answered. Patient denies any further questions.  Patient encouraged to call the office with any concerns or issues.     Georgia Chatman M.D.  Hematology/Oncology    30 minutes spent on this case

## 2023-11-14 NOTE — ADDENDUM NOTE
Encounter addended by: Delonte Carlin on: 11/14/2023 2:15 PM   Actions taken: Charge Capture section accepted

## 2023-11-27 DIAGNOSIS — R05.9 COUGH, UNSPECIFIED TYPE: ICD-10-CM

## 2023-11-27 RX ORDER — PANTOPRAZOLE SODIUM 40 MG/1
40 TABLET, DELAYED RELEASE ORAL DAILY
Qty: 90 TABLET | Refills: 3 | Status: SHIPPED | OUTPATIENT
Start: 2023-11-27

## 2023-11-29 ENCOUNTER — PATIENT MESSAGE (OUTPATIENT)
Dept: HEALTH INFORMATION MANAGEMENT | Facility: OTHER | Age: 78
End: 2023-11-29

## 2024-01-06 ENCOUNTER — PATIENT MESSAGE (OUTPATIENT)
Dept: SLEEP MEDICINE | Facility: MEDICAL CENTER | Age: 79
End: 2024-01-06
Payer: MEDICARE

## 2024-01-06 DIAGNOSIS — R05.9 COUGH, UNSPECIFIED TYPE: ICD-10-CM

## 2024-01-09 ENCOUNTER — PATIENT MESSAGE (OUTPATIENT)
Dept: MEDICAL GROUP | Facility: LAB | Age: 79
End: 2024-01-09
Payer: MEDICARE

## 2024-01-09 DIAGNOSIS — N39.3 STRESS INCONTINENCE: ICD-10-CM

## 2024-01-10 RX ORDER — FLUTICASONE FUROATE, UMECLIDINIUM BROMIDE AND VILANTEROL TRIFENATATE 100; 62.5; 25 UG/1; UG/1; UG/1
1 POWDER RESPIRATORY (INHALATION) DAILY
Qty: 1 EACH | Refills: 6 | Status: SHIPPED | OUTPATIENT
Start: 2024-01-10 | End: 2024-02-27 | Stop reason: SDUPTHER

## 2024-02-27 ENCOUNTER — OFFICE VISIT (OUTPATIENT)
Dept: MEDICAL GROUP | Facility: LAB | Age: 79
End: 2024-02-27
Payer: MEDICARE

## 2024-02-27 VITALS
HEART RATE: 71 BPM | OXYGEN SATURATION: 94 % | HEIGHT: 67 IN | DIASTOLIC BLOOD PRESSURE: 60 MMHG | SYSTOLIC BLOOD PRESSURE: 140 MMHG | RESPIRATION RATE: 18 BRPM | TEMPERATURE: 98 F | BODY MASS INDEX: 23.23 KG/M2 | WEIGHT: 148 LBS

## 2024-02-27 DIAGNOSIS — R05.9 COUGH, UNSPECIFIED TYPE: ICD-10-CM

## 2024-02-27 DIAGNOSIS — R07.89 OTHER CHEST PAIN: ICD-10-CM

## 2024-02-27 DIAGNOSIS — J45.31 MILD PERSISTENT ASTHMA WITH EXACERBATION: ICD-10-CM

## 2024-02-27 PROCEDURE — 99214 OFFICE O/P EST MOD 30 MIN: CPT | Performed by: FAMILY MEDICINE

## 2024-02-27 PROCEDURE — 3077F SYST BP >= 140 MM HG: CPT | Performed by: FAMILY MEDICINE

## 2024-02-27 PROCEDURE — 3078F DIAST BP <80 MM HG: CPT | Performed by: FAMILY MEDICINE

## 2024-02-27 RX ORDER — PREDNISONE 20 MG/1
40 TABLET ORAL DAILY
Qty: 10 TABLET | Refills: 0 | Status: SHIPPED | OUTPATIENT
Start: 2024-02-27 | End: 2024-02-27

## 2024-02-27 RX ORDER — PREDNISONE 20 MG/1
40 TABLET ORAL DAILY
Qty: 10 TABLET | Refills: 0 | Status: SHIPPED | OUTPATIENT
Start: 2024-02-27 | End: 2024-03-03

## 2024-02-27 RX ORDER — FLUTICASONE FUROATE, UMECLIDINIUM BROMIDE AND VILANTEROL TRIFENATATE 100; 62.5; 25 UG/1; UG/1; UG/1
1 POWDER RESPIRATORY (INHALATION) DAILY
Qty: 1 EACH | Refills: 6 | Status: SHIPPED | OUTPATIENT
Start: 2024-02-27

## 2024-02-27 ASSESSMENT — FIBROSIS 4 INDEX: FIB4 SCORE: 1.27

## 2024-02-27 ASSESSMENT — PATIENT HEALTH QUESTIONNAIRE - PHQ9: CLINICAL INTERPRETATION OF PHQ2 SCORE: 0

## 2024-02-27 NOTE — PROGRESS NOTES
Subjective:     Chief Complaint   Patient presents with    Breathing Problem         HPI:   Alissa presents today with Increased labor of breathing. Has history of Adenocarcinoma of right lung, and subsequent removal.   She does follow with pulm as well. She reports this started 4 weeks ago, getting worse. Using albuterol 8-10 times per day. Also left sided chest pain. Has still been trying to ride her bike and go to her .   She has been out of her trelegy inhaler for the last couple months.   Has been using her nebulizer as well. Waking up at night to take her albuterol. Cough, productive, but clear sputum.   Better in the hot shower, sleeps by humidifier.     Struggling right now with some marital concerns.             Current Outpatient Medications Ordered in Epic   Medication Sig Dispense Refill    fluticasone-umeclidinium-vilanterol (TRELEGY ELLIPTA) 100-62.5-25 mcg/act inhaler Inhale 1 Puff every day. 1 Each 6    pantoprazole (PROTONIX) 40 MG Tablet Delayed Response Take 1 Tablet by mouth every day. 90 Tablet 3    predniSONE (DELTASONE) 20 MG Tab 3 tabs daily for 3 days, 2 tabs daily for 3 days, 1 tab daily for 3 days 18 Tablet 1    albuterol 108 (90 Base) MCG/ACT Aero Soln inhalation aerosol Inhale 2 Puffs every four hours as needed for Shortness of Breath. 8.5 Each 11    rosuvastatin (CRESTOR) 5 MG Tab Take 1 Tablet by mouth every evening. 90 Tablet 3    levothyroxine (SYNTHROID) 25 MCG Tab Take 1 Tablet by mouth every morning on an empty stomach. 90 Tablet 3    azelastine (ASTELIN) 137 MCG/SPRAY nasal spray Administer 1 Spray into affected nostril(S) every day. 30 mL 6    albuterol (PROVENTIL) 2.5mg/3ml Nebu Soln solution for nebulization Take 3 mL by nebulization every four hours as needed for Shortness of Breath. 120 mL 11    Levomefolate Glucosamine (METHYLFOLATE PO) Take 1 Tablet by mouth every day.      Cyanocobalamin (B-12) 100 MCG Tab Take 1 Tablet by mouth every day.      B Complex Vitamins  "(B COMPLEX-B12 PO) Take 1 Tab by mouth every day.      cholecalciferol (DIALYVITE VITAMIN D 5000) 5000 UNIT Cap Take 5,000 Units by mouth every day.      Ascorbic Acid (VITAMIN C) 1000 MG Tab Take 1 Tab by mouth every day.       No current [facility]-ordered facility-administered medications on file.         ROS:  Gen: no fevers/chills, no changes in weight  Eyes: no changes in vision  ENT: no sore throat, no hearing loss, no bloody nose    GI: no nausea/vomiting, no diarrhea  : no dysuria  MSk: no myalgias  Skin: no rash  Neuro: no headaches, no numbness/tingling  Heme/Lymph: no easy bruising      Objective:     Exam:  BP (!) 140/60 (BP Location: Right arm, Patient Position: Sitting, BP Cuff Size: Adult)   Pulse 71   Temp 36.7 °C (98 °F)   Resp 18   Ht 1.702 m (5' 7\")   Wt 67.1 kg (148 lb)   SpO2 94%   BMI 23.18 kg/m²  Body mass index is 23.18 kg/m².    Gen: Alert and oriented, No apparent distress.  Neck: Neck is supple without lymphadenopathy.  Lungs: Normal effort, CTA bilaterally, no wheezes, rhonchi, or rales. Hoarse voice, tachypneic  CV: Regular rate and rhythm. No murmurs, rubs, or gallops.  Ext: No clubbing, cyanosis, edema.      Assessment & Plan:     78 y.o. female with the following -     1. Other chest pain  EKG in the clinic shows normal sinus rhythm.  No evidence of ischemia present.  - EKG - Clinic Performed    2. Mild persistent asthma with exacerbation  Will treat for asthma exacerbation.  With a steroid burst.  This is sent in.  She does have a prescription for Trelegy already at the pharmacy from her pulmonologist which she will .  She will let me know how she is doing over the next couple days.  - predniSONE (DELTASONE) 20 MG Tab; Take 2 Tablets by mouth every day for 5 days.  Dispense: 10 Tablet; Refill: 0    3. Cough, unspecified type    - fluticasone-umeclidinium-vilanterol (TRELEGY ELLIPTA) 100-62.5-25 mcg/act inhaler; Inhale 1 Puff every day.  Dispense: 1 Each; Refill: " 6            No follow-ups on file.    Please note that this dictation was created using voice recognition software. I have made every reasonable attempt to correct obvious errors, but I expect that there are errors of grammar and possibly content that I did not discover before finalizing the note.

## 2024-02-28 ENCOUNTER — PATIENT MESSAGE (OUTPATIENT)
Dept: MEDICAL GROUP | Facility: LAB | Age: 79
End: 2024-02-28
Payer: MEDICARE

## 2024-02-28 RX ORDER — FLUTICASONE PROPIONATE AND SALMETEROL 250; 50 UG/1; UG/1
1 POWDER RESPIRATORY (INHALATION) EVERY 12 HOURS
Qty: 1 EACH | Refills: 3 | Status: SHIPPED | OUTPATIENT
Start: 2024-02-28

## 2024-04-08 RX ORDER — LEVOTHYROXINE SODIUM 0.03 MG/1
25 TABLET ORAL
Qty: 90 TABLET | Refills: 3 | Status: SHIPPED | OUTPATIENT
Start: 2024-04-08

## 2024-04-08 RX ORDER — ROSUVASTATIN CALCIUM 5 MG/1
5 TABLET, COATED ORAL EVERY EVENING
Qty: 90 TABLET | Refills: 3 | Status: SHIPPED | OUTPATIENT
Start: 2024-04-08

## 2024-04-19 ENCOUNTER — HOSPITAL ENCOUNTER (OUTPATIENT)
Dept: RADIOLOGY | Facility: MEDICAL CENTER | Age: 79
End: 2024-04-19
Attending: INTERNAL MEDICINE
Payer: MEDICARE

## 2024-04-19 DIAGNOSIS — R91.8 LUNG NODULES: ICD-10-CM

## 2024-04-19 DIAGNOSIS — C80.1 ADENOCARCINOMA (HCC): ICD-10-CM

## 2024-04-19 PROCEDURE — 71250 CT THORAX DX C-: CPT

## 2024-04-26 ENCOUNTER — APPOINTMENT (OUTPATIENT)
Dept: HEMATOLOGY ONCOLOGY | Facility: MEDICAL CENTER | Age: 79
End: 2024-04-26
Payer: MEDICARE

## 2024-05-03 ENCOUNTER — APPOINTMENT (OUTPATIENT)
Dept: HEMATOLOGY ONCOLOGY | Facility: MEDICAL CENTER | Age: 79
End: 2024-05-03
Payer: MEDICARE

## 2024-05-07 ENCOUNTER — OFFICE VISIT (OUTPATIENT)
Dept: MEDICAL GROUP | Facility: LAB | Age: 79
End: 2024-05-07
Payer: MEDICARE

## 2024-05-07 ENCOUNTER — APPOINTMENT (OUTPATIENT)
Dept: MEDICAL GROUP | Facility: LAB | Age: 79
End: 2024-05-07
Payer: MEDICARE

## 2024-05-07 VITALS
HEART RATE: 65 BPM | OXYGEN SATURATION: 96 % | RESPIRATION RATE: 16 BRPM | TEMPERATURE: 98.1 F | SYSTOLIC BLOOD PRESSURE: 104 MMHG | WEIGHT: 149 LBS | DIASTOLIC BLOOD PRESSURE: 54 MMHG | HEIGHT: 67 IN | BODY MASS INDEX: 23.39 KG/M2

## 2024-05-07 DIAGNOSIS — J98.9 RESPIRATORY ILLNESS: ICD-10-CM

## 2024-05-07 PROCEDURE — 3074F SYST BP LT 130 MM HG: CPT | Performed by: FAMILY MEDICINE

## 2024-05-07 PROCEDURE — 99213 OFFICE O/P EST LOW 20 MIN: CPT | Performed by: FAMILY MEDICINE

## 2024-05-07 PROCEDURE — 3078F DIAST BP <80 MM HG: CPT | Performed by: FAMILY MEDICINE

## 2024-05-07 ASSESSMENT — FIBROSIS 4 INDEX: FIB4 SCORE: 1.27

## 2024-05-07 NOTE — PROGRESS NOTES
"Subjective:     CC: Cough/cold    HPI:   Alissa presents today with concern for cough and cold symptoms that began 5 days ago.  Overall not improving and  is sick with pneumonia so she is concerned about this.  Symptoms include chills, weakness, nasal congestion, fatigue, severe dry cough. No measured fevers, no persistent SOB.     with negative viral PCR.  She has a history of right upper lobe lobectomy secondary to cancer and concerned about her pneumonia risk.      Medications, past medical history, allergies, and social history have been reviewed and updated.      Objective:       Exam:  /54   Pulse 65   Temp 36.7 °C (98.1 °F) (Temporal)   Resp 16   Ht 1.702 m (5' 7\")   Wt 67.6 kg (149 lb)   SpO2 96%   BMI 23.34 kg/m²  Body mass index is 23.34 kg/m².    Constitutional: Alert. Well appearing. No distress.  Skin: Warm, dry, good turgor, no visible rashes.  ENMT: Moist mucous membranes. Normal dentition.  Bilateral tympanic membranes are clear.  Oropharynx is clear.  Mild clear rhinorrhea with erythematous nasal mucosa.  No cervical LAD.  No sinus tenderness.  Respiratory: Normal effort. Lungs are clear to auscultation bilaterally.  Cardiovascular: Regular rate and rhythm. Normal S1/S2. No murmurs, rubs or gallops.   Neuro: Moves all four extremities. No facial droop.  Psych: Answers questions appropriately. Normal affect and mood.    Assessment & Plan:     78 y.o. female with the following -     1. Respiratory illness    Presents with 5 days of symptoms including cough, chills, congestion, fatigue.  Vitals reassuring.  Exam without signs of bacterial infection.  Suspect likely viral respiratory infection.  Do not see indication for viral testing as past time for antivirals,  also sick with similar and negative viral PCR.  She is concerned about pneumonia risk and order for CXR is provided to do if she is not starting to improve within the next few days.  We did discuss overall " prognosis and that cough can linger but not necessarily concerning if other symptoms are improving.  Continue with supportive measures including rest, hydration, OTC cough/cold medication.    - DX-CHEST-2 VIEWS; Future      Please note that this note was created using voice recognition software.

## 2024-05-10 ENCOUNTER — APPOINTMENT (OUTPATIENT)
Dept: MEDICAL GROUP | Facility: LAB | Age: 79
End: 2024-05-10
Payer: MEDICARE

## 2024-05-27 ENCOUNTER — HOSPITAL ENCOUNTER (EMERGENCY)
Facility: MEDICAL CENTER | Age: 79
End: 2024-05-27
Attending: EMERGENCY MEDICINE
Payer: MEDICARE

## 2024-05-27 ENCOUNTER — APPOINTMENT (OUTPATIENT)
Dept: RADIOLOGY | Facility: MEDICAL CENTER | Age: 79
End: 2024-05-27
Attending: EMERGENCY MEDICINE
Payer: MEDICARE

## 2024-05-27 VITALS
TEMPERATURE: 98 F | DIASTOLIC BLOOD PRESSURE: 81 MMHG | RESPIRATION RATE: 18 BRPM | BODY MASS INDEX: 23.01 KG/M2 | SYSTOLIC BLOOD PRESSURE: 141 MMHG | HEART RATE: 56 BPM | HEIGHT: 67 IN | OXYGEN SATURATION: 96 % | WEIGHT: 146.61 LBS

## 2024-05-27 DIAGNOSIS — J45.909 ASTHMA, UNSPECIFIED ASTHMA SEVERITY, UNSPECIFIED WHETHER COMPLICATED, UNSPECIFIED WHETHER PERSISTENT: ICD-10-CM

## 2024-05-27 DIAGNOSIS — J06.9 UPPER RESPIRATORY TRACT INFECTION, UNSPECIFIED TYPE: Primary | ICD-10-CM

## 2024-05-27 DIAGNOSIS — J45.40 MODERATE PERSISTENT ASTHMA WITHOUT COMPLICATION: ICD-10-CM

## 2024-05-27 DIAGNOSIS — R05.1 ACUTE COUGH: ICD-10-CM

## 2024-05-27 LAB
ANION GAP SERPL CALC-SCNC: 12 MMOL/L (ref 7–16)
BASOPHILS # BLD AUTO: 0.6 % (ref 0–1.8)
BASOPHILS # BLD: 0.03 K/UL (ref 0–0.12)
BUN SERPL-MCNC: 13 MG/DL (ref 8–22)
CALCIUM SERPL-MCNC: 8.8 MG/DL (ref 8.4–10.2)
CHLORIDE SERPL-SCNC: 101 MMOL/L (ref 96–112)
CO2 SERPL-SCNC: 23 MMOL/L (ref 20–33)
CREAT SERPL-MCNC: 0.69 MG/DL (ref 0.5–1.4)
EOSINOPHIL # BLD AUTO: 0.12 K/UL (ref 0–0.51)
EOSINOPHIL NFR BLD: 2.4 % (ref 0–6.9)
ERYTHROCYTE [DISTWIDTH] IN BLOOD BY AUTOMATED COUNT: 45.5 FL (ref 35.9–50)
FLUAV RNA SPEC QL NAA+PROBE: NEGATIVE
FLUBV RNA SPEC QL NAA+PROBE: NEGATIVE
GFR SERPLBLD CREATININE-BSD FMLA CKD-EPI: 90 ML/MIN/1.73 M 2
GLUCOSE SERPL-MCNC: 92 MG/DL (ref 65–99)
HCT VFR BLD AUTO: 39.9 % (ref 37–47)
HGB BLD-MCNC: 13.1 G/DL (ref 12–16)
IMM GRANULOCYTES # BLD AUTO: 0.02 K/UL (ref 0–0.11)
IMM GRANULOCYTES NFR BLD AUTO: 0.4 % (ref 0–0.9)
LYMPHOCYTES # BLD AUTO: 1.55 K/UL (ref 1–4.8)
LYMPHOCYTES NFR BLD: 31.6 % (ref 22–41)
MCH RBC QN AUTO: 30.7 PG (ref 27–33)
MCHC RBC AUTO-ENTMCNC: 32.8 G/DL (ref 32.2–35.5)
MCV RBC AUTO: 93.4 FL (ref 81.4–97.8)
MONOCYTES # BLD AUTO: 0.41 K/UL (ref 0–0.85)
MONOCYTES NFR BLD AUTO: 8.4 % (ref 0–13.4)
NEUTROPHILS # BLD AUTO: 2.77 K/UL (ref 1.82–7.42)
NEUTROPHILS NFR BLD: 56.6 % (ref 44–72)
NRBC # BLD AUTO: 0 K/UL
NRBC BLD-RTO: 0 /100 WBC (ref 0–0.2)
PLATELET # BLD AUTO: 347 K/UL (ref 164–446)
PMV BLD AUTO: 9.7 FL (ref 9–12.9)
POTASSIUM SERPL-SCNC: 4.4 MMOL/L (ref 3.6–5.5)
RBC # BLD AUTO: 4.27 M/UL (ref 4.2–5.4)
RSV RNA SPEC QL NAA+PROBE: NEGATIVE
SARS-COV-2 RNA RESP QL NAA+PROBE: NOTDETECTED
SODIUM SERPL-SCNC: 136 MMOL/L (ref 135–145)
SPECIMEN SOURCE: NORMAL
WBC # BLD AUTO: 4.9 K/UL (ref 4.8–10.8)

## 2024-05-27 PROCEDURE — 99283 EMERGENCY DEPT VISIT LOW MDM: CPT

## 2024-05-27 PROCEDURE — 71046 X-RAY EXAM CHEST 2 VIEWS: CPT

## 2024-05-27 PROCEDURE — 80048 BASIC METABOLIC PNL TOTAL CA: CPT

## 2024-05-27 PROCEDURE — 85025 COMPLETE CBC W/AUTO DIFF WBC: CPT

## 2024-05-27 PROCEDURE — 36415 COLL VENOUS BLD VENIPUNCTURE: CPT

## 2024-05-27 PROCEDURE — 700111 HCHG RX REV CODE 636 W/ 250 OVERRIDE (IP): Performed by: EMERGENCY MEDICINE

## 2024-05-27 PROCEDURE — 0241U HCHG SARS-COV-2 COVID-19 NFCT DS RESP RNA 4 TRGT MIC: CPT

## 2024-05-27 RX ORDER — DEXTROMETHORPHAN HYDROBROMIDE AND PROMETHAZINE HYDROCHLORIDE 15; 6.25 MG/5ML; MG/5ML
5 SYRUP ORAL EVERY 4 HOURS PRN
Qty: 120 ML | Refills: 0 | Status: SHIPPED | OUTPATIENT
Start: 2024-05-27

## 2024-05-27 RX ORDER — ACETAMINOPHEN 500 MG
1000 TABLET ORAL EVERY 8 HOURS PRN
COMMUNITY

## 2024-05-27 RX ORDER — PREDNISONE 20 MG/1
60 TABLET ORAL DAILY
Qty: 12 TABLET | Refills: 0 | Status: SHIPPED | OUTPATIENT
Start: 2024-05-27 | End: 2024-05-31

## 2024-05-27 RX ORDER — AMOXICILLIN 500 MG/1
500 CAPSULE ORAL 3 TIMES DAILY
COMMUNITY
Start: 2024-05-23

## 2024-05-27 RX ADMIN — PREDNISONE 60 MG: 50 TABLET ORAL at 17:47

## 2024-05-27 ASSESSMENT — FIBROSIS 4 INDEX: FIB4 SCORE: 1.27

## 2024-05-27 NOTE — ED TRIAGE NOTES
"BIB spouse for following complaints.     Chief Complaint   Patient presents with    Cough     Pt been having cough since may 3rd. Started off with cough, body aches, headache, weakness, sore throat. Pt recently seen at [facility] and dx with bronchitis started on amoxicillin for past 5 days without relief. Pt has productive cough with brown/yellow sputum.     Shortness of Breath     BP (!) 142/78   Pulse 69   Temp 36.7 °C (98.1 °F) (Temporal)   Resp 18   Ht 1.702 m (5' 7\")   Wt 66.5 kg (146 lb 9.7 oz)   SpO2 93%   Breastfeeding No   BMI 22.96 kg/m²     "

## 2024-05-28 NOTE — DISCHARGE INSTRUCTIONS
Return to the emergency [facility] department if you have severe shortness of breath, cough, chest pain, lightness, dizziness.  
 used

## 2024-05-28 NOTE — ED PROVIDER NOTES
ED Provider Note    CHIEF COMPLAINT  Chief Complaint   Patient presents with    Cough     Pt been having cough since may 3rd. Started off with cough, body aches, headache, weakness, sore throat. Pt recently seen at [facility] and dx with bronchitis started on amoxicillin for past 5 days without relief. Pt has productive cough with brown/yellow sputum.     Shortness of Breath       HPI/ROS    OUTSIDE HISTORIAN(S):  Since  at bedside and history review of systems.    Alissa Bonilla is a 78 y.o. female who presents states she has had a cough for approximately 24 days.  She was seen by a provider and diagnosed with bronchitis and was started on amoxicillin for 5 days now.  She does have history of asthma.  She states she has had no fever, chest pain, shortness of breath, nausea, vomiting.  She did have lung cancer in the past and was found to have slight lobectomy with Dr. Bernal.  Patient denies fever, shakes, chills, sweats, nausea, vomiting, chest pain, lightness, dizziness.    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    SURGICAL HISTORY   has a past surgical history that includes breast augmentation with implant (01/01/1972); bronchoscopy, robot-assisted (N/A, 09/02/2022); endobronchial us add-on (N/A, 09/02/2022); thoracoscopy,dx no bx (Right, 09/26/2022); lobectomy (Right, 09/26/2022); mediastinoscopy (09/26/2022); mammoplasty augmentation (Bilateral, 2017); colonoscopy (2019); and bronchoscopy,diagnostic (12/15/2022).    FAMILY HISTORY  Family History   Problem Relation Age of Onset    Dementia Mother     Heart Attack  Mother     Cancer Sister 81        Lung Cancer, smoker    Hypertension Sister     Lung Disease Sister     Hypertension Brother     Arthritis Brother         RA    Cancer Paternal Aunt         Lung    Cancer Paternal Uncle         Lung       SOCIAL HISTORY  Social History     Tobacco Use    Smoking status: Former     Current packs/day: 0.00     Average packs/day: 1 pack/day for 37.0 years (37.0 ttl pk-yrs)     Types: Cigarettes     Start date: 1968     Quit date: 2005     Years since quittin.4     Passive exposure: Past    Smokeless tobacco: Never   Vaping Use    Vaping status: Never Used   Substance and Sexual Activity    Alcohol use: Not Currently     Alcohol/week: 8.4 - 12.6 oz     Types: 14 - 21 Glasses of wine per week     Comment: 2/day    Drug use: Not Currently     Types: Marijuana     Comment: 1 gummy every month maybe - last 2022    Sexual activity: Yes     Partners: Male     Comment: I am menopausal       CURRENT MEDICATIONS  Home Medications       Reviewed by Caesar Sneed (Pharmacy Tech) on 24 at 1629  Med List Status: Complete     Medication Last Dose Status   acetaminophen (TYLENOL) 500 MG Tab 2024 Active   albuterol (PROVENTIL) 2.5mg/3ml Nebu Soln solution for nebulization Not Taking Active   albuterol 108 (90 Base) MCG/ACT Aero Soln inhalation aerosol 1 WEEK Active   amoxicillin (AMOXIL) 500 MG Cap 2024 Active   Ascorbic Acid (VITAMIN C) 1000 MG Tab 2024 Active   azelastine (ASTELIN) 137 MCG/SPRAY nasal spray 2024 Active   B Complex Vitamins (B COMPLEX-B12 PO) 2024 Active   cholecalciferol (DIALYVITE VITAMIN D 5000) 5000 UNIT Cap 2024 Active   Cyanocobalamin (B-12) 100 MCG Tab 2024 Active   fluticasone-salmeterol (ADVAIR) 250-50 MCG/ACT AEROSOL POWDER, BREATH ACTIVATED 1 WEEK Active   Levomefolate Glucosamine (METHYLFOLATE PO) 2024 Active   levothyroxine (SYNTHROID) 25 MCG Tab 2024 Active   pantoprazole (PROTONIX) 40 MG Tablet  "Delayed Response 5/27/2024 Active   rosuvastatin (CRESTOR) 5 MG Tab 5/26/2024 Active                    ALLERGIES  Allergies   Allergen Reactions    Other Environmental      Russian rhoda and other pollens.    Sulfa Drugs      \"crazy\"    Sulfites      Asthma        PHYSICAL EXAM  VITAL SIGNS: BP (!) 141/81   Pulse (!) 56   Temp 36.7 °C (98 °F)   Resp 18   Ht 1.702 m (5' 7\")   Wt 66.5 kg (146 lb 9.7 oz)   SpO2 96%   Breastfeeding No   BMI 22.96 kg/m²      Nursing notes and vitals reviewed.  Constitutional: Well developed, Well nourished, No acute distress, Non-toxic appearance.   Eyes: PERRLA, EOMI, Conjunctiva normal, No discharge.   HENT: Normocephalic, Atraumatic, moist mucous membranes, no facial edema   Cardiovascular: Normal heart rate, Normal rhythm, No murmurs, No rubs, No gallops.   Thorax & Lungs: No respiratory distress, No rales, No rhonchi, No wheezing, No chest tenderness.   Skin: Warm, Dry, No erythema, No rash.   Extremities: No deformity, no pedal edema, good range of motion range of motion upper lower extremes bilaterally        EKG/LABS  Results for orders placed or performed during the hospital encounter of 05/27/24   CBC WITH DIFFERENTIAL   Result Value Ref Range    WBC 4.9 4.8 - 10.8 K/uL    RBC 4.27 4.20 - 5.40 M/uL    Hemoglobin 13.1 12.0 - 16.0 g/dL    Hematocrit 39.9 37.0 - 47.0 %    MCV 93.4 81.4 - 97.8 fL    MCH 30.7 27.0 - 33.0 pg    MCHC 32.8 32.2 - 35.5 g/dL    RDW 45.5 35.9 - 50.0 fL    Platelet Count 347 164 - 446 K/uL    MPV 9.7 9.0 - 12.9 fL    Neutrophils-Polys 56.60 44.00 - 72.00 %    Lymphocytes 31.60 22.00 - 41.00 %    Monocytes 8.40 0.00 - 13.40 %    Eosinophils 2.40 0.00 - 6.90 %    Basophils 0.60 0.00 - 1.80 %    Immature Granulocytes 0.40 0.00 - 0.90 %    Nucleated RBC 0.00 0.00 - 0.20 /100 WBC    Neutrophils (Absolute) 2.77 1.82 - 7.42 K/uL    Lymphs (Absolute) 1.55 1.00 - 4.80 K/uL    Monos (Absolute) 0.41 0.00 - 0.85 K/uL    Eos (Absolute) 0.12 0.00 - 0.51 K/uL    " Baso (Absolute) 0.03 0.00 - 0.12 K/uL    Immature Granulocytes (abs) 0.02 0.00 - 0.11 K/uL    NRBC (Absolute) 0.00 K/uL   BASIC METABOLIC PANEL   Result Value Ref Range    Sodium 136 135 - 145 mmol/L    Potassium 4.4 3.6 - 5.5 mmol/L    Chloride 101 96 - 112 mmol/L    Co2 23 20 - 33 mmol/L    Glucose 92 65 - 99 mg/dL    Bun 13 8 - 22 mg/dL    Creatinine 0.69 0.50 - 1.40 mg/dL    Calcium 8.8 8.4 - 10.2 mg/dL    Anion Gap 12.0 7.0 - 16.0   CoV-2, Flu A/B, And RSV by PCR ([employer])    Specimen: Respirate   Result Value Ref Range    Influenza virus A RNA Negative Negative    Influenza virus B, PCR Negative Negative    RSV, PCR Negative Negative    SARS-CoV-2 by PCR NotDetected     SARS-CoV-2 Source NP Swab    ESTIMATED GFR   Result Value Ref Range    GFR (CKD-EPI) 90 >60 mL/min/1.73 m 2       I have independently interpreted this EKG    RADIOLOGY/PROCEDURES   I have independently interpreted the diagnostic imaging associated with this visit and am waiting the final reading from the radiologist.   My preliminary interpretation is as follows: Chest x-ray reveals no evidence of mass, no evidence of edema, or infiltrate    Radiologist interpretation:  DX-CHEST-2 VIEWS   Final Result      1.  No evidence of acute cardiopulmonary process.          COURSE & MEDICAL DECISION MAKING    ASSESSMENT, COURSE AND PLAN  Care Narrative: Is a pleasant 78-year-old female that presents with cough for 24 days.  She does have a history of asthma in the past.  When she has an upper restaurant infection.  X-ray is negative for mass, infiltrate, edema.  Patient is not hypoxic, speaking in full sentences.  She may be having an asthma exacerbation as well received prednisone and has albuterol at home.  I encouraged her to continue to take her amoxicillin, prescribed her prednisone she is to follow-up with her primary care physician for further evaluation.  If she continues have severe symptoms she may need to follow-up with her primary care  physician for imaging to rule out cancer at this point x-ray does not show evidence of lesions.  Patient also received a prescription for Phenergan DM.    Reevaluation prior to discharge at 1715, the patient was speaking in full sentences, she had no evidence of respiratory distress or respiratory failure.        ADDITIONAL PROBLEMS MANAGED  History of lung cancer with lobectomy, x-rays negative for infiltrate, or evidence of mass or nodule.  She is instructed to follow the primary care physician and continue her symptoms that she may need outpatient evaluation such as CT scan.    DISPOSITION AND DISCUSSIONS      Decision tools and prescription drugs considered including, but not limited to: Consider CAT scan for possible colon lesion such as mass that she has a history of cancer in the past.  I will defer to her primary care physician this if she has increasing symptoms.    FINAL DIAGNOSIS  1. Upper respiratory tract infection, unspecified type    2. Acute cough    3. Moderate persistent asthma without complication    4. Asthma, unspecified asthma severity, unspecified whether complicated, unspecified whether persistent      DISPOSITION:  Patient will be discharged home in stable condition.    FOLLOW UP:  [facility], Emergency Dept  [address]  [facility] [id number] [phone]    If symptoms worsen    Magaly Estes M.D.  [address] [facility] [address] [place] [id number] [phone]    Schedule an appointment as soon as possible for a visit   As needed      OUTPATIENT MEDICATIONS:  Discharge Medication List as of 5/27/2024  5:43 PM        START taking these medications    Details   promethazine-dextromethorphan (PROMETHAZINE-DM) 6.25-15 MG/5ML syrup Take 5 mL by mouth every four hours as needed for Cough., Disp-120 mL, R-0, Normal      predniSONE (DELTASONE) 20 MG Tab Take 3 Tablets by mouth every day for 4 days., Disp-12 Tablet, R-0, Normal                 Electronically signed by: Xavier Wells D.O., 5/27/2024 8:04 PM

## 2024-05-29 DIAGNOSIS — R05.9 COUGH, UNSPECIFIED TYPE: ICD-10-CM

## 2024-05-29 DIAGNOSIS — J45.40 MODERATE PERSISTENT ASTHMA WITHOUT COMPLICATION: ICD-10-CM

## 2024-05-30 NOTE — TELEPHONE ENCOUNTER
Have we ever prescribed this med? Yes.  If yes, what date? 2/23/23    Last OV: 7/14/23    Next OV: None      Medications: ALBUTEROL 0.083%(2.5MG/3ML) 25X3ML

## 2024-05-31 RX ORDER — ALBUTEROL SULFATE 2.5 MG/3ML
2.5 SOLUTION RESPIRATORY (INHALATION) EVERY 4 HOURS PRN
Qty: 150 ML | Refills: 6 | Status: SHIPPED | OUTPATIENT
Start: 2024-05-31

## 2024-06-05 ENCOUNTER — HOSPITAL ENCOUNTER (OUTPATIENT)
Dept: LAB | Facility: MEDICAL CENTER | Age: 79
End: 2024-06-05
Attending: FAMILY MEDICINE
Payer: MEDICARE

## 2024-06-05 ENCOUNTER — OFFICE VISIT (OUTPATIENT)
Dept: MEDICAL GROUP | Facility: LAB | Age: 79
End: 2024-06-05
Payer: MEDICARE

## 2024-06-05 VITALS
TEMPERATURE: 97.2 F | RESPIRATION RATE: 20 BRPM | WEIGHT: 148 LBS | HEIGHT: 67 IN | DIASTOLIC BLOOD PRESSURE: 66 MMHG | OXYGEN SATURATION: 93 % | HEART RATE: 72 BPM | SYSTOLIC BLOOD PRESSURE: 132 MMHG | BODY MASS INDEX: 23.23 KG/M2

## 2024-06-05 DIAGNOSIS — R05.3 CHRONIC COUGH: ICD-10-CM

## 2024-06-05 DIAGNOSIS — Z85.118 HISTORY OF LUNG CANCER: ICD-10-CM

## 2024-06-05 DIAGNOSIS — J45.41 MODERATE PERSISTENT ASTHMA WITH ACUTE EXACERBATION: ICD-10-CM

## 2024-06-05 LAB
ALBUMIN SERPL BCP-MCNC: 3.8 G/DL (ref 3.2–4.9)
ALBUMIN/GLOB SERPL: 1.4 G/DL
ALP SERPL-CCNC: 79 U/L (ref 30–99)
ALT SERPL-CCNC: 28 U/L (ref 2–50)
ANION GAP SERPL CALC-SCNC: 11 MMOL/L (ref 7–16)
AST SERPL-CCNC: 21 U/L (ref 12–45)
BASOPHILS # BLD AUTO: 0.4 % (ref 0–1.8)
BASOPHILS # BLD: 0.04 K/UL (ref 0–0.12)
BILIRUB SERPL-MCNC: 0.4 MG/DL (ref 0.1–1.5)
BUN SERPL-MCNC: 10 MG/DL (ref 8–22)
CALCIUM ALBUM COR SERPL-MCNC: 9.2 MG/DL (ref 8.5–10.5)
CALCIUM SERPL-MCNC: 9 MG/DL (ref 8.5–10.5)
CHLORIDE SERPL-SCNC: 101 MMOL/L (ref 96–112)
CO2 SERPL-SCNC: 22 MMOL/L (ref 20–33)
CREAT SERPL-MCNC: 0.59 MG/DL (ref 0.5–1.4)
EOSINOPHIL # BLD AUTO: 0.36 K/UL (ref 0–0.51)
EOSINOPHIL NFR BLD: 3.2 % (ref 0–6.9)
ERYTHROCYTE [DISTWIDTH] IN BLOOD BY AUTOMATED COUNT: 47.8 FL (ref 35.9–50)
GFR SERPLBLD CREATININE-BSD FMLA CKD-EPI: 93 ML/MIN/1.73 M 2
GLOBULIN SER CALC-MCNC: 2.8 G/DL (ref 1.9–3.5)
GLUCOSE SERPL-MCNC: 101 MG/DL (ref 65–99)
HCT VFR BLD AUTO: 39.2 % (ref 37–47)
HGB BLD-MCNC: 12.9 G/DL (ref 12–16)
IMM GRANULOCYTES # BLD AUTO: 0.09 K/UL (ref 0–0.11)
IMM GRANULOCYTES NFR BLD AUTO: 0.8 % (ref 0–0.9)
LYMPHOCYTES # BLD AUTO: 1.86 K/UL (ref 1–4.8)
LYMPHOCYTES NFR BLD: 16.6 % (ref 22–41)
MCH RBC QN AUTO: 30.7 PG (ref 27–33)
MCHC RBC AUTO-ENTMCNC: 32.9 G/DL (ref 32.2–35.5)
MCV RBC AUTO: 93.3 FL (ref 81.4–97.8)
MONOCYTES # BLD AUTO: 0.94 K/UL (ref 0–0.85)
MONOCYTES NFR BLD AUTO: 8.4 % (ref 0–13.4)
NEUTROPHILS # BLD AUTO: 7.91 K/UL (ref 1.82–7.42)
NEUTROPHILS NFR BLD: 70.6 % (ref 44–72)
NRBC # BLD AUTO: 0 K/UL
NRBC BLD-RTO: 0 /100 WBC (ref 0–0.2)
PLATELET # BLD AUTO: 350 K/UL (ref 164–446)
PMV BLD AUTO: 10.9 FL (ref 9–12.9)
POTASSIUM SERPL-SCNC: 4.8 MMOL/L (ref 3.6–5.5)
PROT SERPL-MCNC: 6.6 G/DL (ref 6–8.2)
RBC # BLD AUTO: 4.2 M/UL (ref 4.2–5.4)
SODIUM SERPL-SCNC: 134 MMOL/L (ref 135–145)
WBC # BLD AUTO: 11.2 K/UL (ref 4.8–10.8)

## 2024-06-05 PROCEDURE — 99214 OFFICE O/P EST MOD 30 MIN: CPT | Performed by: FAMILY MEDICINE

## 2024-06-05 PROCEDURE — 3078F DIAST BP <80 MM HG: CPT | Performed by: FAMILY MEDICINE

## 2024-06-05 PROCEDURE — 85025 COMPLETE CBC W/AUTO DIFF WBC: CPT

## 2024-06-05 PROCEDURE — 36415 COLL VENOUS BLD VENIPUNCTURE: CPT

## 2024-06-05 PROCEDURE — 80053 COMPREHEN METABOLIC PANEL: CPT

## 2024-06-05 PROCEDURE — 3075F SYST BP GE 130 - 139MM HG: CPT | Performed by: FAMILY MEDICINE

## 2024-06-05 RX ORDER — PREDNISONE 10 MG/1
TABLET ORAL
Qty: 39 TABLET | Refills: 0 | Status: SHIPPED | OUTPATIENT
Start: 2024-06-05 | End: 2024-06-17

## 2024-06-05 RX ORDER — DEXTROMETHORPHAN HYDROBROMIDE AND PROMETHAZINE HYDROCHLORIDE 15; 6.25 MG/5ML; MG/5ML
5 SYRUP ORAL EVERY 4 HOURS PRN
Qty: 120 ML | Refills: 0 | Status: SHIPPED | OUTPATIENT
Start: 2024-06-05

## 2024-06-05 ASSESSMENT — FIBROSIS 4 INDEX: FIB4 SCORE: 1.17

## 2024-06-05 NOTE — PROGRESS NOTES
"Subjective:     CC: Persistent illness    HPI:   Leigh is a 78-year-old female with a medical history that includes asthma and lung cancer who presents for persistent URI symptoms after ER visit.    Symptoms initially began at least 1 month ago with cough, chills, congestion, fatigue.  She was eventually treated with course of amoxicillin and [place] on cruise ship about 2 [place] ago.  Did not see symptom improvement and was evaluated at ER visit 5/27. Chest x-ray was clear and prednisone was added to her amoxicillin.    Continues to have persistent and severe cough productive of mucus.  Will have severe coughing fits at times.  She is having shortness of breath that improves with albuterol as well.  She stopped using Trelegy as did not think this was helping.  She does think there was some intermittent improvement after the course of amoxicillin and prednisone.  She has not been having fevers.    Her last chest CT was 4/19/2024 with stable nonspecific 4 mm nodules.    Medications, past medical history, allergies, and social history have been reviewed and updated.      Objective:       Exam:  /66   Pulse 72   Temp 36.2 °C (97.2 °F)   Resp 20   Ht 1.702 m (5' 7\")   Wt 67.1 kg (148 lb)   SpO2 93%   BMI 23.18 kg/m²  Body mass index is 23.18 kg/m².    Constitutional: Alert.  Mildly ill-appearing. No distress.  Skin: Warm, dry, good turgor, no visible rashes.  ENMT: Moist mucous membranes. Normal dentition.  Oropharynx is clear.  Tympanic membranes are clear.  Respiratory: Normal effort.  Severe and diffuse bilateral inspiratory and expiratory wheezing.  Moderately decreased air movement bilaterally.  Cardiovascular: Regular rate and rhythm. Normal S1/S2. No murmurs, rubs or gallops.   Neuro: Moves all four extremities. No facial droop.  Psych: Answers questions appropriately. Normal affect and mood.    Assessment & Plan:     78 y.o. female with the following -     1. Chronic cough    Persistent " productive cough for 6 weeks.  Had temporary improvement with course of amoxicillin and prednisone.  She does have a history of asthma and lung cancer as below.  I think most likely this is secondary to a persistent asthma flare but with her lung cancer history we will check CT scan.  CXRs have been clear.  Checking labs and sputum culture.  Treating asthma as below.  She has seen some improvement at night with promethazine dextromethorphan and this is refilled.    - CBC WITH DIFFERENTIAL; Future  - Comp Metabolic Panel; Future  - CT-CHEST (THORAX) W/O; Future  - promethazine-dextromethorphan (PROMETHAZINE-DM) 6.25-15 MG/5ML syrup; Take 5 mL by mouth every four hours as needed for Cough.  Dispense: 120 mL; Refill: 0  - CULTURE RESPIRATORY W/ [facility] STN; Future  - predniSONE (DELTASONE) 10 MG Tab; Take 6 Tablets by mouth every day for 3 days, THEN 4 Tablets every day for 3 days, THEN 2 Tablets every day for 3 days, THEN 1 Tablet every day for 3 days.  Dispense: 39 Tablet; Refill: 0    2. Moderate persistent asthma with acute exacerbation    Suspect persistent asthma exacerbation likely contributing to her persistent cough and trouble breathing.  She has seen temporary improvement with albuterol and using this multiple times per day.  She stopped Trelegy and I encouraged her to restart this.  Did see temporary improvement with prednisone burst.  Will restart with longer duration and taper.  Also encouraged follow-up with pulmonology.  Discussed ER precautions.    - CBC WITH DIFFERENTIAL; Future  - Comp Metabolic Panel; Future  - predniSONE (DELTASONE) 10 MG Tab; Take 6 Tablets by mouth every day for 3 days, THEN 4 Tablets every day for 3 days, THEN 2 Tablets every day for 3 days, THEN 1 Tablet every day for 3 days.  Dispense: 39 Tablet; Refill: 0    3. History of lung cancer    Her last chest CT was 4/19/2024 with stable nonspecific 4 mm nodules.  Recent CXRs have been clear.  Repeating given her persistent productive  cough.  Covering for asthma exacerbation as above but do think imaging warranted for evaluation of infection or malignancy.    - CT-CHEST (THORAX) W/O; Future      Please note that this note was created using voice recognition software.

## 2024-06-07 ENCOUNTER — HOSPITAL ENCOUNTER (OUTPATIENT)
Facility: MEDICAL CENTER | Age: 79
End: 2024-06-07
Attending: FAMILY MEDICINE
Payer: MEDICARE

## 2024-06-07 ENCOUNTER — HOSPITAL ENCOUNTER (OUTPATIENT)
Dept: RADIOLOGY | Facility: MEDICAL CENTER | Age: 79
End: 2024-06-07
Attending: FAMILY MEDICINE
Payer: MEDICARE

## 2024-06-07 ENCOUNTER — APPOINTMENT (OUTPATIENT)
Dept: HEMATOLOGY ONCOLOGY | Facility: MEDICAL CENTER | Age: 79
End: 2024-06-07
Payer: MEDICARE

## 2024-06-07 VITALS
OXYGEN SATURATION: 96 % | HEART RATE: 63 BPM | HEIGHT: 67 IN | BODY MASS INDEX: 23.3 KG/M2 | SYSTOLIC BLOOD PRESSURE: 122 MMHG | TEMPERATURE: 97.9 F | WEIGHT: 148.48 LBS | DIASTOLIC BLOOD PRESSURE: 72 MMHG

## 2024-06-07 DIAGNOSIS — Z85.118 HISTORY OF LUNG CANCER: ICD-10-CM

## 2024-06-07 DIAGNOSIS — R05.3 CHRONIC COUGH: ICD-10-CM

## 2024-06-07 DIAGNOSIS — C34.91 PRIMARY LUNG CANCER, RIGHT (HCC): ICD-10-CM

## 2024-06-07 LAB
GRAM STN SPEC: NORMAL
SIGNIFICANT IND 70042: NORMAL
SITE SITE: NORMAL
SOURCE SOURCE: NORMAL

## 2024-06-07 PROCEDURE — 87070 CULTURE OTHR SPECIMN AEROBIC: CPT

## 2024-06-07 PROCEDURE — 71250 CT THORAX DX C-: CPT

## 2024-06-07 PROCEDURE — 99214 OFFICE O/P EST MOD 30 MIN: CPT | Performed by: STUDENT IN AN ORGANIZED HEALTH CARE EDUCATION/TRAINING PROGRAM

## 2024-06-07 PROCEDURE — 99212 OFFICE O/P EST SF 10 MIN: CPT | Performed by: STUDENT IN AN ORGANIZED HEALTH CARE EDUCATION/TRAINING PROGRAM

## 2024-06-07 PROCEDURE — 87205 SMEAR GRAM STAIN: CPT

## 2024-06-07 RX ORDER — GUAIFENESIN 600 MG/1
600 TABLET, EXTENDED RELEASE ORAL EVERY 12 HOURS
COMMUNITY

## 2024-06-07 ASSESSMENT — ENCOUNTER SYMPTOMS
ABDOMINAL PAIN: 0
VOMITING: 0
PALPITATIONS: 0
BLURRED VISION: 0
DIZZINESS: 0
CHILLS: 0
COUGH: 1
ORTHOPNEA: 0
FEVER: 0
NECK PAIN: 0
DEPRESSION: 0
WEIGHT LOSS: 0
WHEEZING: 1
TINGLING: 0
HEADACHES: 0
BRUISES/BLEEDS EASILY: 0
HEARTBURN: 0
NAUSEA: 0
SPUTUM PRODUCTION: 0
FOCAL WEAKNESS: 0
SHORTNESS OF BREATH: 1
SORE THROAT: 0
SENSORY CHANGE: 0
MEMORY LOSS: 0
TREMORS: 0

## 2024-06-07 ASSESSMENT — FIBROSIS 4 INDEX: FIB4 SCORE: 0.86

## 2024-06-07 NOTE — PROGRESS NOTES
Follow Up Note: Hematology/Oncology     Primary Care:  Magaly Estes M.D.    CC: lung adenocarcinoma    Current Treatment: N/A  Previous Treatment:  lobectomy        Subjective:   History of Presenting Illness:  Alissa Bonilla is a 78 y.o. female here to establish care for new diagnosis of lung adenocarcinoma.    Is accompanied by her  to today's visit.  Patient had a lung cancer screening CT in 2019 which showed a 2.2 groundglass nodule in the right upper lobe and 3 solid nodules in the middle lobe.  Due to this it was recommended that she have a repeat CT scan 6 months later which was June 2020.  At that time the CT showed a part groundglass opacity in the right upper lobe measuring 11.2 x 17.3 mm which remained unchanged.  Follow-up CT was completed 2 years later in July 2022 which noted an interval enlargement of the groundglass and partially solid nodule in the right upper lobe measuring 1.7 x 3.2 x 3.1 cm in size.  PET scan was completed in August 2022 which showed the aforementioned mass as well as 2 right paratracheal node suspicious for metastatic disease.  Biopsy was performed of the mass and came back to be adenocarcinoma.  Unfortunately pulmonology was unable to sample the lymph node during the procedure.    Patient reports that she is a former smoker.  Her father is was also a smoker.  She denies any alcohol or drug use.  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    Patient is largely asymptomatic.  She does note her sister has lung adenocarcinoma and currently undergoing treatment with pembrolizumab.    Interval history    She notes that she has been sick for 38 days.  She is + fatigue and very SOB.     Today we went over her CT Chest, which again showed RU lobectomy.  She again has nonspecific 4 mm lung nodules. She has stable mediastinal LN.   Stable left adrenal  "nodule.      Review of Systems   Constitutional:  Negative for chills, fever, malaise/fatigue and weight loss.   HENT:  Negative for congestion, ear pain, nosebleeds and sore throat.    Eyes:  Negative for blurred vision.   Respiratory:  Positive for cough, shortness of breath and wheezing. Negative for sputum production.    Cardiovascular:  Negative for chest pain, palpitations, orthopnea and leg swelling.   Gastrointestinal:  Negative for abdominal pain, heartburn, nausea and vomiting.   Genitourinary:  Negative for dysuria, frequency and urgency.   Musculoskeletal:  Negative for neck pain.   Neurological:  Negative for dizziness, tingling, tremors, sensory change, focal weakness and headaches.   Endo/Heme/Allergies:  Does not bruise/bleed easily.   Psychiatric/Behavioral:  Negative for depression, memory loss and suicidal ideas.    All other systems reviewed and are negative.      Allergies   Allergen Reactions    Other Environmental      Russian rhoda and other pollens.    Sulfa Drugs      \"crazy\"    Sulfites      Asthma        Current Outpatient Medications   Medication Sig Dispense Refill    promethazine-dextromethorphan (PROMETHAZINE-DM) 6.25-15 MG/5ML syrup Take 5 mL by mouth every four hours as needed for Cough. 120 mL 0    predniSONE (DELTASONE) 10 MG Tab Take 6 Tablets by mouth every day for 3 days, THEN 4 Tablets every day for 3 days, THEN 2 Tablets every day for 3 days, THEN 1 Tablet every day for 3 days. 39 Tablet 0    albuterol (PROVENTIL) 2.5mg/3ml Nebu Soln solution for nebulization USE 3 ML VIA NEBULIZER EVERY 4 HOURS AS NEEDED FOR SHORTNESS OF BREATH 150 mL 6    amoxicillin (AMOXIL) 500 MG Cap Take 500 mg by mouth 3 times a day. Pt started a 7 day course on 5/23      acetaminophen (TYLENOL) 500 MG Tab Take 1,000 mg by mouth every 8 hours as needed for Mild Pain.      promethazine-dextromethorphan (PROMETHAZINE-DM) 6.25-15 MG/5ML syrup Take 5 mL by mouth every four hours as needed for Cough. 120 mL " "0    levothyroxine (SYNTHROID) 25 MCG Tab TAKE 1 TABLET BY MOUTH IN THE MORNING ON AN EMPTY STOMACH 90 Tablet 3    rosuvastatin (CRESTOR) 5 MG Tab TAKE 1 TABLET BY MOUTH EVERY EVENING 90 Tablet 3    fluticasone-salmeterol (ADVAIR) 250-50 MCG/ACT AEROSOL POWDER, BREATH ACTIVATED Inhale 1 Puff every 12 hours. 1 Each 3    pantoprazole (PROTONIX) 40 MG Tablet Delayed Response Take 1 Tablet by mouth every day. 90 Tablet 3    albuterol 108 (90 Base) MCG/ACT Aero Soln inhalation aerosol Inhale 2 Puffs every four hours as needed for Shortness of Breath. 8.5 Each 11    azelastine (ASTELIN) 137 MCG/SPRAY nasal spray Administer 1 Spray into affected nostril(S) every day. 30 mL 6    Levomefolate Glucosamine (METHYLFOLATE PO) Take 1 Tablet by mouth every day.      Cyanocobalamin (B-12) 100 MCG Tab Take 1 Tablet by mouth every day.      B Complex Vitamins (B COMPLEX-B12 PO) Take 1 Tab by mouth every day.      cholecalciferol (DIALYVITE VITAMIN D 5000) 5000 UNIT Cap Take 5,000 Units by mouth every day.      Ascorbic Acid (VITAMIN C) 1000 MG Tab Take 1 Tab by mouth every day.       No current facility-administered medications for this encounter.        Problem list, medications, and allergies reviewed by myself today in Epic.     Objective:     Vitals:    06/07/24 0748   BP: 122/72   BP Location: Right arm   Patient Position: Sitting   BP Cuff Size: Adult   Pulse: 63   Temp: 36.6 °C (97.9 °F)   TempSrc: Temporal   SpO2: 96%   Weight: 67.4 kg (148 lb 7.7 oz)   Height: 1.702 m (5' 7.01\")         DESC; KARNOFSKY SCALE WITH ECOG EQUIVALENT: 100, Fully active, able to carry on all pre-disease performed without restriction (ECOG equivalent 0)    DISTRESS LEVEL: no apparent distress    Physical Exam:   Physical Exam  Constitutional:       General: She is not in acute distress.     Appearance: Normal appearance. She is not ill-appearing.   HENT:      Head: Normocephalic and atraumatic.      Nose: Nose normal.      Mouth/Throat:      Mouth: " Mucous membranes are moist.      Pharynx: No oropharyngeal exudate or posterior oropharyngeal erythema.   Eyes:      General: No scleral icterus.     Conjunctiva/sclera: Conjunctivae normal.      Pupils: Pupils are equal, round, and reactive to light.   Cardiovascular:      Rate and Rhythm: Normal rate and regular rhythm.      Pulses: Normal pulses.      Heart sounds: Normal heart sounds. No murmur heard.     No friction rub. No gallop.   Pulmonary:      Effort: No respiratory distress.      Breath sounds: No stridor. Wheezing present. No rhonchi or rales.      Comments: Decreased airflow  Chest:      Chest wall: No tenderness.   Abdominal:      General: Abdomen is flat. Bowel sounds are normal. There is no distension.      Palpations: Abdomen is soft. There is no mass.      Tenderness: There is no abdominal tenderness. There is no guarding.   Musculoskeletal:         General: No swelling, tenderness or deformity. Normal range of motion.      Cervical back: Normal range of motion and neck supple. No rigidity or tenderness.      Right lower leg: No edema.      Left lower leg: No edema.   Skin:     General: Skin is warm and dry.      Coloration: Skin is not jaundiced or pale.      Findings: No bruising, erythema or rash.   Neurological:      General: No focal deficit present.      Mental Status: She is alert and oriented to person, place, and time. Mental status is at baseline.      Sensory: No sensory deficit.      Motor: No weakness.      Coordination: Coordination normal.      Gait: Gait normal.   Psychiatric:         Mood and Affect: Mood normal.         Behavior: Behavior normal.         Thought Content: Thought content normal.         Judgment: Judgment normal.             Labs:   Most recent labs reviewed.  Please see the lab tab of chart review    Imaging:   Most recent images below have been independently reviewed by me.     CT Chest  1.  Right upper lobectomy is again noted.  2.  Mildly increased volume loss  in the middle lobe.  3.  A few stable small nonspecific 4 mm lung nodules. Continued follow-up for oncologic guidelines is recommended.  4.  Stable mediastinal lymph nodes.  5.  Stable left adrenal nodule    CT Chest  1.  Right upper lobectomy is again noted.  2.  Improved volume loss in the middle lobe.  3.  A few small nonspecific 4 mm lung nodules as described for which continued follow-up for oncologic guidelines is recommended.  4.  Slightly smaller mediastinal lymph nodes.  5.  Stable left adrenal nodule.    CT Chest  1.  Right upper lobectomy. No new metastatic disease in the chest.  2.  Progressive volume loss in the middle lobe. No masslike lesions.  3.  Stable small groundglass opacities in the right lower lobe periphery, immediately inferior to the right upper lobe, likely postinfectious/postinflammatory or related to prior treatment.  4.  No new pulmonary nodules or masses.  5.  Right lower paratracheal node now measures 1 cm, slightly smaller since prior. All other thoracic nodes are not enlarged by size criteria.  6.  Stable left adrenal nodule.    Brain:  1.  No acute abnormality.  2.  There is no evidence of intracranial metastasis.  3.  Mild age-appropriate volume loss.     PET  1.  There is abnormal uptake within the enlarging groundglass and part solid mass in the right lung apex which is highly suspicious for slow growing malignancy such as adenocarcinoma.  2.  Uptake within 2 right paratracheal node suspicious for metastatic disease although nonspecific based on size.  3.  Uptake within the stable left adrenal gland nodule most consistent with a benign adenoma.  4.  No evidence of metastatic disease in the neck, abdomen or pelvis.    Pathology:  A. Right upper lobe nodule, fine needle aspiration, slide:          Atypical clusters noted on PAP stain.  See comment.   B. Right upper lobe nodule, fine needle aspiration, core:          Positive for adenocarcinoma.   C. Right upper lobe nodule,  forceps with touch prep slides:          Positive for adenocarcinoma, lung primary with papillary           architecture noted.   D. 4R, fine needle aspiration, slide:          Negative for lesional cells and sheets of lymphocytes to suggest           lymph node aspirate.   E. 4R, fine needle aspiration, core:          Negative; bronchial epithelial cells, cartilage and a few           lymphoid aggregates noted.   F. Right upper lobe, bronchoalveolar lavage:          No malignancy identified.          Cytology preparations, including cell block, demonstrate benign           bronchial epithelial cells and alveolar macrophages.     Path from 9/26    A. Right paratracheal lymph node:          Negative for carcinoma in five lymph nodes (0/5)          Non-necrotizing granulomatous lymphadenitis, negative for           morphologic evidence of acid fast and fungal organisms by           AFB/GMS stains, see comment   B. Right hilar node, station 10:          Negative for carcinoma in four lymph nodes (0/4)          Non-necrotizing granulomatous lymphadenitis   C. Right subcarinal node, station 7:          Negative for carcinoma in one lymph node (0/1)          Non-necrotizing granulomatous lymphadenitis   D. Right upper paratracheal node, station 2:          Negative for carcinoma in one lymph node (0/1)          Non-necrotizing granulomatous lymphadenitis   E. Right upper lobe, lung:          Invasive papillary adenocarcinoma, moderately differentiated,           3.6 cm          Margins of resection are negative for carcinoma          Negative for carcinoma in eleven lymph nodes (0/11)          Non-necrotizing granulomatous lymphadenitis, see comment          See synoptic report for details   Assessment/Plan:      Cancer Staging   No matching staging information was found for the patient.      Ms. Talon Bonilla is a 79 yo F with a new diagnosis of adenocarcinoma of the lung.  She recently underwent a lobectomy with   Ganser.  Final pathology reports adenocarcinoma 3.6 cm with negative lymph nodes. Her Scan at 6 months shows no concern for metastatic disease in the chest.    She has been sick for >1 month.  Will review her CT chest today.      Surveillance will consist of H&P and chest CT scan every 6 months for 3 years and then a low-dose noncontrast enhanced chest CT annually.  If there is recurrence found on the scans we will undergo a PET scan and a brain MRI.    Plan  -will review her CT chest that's being done today  -CT scan in 6 months  -patient to see med onc after scans    Adrenal Adenoma  -Stable : 11mm -->10mm -->14mm (most recent)  -since it is less than 4 cm it is appropriate to watch it every 6 to 12 months  -If it grows larger than 1 cm in 1 year then I will refer patient to surgery    No follow-ups on file.    Any questions and concerns raised by the patient were addressed and answered. Patient denies any further questions.  Patient encouraged to call the office with any concerns or issues.     Georgia Chatman M.D.  Hematology/Oncology    32 minutes spent on this case

## 2024-06-07 NOTE — ADDENDUM NOTE
Encounter addended by: Delonte Carlin on: 6/7/2024 8:27 AM   Actions taken: Charge Capture section accepted

## 2024-06-08 ENCOUNTER — PATIENT MESSAGE (OUTPATIENT)
Dept: MEDICAL GROUP | Facility: LAB | Age: 79
End: 2024-06-08
Payer: MEDICARE

## 2024-06-10 ENCOUNTER — APPOINTMENT (OUTPATIENT)
Dept: RADIOLOGY | Facility: MEDICAL CENTER | Age: 79
End: 2024-06-10
Attending: FAMILY MEDICINE
Payer: MEDICARE

## 2024-06-10 DIAGNOSIS — J18.9 PNEUMONIA DUE TO INFECTIOUS ORGANISM, UNSPECIFIED LATERALITY, UNSPECIFIED PART OF LUNG: ICD-10-CM

## 2024-06-10 RX ORDER — AZITHROMYCIN 250 MG/1
TABLET, FILM COATED ORAL
Qty: 6 TABLET | Refills: 0 | Status: SHIPPED | OUTPATIENT
Start: 2024-06-10

## 2024-08-01 ENCOUNTER — OFFICE VISIT (OUTPATIENT)
Dept: SLEEP MEDICINE | Facility: MEDICAL CENTER | Age: 79
End: 2024-08-01
Attending: INTERNAL MEDICINE
Payer: MEDICARE

## 2024-08-01 VITALS
WEIGHT: 147 LBS | HEART RATE: 59 BPM | BODY MASS INDEX: 23.07 KG/M2 | OXYGEN SATURATION: 96 % | DIASTOLIC BLOOD PRESSURE: 62 MMHG | SYSTOLIC BLOOD PRESSURE: 108 MMHG | HEIGHT: 67 IN

## 2024-08-01 DIAGNOSIS — R05.9 COUGH, UNSPECIFIED TYPE: ICD-10-CM

## 2024-08-01 DIAGNOSIS — Z87.891 FORMER SMOKER: ICD-10-CM

## 2024-08-01 DIAGNOSIS — J45.40 MODERATE PERSISTENT ASTHMA WITHOUT COMPLICATION: ICD-10-CM

## 2024-08-01 DIAGNOSIS — J18.9 RECURRENT PNEUMONIA: ICD-10-CM

## 2024-08-01 DIAGNOSIS — C80.1 ADENOCARCINOMA (HCC): ICD-10-CM

## 2024-08-01 PROCEDURE — 3078F DIAST BP <80 MM HG: CPT | Performed by: INTERNAL MEDICINE

## 2024-08-01 PROCEDURE — 3074F SYST BP LT 130 MM HG: CPT | Performed by: INTERNAL MEDICINE

## 2024-08-01 PROCEDURE — 99214 OFFICE O/P EST MOD 30 MIN: CPT | Performed by: INTERNAL MEDICINE

## 2024-08-01 PROCEDURE — 99213 OFFICE O/P EST LOW 20 MIN: CPT | Performed by: INTERNAL MEDICINE

## 2024-08-01 RX ORDER — AZITHROMYCIN 250 MG/1
TABLET, FILM COATED ORAL
Qty: 6 TABLET | Refills: 0 | Status: SHIPPED | OUTPATIENT
Start: 2024-08-01 | End: 2024-08-22

## 2024-08-01 RX ORDER — AZELASTINE 1 MG/ML
1 SPRAY, METERED NASAL
Qty: 30 ML | Refills: 6 | Status: SHIPPED | OUTPATIENT
Start: 2024-08-01

## 2024-08-01 ASSESSMENT — ENCOUNTER SYMPTOMS
WHEEZING: 0
WEIGHT LOSS: 0
PHOTOPHOBIA: 0
SPUTUM PRODUCTION: 0
PND: 0
SINUS PAIN: 0
DIARRHEA: 0
SHORTNESS OF BREATH: 0
FOCAL WEAKNESS: 0
BLURRED VISION: 0
EYE REDNESS: 0
MYALGIAS: 0
SORE THROAT: 0
EYE DISCHARGE: 0
NAUSEA: 0
WEAKNESS: 0
CONSTIPATION: 0
NECK PAIN: 0
DIAPHORESIS: 0
COUGH: 0
VOMITING: 0
FALLS: 0
EYE PAIN: 0
STRIDOR: 0
PALPITATIONS: 0
SPEECH CHANGE: 0
ABDOMINAL PAIN: 0
TREMORS: 0
DEPRESSION: 0
DIZZINESS: 0
HEADACHES: 0
ORTHOPNEA: 0
DOUBLE VISION: 0
BACK PAIN: 0
HEMOPTYSIS: 0
HEARTBURN: 0
FEVER: 0
CLAUDICATION: 0
CHILLS: 0

## 2024-08-01 ASSESSMENT — FIBROSIS 4 INDEX: FIB4 SCORE: 0.86

## 2024-08-01 NOTE — PROGRESS NOTES
Chief Complaint   Patient presents with    Follow-Up     LAST SEEN 7/14/23    Results     CT CHEST 6/7/24, 4/19/24, CXR 5/27/24           HPI: This patient is a 78 y.o. 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  PMHx includes asthma for which she was previously on only prn GILMER and PND with associated chronic productive cough. She is a former smoker with 34 pk year hx and quit in roughly 2005. Pt lung cancer screening CT in 11/2019 showing RUL 2.2 cm GGO. Repeat CT in 6/2020 showed stability however no surveillance imaging was performed in 2021 and CT 7/2022 showed increase in RUL GGO to up to 3 cm in diameter now with solid component. A PET form 8/10 showed low level FDG uptake with SUV of 4.86 and mild uptake in 9 mm paratracheal LN as well as smaller 7 mm paratracheal node. She had bronchscopy with transbronchial bx demonstrating adenoCa however there was unsuccessful attempt to biopsy LN with EBUS and she underwent thoracoscopic right upper lobectomy with mediastinoscopy by Dr Ganser which revealed no e/o metastatic disease on 9/26/22 and I saw her shortly thereafter on 10/5/22. She is also followed by oncology and her last surveillance CT from 6/7/24 showed pneumonitis in RLL for which she was tx with 5 days of azithromycin.  The patient has fairly severe gastroesophageal reflux which we have been concerned for causing aspiration in the past.  She actually had bronchoscopy in December 22 during which BAL fluid was benign.  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  Over the past year she has had recurrent respiratory symptoms and reported ongoing cough productive of mucus from April until treatment with azithromycin by Dr. Chatman in oncology in June.  Since that time she has improved significantly.  She is on proton pump inhibitor which has helped significantly as well.  Currently  she is complaining of issues swallowing.    Past Medical History:   Diagnosis Date    Anesthesia     PONV    Asthma     Bronchitis     last time approx .    Cancer (HCC) 10/2022    Lung-had lobectomy. No chemo or radiation.    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 No chemo or radiation.    Cough     Chronic since pneumonia 2022    Depression     Disorder of thyroid     GERD (gastroesophageal reflux disease)     taking pantoprazole    Hiatus hernia syndrome     mild    Pain 2022    Throat and chest due to Pneumonia (2022 & 2022).    Pneumonia 11/2022    x2(end of November and early December) after lobectomy.    PONV (postoperative nausea and vomiting)     Shortness of breath     22-after pneumonia x2 (2022 & 2022) S/P lobectomy.    Snoring     Tobacco use 2019    Urinary incontinence     a little stress incontinence.       Social History     Socioeconomic History    Marital status:      Spouse name: Not on file    Number of children: Not on file    Years of education: Not on file    Highest education level: Bachelor's degree (e.g., BA, AB, BS)   Occupational History    Not on file   Tobacco Use    Smoking status: Former     Current packs/day: 0.00     Average packs/day: 1 pack/day for 37.0 years (37.0 ttl pk-yrs)     Types: Cigarettes     Start date: 1968     Quit date: 2005     Years since quittin.5     Passive exposure: Past    Smokeless tobacco: Never   Vaping Use    Vaping status: Never Used   Substance and Sexual Activity    Alcohol use: Not Currently     Alcohol/week: 8.4 - 12.6 oz     Types: 14 - 21 Glasses of wine per week     Comment: 2/day    Drug use: Not Currently     Types: Marijuana     Comment: 1 gummy every month maybe - last 2022    Sexual activity: Yes     Partners: Male     Comment: I am menopausal   Other Topics Concern    Not on file   Social History Narrative    Not on file     Social Determinants of  Health     Financial Resource Strain: Low Risk  (8/27/2022)    Overall Financial Resource Strain (CARDIA)     Difficulty of Paying Living Expenses: Not hard at all   Food Insecurity: No Food Insecurity (8/27/2022)    Hunger Vital Sign     Worried About Running Out of Food in the Last Year: Never true     Ran Out of Food in the Last Year: Never true   Transportation Needs: No Transportation Needs (8/27/2022)    PRAPARE - Transportation     Lack of Transportation (Medical): No     Lack of Transportation (Non-Medical): No   Physical Activity: Sufficiently Active (8/27/2022)    Exercise Vital Sign     Days of Exercise per Week: 5 days     Minutes of Exercise per Session: 30 min   Stress: No Stress Concern Present (8/27/2022)    [employer] - Occupational Stress Questionnaire     Feeling of Stress : Only a little   Social Connections: Moderately Integrated (8/27/2022)    Social Connection and Isolation Panel [NHANES]     Frequency of Communication with Friends and Family: Twice a week     Frequency of Social Gatherings with Friends and Family: Once a week     Attends Gnosticism Services: Never     Active Member of Clubs or Organizations: Yes     Attends Club or Organization Meetings: More than 4 times per year     Marital Status:    Intimate Partner Violence: Not on file   Housing Stability: Low Risk  (8/27/2022)    Housing Stability Vital Sign     Unable to Pay for Housing in the Last Year: No     Number of Places Lived in the Last Year: 1     Unstable Housing in the Last Year: No       Family History   Problem Relation Age of Onset    Dementia Mother     Heart Attack Mother     Cancer Sister 81        Lung Cancer, smoker    Hypertension Sister     Lung Disease Sister     Hypertension Brother     Arthritis Brother         RA    Cancer Paternal Aunt         Lung    Cancer Paternal Uncle         Lung       Current Outpatient Medications on File Prior to Visit   Medication Sig Dispense  Refill    guaiFENesin ER (MUCINEX) 600 MG TABLET SR 12 HR Take 600 mg by mouth every 12 hours.      promethazine-dextromethorphan (PROMETHAZINE-DM) 6.25-15 MG/5ML syrup Take 5 mL by mouth every four hours as needed for Cough. 120 mL 0    albuterol (PROVENTIL) 2.5mg/3ml Nebu Soln solution for nebulization USE 3 ML VIA NEBULIZER EVERY 4 HOURS AS NEEDED FOR SHORTNESS OF BREATH 150 mL 6    amoxicillin (AMOXIL) 500 MG Cap Take 500 mg by mouth 3 times a day. Pt started a 7 day course on 5/23      acetaminophen (TYLENOL) 500 MG Tab Take 1,000 mg by mouth every 8 hours as needed for Mild Pain.      promethazine-dextromethorphan (PROMETHAZINE-DM) 6.25-15 MG/5ML syrup Take 5 mL by mouth every four hours as needed for Cough. 120 mL 0    levothyroxine (SYNTHROID) 25 MCG Tab TAKE 1 TABLET BY MOUTH IN THE MORNING ON AN EMPTY STOMACH 90 Tablet 3    rosuvastatin (CRESTOR) 5 MG Tab TAKE 1 TABLET BY MOUTH EVERY EVENING 90 Tablet 3    fluticasone-salmeterol (ADVAIR) 250-50 MCG/ACT AEROSOL POWDER, BREATH ACTIVATED Inhale 1 Puff every 12 hours. 1 Each 3    pantoprazole (PROTONIX) 40 MG Tablet Delayed Response Take 1 Tablet by mouth every day. 90 Tablet 3    albuterol 108 (90 Base) MCG/ACT Aero Soln inhalation aerosol Inhale 2 Puffs every four hours as needed for Shortness of Breath. 8.5 Each 11    Levomefolate Glucosamine (METHYLFOLATE PO) Take 1 Tablet by mouth every day.      Cyanocobalamin (B-12) 100 MCG Tab Take 1 Tablet by mouth every day.      B Complex Vitamins (B COMPLEX-B12 PO) Take 1 Tab by mouth every day.      cholecalciferol (DIALYVITE VITAMIN D 5000) 5000 UNIT Cap Take 5,000 Units by mouth every day.      Ascorbic Acid (VITAMIN C) 1000 MG Tab Take 1 Tab by mouth every day.       No current facility-administered medications on file prior to visit.       Other environmental, Sulfa drugs, and Sulfites      ROS:   Review of Systems   Constitutional:  Negative for chills, diaphoresis, fever, malaise/fatigue and weight loss.  "  HENT:  Negative for congestion, ear discharge, ear pain, hearing loss, nosebleeds, sinus pain, sore throat and tinnitus.    Eyes:  Negative for blurred vision, double vision, photophobia, pain, discharge and redness.   Respiratory:  Negative for cough, hemoptysis, sputum production, shortness of breath, wheezing and stridor.    Cardiovascular:  Negative for chest pain, palpitations, orthopnea, claudication, leg swelling and PND.   Gastrointestinal:  Negative for abdominal pain, constipation, diarrhea, heartburn, nausea and vomiting.   Genitourinary:  Negative for dysuria and urgency.   Musculoskeletal:  Negative for back pain, falls, joint pain, myalgias and neck pain.   Skin:  Negative for itching and rash.   Neurological:  Negative for dizziness, tremors, speech change, focal weakness, weakness and headaches.   Endo/Heme/Allergies:  Negative for environmental allergies.   Psychiatric/Behavioral:  Negative for depression.        /62 (BP Location: Right arm, Patient Position: Sitting, BP Cuff Size: Adult)   Pulse (!) 59   Ht 1.702 m (5' 7\")   Wt 66.7 kg (147 lb)   SpO2 96%   Physical Exam  Constitutional:       General: She is not in acute distress.     Appearance: Normal appearance. She is well-developed and normal weight.   HENT:      Head: Normocephalic and atraumatic.      Right Ear: External ear normal.      Left Ear: External ear normal.      Nose: Nose normal. No congestion.      Mouth/Throat:      Mouth: Mucous membranes are moist.      Pharynx: Oropharynx is clear. No oropharyngeal exudate.   Eyes:      General: No scleral icterus.     Extraocular Movements: Extraocular movements intact.      Conjunctiva/sclera: Conjunctivae normal.      Pupils: Pupils are equal, round, and reactive to light.   Neck:      Vascular: No JVD.      Trachea: No tracheal deviation.   Cardiovascular:      Rate and Rhythm: Normal rate and regular rhythm.      Heart sounds: Normal heart sounds. No murmur heard.     No " friction rub. No gallop.   Pulmonary:      Effort: Pulmonary effort is normal. No accessory muscle usage or respiratory distress.      Breath sounds: Normal breath sounds. No wheezing or rales.   Abdominal:      General: There is no distension.      Palpations: Abdomen is soft.      Tenderness: There is no abdominal tenderness.   Musculoskeletal:         General: No tenderness or deformity. Normal range of motion.      Cervical back: Normal range of motion and neck supple.      Right lower leg: No edema.      Left lower leg: No edema.   Lymphadenopathy:      Cervical: No cervical adenopathy.   Skin:     General: Skin is warm and dry.      Findings: No rash.      Nails: There is no clubbing.   Neurological:      Mental Status: She is alert and oriented to person, place, and time.      Cranial Nerves: No cranial nerve deficit.      Gait: Gait normal.   Psychiatric:         Behavior: Behavior normal.       PFTs as reviewed by me personally: AS PER hpi    Imaging as reviewed by me personally:  SA PER HPi    Assessment:  1. Moderate persistent asthma without complication  PULMONARY FUNCTION TESTS -Test requested: Complete Pulmonary Function Test      2. Adenocarcinoma (HCC)        3. Former smoker        4. Recurrent pneumonia  IMMUNOGLOBULINS A/E/G/M, SERUM    CULTURE RESPIRATORY W/ [facility] STN    AFB Culture    AFB Culture    Referral to Other    azithromycin (ZITHROMAX) 250 MG Tab      5. Cough, unspecified type  azelastine (ASTELIN) 137 MCG/SPRAY nasal spray          Plan:  Chronic and worsening which I suspect is more related to reflux or possible aspiration.  Continue Flovent and short acting bronchodilators.  Update pulmonary function testing at follow-up.  Status post resection with no evidence of recurrence on surveillance imaging followed by oncology.  Tobacco free.  Encouraged ongoing abstinence.  I suspect this is related to reflux with retrograde aspiration but could also be anterograde given she is complaining  of dysphagia.  We discussed lifestyle modifications to minimize reflux and she is on proton pump inhibitor therapy.  I did place referral to [facility] for swallow evaluation.  We will also culture her the next time she experience symptoms to ensure there is not airway colonization and obtain serum immunoglobulins.  Chronic and presumed secondary to a combination of reflux, postnasal drip, asthma, query aspiration.  She has benefited from Astelin for postnasal drip which I refilled.  Return in about 4 months (around 12/1/2024) for lABS .

## 2024-08-03 DIAGNOSIS — J45.40 MODERATE PERSISTENT ASTHMA WITHOUT COMPLICATION: ICD-10-CM

## 2024-08-05 RX ORDER — ALBUTEROL SULFATE 90 UG/1
AEROSOL, METERED RESPIRATORY (INHALATION)
Qty: 8.5 G | Refills: 6 | Status: SHIPPED | OUTPATIENT
Start: 2024-08-05

## 2024-08-05 NOTE — TELEPHONE ENCOUNTER
Have we ever prescribed this med? Yes.  If yes, what date? 8/17/23    Last OV: 8/1/24        Medications:  ALBUTEROL HFA INH (200 PUFFS) 8.5GM

## 2024-08-07 ENCOUNTER — HOSPITAL ENCOUNTER (OUTPATIENT)
Dept: LAB | Facility: MEDICAL CENTER | Age: 79
End: 2024-08-07
Attending: INTERNAL MEDICINE
Payer: MEDICARE

## 2024-08-07 PROCEDURE — 82784 ASSAY IGA/IGD/IGG/IGM EACH: CPT

## 2024-08-07 PROCEDURE — 82785 ASSAY OF IGE: CPT

## 2024-08-07 PROCEDURE — 36415 COLL VENOUS BLD VENIPUNCTURE: CPT

## 2024-08-09 LAB
IGA SERPL-MCNC: 90 MG/DL (ref 68–408)
IGE SERPL-ACNC: 130 KU/L
IGG SERPL-MCNC: 678 MG/DL (ref 768–1632)
IGM SERPL-MCNC: 282 MG/DL (ref 35–263)

## 2024-08-13 ENCOUNTER — PATIENT MESSAGE (OUTPATIENT)
Dept: MEDICAL GROUP | Facility: LAB | Age: 79
End: 2024-08-13
Payer: MEDICARE

## 2024-08-13 ENCOUNTER — PATIENT MESSAGE (OUTPATIENT)
Dept: SLEEP MEDICINE | Facility: MEDICAL CENTER | Age: 79
End: 2024-08-13
Payer: MEDICARE

## 2024-08-13 DIAGNOSIS — R05.9 COUGH, UNSPECIFIED TYPE: ICD-10-CM

## 2024-08-13 DIAGNOSIS — M81.0 POSTMENOPAUSAL BONE LOSS: ICD-10-CM

## 2024-08-13 DIAGNOSIS — J18.9 RECURRENT PNEUMONIA: ICD-10-CM

## 2024-08-15 ENCOUNTER — APPOINTMENT (OUTPATIENT)
Dept: MEDICAL GROUP | Facility: LAB | Age: 79
End: 2024-08-15
Payer: MEDICARE

## 2024-08-19 ENCOUNTER — APPOINTMENT (OUTPATIENT)
Dept: MEDICAL GROUP | Facility: LAB | Age: 79
End: 2024-08-19
Payer: MEDICARE

## 2024-08-20 ENCOUNTER — APPOINTMENT (OUTPATIENT)
Dept: MEDICAL GROUP | Facility: LAB | Age: 79
End: 2024-08-20
Payer: MEDICARE

## 2024-08-20 SDOH — ECONOMIC STABILITY: FOOD INSECURITY: WITHIN THE PAST 12 MONTHS, YOU WORRIED THAT YOUR FOOD WOULD RUN OUT BEFORE YOU GOT MONEY TO BUY MORE.: NEVER TRUE

## 2024-08-20 SDOH — HEALTH STABILITY: MENTAL HEALTH
STRESS IS WHEN SOMEONE FEELS TENSE, NERVOUS, ANXIOUS, OR CAN'T SLEEP AT NIGHT BECAUSE THEIR MIND IS TROUBLED. HOW STRESSED ARE YOU?: TO SOME EXTENT

## 2024-08-20 SDOH — ECONOMIC STABILITY: FOOD INSECURITY: WITHIN THE PAST 12 MONTHS, THE FOOD YOU BOUGHT JUST DIDN'T LAST AND YOU DIDN'T HAVE MONEY TO GET MORE.: NEVER TRUE

## 2024-08-20 SDOH — ECONOMIC STABILITY: INCOME INSECURITY: IN THE LAST 12 MONTHS, WAS THERE A TIME WHEN YOU WERE NOT ABLE TO PAY THE MORTGAGE OR RENT ON TIME?: NO

## 2024-08-20 SDOH — ECONOMIC STABILITY: INCOME INSECURITY: HOW HARD IS IT FOR YOU TO PAY FOR THE VERY BASICS LIKE FOOD, HOUSING, MEDICAL CARE, AND HEATING?: NOT HARD AT ALL

## 2024-08-20 SDOH — HEALTH STABILITY: PHYSICAL HEALTH: ON AVERAGE, HOW MANY DAYS PER WEEK DO YOU ENGAGE IN MODERATE TO STRENUOUS EXERCISE (LIKE A BRISK WALK)?: 4 DAYS

## 2024-08-20 SDOH — HEALTH STABILITY: PHYSICAL HEALTH: ON AVERAGE, HOW MANY MINUTES DO YOU ENGAGE IN EXERCISE AT THIS LEVEL?: 60 MIN

## 2024-08-20 ASSESSMENT — SOCIAL DETERMINANTS OF HEALTH (SDOH)
HOW MANY DRINKS CONTAINING ALCOHOL DO YOU HAVE ON A TYPICAL DAY WHEN YOU ARE DRINKING: 3 OR 4
HOW OFTEN DO YOU HAVE A DRINK CONTAINING ALCOHOL: 4 OR MORE TIMES A WEEK
HOW OFTEN DO YOU ATTENT MEETINGS OF THE CLUB OR ORGANIZATION YOU BELONG TO?: MORE THAN 4 TIMES PER YEAR
HOW HARD IS IT FOR YOU TO PAY FOR THE VERY BASICS LIKE FOOD, HOUSING, MEDICAL CARE, AND HEATING?: NOT HARD AT ALL
IN A TYPICAL WEEK, HOW MANY TIMES DO YOU TALK ON THE PHONE WITH FAMILY, FRIENDS, OR NEIGHBORS?: TWICE A WEEK
HOW OFTEN DO YOU GET TOGETHER WITH FRIENDS OR RELATIVES?: TWICE A WEEK
DO YOU BELONG TO ANY CLUBS OR ORGANIZATIONS SUCH AS CHURCH GROUPS UNIONS, FRATERNAL OR ATHLETIC GROUPS, OR SCHOOL GROUPS?: YES
HOW OFTEN DO YOU GET TOGETHER WITH FRIENDS OR RELATIVES?: TWICE A WEEK
WITHIN THE PAST 12 MONTHS, YOU WORRIED THAT YOUR FOOD WOULD RUN OUT BEFORE YOU GOT THE MONEY TO BUY MORE: NEVER TRUE
HOW OFTEN DO YOU HAVE SIX OR MORE DRINKS ON ONE OCCASION: NEVER
DO YOU BELONG TO ANY CLUBS OR ORGANIZATIONS SUCH AS CHURCH GROUPS UNIONS, FRATERNAL OR ATHLETIC GROUPS, OR SCHOOL GROUPS?: YES
HOW OFTEN DO YOU ATTENT MEETINGS OF THE CLUB OR ORGANIZATION YOU BELONG TO?: MORE THAN 4 TIMES PER YEAR
HOW OFTEN DO YOU ATTEND CHURCH OR RELIGIOUS SERVICES?: PATIENT DECLINED
IN THE PAST 12 MONTHS, HAS THE ELECTRIC, GAS, OIL, OR WATER COMPANY THREATENED TO SHUT OFF SERVICE IN YOUR HOME?: NO
HOW OFTEN DO YOU ATTEND CHURCH OR RELIGIOUS SERVICES?: PATIENT DECLINED
IN A TYPICAL WEEK, HOW MANY TIMES DO YOU TALK ON THE PHONE WITH FAMILY, FRIENDS, OR NEIGHBORS?: TWICE A WEEK

## 2024-08-20 ASSESSMENT — LIFESTYLE VARIABLES
HOW MANY STANDARD DRINKS CONTAINING ALCOHOL DO YOU HAVE ON A TYPICAL DAY: 3 OR 4
HOW OFTEN DO YOU HAVE SIX OR MORE DRINKS ON ONE OCCASION: NEVER
HOW OFTEN DO YOU HAVE A DRINK CONTAINING ALCOHOL: 4 OR MORE TIMES A WEEK
SKIP TO QUESTIONS 9-10: 0
AUDIT-C TOTAL SCORE: 5

## 2024-08-22 ENCOUNTER — OFFICE VISIT (OUTPATIENT)
Dept: MEDICAL GROUP | Facility: LAB | Age: 79
End: 2024-08-22
Payer: MEDICARE

## 2024-08-22 VITALS
WEIGHT: 147 LBS | BODY MASS INDEX: 23.07 KG/M2 | HEART RATE: 68 BPM | RESPIRATION RATE: 16 BRPM | TEMPERATURE: 96.9 F | OXYGEN SATURATION: 97 % | HEIGHT: 67 IN | SYSTOLIC BLOOD PRESSURE: 104 MMHG | DIASTOLIC BLOOD PRESSURE: 70 MMHG

## 2024-08-22 DIAGNOSIS — E78.5 DYSLIPIDEMIA: ICD-10-CM

## 2024-08-22 DIAGNOSIS — R73.09 ELEVATED GLUCOSE: ICD-10-CM

## 2024-08-22 DIAGNOSIS — Z85.118 HISTORY OF LUNG CANCER: ICD-10-CM

## 2024-08-22 DIAGNOSIS — E55.9 VITAMIN D DEFICIENCY: ICD-10-CM

## 2024-08-22 DIAGNOSIS — E03.9 ACQUIRED HYPOTHYROIDISM: ICD-10-CM

## 2024-08-22 DIAGNOSIS — G47.9 SLEEP DISTURBANCE: ICD-10-CM

## 2024-08-22 DIAGNOSIS — Z00.00 ENCOUNTER FOR MEDICARE ANNUAL WELLNESS EXAM: ICD-10-CM

## 2024-08-22 PROCEDURE — 3074F SYST BP LT 130 MM HG: CPT | Performed by: FAMILY MEDICINE

## 2024-08-22 PROCEDURE — 3078F DIAST BP <80 MM HG: CPT | Performed by: FAMILY MEDICINE

## 2024-08-22 PROCEDURE — G0439 PPPS, SUBSEQ VISIT: HCPCS | Performed by: FAMILY MEDICINE

## 2024-08-22 RX ORDER — FLUTICASONE FUROATE, UMECLIDINIUM BROMIDE AND VILANTEROL TRIFENATATE 100; 62.5; 25 UG/1; UG/1; UG/1
1 POWDER RESPIRATORY (INHALATION)
COMMUNITY

## 2024-08-22 ASSESSMENT — ENCOUNTER SYMPTOMS: GENERAL WELL-BEING: GOOD

## 2024-08-22 ASSESSMENT — FIBROSIS 4 INDEX: FIB4 SCORE: 0.86

## 2024-08-22 ASSESSMENT — PATIENT HEALTH QUESTIONNAIRE - PHQ9: CLINICAL INTERPRETATION OF PHQ2 SCORE: 0

## 2024-08-22 ASSESSMENT — ACTIVITIES OF DAILY LIVING (ADL): BATHING_REQUIRES_ASSISTANCE: 0

## 2024-08-22 NOTE — PROGRESS NOTES
Chief Complaint   Patient presents with    Medicare Annual Wellness       HPI:  Alissa Bonilla is a 78 y.o. here for Medicare Annual Wellness Visit     Few months ago got pneumonia.   Seeing pulm, Dr Martel. Evaluating possible reflux. Planning to wear masks more through the cold and flu season, travel, etc.   Hard sleeping through the night. Wakes in the night a lot. Easy to fall asleep, but waking early, 2 am. Tries melatonin, deep breathing, meditation. Sometimes this works, sometimes not. She will be up 3-4 hours or so in the AM.   Does nap when she can during the day. 15-60 minutes.     Works with , lifting weights, rides dressage horse.   Twice weekly for gym, riding 4 times per week.     Patient Active Problem List    Diagnosis Date Noted    Primary malignant neoplasm of right lung (HCC) 09/20/2022    Hypothyroidism 09/20/2022    Depressive disorder 09/20/2022    Bronchitis 06/14/2022    Adrenal adenoma, left 11/12/2019    Right ankle pain 08/15/2018    Asthma 08/14/2018    Benign essential HTN 07/06/2017    Neoplasm of uncertain behavior 07/06/2017    AK (actinic keratosis) 06/28/2017    Smoking greater than 30 pack years 09/01/2016    Postmenopausal 09/01/2016    Asthma, moderate persistent 02/26/2015    Environmental allergies 02/26/2015    Recurrent major depressive disorder, in full remission (HCC) 02/26/2015    Dyslipidemia 02/26/2015       Current Outpatient Medications   Medication Sig Dispense Refill    fluticasone-umeclidinium-vilanterol (TRELEGY ELLIPTA) 100-62.5-25 mcg/act inhaler Inhale 1 Puff.      albuterol 108 (90 Base) MCG/ACT Aero Soln inhalation aerosol INHALE 2 PUFFS BY MOUTH EVERY 4 HOURS AS NEEDED SHORTNESS OF BREATH 8.5 g 6    azelastine (ASTELIN) 137 MCG/SPRAY nasal spray Administer 1 Spray into affected nostril(S) every day. 30 mL 6    guaiFENesin ER (MUCINEX) 600 MG TABLET SR 12 HR Take 600 mg by mouth every 12 hours.      albuterol (PROVENTIL) 2.5mg/3ml Nebu Soln  solution for nebulization USE 3 ML VIA NEBULIZER EVERY 4 HOURS AS NEEDED FOR SHORTNESS OF BREATH 150 mL 6    acetaminophen (TYLENOL) 500 MG Tab Take 1,000 mg by mouth every 8 hours as needed for Mild Pain.      promethazine-dextromethorphan (PROMETHAZINE-DM) 6.25-15 MG/5ML syrup Take 5 mL by mouth every four hours as needed for Cough. 120 mL 0    levothyroxine (SYNTHROID) 25 MCG Tab TAKE 1 TABLET BY MOUTH IN THE MORNING ON AN EMPTY STOMACH 90 Tablet 3    rosuvastatin (CRESTOR) 5 MG Tab TAKE 1 TABLET BY MOUTH EVERY EVENING 90 Tablet 3    pantoprazole (PROTONIX) 40 MG Tablet Delayed Response Take 1 Tablet by mouth every day. 90 Tablet 3    Levomefolate Glucosamine (METHYLFOLATE PO) Take 1 Tablet by mouth every day.      Cyanocobalamin (B-12) 100 MCG Tab Take 1 Tablet by mouth every day.      B Complex Vitamins (B COMPLEX-B12 PO) Take 1 Tab by mouth every day.      cholecalciferol (DIALYVITE VITAMIN D 5000) 5000 UNIT Cap Take 5,000 Units by mouth every day.      Ascorbic Acid (VITAMIN C) 1000 MG Tab Take 1 Tab by mouth every day.       No current facility-administered medications for this visit.          Current supplements as per medication list.     Allergies: Other environmental, Sulfa drugs, and Sulfites    Current social contact/activities: Clubs and dinner with friends. Horse ridding     She  reports that she quit smoking about 19 years ago. Her smoking use included cigarettes. She started smoking about 56 years ago. She has a 37 pack-year smoking history. She has been exposed to tobacco smoke. She has never used smokeless tobacco. She reports that she does not currently use alcohol after a past usage of about 8.4 - 12.6 oz of alcohol per week. She reports that she does not currently use drugs after having used the following drugs: Marijuana.  Counseling given: Not Answered      ROS:    Gait: Uses no assistive device  Ostomy: No  Other tubes: No  Amputations: No  Chronic oxygen use: No  Last eye exam:  08/2024  Wears hearing aids: No   : Denies any urinary leakage during the last 6 months    Screening:    Depression Screening  Little interest or pleasure in doing things?  0 - not at all  Feeling down, depressed , or hopeless? 0 - not at all  Patient Health Questionnaire Score: 0     If depressive symptoms identified deferred to follow up visit unless specifically addressed in assessment and plan.    Interpretation of PHQ-9 Total Score   Score Severity   1-4 No Depression   5-9 Mild Depression   10-14 Moderate Depression   15-19 Moderately Severe Depression   20-27 Severe Depression    Screening for Cognitive Impairment  Do you or any of your friends or family members have any concern about your memory? No  Three Minute Recall (Leader, Season, Table) 2/3    Pacheco clock face with all 12 numbers and set the hands to show 10 minutes after 11.  Yes    Cognitive concerns identified deferred for follow up unless specifically addressed in assessment and plan.    Fall Risk Assessment  Has the patient had two or more falls in the last year or any fall with injury in the last year?  No    Safety Assessment  Do you always wear your seatbelt?  Yes  Any changes to home needed to function safely? No  Difficulty hearing.  No  Patient counseled about all safety risks that were identified.    Functional Assessment ADLs  Are there any barriers preventing you from cooking for yourself or meeting nutritional needs?  No.    Are there any barriers preventing you from driving safely or obtaining transportation?  No.    Are there any barriers preventing you from using a telephone or calling for help?  No    Are there any barriers preventing you from shopping?  No.    Are there any barriers preventing you from taking care of your own finances?  No    Are there any barriers preventing you from managing your medications?  No    Are there any barriers preventing you from showering, bathing or dressing yourself? No    Are there any barriers  preventing you from doing housework or laundry? No  Are there any barriers preventing you from using the toilet?No  Are you currently engaging in any exercise or physical activity?  Yes.      Self-Assessment of Health  What is your perception of your health? Good    Do you sleep more than six hours a night? No    In the past 7 days, how much did pain keep you from doing your normal work? None    Do you spend quality time with family or friends (virtually or in person)? Yes    Do you usually eat a heart healthy diet that constists of a variety of fruits, vegetables, whole grains and fiber? Yes    Do you eat foods high in fat and/or Fast Food more than three times per week? No    How concerned are you that your medical conditions are not being well managed? Not at all    Are you worried that in the next 2 months, you may not have stable housing that you own, rent, or stay in as part of a household? No      Advance Care Planning  Do you have an Advance Directive, Living Will, Durable Power of , or POLST? Yes  Advance Directive       is not on file - instructed patient to bring in a copy to scan into their chart      Health Maintenance Summary            Overdue - COVID-19 Vaccine (4 - 2023-24 season) Overdue since 9/1/2023      10/13/2021  Imm Admin: PFIZER PURPLE CAP SARS-COV-2 VACCINATION (12+)    03/06/2021  Imm Admin: PFIZER PURPLE CAP SARS-COV-2 VACCINATION (12+)    02/16/2021  Imm Admin: PFIZER PURPLE CAP SARS-COV-2 VACCINATION (12+)              Ordered - Bone Density Scan (Every 2 Years) Ordered on 8/13/2024 07/18/2022  DS-BONE DENSITY STUDY (DEXA)    09/09/2016  DS-BONE DENSITY STUDY (DEXA)              Influenza Vaccine (1) Next due on 9/1/2024 11/24/2023  Patient Reported Immunization: Influenza, Unspecified - HISTORICAL DATA    11/01/2022  Imm Admin: Influenza Vaccine Adult HD    10/21/2021  Imm Admin: Influenza Vaccine Adult HD    11/01/2020  Imm Admin: Influenza Vaccine Adult HD     11/10/2019  Imm Admin: Influenza Vaccine Adult HD    Only the first 5 history entries have been loaded, but more history exists.              Annual Wellness Visit (Yearly) Next due on 8/22/2025 08/22/2024  Visit Dx: Encounter for Medicare annual wellness exam    07/06/2023  Visit Dx: Encounter for Medicare annual wellness exam    06/14/2022  Prob Dx: Medicare annual wellness visit, initial    04/14/2021  Subsequent Annual Wellness Visit - Includes PPPS ()    04/14/2021  Visit Dx: Medicare annual wellness visit, subsequent    Only the first 5 history entries have been loaded, but more history exists.              IMM DTaP/Tdap/Td Vaccine (2 - Td or Tdap) Next due on 7/6/2027 07/06/2017  Imm Admin: Tdap Vaccine              Pneumococcal Vaccine: 65+ Years (Series Information) Completed      06/28/2017  Imm Admin: Pneumococcal polysaccharide vaccine (PPSV-23)    11/23/2015  Imm Admin: Pneumococcal Conjugate Vaccine (Prevnar/PCV-13)              Hepatitis C Screening  Tentatively Complete      09/20/2019  Hepatitis C Antibody component of HEP C VIRUS ANTIBODY              Zoster (Shingles) Vaccines (Series Information) Completed      04/14/2021  Imm Admin: Zoster Vaccine Recombinant (RZV) (SHINGRIX)    11/12/2019  Imm Admin: Zoster Vaccine Recombinant (RZV) (SHINGRIX)    07/06/2017  Imm Admin: Zoster Vaccine Live (ZVL) (Zostavax) - HISTORICAL DATA              Hepatitis A Vaccine (Hep A) (Series Information) Aged Out      No completion history exists for this topic.              HPV Vaccines (Series Information) Aged Out      No completion history exists for this topic.              Polio Vaccine (Inactivated Polio) (Series Information) Aged Out      No completion history exists for this topic.              Meningococcal Immunization (Series Information) Aged Out      No completion history exists for this topic.              Discontinued - Mammogram  Discontinued        Frequency changed to Never  automatically (Topic No Longer Applies)    07/19/2022  MA-SCREENING MAMMO BILAT W/IMPLANTS W/DENVER W/CAD    10/14/2019  MA-MAMMO SCREEN BILAT IMPLANTS DENVER CAD    09/07/2018  MA-MAMMO SCREEN BILAT IMPLANTS DENVER CAD    07/17/2017  MA-MAMMO SCREEN BILAT IMPLANTS DENVER CAD    Only the first 5 history entries have been loaded, but more history exists.              Discontinued - Cervical Cancer Screening  Discontinued        Frequency changed to Never automatically (Topic No Longer Applies)    07/06/2017  Done    07/06/2017  THINPREP PAP W/HPV AND CTNG    07/06/2017  PATHOLOGY GYN SPECIMEN              Discontinued - Colorectal Cancer Screening  Discontinued        Frequency changed to Never automatically (Topic No Longer Applies)    04/10/2023  AMB EXTERNAL COLONOSCOPY RESULTS    04/10/2023  AMB EXTERNAL COLONOSCOPY RESULTS    01/09/2018  REFERRAL TO GI FOR COLONOSCOPY              Discontinued - Hepatitis B Vaccine (Hep B)  Discontinued      No completion history exists for this topic.              Discontinued - Lung Cancer Screening  Discontinued        Frequency changed to Never automatically (Topic No Longer Applies)    06/07/2024  CT-CHEST (THORAX) W/O    04/19/2024  CT-CHEST (THORAX) W/O    10/19/2023  CT-CHEST (THORAX) WITH    03/13/2023  CT-CHEST (THORAX) W/O    Only the first 5 history entries have been loaded, but more history exists.                    Patient Care Team:  Magaly Estes M.D. as PCP - General (Family Medicine)  Yo King O.D. as Consulting Physician (Optometry)  Kannan Kessler D.O. (Endocrinology)  Lauren Mcbride, LSW, MSW as   [facility] as Respiratory Therapist  Jadyn Martel M.D. (Pulmonary Medicine)  Georgia Chatman M.D. (Hematology and Oncology)        Social History     Tobacco Use    Smoking status: Former     Current packs/day: 0.00     Average packs/day: 1 pack/day for 37.0 years (37.0 ttl pk-yrs)     Types: Cigarettes     Start date:  1968     Quit date: 2005     Years since quittin.6     Passive exposure: Past    Smokeless tobacco: Never   Vaping Use    Vaping status: Never Used   Substance Use Topics    Alcohol use: Not Currently     Alcohol/week: 8.4 - 12.6 oz     Types: 14 - 21 Glasses of wine per week     Comment: 2/day    Drug use: Not Currently     Types: Marijuana     Comment: 1 gummy every month maybe - last 2022     Family History   Problem Relation Age of Onset    Dementia Mother     Heart Attack Mother     Cancer Sister 81        Lung Cancer, smoker    Hypertension Sister     Lung Disease Sister     Hypertension Brother     Arthritis Brother         RA    Cancer Paternal Aunt         Lung    Cancer Paternal Uncle         Lung     She  has a past medical history of Anesthesia, Asthma, Bronchitis, Cancer (HCC) (10/2022), 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    She has no past medical history of Painful breathing, Sputum production, or Wheezing.   Past Surgical History:   Procedure Laterality Date    WI BRONCHOSCOPY,DIAGNOSTIC  12/15/2022    Procedure: FIBER OPTIC BRONCHOSCOPY WITH WASH, AND BIOPSY;  Surgeon: Aislinn Alvarez D.O.;  Location: [facility];  Service: Pulmonary    WI THORACOSCOPY,DX NO BX Right 2022    Procedure: VIDEO ASSISTED THORACOSCOPY, RIGHT UPPER LOBECTOMY, NODE DISSECTION WITH MEDIASTINOSCOPY LYMPH NODE BIOPSY;  Surgeon: John H Ganser, M.D.;  Location: [facility];  Service: General    LOBECTOMY Right 2022    Procedure: LOBECTOMY;  Surgeon: John H Ganser, M.D.;  Location: [facility];  Service: General    MEDIASTINOSCOPY  2022    Procedure: MEDIASTINOSCOPY;  Surgeon: Eduin WISE  "Ganser, M.D.;  Location: SURGERY [facility];  Service: General    BRONCHOSCOPY, ROBOT-ASSISTED N/A 09/02/2022    Procedure: FIBER OPTICBRONHOSCOPY WITH BRONCHOALVEOLAR LAVAGE, BIOPSY AND FINE NEEDLE ASPIRATION, ENDOBRONCHIAL ULTRASOUND AND NAVIGATION ROBOTICS;  Surgeon: Idalia Arguelles M.D.;  Location: SURGERY [facility];  Service: Pulmonary Robotic    ENDOBRONCHIAL US ADD-ON N/A 09/02/2022    Procedure: ENDOBRONCHIAL ULTRASOUND (EBUS);  Surgeon: Idalia Arguelles M.D.;  Location: SURGERY [facility];  Service: Pulmonary Robotic    COLONOSCOPY  2019    MAMMOPLASTY AUGMENTATION Bilateral 2017    PA BREAST AUGMENTATION WITH IMPLANT  01/01/1972       Exam:   /70   Pulse 68   Temp 36.1 °C (96.9 °F)   Resp 16   Ht 1.702 m (5' 7\")   Wt 66.7 kg (147 lb)   SpO2 97%  Body mass index is 23.02 kg/m².    Hearing excellent.    Dentition good  Alert, oriented in no acute distress.  Eye contact is good, speech goal directed, affect calm  RRR, CTAB.   No edema.       Assessment and Plan. The following treatment and monitoring plan is recommended:    1. Encounter for Medicare annual wellness exam    2. Sleep disturbance    3. Dyslipidemia  - Lipid Profile; Future    4. Vitamin D deficiency  - VITAMIN D,25 HYDROXY (DEFICIENCY); Future    5. Acquired hypothyroidism  - TSH WITH REFLEX TO FT4; Future    6. History of lung cancer  - CBC WITH DIFFERENTIAL; Future    7. Elevated glucose  - Comp Metabolic Panel; Future  - HEMOGLOBIN A1C; Future    Other orders  - fluticasone-umeclidinium-vilanterol (TRELEGY ELLIPTA) 100-62.5-25 mcg/act inhaler; Inhale 1 Puff.    She is doing fairly well overall.  She is having some issues with sleep.  We did discuss that napping can be okay as long as it is short during the day.  Discussed that sleep medications are likely more risky for her given her age than they would be helpful.  If the melatonin seems to be helpful she will continue this.  We did discuss trying to get out of " bed if she is in bed for more than an hour without sleeping.  Discussed trying not to let her brain associate the bed with when she does not sleep.  Services suggested: No services needed at this time  Health Care Screening: Age-appropriate preventive services recommended by USPTF and ACIP covered by Medicare were discussed today. Services ordered if indicated and agreed upon by the patient.  Referrals offered: Community-based lifestyle interventions to reduce health risks and promote self-management and wellness, fall prevention, nutrition, physical activity, tobacco-use cessation, weight loss, and mental health services as per orders if indicated.    Discussion today about general wellness and lifestyle habits:    Prevent falls and reduce trip hazards; Cautioned about securing or removing rugs.  Have a working fire alarm and carbon monoxide detector;   Engage in regular physical activity and social activities     Follow-up: No follow-ups on file.

## 2024-09-09 ENCOUNTER — TELEPHONE (OUTPATIENT)
Dept: HEALTH INFORMATION MANAGEMENT | Facility: OTHER | Age: 79
End: 2024-09-09
Payer: MEDICARE

## 2024-09-28 NOTE — OR NURSING
1538: Pt arrived to PACU. Respirations even and unlabored. VSS.     1543: pt alert and coughing up small bloody sputum, using oral suction, denies pain/nausea    1545: no change in status, respirations even and unlabored with frequent cough    1600: continues to have frequent coughing and given breathing treatment, denies pain/nausea    1605: updated  who is in the waiting room.    1615: respirations even and unlabored, states breathing improved with breathing treatment, denies pain/nausea, tolerating ice chips, speaking in complete sentences, maintaining O2 sat above 92 on RA.    1620: x-ray at bedside, pt meets criteria for stage II    1630: respirations even and unlabored, no change in status    1640: Report given to Carol DAVIS stage II    1642: transported tp stage II          
1642 Arrived to stage II via gurney with CNA assist. Pt dressed with CNA assist.    1642 Sedation/Respiratory status: Alert  Respiratory spontaneous and non-labored    1650 Family arrived to stage II.     1655 Discharge instructions given to patient and family, both state understanding and agreement with plan of care.     1700 Discharged to care of family post uneventful stay in stage II. Pt states good pain control. VSS. Transfer safely to vehicle via wheelchair with CNA assist.    
In person verbal COVID screening completed; negative.  
Patient allergies and NPO status verified, home medication reconciliation completed and belongings secured. Surgical site verified with patient. Patient verbalizes understanding of pain scale, expected course of stay and plan of care; patient and family state verbal understanding at this time. IV access established.   
General

## 2024-10-07 ENCOUNTER — OFFICE VISIT (OUTPATIENT)
Dept: MEDICAL GROUP | Facility: LAB | Age: 79
End: 2024-10-07
Payer: MEDICARE

## 2024-10-07 VITALS
TEMPERATURE: 97.1 F | SYSTOLIC BLOOD PRESSURE: 124 MMHG | HEIGHT: 67 IN | WEIGHT: 147 LBS | DIASTOLIC BLOOD PRESSURE: 76 MMHG | BODY MASS INDEX: 23.07 KG/M2 | HEART RATE: 60 BPM | OXYGEN SATURATION: 97 % | RESPIRATION RATE: 16 BRPM

## 2024-10-07 DIAGNOSIS — F32.A DEPRESSIVE DISORDER: ICD-10-CM

## 2024-10-07 DIAGNOSIS — F41.9 ANXIETY: ICD-10-CM

## 2024-10-07 PROCEDURE — 3078F DIAST BP <80 MM HG: CPT | Performed by: FAMILY MEDICINE

## 2024-10-07 PROCEDURE — 99214 OFFICE O/P EST MOD 30 MIN: CPT | Performed by: FAMILY MEDICINE

## 2024-10-07 PROCEDURE — 3074F SYST BP LT 130 MM HG: CPT | Performed by: FAMILY MEDICINE

## 2024-10-07 RX ORDER — FLUTICASONE PROPIONATE AND SALMETEROL 250; 50 UG/1; UG/1
1 POWDER RESPIRATORY (INHALATION) EVERY 12 HOURS
COMMUNITY
End: 2024-10-21

## 2024-10-07 RX ORDER — FLUOXETINE 10 MG/1
10 CAPSULE ORAL DAILY
Qty: 90 CAPSULE | Refills: 1 | Status: SHIPPED | OUTPATIENT
Start: 2024-10-07 | End: 2024-10-21 | Stop reason: SDUPTHER

## 2024-10-07 ASSESSMENT — ANXIETY QUESTIONNAIRES
4. TROUBLE RELAXING: MORE THAN HALF THE DAYS
7. FEELING AFRAID AS IF SOMETHING AWFUL MIGHT HAPPEN: NEARLY EVERY DAY
6. BECOMING EASILY ANNOYED OR IRRITABLE: NEARLY EVERY DAY
5. BEING SO RESTLESS THAT IT IS HARD TO SIT STILL: MORE THAN HALF THE DAYS
GAD7 TOTAL SCORE: 18
3. WORRYING TOO MUCH ABOUT DIFFERENT THINGS: NEARLY EVERY DAY
2. NOT BEING ABLE TO STOP OR CONTROL WORRYING: NEARLY EVERY DAY
IF YOU CHECKED OFF ANY PROBLEMS ON THIS QUESTIONNAIRE, HOW DIFFICULT HAVE THESE PROBLEMS MADE IT FOR YOU TO DO YOUR WORK, TAKE CARE OF THINGS AT HOME, OR GET ALONG WITH OTHER PEOPLE: SOMEWHAT DIFFICULT
1. FEELING NERVOUS, ANXIOUS, OR ON EDGE: MORE THAN HALF THE DAYS

## 2024-10-07 ASSESSMENT — PATIENT HEALTH QUESTIONNAIRE - PHQ9
5. POOR APPETITE OR OVEREATING: 2 - MORE THAN HALF THE DAYS
SUM OF ALL RESPONSES TO PHQ QUESTIONS 1-9: 20
CLINICAL INTERPRETATION OF PHQ2 SCORE: 3

## 2024-10-07 ASSESSMENT — FIBROSIS 4 INDEX: FIB4 SCORE: 0.86

## 2024-10-15 ENCOUNTER — HOSPITAL ENCOUNTER (OUTPATIENT)
Dept: RADIOLOGY | Facility: MEDICAL CENTER | Age: 79
End: 2024-10-15
Attending: NURSE PRACTITIONER
Payer: MEDICARE

## 2024-10-15 DIAGNOSIS — R05.9 COUGH, UNSPECIFIED TYPE: ICD-10-CM

## 2024-10-15 DIAGNOSIS — R13.14 PHARYNGOESOPHAGEAL DYSPHAGIA: ICD-10-CM

## 2024-10-15 DIAGNOSIS — J18.9 RECURRENT PNEUMONIA: ICD-10-CM

## 2024-10-15 PROCEDURE — 74230 X-RAY XM SWLNG FUNCJ C+: CPT

## 2024-10-15 PROCEDURE — 92611 MOTION FLUOROSCOPY/SWALLOW: CPT | Performed by: SPEECH-LANGUAGE PATHOLOGIST

## 2024-10-18 DIAGNOSIS — R05.9 COUGH, UNSPECIFIED TYPE: ICD-10-CM

## 2024-10-18 DIAGNOSIS — R93.3 ABNORMAL BARIUM SWALLOW: ICD-10-CM

## 2024-10-19 ENCOUNTER — PATIENT MESSAGE (OUTPATIENT)
Dept: MEDICAL GROUP | Facility: LAB | Age: 79
End: 2024-10-19
Payer: MEDICARE

## 2024-10-21 ENCOUNTER — OFFICE VISIT (OUTPATIENT)
Dept: MEDICAL GROUP | Facility: LAB | Age: 79
End: 2024-10-21
Payer: MEDICARE

## 2024-10-21 VITALS
OXYGEN SATURATION: 96 % | WEIGHT: 147.4 LBS | RESPIRATION RATE: 18 BRPM | HEIGHT: 67 IN | SYSTOLIC BLOOD PRESSURE: 128 MMHG | HEART RATE: 58 BPM | BODY MASS INDEX: 23.13 KG/M2 | DIASTOLIC BLOOD PRESSURE: 68 MMHG | TEMPERATURE: 96.5 F

## 2024-10-21 DIAGNOSIS — F41.9 ANXIETY: ICD-10-CM

## 2024-10-21 PROCEDURE — 3074F SYST BP LT 130 MM HG: CPT | Performed by: PHYSICIAN ASSISTANT

## 2024-10-21 PROCEDURE — 99213 OFFICE O/P EST LOW 20 MIN: CPT | Performed by: PHYSICIAN ASSISTANT

## 2024-10-21 PROCEDURE — 3078F DIAST BP <80 MM HG: CPT | Performed by: PHYSICIAN ASSISTANT

## 2024-10-21 RX ORDER — LORAZEPAM 0.5 MG/1
0.5 TABLET ORAL
Qty: 5 TABLET | Refills: 0 | Status: SHIPPED | OUTPATIENT
Start: 2024-10-21 | End: 2024-10-26

## 2024-10-21 RX ORDER — PROPRANOLOL HYDROCHLORIDE 10 MG/1
10 TABLET ORAL 3 TIMES DAILY PRN
Qty: 30 TABLET | Refills: 0 | Status: SHIPPED | OUTPATIENT
Start: 2024-10-21

## 2024-10-21 ASSESSMENT — ANXIETY QUESTIONNAIRES
4. TROUBLE RELAXING: NEARLY EVERY DAY
3. WORRYING TOO MUCH ABOUT DIFFERENT THINGS: NEARLY EVERY DAY
2. NOT BEING ABLE TO STOP OR CONTROL WORRYING: NEARLY EVERY DAY
5. BEING SO RESTLESS THAT IT IS HARD TO SIT STILL: NEARLY EVERY DAY
6. BECOMING EASILY ANNOYED OR IRRITABLE: NEARLY EVERY DAY
7. FEELING AFRAID AS IF SOMETHING AWFUL MIGHT HAPPEN: NEARLY EVERY DAY
GAD7 TOTAL SCORE: 21
1. FEELING NERVOUS, ANXIOUS, OR ON EDGE: NEARLY EVERY DAY
IF YOU CHECKED OFF ANY PROBLEMS ON THIS QUESTIONNAIRE, HOW DIFFICULT HAVE THESE PROBLEMS MADE IT FOR YOU TO DO YOUR WORK, TAKE CARE OF THINGS AT HOME, OR GET ALONG WITH OTHER PEOPLE: EXTREMELY DIFFICULT

## 2024-10-21 ASSESSMENT — PATIENT HEALTH QUESTIONNAIRE - PHQ9
SUM OF ALL RESPONSES TO PHQ QUESTIONS 1-9: 22
CLINICAL INTERPRETATION OF PHQ2 SCORE: 5
5. POOR APPETITE OR OVEREATING: 2 - MORE THAN HALF THE DAYS

## 2024-10-21 ASSESSMENT — FIBROSIS 4 INDEX: FIB4 SCORE: 0.86

## 2024-10-26 ENCOUNTER — HOSPITAL ENCOUNTER (OUTPATIENT)
Dept: LAB | Facility: MEDICAL CENTER | Age: 79
End: 2024-10-26
Attending: FAMILY MEDICINE
Payer: MEDICARE

## 2024-10-26 DIAGNOSIS — Z85.118 HISTORY OF LUNG CANCER: ICD-10-CM

## 2024-10-26 DIAGNOSIS — R73.09 ELEVATED GLUCOSE: ICD-10-CM

## 2024-10-26 DIAGNOSIS — E55.9 VITAMIN D DEFICIENCY: ICD-10-CM

## 2024-10-26 DIAGNOSIS — E03.9 ACQUIRED HYPOTHYROIDISM: ICD-10-CM

## 2024-10-26 DIAGNOSIS — E78.5 DYSLIPIDEMIA: ICD-10-CM

## 2024-10-26 LAB
25(OH)D3 SERPL-MCNC: 79 NG/ML (ref 30–100)
ALBUMIN SERPL BCP-MCNC: 4.4 G/DL (ref 3.2–4.9)
ALBUMIN/GLOB SERPL: 1.9 G/DL
ALP SERPL-CCNC: 70 U/L (ref 30–99)
ALT SERPL-CCNC: 24 U/L (ref 2–50)
ANION GAP SERPL CALC-SCNC: 10 MMOL/L (ref 7–16)
AST SERPL-CCNC: 24 U/L (ref 12–45)
BASOPHILS # BLD AUTO: 0.8 % (ref 0–1.8)
BASOPHILS # BLD: 0.06 K/UL (ref 0–0.12)
BILIRUB SERPL-MCNC: 0.8 MG/DL (ref 0.1–1.5)
BUN SERPL-MCNC: 11 MG/DL (ref 8–22)
CALCIUM ALBUM COR SERPL-MCNC: 9.1 MG/DL (ref 8.5–10.5)
CALCIUM SERPL-MCNC: 9.4 MG/DL (ref 8.5–10.5)
CHLORIDE SERPL-SCNC: 101 MMOL/L (ref 96–112)
CHOLEST SERPL-MCNC: 193 MG/DL (ref 100–199)
CO2 SERPL-SCNC: 25 MMOL/L (ref 20–33)
CREAT SERPL-MCNC: 0.7 MG/DL (ref 0.5–1.4)
EOSINOPHIL # BLD AUTO: 0.36 K/UL (ref 0–0.51)
EOSINOPHIL NFR BLD: 5 % (ref 0–6.9)
ERYTHROCYTE [DISTWIDTH] IN BLOOD BY AUTOMATED COUNT: 47 FL (ref 35.9–50)
EST. AVERAGE GLUCOSE BLD GHB EST-MCNC: 120 MG/DL
FASTING STATUS PATIENT QL REPORTED: NORMAL
GFR SERPLBLD CREATININE-BSD FMLA CKD-EPI: 89 ML/MIN/1.73 M 2
GLOBULIN SER CALC-MCNC: 2.3 G/DL (ref 1.9–3.5)
GLUCOSE SERPL-MCNC: 101 MG/DL (ref 65–99)
HBA1C MFR BLD: 5.8 % (ref 4–5.6)
HCT VFR BLD AUTO: 42 % (ref 37–47)
HDLC SERPL-MCNC: 87 MG/DL
HGB BLD-MCNC: 14 G/DL (ref 12–16)
IMM GRANULOCYTES # BLD AUTO: 0.02 K/UL (ref 0–0.11)
IMM GRANULOCYTES NFR BLD AUTO: 0.3 % (ref 0–0.9)
LDLC SERPL CALC-MCNC: 94 MG/DL
LYMPHOCYTES # BLD AUTO: 1.58 K/UL (ref 1–4.8)
LYMPHOCYTES NFR BLD: 21.9 % (ref 22–41)
MCH RBC QN AUTO: 31 PG (ref 27–33)
MCHC RBC AUTO-ENTMCNC: 33.3 G/DL (ref 32.2–35.5)
MCV RBC AUTO: 93.1 FL (ref 81.4–97.8)
MONOCYTES # BLD AUTO: 0.5 K/UL (ref 0–0.85)
MONOCYTES NFR BLD AUTO: 6.9 % (ref 0–13.4)
NEUTROPHILS # BLD AUTO: 4.69 K/UL (ref 1.82–7.42)
NEUTROPHILS NFR BLD: 65.1 % (ref 44–72)
NRBC # BLD AUTO: 0 K/UL
NRBC BLD-RTO: 0 /100 WBC (ref 0–0.2)
PLATELET # BLD AUTO: 298 K/UL (ref 164–446)
PMV BLD AUTO: 11.6 FL (ref 9–12.9)
POTASSIUM SERPL-SCNC: 4.5 MMOL/L (ref 3.6–5.5)
PROT SERPL-MCNC: 6.7 G/DL (ref 6–8.2)
RBC # BLD AUTO: 4.51 M/UL (ref 4.2–5.4)
SODIUM SERPL-SCNC: 136 MMOL/L (ref 135–145)
TRIGL SERPL-MCNC: 60 MG/DL (ref 0–149)
TSH SERPL DL<=0.005 MIU/L-ACNC: 2.62 UIU/ML (ref 0.38–5.33)
WBC # BLD AUTO: 7.2 K/UL (ref 4.8–10.8)

## 2024-10-26 PROCEDURE — 85025 COMPLETE CBC W/AUTO DIFF WBC: CPT

## 2024-10-26 PROCEDURE — 36415 COLL VENOUS BLD VENIPUNCTURE: CPT

## 2024-10-26 PROCEDURE — 80061 LIPID PANEL: CPT

## 2024-10-26 PROCEDURE — 84443 ASSAY THYROID STIM HORMONE: CPT

## 2024-10-26 PROCEDURE — 80053 COMPREHEN METABOLIC PANEL: CPT

## 2024-10-26 PROCEDURE — 82306 VITAMIN D 25 HYDROXY: CPT

## 2024-10-26 PROCEDURE — 83036 HEMOGLOBIN GLYCOSYLATED A1C: CPT | Mod: GA

## 2024-11-04 ENCOUNTER — PATIENT MESSAGE (OUTPATIENT)
Dept: MEDICAL GROUP | Facility: LAB | Age: 79
End: 2024-11-04
Payer: MEDICARE

## 2024-11-04 DIAGNOSIS — F41.9 ANXIETY: ICD-10-CM

## 2024-11-11 DIAGNOSIS — R05.9 COUGH, UNSPECIFIED TYPE: ICD-10-CM

## 2024-11-11 RX ORDER — PANTOPRAZOLE SODIUM 40 MG/1
40 TABLET, DELAYED RELEASE ORAL DAILY
Qty: 90 TABLET | Refills: 3 | Status: SHIPPED | OUTPATIENT
Start: 2024-11-11

## 2024-11-11 NOTE — TELEPHONE ENCOUNTER
Have we ever prescribed this med? Yes.  If yes, what date? 11/27/23    Last OV: 08/01/24 with Dr Martel    Next OV: No Pending appt. .    DX: cough    Medications:   Requested Prescriptions     Pending Prescriptions Disp Refills    pantoprazole (PROTONIX) 40 MG Tablet Delayed Response [Pharmacy Med Name: PANTOPRAZOLE 40MG TABLETS] 90 Tablet 3     Sig: TAKE 1 TABLET BY MOUTH EVERY DAY

## 2024-11-18 ENCOUNTER — OFFICE VISIT (OUTPATIENT)
Dept: MEDICAL GROUP | Facility: LAB | Age: 79
End: 2024-11-18
Payer: MEDICARE

## 2024-11-18 VITALS
HEIGHT: 67 IN | OXYGEN SATURATION: 98 % | RESPIRATION RATE: 16 BRPM | WEIGHT: 146 LBS | DIASTOLIC BLOOD PRESSURE: 72 MMHG | SYSTOLIC BLOOD PRESSURE: 114 MMHG | TEMPERATURE: 96.8 F | BODY MASS INDEX: 22.91 KG/M2 | HEART RATE: 62 BPM

## 2024-11-18 DIAGNOSIS — R06.02 SOB (SHORTNESS OF BREATH) ON EXERTION: ICD-10-CM

## 2024-11-18 DIAGNOSIS — F41.9 ANXIETY: ICD-10-CM

## 2024-11-18 PROCEDURE — 3078F DIAST BP <80 MM HG: CPT | Performed by: FAMILY MEDICINE

## 2024-11-18 PROCEDURE — 99214 OFFICE O/P EST MOD 30 MIN: CPT | Performed by: FAMILY MEDICINE

## 2024-11-18 PROCEDURE — 3074F SYST BP LT 130 MM HG: CPT | Performed by: FAMILY MEDICINE

## 2024-11-18 RX ORDER — LORAZEPAM 0.5 MG/1
0.5 TABLET ORAL
Qty: 10 TABLET | Refills: 0 | Status: SHIPPED | OUTPATIENT
Start: 2024-11-18 | End: 2024-11-28

## 2024-11-18 RX ORDER — VENLAFAXINE HYDROCHLORIDE 75 MG/1
75 CAPSULE, EXTENDED RELEASE ORAL DAILY
Qty: 30 CAPSULE | Refills: 3 | Status: SHIPPED | OUTPATIENT
Start: 2024-11-18

## 2024-11-18 ASSESSMENT — FIBROSIS 4 INDEX: FIB4 SCORE: 1.25

## 2024-11-18 NOTE — PROGRESS NOTES
Subjective:     Chief Complaint   Patient presents with    Depression    Shortness of Breath    Anxiety         HPI:   Alissa presents today for SOB. Started 2 weeks ago. Worse with activity in the home even. In the past only cold air acivity would flare this up, but now even in the house she has symptoms.   Coughing now and then. More phlegm of late as well. Using humidifiers at home in almost every room. Feeling better in hot humid shower as well.   Does feel ok with albuterol but not always. On trelegy as well.   Hoarse voice, she thinks related to trelegy.     She reports while in [place] she had no symptoms whatsoever. Agrees there may be an anxiety component to this. Wonders about going back to her Effexor from Fluoxetine.     Current Outpatient Medications Ordered in Epic   Medication Sig Dispense Refill    venlafaxine XR (EFFEXOR XR) 75 MG CAPSULE SR 24 HR Take 1 Capsule by mouth every day. 30 Capsule 3    LORazepam (ATIVAN) 0.5 MG Tab Take 1 Tablet by mouth 1 time a day as needed for Anxiety for up to 10 days. 10 Tablet 0    pantoprazole (PROTONIX) 40 MG Tablet Delayed Response TAKE 1 TABLET BY MOUTH EVERY DAY 90 Tablet 3    propranolol (INDERAL) 10 MG Tab Take 1 Tablet by mouth 3 times a day as needed (anxiety). 30 Tablet 0    fluticasone-umeclidinium-vilanterol (TRELEGY ELLIPTA) 100-62.5-25 mcg/act inhaler Inhale 1 Puff.      albuterol 108 (90 Base) MCG/ACT Aero Soln inhalation aerosol INHALE 2 PUFFS BY MOUTH EVERY 4 HOURS AS NEEDED SHORTNESS OF BREATH 8.5 g 6    azelastine (ASTELIN) 137 MCG/SPRAY nasal spray Administer 1 Spray into affected nostril(S) every day. 30 mL 6    guaiFENesin ER (MUCINEX) 600 MG TABLET SR 12 HR Take 600 mg by mouth every 12 hours.      albuterol (PROVENTIL) 2.5mg/3ml Nebu Soln solution for nebulization USE 3 ML VIA NEBULIZER EVERY 4 HOURS AS NEEDED FOR SHORTNESS OF BREATH 150 mL 6    acetaminophen (TYLENOL) 500 MG Tab Take 1,000 mg by mouth every 8 hours as needed for Mild Pain.   "    levothyroxine (SYNTHROID) 25 MCG Tab TAKE 1 TABLET BY MOUTH IN THE MORNING ON AN EMPTY STOMACH 90 Tablet 3    rosuvastatin (CRESTOR) 5 MG Tab TAKE 1 TABLET BY MOUTH EVERY EVENING 90 Tablet 3    Levomefolate Glucosamine (METHYLFOLATE PO) Take 1 Tablet by mouth every day.      Cyanocobalamin (B-12) 100 MCG Tab Take 1 Tablet by mouth every day.      B Complex Vitamins (B COMPLEX-B12 PO) Take 1 Tab by mouth every day.      cholecalciferol (DIALYVITE VITAMIN D 5000) 5000 UNIT Cap Take 5,000 Units by mouth every day.      Ascorbic Acid (VITAMIN C) 1000 MG Tab Take 1 Tab by mouth every day.       No current [facility]-ordered facility-administered medications on file.         ROS:  Gen: no fevers/chills, no changes in weight  Eyes: no changes in vision  ENT: no sore throat, no hearing loss, no bloody nose  CV: no chest pain, no palpitations  GI: no nausea/vomiting, no diarrhea  : no dysuria  MSk: no myalgias  Skin: no rash  Neuro: no headaches, no numbness/tingling  Heme/Lymph: no easy bruising      Objective:     Exam:  /72   Pulse 62   Temp 36 °C (96.8 °F)   Resp 16   Ht 1.702 m (5' 7\")   Wt 66.2 kg (146 lb)   SpO2 98%   BMI 22.87 kg/m²  Body mass index is 22.87 kg/m².    Gen: Alert and oriented, No apparent distress.  Neck: Neck is supple without lymphadenopathy.  Lungs: Normal effort, CTA bilaterally, no wheezes, rhonchi, or rales  CV: Regular rate and rhythm. No murmurs, rubs, or gallops.  Ext: No clubbing, cyanosis, edema.      Assessment & Plan:     78 y.o. female with the following -     1. Anxiety  venlafaxine XR (EFFEXOR XR) 75 MG CAPSULE SR 24 HR    LORazepam (ATIVAN) 0.5 MG Tab      2. SOB (shortness of breath) on exertion  venlafaxine XR (EFFEXOR XR) 75 MG CAPSULE SR 24 HR        Discussed anxiety overall. Will go back to effexor, switching completely from fluoxetine. Lorazepam still as needed.   Continue current inhalers. Low suspicion for asthma exacerbation or PE given clinical course, exam, " etc.   She will let me know how she is doing in 2 weeks. Visit schedule by myself.           No follow-ups on file.    Please note that this dictation was created using voice recognition software. I have made every reasonable attempt to correct obvious errors, but I expect that there are errors of grammar and possibly content that I did not discover before finalizing the note.

## 2024-11-22 ENCOUNTER — PATIENT MESSAGE (OUTPATIENT)
Dept: MEDICAL GROUP | Facility: LAB | Age: 79
End: 2024-11-22
Payer: MEDICARE

## 2024-11-22 DIAGNOSIS — F41.9 ANXIETY: ICD-10-CM

## 2024-11-22 RX ORDER — PROPRANOLOL HYDROCHLORIDE 10 MG/1
10 TABLET ORAL 3 TIMES DAILY PRN
Qty: 30 TABLET | Refills: 0 | Status: SHIPPED | OUTPATIENT
Start: 2024-11-22

## 2024-11-22 NOTE — PATIENT COMMUNICATION
Received request via: Pharmacy    Was the patient seen in the last year in this department? Yes    Does the patient have an active prescription (recently filled or refills available) for medication(s) requested? No    Pharmacy Name:   [id number] - [place], [place] - [address]  [address] [place] [address]  Phone: [phone] Fax: [phone]       Does the patient have MCFP Plus and need 100-day supply? (This applies to ALL medications) Patient does not have SCP

## 2024-11-26 ENCOUNTER — PATIENT MESSAGE (OUTPATIENT)
Dept: SLEEP MEDICINE | Facility: MEDICAL CENTER | Age: 79
End: 2024-11-26

## 2024-11-26 ENCOUNTER — PATIENT MESSAGE (OUTPATIENT)
Dept: MEDICAL GROUP | Facility: LAB | Age: 79
End: 2024-11-26

## 2024-11-26 ENCOUNTER — HOSPITAL ENCOUNTER (OUTPATIENT)
Dept: RADIOLOGY | Facility: MEDICAL CENTER | Age: 79
End: 2024-11-26
Attending: FAMILY MEDICINE
Payer: MEDICARE

## 2024-11-26 DIAGNOSIS — J45.40 MODERATE PERSISTENT ASTHMA WITHOUT COMPLICATION: ICD-10-CM

## 2024-11-26 DIAGNOSIS — M81.0 POSTMENOPAUSAL BONE LOSS: ICD-10-CM

## 2024-11-26 PROCEDURE — 77080 DXA BONE DENSITY AXIAL: CPT

## 2024-11-26 RX ORDER — ALENDRONATE SODIUM 70 MG/1
70 TABLET ORAL
Qty: 12 TABLET | Refills: 1 | Status: SHIPPED | OUTPATIENT
Start: 2024-11-26

## 2024-11-26 RX ORDER — FLUTICASONE FUROATE, UMECLIDINIUM BROMIDE AND VILANTEROL TRIFENATATE 100; 62.5; 25 UG/1; UG/1; UG/1
1 POWDER RESPIRATORY (INHALATION) DAILY
Qty: 90 EACH | Refills: 3 | Status: SHIPPED | OUTPATIENT
Start: 2024-11-26

## 2024-12-02 ENCOUNTER — APPOINTMENT (OUTPATIENT)
Dept: MEDICAL GROUP | Facility: LAB | Age: 79
End: 2024-12-02
Payer: MEDICARE

## 2024-12-02 VITALS
HEIGHT: 67 IN | SYSTOLIC BLOOD PRESSURE: 126 MMHG | RESPIRATION RATE: 16 BRPM | HEART RATE: 72 BPM | OXYGEN SATURATION: 95 % | WEIGHT: 144 LBS | DIASTOLIC BLOOD PRESSURE: 70 MMHG | TEMPERATURE: 97.2 F | BODY MASS INDEX: 22.6 KG/M2

## 2024-12-02 DIAGNOSIS — F41.9 ANXIETY: ICD-10-CM

## 2024-12-02 DIAGNOSIS — M81.0 OSTEOPOROSIS OF FOREARM: ICD-10-CM

## 2024-12-02 PROCEDURE — 99213 OFFICE O/P EST LOW 20 MIN: CPT | Performed by: FAMILY MEDICINE

## 2024-12-02 PROCEDURE — 3078F DIAST BP <80 MM HG: CPT | Performed by: FAMILY MEDICINE

## 2024-12-02 PROCEDURE — 3074F SYST BP LT 130 MM HG: CPT | Performed by: FAMILY MEDICINE

## 2024-12-02 RX ORDER — PROPRANOLOL HYDROCHLORIDE 10 MG/1
10 TABLET ORAL 3 TIMES DAILY PRN
Qty: 30 TABLET | Refills: 0 | Status: SHIPPED | OUTPATIENT
Start: 2024-12-02

## 2024-12-02 ASSESSMENT — FIBROSIS 4 INDEX: FIB4 SCORE: 1.25

## 2024-12-02 NOTE — PROGRESS NOTES
Subjective:     Chief Complaint   Patient presents with    Anxiety     FV          HPI:   Alissa presents today for anxiety follow-up.  She had taken herself off venlafaxine after being told this was a bad drug and she needed to be off of it.  Since then she has had nothing but problems with increasing anxiety and difficulty with panic attacks.  She has been using propranolol for acute symptoms which works very well for her.  We also got her back on venlafaxine and she will she is feeling better in general.  She is also started going to the gym 2-3 times per week and working with a  to workout.  She has some questions about her bone density as well.  Recently found to have osteoporosis in the forearms and osteopenia at the hip with normal lumbar density.    Postmenopause.  Never treated with hormone supplementation.      Current Outpatient Medications Ordered in Epic   Medication Sig Dispense Refill    propranolol (INDERAL) 10 MG Tab TAKE 1 TABLET BY MOUTH THREE TIMES DAILY AS NEEDED FOR ANXIETY 30 Tablet 0    fluticasone-umeclidinium-vilanterol (TRELEGY ELLIPTA) 100-62.5-25 mcg/act inhaler Inhale 1 Puff every day. 90 Each 3    alendronate (FOSAMAX) 70 MG Tab Take 1 Tablet by mouth every 7 days. 12 Tablet 1    venlafaxine XR (EFFEXOR XR) 75 MG CAPSULE SR 24 HR Take 1 Capsule by mouth every day. 30 Capsule 3    pantoprazole (PROTONIX) 40 MG Tablet Delayed Response TAKE 1 TABLET BY MOUTH EVERY DAY 90 Tablet 3    albuterol 108 (90 Base) MCG/ACT Aero Soln inhalation aerosol INHALE 2 PUFFS BY MOUTH EVERY 4 HOURS AS NEEDED SHORTNESS OF BREATH 8.5 g 6    azelastine (ASTELIN) 137 MCG/SPRAY nasal spray Administer 1 Spray into affected nostril(S) every day. 30 mL 6    guaiFENesin ER (MUCINEX) 600 MG TABLET SR 12 HR Take 600 mg by mouth every 12 hours.      albuterol (PROVENTIL) 2.5mg/3ml Nebu Soln solution for nebulization USE 3 ML VIA NEBULIZER EVERY 4 HOURS AS NEEDED FOR SHORTNESS OF BREATH 150 mL 6    acetaminophen  "(TYLENOL) 500 MG Tab Take 1,000 mg by mouth every 8 hours as needed for Mild Pain.      levothyroxine (SYNTHROID) 25 MCG Tab TAKE 1 TABLET BY MOUTH IN THE MORNING ON AN EMPTY STOMACH 90 Tablet 3    rosuvastatin (CRESTOR) 5 MG Tab TAKE 1 TABLET BY MOUTH EVERY EVENING 90 Tablet 3    Levomefolate Glucosamine (METHYLFOLATE PO) Take 1 Tablet by mouth every day.      Cyanocobalamin (B-12) 100 MCG Tab Take 1 Tablet by mouth every day.      B Complex Vitamins (B COMPLEX-B12 PO) Take 1 Tab by mouth every day.      cholecalciferol (DIALYVITE VITAMIN D 5000) 5000 UNIT Cap Take 5,000 Units by mouth every day.      Ascorbic Acid (VITAMIN C) 1000 MG Tab Take 1 Tab by mouth every day.       No current [facility]-ordered facility-administered medications on file.         ROS:  Gen: no fevers/chills, no changes in weight  Eyes: no changes in vision  ENT: no sore throat, no hearing loss, no bloody nose  Pulm: no sob, no cough  CV: no chest pain, no palpitations  GI: no nausea/vomiting, no diarrhea  : no dysuria  MSk: no myalgias  Skin: no rash  Neuro: no headaches, no numbness/tingling  Heme/Lymph: no easy bruising      Objective:     Exam:  /70   Pulse 72   Temp 36.2 °C (97.2 °F)   Resp 16   Ht 1.702 m (5' 7\")   Wt 65.3 kg (144 lb)   SpO2 95%   BMI 22.55 kg/m²  Body mass index is 22.55 kg/m².    Gen: AAOx3, NAD, well appearing  HEENT: NCAT, EOMI, Nares patent, Mucosa moist  Resp: Normal chest wall rise and fall, not SOB, no tachypnea  Skin: no rash or abnormality of visible skin.   Psych: normal speech, not slurred, good insight, affect full  MSK: Moves all four limbs equally and normally, gait normal        Assessment & Plan:     78 y.o. female with the following -     1. Anxiety  Discussed taking her venlafaxine and use the propranolol as she is for right now.  She will continue these and let me know how things are going.  She is feeling much better than she did previously.    2. Osteoporosis of forearm  Discussed tarting " on her Fosamax which she did .  We did discuss different levels of osteoporosis and different medications with regard to antiresorptive's and/or bone building agents.  Discussed weightbearing exercise and making sure that she is getting enough protein in general.  She is going to try to aim for between 70 to 80 g of protein daily and then continue working with her .  We did discuss limiting her forearms and her upper extremities as well with strength training and how helpful strength training can be for fall prevention as well.    .          No follow-ups on file.    Please note that this dictation was created using voice recognition software. I have made every reasonable attempt to correct obvious errors, but I expect that there are errors of grammar and possibly content that I did not discover before finalizing the note.

## 2024-12-09 ENCOUNTER — HOSPITAL ENCOUNTER (OUTPATIENT)
Dept: HEMATOLOGY ONCOLOGY | Facility: MEDICAL CENTER | Age: 79
End: 2024-12-09
Attending: NURSE PRACTITIONER
Payer: MEDICARE

## 2024-12-09 VITALS
SYSTOLIC BLOOD PRESSURE: 146 MMHG | TEMPERATURE: 97.4 F | HEIGHT: 67 IN | DIASTOLIC BLOOD PRESSURE: 78 MMHG | OXYGEN SATURATION: 94 % | RESPIRATION RATE: 16 BRPM | BODY MASS INDEX: 22.91 KG/M2 | WEIGHT: 145.94 LBS | HEART RATE: 64 BPM

## 2024-12-09 DIAGNOSIS — C34.91 PRIMARY LUNG CANCER, RIGHT (HCC): ICD-10-CM

## 2024-12-09 DIAGNOSIS — Z01.812 PRE-PROCEDURE LAB EXAM: ICD-10-CM

## 2024-12-09 PROCEDURE — 99212 OFFICE O/P EST SF 10 MIN: CPT | Performed by: NURSE PRACTITIONER

## 2024-12-09 PROCEDURE — 99214 OFFICE O/P EST MOD 30 MIN: CPT | Performed by: NURSE PRACTITIONER

## 2024-12-09 ASSESSMENT — ENCOUNTER SYMPTOMS
DIZZINESS: 0
CHILLS: 0
HEADACHES: 0
VOMITING: 0
DIARRHEA: 0
WEIGHT LOSS: 0
SHORTNESS OF BREATH: 1
NAUSEA: 0
FEVER: 0
CONSTIPATION: 0
MYALGIAS: 0
COUGH: 0
PALPITATIONS: 0

## 2024-12-09 ASSESSMENT — FIBROSIS 4 INDEX: FIB4 SCORE: 1.27

## 2024-12-09 ASSESSMENT — PAIN SCALES - GENERAL: PAINLEVEL_OUTOF10: NO PAIN

## 2024-12-09 NOTE — PROGRESS NOTES
Subjective     Leigh Bonilla is a 79 y.o. female who presents with Lung Cancer (Compa/ 6 [facility])          HPI    Patient seen today for evaluation for 6-month follow-up for continued surveillance for adenocarcinoma of the lung, stage Ib (cT2a, cN0, cM0).    Oncology history of presenting illness:  Patient originally seen on 8/22/2022 after abnormal CT noted a right upper lobe lung nodule. First lung cancer screening CT was completed on 11/8/2019.  That CT at that time showed a 2.2 cm groundglass nodule in the right upper lobe with 3 solid nodules in the middle lobe measuring up to 5 mm in size.  There was also an incidental left adrenal adenoma.  Based on these findings it was recommended she undergo a repeat CT scan in approximately 6 months which she did complete on 6/8/2020.  That CT showed part solid groundglass opacity in the right upper lobe measuring 11.2 x 17.3 mm which appeared to be unchanged.  3 nodules in the right middle lobe also noted again which are unchanged.  11.8 mm left adrenal gland nodule which is unchanged.  Follow-up CT completed on 7/19/2022 noted interval enlargement of the partially groundglass and partially solid nodule in the right lung apex measuring 1.7 x 3.2 x 3.1 cm in size.  This previously measured 0.8 x 1.6 x 2.1 cm.  According to the reading radiologist this is concerning for slow-growing low-grade malignancy.  3 nodules in the right middle lobe continue to be unchanged.  Further recommendations given for PET/CT which was completed by PCP.  PET scan completed on 8/10/2022 showed abnormal uptake within the enlarging groundglass and part solid mass in the right lung apex highly suspicious for slow-growing malignancy such as an adenocarcinoma.  This had an SUV of 4.96.  There was some uptake within 2 right paratracheal nodes suspicious for metastatic disease however this may be nonspecific based on size as they measured 7 and 9 mm in size.  Reading radiologist did note some  uptake within the left adrenal gland but is most consistent with a benign adenoma.  No evidence of metastatic disease noted in the neck, abdomen or pelvis.  Based on these findings it was recommended patient undergo bronchoscopy for tissue diagnosis and staging.  Requesting lymph node and lung nodule be sampled for tissue diagnosis.     Biopsy completed on 9/2/2022 did show that the right upper lobe lung nodule came back positive for adenocarcinoma, TTF-1 and Napsin A positive, but unfortunately they were unable to sample lymph noted during the procedure.  Therefore patient was sent to surgery for further evaluation of the mediastinal nodes.    Patient did undergo surgical resection on 9/26/2022.  The mass was completely removed which was 3.6 cm in size, and all lymph nodes (22) were evaluated were found to be negative.  4 patient entered into surveillance with scans every 6 months for the first 3 years followed by annual CT scans.    Patient also found to have an adrenal adenoma measuring 11 mm.  According to Dr. Chatman initial consultation since the adenoma is less than 4 cm it is appropriate to watch it every 6-12 months.    Treatment history:  09/26/22: Lobectomy -Dr. Ganser    Interval history:  Patient overall is doing well.  She does state that it is difficult for her to breathe at times which she blames due to the altitude and dry air here in [place].  She is very active and does ride a horse as well as works out frequently.  She did have some issues with swallowing.  She did have a swallow evaluation was found that she had narrowing of her esophagus and has been referred to GI.  Patient also mentioned that she was on venlafaxine for quite some time and she was concerned about this medication so she weaned herself off.  After weaning herself completely off the medication she stated that she experienced a severe anxiety attack and saw her PCP and is now back on this medication with good tolerance.  She does note  "some constipation from time to time but takes Metamucil as needed with positive results.        Allergies   Allergen Reactions    Other Environmental      Russian rhoda and other pollens.    Sulfa Drugs      \"crazy\"    Sulfites      Asthma        Current Outpatient Medications on File Prior to Encounter   Medication Sig Dispense Refill    propranolol (INDERAL) 10 MG Tab TAKE 1 TABLET BY MOUTH THREE TIMES DAILY AS NEEDED FOR ANXIETY 30 Tablet 0    fluticasone-umeclidinium-vilanterol (TRELEGY ELLIPTA) 100-62.5-25 mcg/act inhaler Inhale 1 Puff every day. 90 Each 3    alendronate (FOSAMAX) 70 MG Tab Take 1 Tablet by mouth every 7 days. 12 Tablet 1    venlafaxine XR (EFFEXOR XR) 75 MG CAPSULE SR 24 HR Take 1 Capsule by mouth every day. 30 Capsule 3    pantoprazole (PROTONIX) 40 MG Tablet Delayed Response TAKE 1 TABLET BY MOUTH EVERY DAY 90 Tablet 3    albuterol 108 (90 Base) MCG/ACT Aero Soln inhalation aerosol INHALE 2 PUFFS BY MOUTH EVERY 4 HOURS AS NEEDED SHORTNESS OF BREATH 8.5 g 6    azelastine (ASTELIN) 137 MCG/SPRAY nasal spray Administer 1 Spray into affected nostril(S) every day. 30 mL 6    guaiFENesin ER (MUCINEX) 600 MG TABLET SR 12 HR Take 600 mg by mouth every 12 hours.      albuterol (PROVENTIL) 2.5mg/3ml Nebu Soln solution for nebulization USE 3 ML VIA NEBULIZER EVERY 4 HOURS AS NEEDED FOR SHORTNESS OF BREATH 150 mL 6    acetaminophen (TYLENOL) 500 MG Tab Take 1,000 mg by mouth every 8 hours as needed for Mild Pain.      levothyroxine (SYNTHROID) 25 MCG Tab TAKE 1 TABLET BY MOUTH IN THE MORNING ON AN EMPTY STOMACH 90 Tablet 3    rosuvastatin (CRESTOR) 5 MG Tab TAKE 1 TABLET BY MOUTH EVERY EVENING 90 Tablet 3    Levomefolate Glucosamine (METHYLFOLATE PO) Take 1 Tablet by mouth every day.      Cyanocobalamin (B-12) 100 MCG Tab Take 1 Tablet by mouth every day.      B Complex Vitamins (B COMPLEX-B12 PO) Take 1 Tab by mouth every day.      cholecalciferol (DIALYVITE VITAMIN D 5000) 5000 UNIT Cap Take 5,000 " "Units by mouth every day.      Ascorbic Acid (VITAMIN C) 1000 MG Tab Take 1 Tab by mouth every day.       No current facility-administered medications on file prior to encounter.           Review of Systems   Constitutional:  Negative for chills, fever, malaise/fatigue and weight loss.   Respiratory:  Positive for shortness of breath. Negative for cough.    Cardiovascular:  Negative for chest pain and palpitations.   Gastrointestinal:  Negative for constipation, diarrhea, nausea and vomiting.   Genitourinary:  Negative for dysuria.   Musculoskeletal:  Negative for myalgias.   Neurological:  Negative for dizziness and headaches.              Objective     BP (!) 146/78 (BP Location: Right arm, Patient Position: Sitting, BP Cuff Size: Adult)   Pulse 64   Temp 36.3 °C (97.4 °F) (Temporal)   Resp 16   Ht 1.702 m (5' 7.01\")   Wt 66.2 kg (145 lb 15.1 oz)   SpO2 94%   BMI 22.85 kg/m²      Physical Exam  Vitals reviewed.   Constitutional:       General: She is not in acute distress.     Appearance: Normal appearance. She is not diaphoretic.   HENT:      Head: Normocephalic and atraumatic.   Cardiovascular:      Rate and Rhythm: Normal rate and regular rhythm.      Heart sounds: Normal heart sounds. No murmur heard.     No friction rub. No gallop.   Pulmonary:      Effort: Pulmonary effort is normal. No respiratory distress.      Breath sounds: Normal breath sounds. No wheezing.   Abdominal:      General: Bowel sounds are normal. There is no distension.      Palpations: Abdomen is soft.      Tenderness: There is no abdominal tenderness.   Musculoskeletal:         General: No swelling or tenderness. Normal range of motion.   Skin:     General: Skin is warm and dry.   Neurological:      Mental Status: She is alert and oriented to person, place, and time.   Psychiatric:         Mood and Affect: Mood normal.         Behavior: Behavior normal.                        Assessment & Plan     1. Primary lung cancer, right (HCC) "  CT-CHEST (THORAX) WITH    CBC WITH DIFFERENTIAL    Comp Metabolic Panel      2. Pre-procedure lab exam  CBC WITH DIFFERENTIAL    Comp Metabolic Panel              1.  Patient with lung adenocarcinoma currently on surveillance.  Last CT was on 6/7/2024 which showed stable additional tiny lung nodules and lung scarring but no evidence of new lesions or residual tumor.  Discussed with patient that she is due for her repeat CT scan at this time.  I went ahead and ordered this and scheduled.  Recommendation for patient to complete CT chest with contrast every 6 months for up to the first 3 years.  I will contact patient with the results.    2.  Adrenal adenoma -initially measured 11 mm, then measured 10 mm, with the last scan showing 14 mm in June 2024.  Since this is less than 4 cm in size it is appropriate to continue to monitor and watch every 6-12 months.  Should the adrenal adenoma grow larger than 1 cm over the course of 1 year then would refer to surgery.  Will also look to the updated CT chest recently ordered for continued management and monitoring of this as well.    3.  Patient to follow-up in the clinic in months, or sooner if needed.      Please note that this dictation was created using voice recognition software. I have made every reasonable attempt to correct obvious errors, but I expect that there are errors of grammar and possibly content that I did not discover before finalizing the note.

## 2024-12-11 ENCOUNTER — HOSPITAL ENCOUNTER (OUTPATIENT)
Dept: LAB | Facility: MEDICAL CENTER | Age: 79
End: 2024-12-11
Attending: NURSE PRACTITIONER
Payer: MEDICARE

## 2024-12-11 DIAGNOSIS — Z01.812 PRE-PROCEDURE LAB EXAM: ICD-10-CM

## 2024-12-11 DIAGNOSIS — C34.91 PRIMARY LUNG CANCER, RIGHT (HCC): ICD-10-CM

## 2024-12-11 LAB
ALBUMIN SERPL BCP-MCNC: 4.3 G/DL (ref 3.2–4.9)
ALBUMIN/GLOB SERPL: 1.8 G/DL
ALP SERPL-CCNC: 74 U/L (ref 30–99)
ALT SERPL-CCNC: 40 U/L (ref 2–50)
ANION GAP SERPL CALC-SCNC: 9 MMOL/L (ref 7–16)
AST SERPL-CCNC: 39 U/L (ref 12–45)
BASOPHILS # BLD AUTO: 0.9 % (ref 0–1.8)
BASOPHILS # BLD: 0.06 K/UL (ref 0–0.12)
BILIRUB SERPL-MCNC: 0.7 MG/DL (ref 0.1–1.5)
BUN SERPL-MCNC: 13 MG/DL (ref 8–22)
CALCIUM ALBUM COR SERPL-MCNC: 9.6 MG/DL (ref 8.5–10.5)
CALCIUM SERPL-MCNC: 9.8 MG/DL (ref 8.5–10.5)
CHLORIDE SERPL-SCNC: 101 MMOL/L (ref 96–112)
CO2 SERPL-SCNC: 26 MMOL/L (ref 20–33)
CREAT SERPL-MCNC: 0.79 MG/DL (ref 0.5–1.4)
EOSINOPHIL # BLD AUTO: 0.34 K/UL (ref 0–0.51)
EOSINOPHIL NFR BLD: 4.9 % (ref 0–6.9)
ERYTHROCYTE [DISTWIDTH] IN BLOOD BY AUTOMATED COUNT: 48.4 FL (ref 35.9–50)
GFR SERPLBLD CREATININE-BSD FMLA CKD-EPI: 77 ML/MIN/1.73 M 2
GLOBULIN SER CALC-MCNC: 2.4 G/DL (ref 1.9–3.5)
GLUCOSE SERPL-MCNC: 82 MG/DL (ref 65–99)
HCT VFR BLD AUTO: 39.7 % (ref 37–47)
HGB BLD-MCNC: 13.3 G/DL (ref 12–16)
IMM GRANULOCYTES # BLD AUTO: 0.01 K/UL (ref 0–0.11)
IMM GRANULOCYTES NFR BLD AUTO: 0.1 % (ref 0–0.9)
LYMPHOCYTES # BLD AUTO: 1.72 K/UL (ref 1–4.8)
LYMPHOCYTES NFR BLD: 24.7 % (ref 22–41)
MCH RBC QN AUTO: 31.5 PG (ref 27–33)
MCHC RBC AUTO-ENTMCNC: 33.5 G/DL (ref 32.2–35.5)
MCV RBC AUTO: 94.1 FL (ref 81.4–97.8)
MONOCYTES # BLD AUTO: 0.48 K/UL (ref 0–0.85)
MONOCYTES NFR BLD AUTO: 6.9 % (ref 0–13.4)
NEUTROPHILS # BLD AUTO: 4.36 K/UL (ref 1.82–7.42)
NEUTROPHILS NFR BLD: 62.5 % (ref 44–72)
NRBC # BLD AUTO: 0 K/UL
NRBC BLD-RTO: 0 /100 WBC (ref 0–0.2)
PLATELET # BLD AUTO: 308 K/UL (ref 164–446)
PMV BLD AUTO: 10.8 FL (ref 9–12.9)
POTASSIUM SERPL-SCNC: 5.1 MMOL/L (ref 3.6–5.5)
PROT SERPL-MCNC: 6.7 G/DL (ref 6–8.2)
RBC # BLD AUTO: 4.22 M/UL (ref 4.2–5.4)
SODIUM SERPL-SCNC: 136 MMOL/L (ref 135–145)
WBC # BLD AUTO: 7 K/UL (ref 4.8–10.8)

## 2024-12-11 PROCEDURE — 85025 COMPLETE CBC W/AUTO DIFF WBC: CPT

## 2024-12-11 PROCEDURE — 80053 COMPREHEN METABOLIC PANEL: CPT

## 2024-12-11 PROCEDURE — 36415 COLL VENOUS BLD VENIPUNCTURE: CPT

## 2024-12-14 ENCOUNTER — APPOINTMENT (OUTPATIENT)
Dept: RADIOLOGY | Facility: MEDICAL CENTER | Age: 79
End: 2024-12-14
Attending: NURSE PRACTITIONER
Payer: MEDICARE

## 2024-12-21 ENCOUNTER — HOSPITAL ENCOUNTER (OUTPATIENT)
Dept: RADIOLOGY | Facility: MEDICAL CENTER | Age: 79
End: 2024-12-21
Attending: NURSE PRACTITIONER
Payer: MEDICARE

## 2024-12-21 DIAGNOSIS — C34.91 PRIMARY LUNG CANCER, RIGHT (HCC): ICD-10-CM

## 2024-12-21 PROCEDURE — 71260 CT THORAX DX C+: CPT

## 2024-12-21 PROCEDURE — 700117 HCHG RX CONTRAST REV CODE 255: Performed by: NURSE PRACTITIONER

## 2024-12-21 RX ADMIN — IOHEXOL 75 ML: 350 INJECTION, SOLUTION INTRAVENOUS at 13:35

## 2024-12-24 ENCOUNTER — TELEPHONE (OUTPATIENT)
Dept: HEMATOLOGY ONCOLOGY | Facility: MEDICAL CENTER | Age: 79
End: 2024-12-24
Payer: MEDICARE

## 2024-12-24 DIAGNOSIS — C34.91 PRIMARY LUNG CANCER, RIGHT (HCC): ICD-10-CM

## 2024-12-24 DIAGNOSIS — J98.11 COLLAPSE OF RIGHT LUNG: ICD-10-CM

## 2024-12-24 NOTE — TELEPHONE ENCOUNTER
Patient with a history of known lung cancer, adenocarcinoma the lung, stage Ib status post right upper lobe lobectomy in September 2022 with Dr. Ganser.  She has been followed closely in our clinic with serial CT scans for follow-up and her most recent 6-month follow-up scan completed on 12/23/2024 shows a new right upper lobe completely collapsed.  Radiology stated that there was no central mass evident.  Hilar surgical clips were noted.  Stable right paratracheal normal-sized lymph node.  They did mention that the collapse obscures at least 2 small right upper lobe lung nodules, and other tiny nodular densities are stable.  She also continues to have the stable left adrenal nodule.      Spoke to the patient on the phone with regards to the results.  She stated that she feels great.  She stated that she feels better than she has in a very long time and has no respiratory concerns.  I did discuss with patient that with this new finding I do want her to be seen by pulmonary team to discuss possible procedures such as bronchoscopy if warranted.  Patient stated that she is in agreement with the plan and I will go ahead and place that referral.      CT-CHEST (THORAX) WITH    Result Date: 12/23/2024 12/21/2024 1:10 PM HISTORY/REASON FOR EXAM:  6-month follow-up imaging for adenocarcinoma lung status post lobectomy; 6-month follow-up imaging for adenocarcinoma lung status post lobectomy. COMPARISON:  None. TECHNIQUE/EXAM DESCRIPTION: CT scan of the chest with contrast. Thin-section helical images were obtained from the lung apices through the adrenal glands following the bolus administration of contrast. 75 mL of Omnipaque 350 nonionic contrast was utilized. Low dose optimization technique was utilized for this CT exam including automated exposure control and adjustment of the mA and/or kV according to patient size. FINDINGS: Lungs: There is new collapse of the right upper lobe or residual right upper lobe. There is  collapse was not present on 6/7/2024. This obscures the right upper lung subpleural nodule anteriorly seen previously. Other scattered coarse lung markings elsewhere are stable. A few small right lung base nodular densities are stable. Continued follow-up recommended. Right hilar surgical clips again noted. Stable normal-sized right paratracheal lymph node. No axillary, supraclavicular or internal mammary or retrocrural adenopathy. The aorta and central pulmonary arteries enhance normally by routine imaging. No visible coronary artery calcification. No pericardial effusion. This reconstructions. No suspicious chest wall mass. The upper abdomen shows a stable 1.4 cm left adrenal nodule. The bones show no suspicious lesion.     1. Since 6/7/2024 CT there is new right upper lobe completely collapse. No central mass evident. Hilar surgical clips again suggested. Stable right paratracheal normal-sized lymph node. 2. The right upper lobe collapse obscures at least 2 small right upper lung nodules. Other tiny nodular densities are stable. Continued follow-up recommended. 3. Stable 1.4 cm left adrenal nodule.       1. Primary lung cancer, right (HCC)  Referral to Pulmonary and Sleep Medicine      2. Collapse of right lung  Referral to Pulmonary and Sleep Medicine

## 2025-01-02 ENCOUNTER — OFFICE VISIT (OUTPATIENT)
Dept: SLEEP MEDICINE | Facility: MEDICAL CENTER | Age: 80
End: 2025-01-02
Attending: INTERNAL MEDICINE
Payer: MEDICARE

## 2025-01-02 VITALS
HEART RATE: 71 BPM | WEIGHT: 143 LBS | SYSTOLIC BLOOD PRESSURE: 106 MMHG | BODY MASS INDEX: 22.44 KG/M2 | HEIGHT: 67 IN | OXYGEN SATURATION: 96 % | DIASTOLIC BLOOD PRESSURE: 56 MMHG

## 2025-01-02 DIAGNOSIS — J45.40 MODERATE PERSISTENT ASTHMA WITHOUT COMPLICATION: ICD-10-CM

## 2025-01-02 DIAGNOSIS — R93.3 ABNORMAL BARIUM SWALLOW: ICD-10-CM

## 2025-01-02 DIAGNOSIS — C80.1 ADENOCARCINOMA (HCC): ICD-10-CM

## 2025-01-02 DIAGNOSIS — J98.11 COLLAPSE OF LUNG TISSUE: ICD-10-CM

## 2025-01-02 DIAGNOSIS — Z87.891 FORMER SMOKER: ICD-10-CM

## 2025-01-02 PROCEDURE — 99214 OFFICE O/P EST MOD 30 MIN: CPT | Performed by: INTERNAL MEDICINE

## 2025-01-02 PROCEDURE — 99213 OFFICE O/P EST LOW 20 MIN: CPT | Performed by: INTERNAL MEDICINE

## 2025-01-02 ASSESSMENT — ENCOUNTER SYMPTOMS
SHORTNESS OF BREATH: 0
PALPITATIONS: 0
HEARTBURN: 0
SPUTUM PRODUCTION: 0
CHILLS: 0
HEADACHES: 0
WHEEZING: 0
ABDOMINAL PAIN: 0
EYE REDNESS: 0
DIARRHEA: 0
BLURRED VISION: 0
VOMITING: 0
FEVER: 0
DIAPHORESIS: 0
WEAKNESS: 0
WEIGHT LOSS: 0
MYALGIAS: 0
PHOTOPHOBIA: 0
FALLS: 0
SORE THROAT: 0
NAUSEA: 0
EYE DISCHARGE: 0
EYE PAIN: 0
FOCAL WEAKNESS: 0
ORTHOPNEA: 0
BACK PAIN: 0
STRIDOR: 0
PND: 0
SPEECH CHANGE: 0
CLAUDICATION: 0
DEPRESSION: 0
HEMOPTYSIS: 0
TREMORS: 0
CONSTIPATION: 0
DOUBLE VISION: 0
DIZZINESS: 0
SINUS PAIN: 0
NECK PAIN: 0
COUGH: 0

## 2025-01-02 ASSESSMENT — FIBROSIS 4 INDEX: FIB4 SCORE: 1.541610359332084924

## 2025-01-03 NOTE — PROGRESS NOTES
Chief Complaint   Patient presents with    Follow-Up     LAST SEEN 8/1/24    Results     CT CHEST 12/21/24  LABS 8/7/24         HPI: This patient is a 79 y.o. female whom is followed in our clinic for hx of RUL adenoCa s/p resection and obstructive lung disease last seen by me on 8/1/24.  PMHx includes asthma for which she was previously on only prn GILMER and PND with associated chronic productive cough. She is a former smoker with 34 pk year hx and quit in roughly 2005. Pt lung cancer screening CT in 11/2019 showing RUL 2.2 cm GGO. Repeat CT in 6/2020 showed stability however no surveillance imaging was performed in 2021 and CT 7/2022 showed increase in RUL GGO to up to 3 cm in diameter now with solid component. A PET form 8/10 showed low level FDG uptake with SUV of 4.86 and mild uptake in 9 mm paratracheal LN as well as smaller 7 mm paratracheal node. She had bronchscopy with transbronchial bx demonstrating adenoCa however there was unsuccessful attempt to biopsy LN with EBUS and she underwent thoracoscopic right upper lobectomy with mediastinoscopy by Dr Ganser which revealed no e/o metastatic disease on 9/26/22 and I saw her shortly thereafter on 10/5/22. She is also followed by oncology for surveillance imaging. She has GERD which we have been concerned was causing retrograde reflux with associated aspiration in the past. She actually had bronchoscopy in December 2022 for partial collapse of the RML during which BAL fluid was benign. No obstructing lesion. 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 For about a year prior to her last visit with me over the summer she was having recurrent respiratory symptoms and reported ongoing cough productive of mucus from April until treatment with azithromycin by Dr. Chatman in oncology in June. A barium swallow suggested LES stricture and she is schedule to have dilation with GI but has been well from a respiratory standpoint since  . No f/c. No cough. She is exercising with a  3x weeks and rides horses. CT chest for surveillance from 26 now shows complete collapse of the RML filling the space of the RUL. No clear obstructing lesion.     Past Medical History:   Diagnosis Date    Anesthesia     PONV    Asthma     Bronchitis     last time approx .    Cancer (HCC) 10/2022    Lung-had lobectomy. No chemo or radiation.    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 No chemo or radiation.    Cough     Chronic since pneumonia 2022    Depression     Disorder of thyroid     GERD (gastroesophageal reflux disease)     taking pantoprazole    Hiatus hernia syndrome     mild    Pain 2022    Throat and chest due to Pneumonia (2022 & 2022).    Pneumonia 11/2022    x2(end of November and early December) after lobectomy.    PONV (postoperative nausea and vomiting)     Shortness of breath     22-after pneumonia x2 (2022 & 2022) S/P lobectomy.    Snoring     Tobacco use 2019    Urinary incontinence     a little stress incontinence.       Social History     Socioeconomic History    Marital status:      Spouse name: Not on file    Number of children: Not on file    Years of education: Not on file    Highest education level: Bachelor's degree (e.g., BA, AB, BS)   Occupational History    Not on file   Tobacco Use    Smoking status: Former     Current packs/day: 0.00     Average packs/day: 1 pack/day for 37.0 years (37.0 ttl pk-yrs)     Types: Cigarettes     Start date: 1968     Quit date: 2005     Years since quittin.0     Passive exposure: Past    Smokeless tobacco: Never   Vaping Use    Vaping status: Never Used   Substance and Sexual Activity    Alcohol use: Not Currently     Alcohol/week: 8.4 - 12.6 oz     Types: 14 - 21 Glasses of wine per week     Comment: 2/day    Drug use: Not Currently     Types: Marijuana     Comment: 1 gummy every month maybe - last 2022     Sexual activity: Yes     Partners: Male     Comment: I am menopausal   Other Topics Concern    Not on file   Social History Narrative    Not on file     Social Drivers of Health     Financial Resource Strain: Low Risk  (8/20/2024)    Overall Financial Resource Strain (CARDIA)     Difficulty of Paying Living Expenses: Not hard at all   Food Insecurity: No Food Insecurity (8/20/2024)    Hunger Vital Sign     Worried About Running Out of Food in the Last Year: Never true     Ran Out of Food in the Last Year: Never true   Transportation Needs: No Transportation Needs (8/20/2024)    PRAPARE - Transportation     Lack of Transportation (Medical): No     Lack of Transportation (Non-Medical): No   Physical Activity: Sufficiently Active (8/20/2024)    Exercise Vital Sign     Days of Exercise per Week: 4 days     Minutes of Exercise per Session: 60 min   Stress: Stress Concern Present (8/20/2024)    [place] [employer] - Occupational Stress Questionnaire     Feeling of Stress : To some extent   Social Connections: Unknown (8/20/2024)    Social Connection and Isolation Panel [NHANES]     Frequency of Communication with Friends and Family: Twice a week     Frequency of Social Gatherings with Friends and Family: Twice a week     Attends Quaker Services: Patient declined     Active Member of Clubs or Organizations: Yes     Attends Club or Organization Meetings: More than 4 times per year     Marital Status:    Intimate Partner Violence: Not on file   Housing Stability: Low Risk  (8/20/2024)    Housing Stability Vital Sign     Unable to Pay for Housing in the Last Year: No     Number of Times Moved in the Last Year: 0     Homeless in the Last Year: No       Family History   Problem Relation Age of Onset    Dementia Mother     Heart Attack Mother     Cancer Sister 81        Lung Cancer, smoker    Hypertension Sister     Lung Disease Sister     Hypertension Brother     Arthritis Brother         RA     Cancer Paternal Aunt         Lung    Cancer Paternal Uncle         Lung       Current Outpatient Medications on File Prior to Visit   Medication Sig Dispense Refill    fluticasone-umeclidinium-vilanterol (TRELEGY ELLIPTA) 100-62.5-25 mcg/act inhaler Inhale 1 Puff every day. 90 Each 3    alendronate (FOSAMAX) 70 MG Tab Take 1 Tablet by mouth every 7 days. 12 Tablet 1    venlafaxine XR (EFFEXOR XR) 75 MG CAPSULE SR 24 HR Take 1 Capsule by mouth every day. 30 Capsule 3    pantoprazole (PROTONIX) 40 MG Tablet Delayed Response TAKE 1 TABLET BY MOUTH EVERY DAY 90 Tablet 3    albuterol 108 (90 Base) MCG/ACT Aero Soln inhalation aerosol INHALE 2 PUFFS BY MOUTH EVERY 4 HOURS AS NEEDED SHORTNESS OF BREATH 8.5 g 6    azelastine (ASTELIN) 137 MCG/SPRAY nasal spray Administer 1 Spray into affected nostril(S) every day. 30 mL 6    guaiFENesin ER (MUCINEX) 600 MG TABLET SR 12 HR Take 600 mg by mouth every 12 hours.      albuterol (PROVENTIL) 2.5mg/3ml Nebu Soln solution for nebulization USE 3 ML VIA NEBULIZER EVERY 4 HOURS AS NEEDED FOR SHORTNESS OF BREATH 150 mL 6    acetaminophen (TYLENOL) 500 MG Tab Take 1,000 mg by mouth every 8 hours as needed for Mild Pain.      levothyroxine (SYNTHROID) 25 MCG Tab TAKE 1 TABLET BY MOUTH IN THE MORNING ON AN EMPTY STOMACH 90 Tablet 3    rosuvastatin (CRESTOR) 5 MG Tab TAKE 1 TABLET BY MOUTH EVERY EVENING 90 Tablet 3    Levomefolate Glucosamine (METHYLFOLATE PO) Take 1 Tablet by mouth every day.      Cyanocobalamin (B-12) 100 MCG Tab Take 1 Tablet by mouth every day.      B Complex Vitamins (B COMPLEX-B12 PO) Take 1 Tab by mouth every day.      cholecalciferol (DIALYVITE VITAMIN D 5000) 5000 UNIT Cap Take 5,000 Units by mouth every day.      Ascorbic Acid (VITAMIN C) 1000 MG Tab Take 1 Tab by mouth every day.      propranolol (INDERAL) 10 MG Tab TAKE 1 TABLET BY MOUTH THREE TIMES DAILY AS NEEDED FOR ANXIETY 30 Tablet 0     No current facility-administered medications on file prior to visit.  "      Other environmental, Sulfa drugs, and Sulfites      ROS:   Review of Systems   Constitutional:  Negative for chills, diaphoresis, fever, malaise/fatigue and weight loss.   HENT:  Negative for congestion, ear discharge, ear pain, hearing loss, nosebleeds, sinus pain, sore throat and tinnitus.    Eyes:  Negative for blurred vision, double vision, photophobia, pain, discharge and redness.   Respiratory:  Negative for cough, hemoptysis, sputum production, shortness of breath, wheezing and stridor.    Cardiovascular:  Negative for chest pain, palpitations, orthopnea, claudication, leg swelling and PND.   Gastrointestinal:  Negative for abdominal pain, constipation, diarrhea, heartburn, nausea and vomiting.   Genitourinary:  Negative for dysuria and urgency.   Musculoskeletal:  Negative for back pain, falls, joint pain, myalgias and neck pain.   Skin:  Negative for itching and rash.   Neurological:  Negative for dizziness, tremors, speech change, focal weakness, weakness and headaches.   Endo/Heme/Allergies:  Negative for environmental allergies.   Psychiatric/Behavioral:  Negative for depression.        /56 (BP Location: Right arm, Patient Position: Sitting, BP Cuff Size: Adult)   Pulse 71   Ht 1.702 m (5' 7\")   Wt 64.9 kg (143 lb)   SpO2 96%   Physical Exam  Constitutional:       General: She is not in acute distress.     Appearance: Normal appearance. She is well-developed and normal weight.   HENT:      Head: Normocephalic and atraumatic.      Right Ear: External ear normal.      Left Ear: External ear normal.      Nose: Nose normal. No congestion.      Mouth/Throat:      Mouth: Mucous membranes are moist.      Pharynx: Oropharynx is clear. No oropharyngeal exudate.   Eyes:      General: No scleral icterus.     Extraocular Movements: Extraocular movements intact.      Conjunctiva/sclera: Conjunctivae normal.      Pupils: Pupils are equal, round, and reactive to light.   Neck:      Vascular: No JVD.     "  Trachea: No tracheal deviation.   Cardiovascular:      Rate and Rhythm: Normal rate and regular rhythm.      Heart sounds: Normal heart sounds. No murmur heard.     No friction rub. No gallop.   Pulmonary:      Effort: Pulmonary effort is normal. No accessory muscle usage or respiratory distress.      Breath sounds: Normal breath sounds. No wheezing or rales.   Abdominal:      General: There is no distension.      Palpations: Abdomen is soft.      Tenderness: There is no abdominal tenderness.   Musculoskeletal:         General: No tenderness or deformity. Normal range of motion.      Cervical back: Normal range of motion and neck supple.      Right lower leg: No edema.      Left lower leg: No edema.   Lymphadenopathy:      Cervical: No cervical adenopathy.   Skin:     General: Skin is warm and dry.      Findings: No rash.      Nails: There is no clubbing.   Neurological:      Mental Status: She is alert and oriented to person, place, and time.      Cranial Nerves: No cranial nerve deficit.      Gait: Gait normal.   Psychiatric:         Behavior: Behavior normal.         PFTs as reviewed by me personally: as per hPI    Imaging as reviewed by me personally:  as per HPI    Assessment:  1. Collapse of lung tissue  DME Flutter Valve    CT-CHEST (THORAX) W/O      2. Adenocarcinoma (HCC)  DME Flutter Valve      3. Abnormal barium swallow        4. Moderate persistent asthma without complication        5. Former smoker            Plan:  This is likely anatomical as she has had partial collapse since her lobectomy. We discussed trial of flutter valve and nebulizer use to see if we can open it up with short interval repeat CT in 3 mos before proceeding straight to another bronchoscopy with pt is agreeable to.  S/p resection. See above. Short interval repeat CT  3.  Is scheduled to see GI  4.  Chronic. Stable on trelegy.  5.  Tobacco free  Return in about 3 months (around 4/2/2025) for CT CHEST .

## 2025-01-07 ENCOUNTER — PATIENT MESSAGE (OUTPATIENT)
Dept: MEDICAL GROUP | Facility: LAB | Age: 80
End: 2025-01-07
Payer: MEDICARE

## 2025-01-07 DIAGNOSIS — R06.02 SOB (SHORTNESS OF BREATH) ON EXERTION: ICD-10-CM

## 2025-01-07 DIAGNOSIS — F41.9 ANXIETY: ICD-10-CM

## 2025-01-08 RX ORDER — VENLAFAXINE HYDROCHLORIDE 75 MG/1
75 CAPSULE, EXTENDED RELEASE ORAL DAILY
Qty: 90 CAPSULE | Refills: 3 | Status: SHIPPED | OUTPATIENT
Start: 2025-01-08

## 2025-01-08 NOTE — PATIENT COMMUNICATION
Received request via: Patient    Was the patient seen in the last year in this department? Yes    Does the patient have an active prescription (recently filled or refills available) for medication(s) requested? No    Pharmacy Name:   [id number] - [place], [place] - [address]  [address] [place] [address]  Phone: [phone] Fax: [phone]       Does the patient have FPC Plus and need 100-day supply? (This applies to ALL medications) Patient does not have SCP

## 2025-03-21 ENCOUNTER — HOSPITAL ENCOUNTER (OUTPATIENT)
Dept: RADIOLOGY | Facility: MEDICAL CENTER | Age: 80
End: 2025-03-21
Attending: INTERNAL MEDICINE
Payer: MEDICARE

## 2025-03-21 DIAGNOSIS — J98.11 COLLAPSE OF LUNG TISSUE: ICD-10-CM

## 2025-03-21 PROCEDURE — 71250 CT THORAX DX C-: CPT

## 2025-04-17 RX ORDER — ROSUVASTATIN CALCIUM 5 MG/1
5 TABLET, COATED ORAL EVERY EVENING
Qty: 90 TABLET | Refills: 3 | Status: SHIPPED | OUTPATIENT
Start: 2025-04-17

## 2025-05-02 RX ORDER — LEVOTHYROXINE SODIUM 25 UG/1
25 TABLET ORAL
Qty: 90 TABLET | Refills: 3 | Status: SHIPPED | OUTPATIENT
Start: 2025-05-02

## 2025-05-16 RX ORDER — ALENDRONATE SODIUM 70 MG/1
70 TABLET ORAL
Qty: 12 TABLET | Refills: 1 | Status: SHIPPED | OUTPATIENT
Start: 2025-05-16

## 2025-05-21 ENCOUNTER — APPOINTMENT (OUTPATIENT)
Dept: MEDICAL GROUP | Facility: LAB | Age: 80
End: 2025-05-21
Payer: MEDICARE

## 2025-05-23 ENCOUNTER — OFFICE VISIT (OUTPATIENT)
Dept: URGENT CARE | Facility: CLINIC | Age: 80
End: 2025-05-23
Payer: MEDICARE

## 2025-05-23 ENCOUNTER — APPOINTMENT (OUTPATIENT)
Dept: RADIOLOGY | Facility: IMAGING CENTER | Age: 80
End: 2025-05-23
Payer: MEDICARE

## 2025-05-23 VITALS
BODY MASS INDEX: 23.1 KG/M2 | OXYGEN SATURATION: 96 % | DIASTOLIC BLOOD PRESSURE: 80 MMHG | RESPIRATION RATE: 16 BRPM | TEMPERATURE: 98 F | HEIGHT: 67 IN | WEIGHT: 147.2 LBS | HEART RATE: 67 BPM | SYSTOLIC BLOOD PRESSURE: 124 MMHG

## 2025-05-23 DIAGNOSIS — J98.8 RESPIRATORY TRACT INFECTION: ICD-10-CM

## 2025-05-23 DIAGNOSIS — R05.1 ACUTE COUGH: ICD-10-CM

## 2025-05-23 DIAGNOSIS — R05.1 ACUTE COUGH: Primary | ICD-10-CM

## 2025-05-23 PROCEDURE — 99214 OFFICE O/P EST MOD 30 MIN: CPT

## 2025-05-23 PROCEDURE — 3079F DIAST BP 80-89 MM HG: CPT

## 2025-05-23 PROCEDURE — 3074F SYST BP LT 130 MM HG: CPT

## 2025-05-23 PROCEDURE — 71046 X-RAY EXAM CHEST 2 VIEWS: CPT | Mod: TC,FY | Performed by: RADIOLOGY

## 2025-05-23 RX ORDER — AMOXICILLIN 500 MG/1
1000 CAPSULE ORAL 3 TIMES DAILY
Qty: 30 CAPSULE | Refills: 0 | Status: SHIPPED | OUTPATIENT
Start: 2025-05-23 | End: 2025-05-28

## 2025-05-23 ASSESSMENT — ENCOUNTER SYMPTOMS
RHINORRHEA: 1
HEADACHES: 0
DIARRHEA: 0
NAUSEA: 0
COUGH: 1
MYALGIAS: 1
SORE THROAT: 1
SHORTNESS OF BREATH: 0
DIZZINESS: 0
FEVER: 1
WEAKNESS: 0
WHEEZING: 0
VOMITING: 0
CHILLS: 1

## 2025-05-23 ASSESSMENT — FIBROSIS 4 INDEX: FIB4 SCORE: 1.541610359332084924

## 2025-05-23 NOTE — PROGRESS NOTES
"Catherine Bonilla is a 79 y.o. female who presents with Flu Like Symptoms (X 4 days, runny nose, cough, sneezing, body chills, history of lung cancer, concerned about Pneumonia.)            Cough  This is a new problem. The current episode started in the past 7 days. The problem has been gradually worsening. The cough is Productive of sputum. Associated symptoms include chills, a fever, myalgias, nasal congestion, rhinorrhea and a sore throat. Pertinent negatives include no chest pain, headaches, rash, shortness of breath or wheezing. The symptoms are aggravated by lying down. She has tried OTC cough suppressant and rest for the symptoms. The treatment provided mild relief. Her past medical history is significant for pneumonia. lung cancer       Review of Systems   Constitutional:  Positive for chills and fever. Negative for malaise/fatigue.   HENT:  Positive for rhinorrhea and sore throat. Negative for congestion.    Respiratory:  Positive for cough. Negative for shortness of breath and wheezing.    Cardiovascular:  Negative for chest pain.   Gastrointestinal:  Negative for diarrhea, nausea and vomiting.   Musculoskeletal:  Positive for myalgias.   Skin:  Negative for rash.   Neurological:  Negative for dizziness, weakness and headaches.   All other systems reviewed and are negative.             Objective     /80   Pulse 67   Temp 36.7 °C (98 °F) (Temporal)   Resp 16   Ht 1.702 m (5' 7\")   Wt 66.8 kg (147 lb 3.2 oz)   SpO2 96%   BMI 23.05 kg/m²      Physical Exam  Vitals reviewed.   Constitutional:       Appearance: Normal appearance. She is not ill-appearing.   HENT:      Head: Normocephalic.      Right Ear: Tympanic membrane and ear canal normal.      Left Ear: Tympanic membrane and ear canal normal.      Nose: Congestion present.      Mouth/Throat:      Mouth: Mucous membranes are moist.      Pharynx: Oropharynx is clear. Posterior oropharyngeal erythema present.   Eyes:      " Extraocular Movements: Extraocular movements intact.      Conjunctiva/sclera: Conjunctivae normal.   Cardiovascular:      Rate and Rhythm: Normal rate and regular rhythm.      Pulses: Normal pulses.      Heart sounds: Normal heart sounds.   Pulmonary:      Effort: Pulmonary effort is normal. No respiratory distress.      Breath sounds: Normal breath sounds. No wheezing or rhonchi.   Musculoskeletal:         General: Normal range of motion.   Skin:     General: Skin is warm and dry.   Neurological:      Mental Status: She is alert and oriented to person, place, and time.   Psychiatric:         Behavior: Behavior normal.                                  Assessment & Plan  Acute cough    Orders:    DX-CHEST-2 VIEWS; Future    Respiratory tract infection    Orders:    amoxicillin (AMOXIL) 500 MG Cap; Take 2 Capsules by mouth 3 times a day for 5 days.         Discussed with Chasidy that her x-ray was negative for any acute changes.  However given her extensive pulmonary history and symptoms we will treat her respiratory tract infection with antibiotics at this time.    Educated Leigh to take entire course of antibiotics even if symptoms improve or she begins to feel better.    Leigh can continue supportive care that was discussed in clinic such as alternating ibuprofen and acetaminophen, rest, plenty of fluids such as water, Pedialyte, low sugar Gatorade, broth, hot steamy showers, hot tea with honey, cough drops/throat spray, and a cool-mist humidifier by bed at night.    Shared decision-making was utilized with Leigh for plan of care. Discussed differential diagnoses, natural history, and supportive care. Reviewed indication for use and the potential benefits and side effects of medications. Leigh was encouraged to monitor symptoms closely and educated about signs and symptoms that would warrant concern and mandate seeking a higher level of service through the emergency department discussed at length. All questions answered  to Leigh's satisfaction and they were in agreement with treatment plan at time of discharge.

## 2025-06-05 ENCOUNTER — APPOINTMENT (OUTPATIENT)
Dept: MEDICAL GROUP | Facility: LAB | Age: 80
End: 2025-06-05
Payer: MEDICARE

## 2025-06-05 VITALS
HEART RATE: 66 BPM | DIASTOLIC BLOOD PRESSURE: 66 MMHG | OXYGEN SATURATION: 96 % | RESPIRATION RATE: 16 BRPM | SYSTOLIC BLOOD PRESSURE: 116 MMHG | BODY MASS INDEX: 23.23 KG/M2 | WEIGHT: 148 LBS | TEMPERATURE: 97.1 F | HEIGHT: 67 IN

## 2025-06-05 DIAGNOSIS — Z01.818 PREOP EXAMINATION: Primary | ICD-10-CM

## 2025-06-05 PROCEDURE — 99213 OFFICE O/P EST LOW 20 MIN: CPT | Performed by: FAMILY MEDICINE

## 2025-06-05 PROCEDURE — 3074F SYST BP LT 130 MM HG: CPT | Performed by: FAMILY MEDICINE

## 2025-06-05 PROCEDURE — 3078F DIAST BP <80 MM HG: CPT | Performed by: FAMILY MEDICINE

## 2025-06-05 ASSESSMENT — FIBROSIS 4 INDEX: FIB4 SCORE: 1.541610359332084924

## 2025-06-05 NOTE — PROGRESS NOTES
"Subjective:     Chief Complaint   Patient presents with    Pre-op Exam         HPI:   Alissa presents today for preop.   She is having plastic surgery with Dr White.   Lots of travel upcoming. Just got back from [place] as well.     Doing CO2 laser resurfacing. Doing a neck lift as well. Late July sometime.   General anethesia.   No issues in the past with this.   Does best with propofol.   No h/o clots, personally or familial.   Recent cough, cold. Better now. No SOB. Ok to walk 4-5 blocks without LARKIN.     Working out twice a week with a .               Current Medications and Prescriptions Ordered in Epic[1]      ROS:  Gen: no fevers/chills, no changes in weight  Eyes: no changes in vision  ENT: no sore throat, no hearing loss, no bloody nose  Pulm: no sob, no cough  CV: no chest pain, no palpitations  GI: no nausea/vomiting, no diarrhea  : no dysuria  MSk: no myalgias  Skin: no rash  Neuro: no headaches, no numbness/tingling  Heme/Lymph: no easy bruising      Objective:     Exam:  /66   Pulse 66   Temp 36.2 °C (97.1 °F)   Resp 16   Ht 1.702 m (5' 7\")   Wt 67.1 kg (148 lb)   SpO2 96%   BMI 23.18 kg/m²  Body mass index is 23.18 kg/m².    Gen: Alert and oriented, No apparent distress.  HEENT: NCAT, EOMI, Tms normal. Oropharynx normal. Mallampati 4.   Neck: Neck is supple without lymphadenopathy.  Lungs: Normal effort, CTA bilaterally, no wheezes, rhonchi, or rales  CV: Regular rate and rhythm. No murmurs, rubs, or gallops.  Ext: No clubbing, cyanosis, edema.      Assessment & Plan:     79 y.o. female with the following -     1. Preop examination (Primary)  She is low risk for planned procedure.  Age is really her biggest risk factor.  She is quite healthy in general.  Will get some updated labs for further risk assessment.  If they are all normal we can sign her off for surgery.  - Basic Metabolic Panel; Future  - CBC WITH DIFFERENTIAL; Future            No follow-ups on file.    Please note " that this dictation was created using voice recognition software. I have made every reasonable attempt to correct obvious errors, but I expect that there are errors of grammar and possibly content that I did not discover before finalizing the note.             [1]   Current Outpatient Medications Ordered in Epic   Medication Sig Dispense Refill    alendronate (FOSAMAX) 70 MG Tab TAKE 1 TABLET BY MOUTH EVERY 7 DAYS 12 Tablet 1    levothyroxine (SYNTHROID) 25 MCG Tab TAKE 1 TABLET BY MOUTH IN THE MORNING ON AN EMPTY STOMACH 90 Tablet 3    rosuvastatin (CRESTOR) 5 MG Tab TAKE 1 TABLET BY MOUTH EVERY EVENING 90 Tablet 3    venlafaxine XR (EFFEXOR XR) 75 MG CAPSULE SR 24 HR Take 1 Capsule by mouth every day. 90 Capsule 3    propranolol (INDERAL) 10 MG Tab TAKE 1 TABLET BY MOUTH THREE TIMES DAILY AS NEEDED FOR ANXIETY 30 Tablet 0    fluticasone-umeclidinium-vilanterol (TRELEGY ELLIPTA) 100-62.5-25 mcg/act inhaler Inhale 1 Puff every day. 90 Each 3    pantoprazole (PROTONIX) 40 MG Tablet Delayed Response TAKE 1 TABLET BY MOUTH EVERY DAY 90 Tablet 3    albuterol 108 (90 Base) MCG/ACT Aero Soln inhalation aerosol INHALE 2 PUFFS BY MOUTH EVERY 4 HOURS AS NEEDED SHORTNESS OF BREATH 8.5 g 6    azelastine (ASTELIN) 137 MCG/SPRAY nasal spray Administer 1 Spray into affected nostril(S) every day. 30 mL 6    guaiFENesin ER (MUCINEX) 600 MG TABLET SR 12 HR Take 600 mg by mouth every 12 hours.      albuterol (PROVENTIL) 2.5mg/3ml Nebu Soln solution for nebulization USE 3 ML VIA NEBULIZER EVERY 4 HOURS AS NEEDED FOR SHORTNESS OF BREATH 150 mL 6    acetaminophen (TYLENOL) 500 MG Tab Take 1,000 mg by mouth every 8 hours as needed for Mild Pain.      Levomefolate Glucosamine (METHYLFOLATE PO) Take 1 Tablet by mouth every day.      Cyanocobalamin (B-12) 100 MCG Tab Take 1 Tablet by mouth every day.      B Complex Vitamins (B COMPLEX-B12 PO) Take 1 Tab by mouth every day.      cholecalciferol (DIALYVITE VITAMIN D 5000) 5000 UNIT Cap Take  5,000 Units by mouth every day.      Ascorbic Acid (VITAMIN C) 1000 MG Tab Take 1 Tab by mouth every day.       No current [facility]-ordered facility-administered medications on file.

## 2025-06-10 DIAGNOSIS — C34.91 PRIMARY MALIGNANT NEOPLASM OF RIGHT LUNG (HCC): Primary | ICD-10-CM

## 2025-06-11 NOTE — Clinical Note
REFERRAL APPROVAL NOTICE         Sent on June 11, 2025                   Leigh Carneyer  [address]  [place] [address]                   Dear Ms. Talon Bonilla,    After a careful review of the medical information and benefit coverage, [facility] has processed your referral. See below for additional details.    If applicable, you must be actively enrolled with your insurance for coverage of the authorized service. If you have any questions regarding your coverage, please contact your insurance directly.    REFERRAL INFORMATION   Referral #:  [id number]  Referred-To Department    Referred-By Provider:  Hematology Oncology    Magaly Estes M.D.   Oncology [facility]      [address]  [place] [address]  [phone] [address]  [place] [id number] [phone]    Referral Start Date:  06/10/2025  Referral End Date:   06/10/2026           SCHEDULING  If you do not already have an appointment, please call [phone] to make an appointment.   MORE INFORMATION  As a reminder, [facility] Oncology ownership has changed, meaning this location is now owned and operated by [facility]. As such, we want to clarify that our patients should expect to receive two separate bills for the services received at [facility] - one representing the [facility] facility fees as the owner of the establishment, and the other to represent the physician's services and subsequent fees. You can speak with your insurance carrier for a pricing estimate by calling the customer service number on the back of your card and ask about charges for a hospital outpatient visit.  If you do not already have a [employer] account, sign up at: [email or web address]  You can access your medical information, make appointments, see lab results, billing information, and more.  If you have questions regarding this referral, please contact  the [facility] Referrals department at:              [phone]. Monday - Friday 7:30AM - 5:00PM.      Sincerely,  [facility]

## 2025-06-12 ENCOUNTER — APPOINTMENT (OUTPATIENT)
Dept: HEMATOLOGY ONCOLOGY | Facility: MEDICAL CENTER | Age: 80
End: 2025-06-12
Attending: NURSE PRACTITIONER
Payer: MEDICARE

## 2025-06-14 ENCOUNTER — PATIENT MESSAGE (OUTPATIENT)
Dept: MEDICAL GROUP | Facility: LAB | Age: 80
End: 2025-06-14
Payer: MEDICARE

## 2025-06-14 DIAGNOSIS — R53.83 FATIGUE, UNSPECIFIED TYPE: Primary | ICD-10-CM

## 2025-06-14 DIAGNOSIS — R79.9 ABNORMAL FINDING OF BLOOD CHEMISTRY, UNSPECIFIED: ICD-10-CM

## 2025-06-17 ENCOUNTER — HOSPITAL ENCOUNTER (OUTPATIENT)
Dept: HEMATOLOGY ONCOLOGY | Facility: MEDICAL CENTER | Age: 80
End: 2025-06-17
Attending: NURSE PRACTITIONER
Payer: MEDICARE

## 2025-06-17 VITALS
HEART RATE: 67 BPM | WEIGHT: 146.16 LBS | RESPIRATION RATE: 18 BRPM | BODY MASS INDEX: 22.94 KG/M2 | SYSTOLIC BLOOD PRESSURE: 114 MMHG | TEMPERATURE: 97.8 F | DIASTOLIC BLOOD PRESSURE: 66 MMHG | OXYGEN SATURATION: 96 % | HEIGHT: 67 IN

## 2025-06-17 DIAGNOSIS — Z85.118 ENCOUNTER FOR FOLLOW-UP SURVEILLANCE OF LUNG CANCER: ICD-10-CM

## 2025-06-17 DIAGNOSIS — Z08 ENCOUNTER FOR FOLLOW-UP SURVEILLANCE OF LUNG CANCER: ICD-10-CM

## 2025-06-17 DIAGNOSIS — C34.91 PRIMARY MALIGNANT NEOPLASM OF RIGHT LUNG (HCC): Primary | ICD-10-CM

## 2025-06-17 PROCEDURE — 99212 OFFICE O/P EST SF 10 MIN: CPT | Performed by: NURSE PRACTITIONER

## 2025-06-17 PROCEDURE — 99214 OFFICE O/P EST MOD 30 MIN: CPT | Performed by: NURSE PRACTITIONER

## 2025-06-17 ASSESSMENT — ENCOUNTER SYMPTOMS
PALPITATIONS: 0
WEIGHT LOSS: 0
NAUSEA: 0
DIARRHEA: 0
MYALGIAS: 0
CHILLS: 0
WHEEZING: 0
COUGH: 0
SPUTUM PRODUCTION: 1
DIZZINESS: 0
CONSTIPATION: 0
FEVER: 0
VOMITING: 0
SHORTNESS OF BREATH: 0
HEADACHES: 1

## 2025-06-17 ASSESSMENT — PAIN SCALES - GENERAL: PAINLEVEL_OUTOF10: NO PAIN

## 2025-06-17 ASSESSMENT — FIBROSIS 4 INDEX: FIB4 SCORE: 1.541610359332084924

## 2025-06-17 NOTE — PROGRESS NOTES
Subjective     Leigh Bonilla is a 79 y.o. female who presents with Lung Cancer (Compa/ 6 month FV)          HPI    Patient seen today for evaluation for 6-month follow-up for continued surveillance for adenocarcinoma of the lung, stage Ib (cT2a, cN0, cM0).     Oncology history of presenting illness:  Patient originally seen on 8/22/2022 after abnormal CT noted a right upper lobe lung nodule. First lung cancer screening CT was completed on 11/8/2019.  That CT at that time showed a 2.2 cm groundglass nodule in the right upper lobe with 3 solid nodules in the middle lobe measuring up to 5 mm in size.  There was also an incidental left adrenal adenoma.  Based on these findings it was recommended she undergo a repeat CT scan in approximately 6 months which she did complete on 6/8/2020.  That CT showed part solid groundglass opacity in the right upper lobe measuring 11.2 x 17.3 mm which appeared to be unchanged.  3 nodules in the right middle lobe also noted again which are unchanged.  11.8 mm left adrenal gland nodule which is unchanged.  Follow-up CT completed on 7/19/2022 noted interval enlargement of the partially groundglass and partially solid nodule in the right lung apex measuring 1.7 x 3.2 x 3.1 cm in size.  This previously measured 0.8 x 1.6 x 2.1 cm.  According to the reading radiologist this is concerning for slow-growing low-grade malignancy.  3 nodules in the right middle lobe continue to be unchanged.  Further recommendations given for PET/CT which was completed by PCP.  PET scan completed on 8/10/2022 showed abnormal uptake within the enlarging groundglass and part solid mass in the right lung apex highly suspicious for slow-growing malignancy such as an adenocarcinoma.  This had an SUV of 4.96.  There was some uptake within 2 right paratracheal nodes suspicious for metastatic disease however this may be nonspecific based on size as they measured 7 and 9 mm in size.  Reading radiologist did note  some uptake within the left adrenal gland but is most consistent with a benign adenoma.  No evidence of metastatic disease noted in the neck, abdomen or pelvis.  Based on these findings it was recommended patient undergo bronchoscopy for tissue diagnosis and staging.  Requesting lymph node and lung nodule be sampled for tissue diagnosis.      Biopsy completed on 9/2/2022 did show that the right upper lobe lung nodule came back positive for adenocarcinoma, TTF-1 and Napsin A positive, but unfortunately they were unable to sample lymph noted during the procedure.  Therefore patient was sent to surgery for further evaluation of the mediastinal nodes.     Patient did undergo surgical resection on 9/26/2022.  The mass was completely removed which was 3.6 cm in size, and all lymph nodes (22) were evaluated were found to be negative.  4 patient entered into surveillance with scans every 6 months for the first 3 years followed by annual CT scans.     Patient also found to have an adrenal adenoma measuring 11 mm.  According to Dr. Chatman initial consultation since the adenoma is less than 4 cm it is appropriate to watch it every 6-12 months.     Treatment history:  09/26/22: Lobectomy -Dr. Ganser     Interval history:  Patient is doing very well overall.  She remains very active.  She walks about 1.6 miles per day with her dogs.  She rides a horse for 45 minutes at a time.  She is also doing weightlifting training with a .  She does note that she gets fatigued and rests for about 20-30 minutes with good recovery.  She does note shortness of breath mostly when she is walking up a steep hill but is able to recover quickly.  She does complain of sputum production initially in the morning but that resolves after her morning routine.  She is eating well with good appetite and overall is feeling well.    After last visit in December it was noted that she had a collapse of the right middle lobe.  She did establish with  "pulmonary and they attempted to resolve this with use of flutter valve.  Patient had a repeat CT scan in March on 3/21/2025 which did note slight reexpansion of the previously demonstrated collapse of the right middle lobe.  She had continued lung nodules that were unchanged in the right middle lobe and right lower lobe with no new infiltrates.  The adrenal gland nodule was also unchanged measuring 1.2 cm.  I did personally review the CT report in detail with the patient today.      Allergies[1]  Medications Ordered Prior to Encounter[2]      Review of Systems   Constitutional:  Negative for chills, fever, malaise/fatigue and weight loss.   HENT:          Seasonal allergies   Respiratory:  Positive for sputum production (mostly in the AM). Negative for cough, shortness of breath and wheezing.    Cardiovascular:  Negative for chest pain and palpitations.   Gastrointestinal:  Negative for constipation, diarrhea, nausea and vomiting.   Genitourinary:  Negative for dysuria.   Musculoskeletal:  Negative for myalgias.   Neurological:  Positive for headaches (at times in the AM from allergies). Negative for dizziness.              Objective     /66   Pulse 67   Temp 36.6 °C (97.8 °F) (Temporal)   Resp 18   Ht 1.702 m (5' 7.01\")   Wt 66.3 kg (146 lb 2.6 oz)   SpO2 96%   BMI 22.89 kg/m²      Physical Exam  Vitals reviewed.   Constitutional:       General: She is not in acute distress.     Appearance: Normal appearance. She is not diaphoretic.   HENT:      Head: Normocephalic and atraumatic.   Cardiovascular:      Rate and Rhythm: Normal rate and regular rhythm.      Heart sounds: Normal heart sounds. No murmur heard.     No friction rub. No gallop.   Pulmonary:      Effort: Pulmonary effort is normal. No respiratory distress.      Breath sounds: Normal breath sounds. No wheezing.   Musculoskeletal:         General: No swelling or tenderness. Normal range of motion.   Skin:     General: Skin is warm and dry. "   Neurological:      Mental Status: She is alert and oriented to person, place, and time.   Psychiatric:         Mood and Affect: Mood normal.         Behavior: Behavior normal.            CT-CHEST ([facility]ORAX) W/O   03/21/25  IMPRESSION:     1.  Postoperative changes right hilum and upper right hemothorax consistent with right upper lobectomy.     2.  Slight reexpansion of previously demonstrated collapse right middle lobe.     3.  6.8 mm and 2 smaller nodules within the right middle lobe unchanged.     4.  4.5 mm and 3.7 mm nodules within the right lower lobe unchanged.     5.  No new infiltrates or pleural effusions.     6.  1.2 cm left adrenal gland nodule unchanged.                 Assessment & Plan     1. Primary malignant neoplasm of right lung (HCC)  CT-CHEST ([facility]OR[facility]) W/O      2. Encounter for follow-up surveillance of lung cancer                1.  Patient with a history of lung adenocarcinoma status post right lower lobectomy in 2022 currently on surveillance.  We have been monitoring her with scans every 6 months.  At last visit in December 2024 there was noted to have right middle lobe collapse.  She was seen by pulmonary who started patient on inhalers and flutter valve which had some improvement overall.  Her repeat CT in 3 months did show some slight reexpansion of the right middle lobe collapse.  All other findings were completely stable.  Will continue to follow and will plan to repeat another scan in 6 months which would be in mid September 2025.  Patient will be out of town for a wedding and therefore will do it around the beginning of October.  I will contact her with results.    2.  Adrenal adenoma initially measured 11 mm, and has been waxing and waning.  The most recent CT scan showed that it measured 12 mm in size.  Since this adrenal nodule continues to be less than 4 cm we will continue to monitor and watch every 6-12 months.  Should the adrenal nodule creased in size of over 1 cm over the  "course of 1 year then recommendation would be to refer to surgery.    3.  Will see patient back in the clinic in 6 months, or sooner if needed.  I will contact her with the results of the CT scan that will be scheduled for the beginning of October.      Please note that this dictation was created using voice recognition software. I have made every reasonable attempt to correct obvious errors, but I expect that there are errors of grammar and possibly content that I did not discover before finalizing the note.               [1]   Allergies  Allergen Reactions    Other Environmental      Russian rhoda and other pollens.    Sulfa Drugs      \"crazy\"    Sulfites      Asthma    [2]   Current Outpatient Medications on File Prior to Encounter   Medication Sig Dispense Refill    alendronate (FOSAMAX) 70 MG Tab TAKE 1 TABLET BY MOUTH EVERY 7 DAYS 12 Tablet 1    levothyroxine (SYNTHROID) 25 MCG Tab TAKE 1 TABLET BY MOUTH IN THE MORNING ON AN EMPTY STOMACH 90 Tablet 3    venlafaxine XR (EFFEXOR XR) 75 MG CAPSULE SR 24 HR Take 1 Capsule by mouth every day. 90 Capsule 3    fluticasone-umeclidinium-vilanterol (TRELEGY ELLIPTA) 100-62.5-25 mcg/act inhaler Inhale 1 Puff every day. 90 Each 3    albuterol 108 (90 Base) MCG/ACT Aero Soln inhalation aerosol INHALE 2 PUFFS BY MOUTH EVERY 4 HOURS AS NEEDED SHORTNESS OF BREATH 8.5 g 6    azelastine (ASTELIN) 137 MCG/SPRAY nasal spray Administer 1 Spray into affected nostril(S) every day. 30 mL 6    albuterol (PROVENTIL) 2.5mg/3ml Nebu Soln solution for nebulization USE 3 ML VIA NEBULIZER EVERY 4 HOURS AS NEEDED FOR SHORTNESS OF BREATH 150 mL 6    acetaminophen (TYLENOL) 500 MG Tab Take 1,000 mg by mouth every 8 hours as needed for Mild Pain.      Levomefolate Glucosamine (METHYLFOLATE PO) Take 1 Tablet by mouth every day.      Cyanocobalamin (B-12) 100 MCG Tab Take 1 Tablet by mouth every day.      B Complex Vitamins (B COMPLEX-B12 PO) Take 1 Tab by mouth every day.      cholecalciferol " (DIALYVITE VITAMIN D 5000) 5000 UNIT Cap Take 5,000 Units by mouth every day.      Ascorbic Acid (VITAMIN C) 1000 MG Tab Take 1 Tab by mouth every day.      rosuvastatin (CRESTOR) 5 MG Tab TAKE 1 TABLET BY MOUTH EVERY EVENING (Patient not taking: Reported on 6/17/2025) 90 Tablet 3    guaiFENesin ER (MUCINEX) 600 MG TABLET SR 12 HR Take 600 mg by mouth every 12 hours.       No current facility-administered medications on file prior to encounter.

## 2025-06-21 ENCOUNTER — HOSPITAL ENCOUNTER (OUTPATIENT)
Dept: LAB | Facility: MEDICAL CENTER | Age: 80
End: 2025-06-21
Attending: FAMILY MEDICINE
Payer: MEDICARE

## 2025-06-21 DIAGNOSIS — R79.9 ABNORMAL FINDING OF BLOOD CHEMISTRY, UNSPECIFIED: ICD-10-CM

## 2025-06-21 DIAGNOSIS — Z01.818 PREOP EXAMINATION: ICD-10-CM

## 2025-06-21 DIAGNOSIS — R53.83 FATIGUE, UNSPECIFIED TYPE: ICD-10-CM

## 2025-06-21 LAB
ANION GAP SERPL CALC-SCNC: 11 MMOL/L (ref 7–16)
BASOPHILS # BLD AUTO: 1 % (ref 0–1.8)
BASOPHILS # BLD: 0.07 K/UL (ref 0–0.12)
BUN SERPL-MCNC: 17 MG/DL (ref 8–22)
CALCIUM SERPL-MCNC: 9.1 MG/DL (ref 8.5–10.5)
CHLORIDE SERPL-SCNC: 105 MMOL/L (ref 96–112)
CO2 SERPL-SCNC: 22 MMOL/L (ref 20–33)
CREAT SERPL-MCNC: 0.81 MG/DL (ref 0.5–1.4)
EOSINOPHIL # BLD AUTO: 0.32 K/UL (ref 0–0.51)
EOSINOPHIL NFR BLD: 4.4 % (ref 0–6.9)
ERYTHROCYTE [DISTWIDTH] IN BLOOD BY AUTOMATED COUNT: 50.3 FL (ref 35.9–50)
FERRITIN SERPL-MCNC: 73.9 NG/ML (ref 10–291)
GFR SERPLBLD CREATININE-BSD FMLA CKD-EPI: 74 ML/MIN/1.73 M 2
GLUCOSE SERPL-MCNC: 93 MG/DL (ref 65–99)
HCT VFR BLD AUTO: 42.7 % (ref 37–47)
HGB BLD-MCNC: 13.9 G/DL (ref 12–16)
IMM GRANULOCYTES # BLD AUTO: 0.02 K/UL (ref 0–0.11)
IMM GRANULOCYTES NFR BLD AUTO: 0.3 % (ref 0–0.9)
IRON SATN MFR SERPL: 25 % (ref 15–55)
IRON SERPL-MCNC: 77 UG/DL (ref 40–170)
LYMPHOCYTES # BLD AUTO: 1.9 K/UL (ref 1–4.8)
LYMPHOCYTES NFR BLD: 25.9 % (ref 22–41)
MCH RBC QN AUTO: 31.3 PG (ref 27–33)
MCHC RBC AUTO-ENTMCNC: 32.6 G/DL (ref 32.2–35.5)
MCV RBC AUTO: 96.2 FL (ref 81.4–97.8)
MONOCYTES # BLD AUTO: 0.5 K/UL (ref 0–0.85)
MONOCYTES NFR BLD AUTO: 6.8 % (ref 0–13.4)
NEUTROPHILS # BLD AUTO: 4.52 K/UL (ref 1.82–7.42)
NEUTROPHILS NFR BLD: 61.6 % (ref 44–72)
NRBC # BLD AUTO: 0 K/UL
NRBC BLD-RTO: 0 /100 WBC (ref 0–0.2)
PLATELET # BLD AUTO: 266 K/UL (ref 164–446)
PMV BLD AUTO: 11.5 FL (ref 9–12.9)
POTASSIUM SERPL-SCNC: 4.8 MMOL/L (ref 3.6–5.5)
RBC # BLD AUTO: 4.44 M/UL (ref 4.2–5.4)
SODIUM SERPL-SCNC: 138 MMOL/L (ref 135–145)
T4 FREE SERPL-MCNC: 1.16 NG/DL (ref 0.93–1.7)
TIBC SERPL-MCNC: 314 UG/DL (ref 250–450)
TSH SERPL-ACNC: 1.97 UIU/ML (ref 0.38–5.33)
UIBC SERPL-MCNC: 237 UG/DL (ref 110–370)
WBC # BLD AUTO: 7.3 K/UL (ref 4.8–10.8)

## 2025-06-21 PROCEDURE — 82728 ASSAY OF FERRITIN: CPT

## 2025-06-21 PROCEDURE — 83540 ASSAY OF IRON: CPT

## 2025-06-21 PROCEDURE — 83550 IRON BINDING TEST: CPT

## 2025-06-21 PROCEDURE — 84443 ASSAY THYROID STIM HORMONE: CPT

## 2025-06-21 PROCEDURE — 36415 COLL VENOUS BLD VENIPUNCTURE: CPT

## 2025-06-21 PROCEDURE — 85025 COMPLETE CBC W/AUTO DIFF WBC: CPT

## 2025-06-21 PROCEDURE — 80048 BASIC METABOLIC PNL TOTAL CA: CPT

## 2025-06-21 PROCEDURE — 84439 ASSAY OF FREE THYROXINE: CPT

## 2025-06-22 ENCOUNTER — PATIENT MESSAGE (OUTPATIENT)
Dept: MEDICAL GROUP | Facility: LAB | Age: 80
End: 2025-06-22
Payer: MEDICARE

## 2025-06-24 ENCOUNTER — RESULTS FOLLOW-UP (OUTPATIENT)
Dept: MEDICAL GROUP | Facility: LAB | Age: 80
End: 2025-06-24
Payer: MEDICARE

## 2025-07-03 ENCOUNTER — TELEPHONE (OUTPATIENT)
Facility: MEDICAL CENTER | Age: 80
End: 2025-07-03
Payer: MEDICARE

## 2025-07-03 NOTE — TELEPHONE ENCOUNTER
Contacted patient in regard to [employer] message sent earlier this week. Per patient, she is feeling much better and the message sent was at the worst of the covid infection patient had. Patient states she will be going to an integrative doctor to run additional tests. The integrative doctor will be in August as patient states she has things to complete the rest of July. Inquired to patient, if it is possible to receive records from integrative doctor to have in patient chart, patient agreeable with ensuring records will be sent over to [facility]. All questions answered, no other concerns at this time.

## 2025-08-19 RX ORDER — ALENDRONATE SODIUM 70 MG/1
70 TABLET ORAL
Qty: 12 TABLET | Refills: 1 | Status: SHIPPED | OUTPATIENT
Start: 2025-08-19

## (undated) DEVICE — SUTURE 4-0 VICRYL PLUS FS-2 - 27 INCH (36/BX)

## (undated) DEVICE — TUBE E-T HI-LO CUFF 7.0MM (10EA/PK)

## (undated) DEVICE — TOWEL STOP TIMEOUT SAFETY FLAG (40EA/CA)

## (undated) DEVICE — TOWELS CLOTH SURGICAL - (4/PK 20PK/CA)

## (undated) DEVICE — CATHETER IV SAFETY 22 GA X 1 (50EA/BX)

## (undated) DEVICE — BITE BLOCK ADULT 60FR (100EA/CA)

## (undated) DEVICE — TUBE CONNECTING SUCTION - CLEAR PLASTIC STERILE 72 IN (50EA/CA)

## (undated) DEVICE — STAPLER POWERED 45MM (3EA/BX)

## (undated) DEVICE — GLOVE BIOGEL SZ 7.5 SURGICAL PF LTX - (50PR/BX 4BX/CA)

## (undated) DEVICE — NEEDLE BIOPSY FLEXISION OD21 GA IF1000 (5EA/BX)

## (undated) DEVICE — KIT CUSTOM PROCEDURE SINGLE FOR ENDO  (15/CA)

## (undated) DEVICE — CANISTER SUCTION RIGID RED 1500CC (40EA/CA)

## (undated) DEVICE — TUBING CLEARLINK DUO-VENT - C-FLO (48EA/CA)

## (undated) DEVICE — TROCAR 12MM THORACOPORT (6EA/BX)

## (undated) DEVICE — KIT  I.V. START (100EA/CA)

## (undated) DEVICE — SYRINGE DISP. 60 CC LL - (30/BX, 12BX/CA)**WHEN THESE ARE GONE ORDER #500206**

## (undated) DEVICE — SODIUM CHL IRRIGATION 0.9% 1000ML (12EA/CA)

## (undated) DEVICE — CANNULA O2 COMFORT SOFT EAR ADULT 7 FT TUBING (50/CA)

## (undated) DEVICE — SPONGE GAUZESTER. 2X2 4-PL - (2/PK 50PK/BX 30BX/CS)

## (undated) DEVICE — SET LEADWIRE 5 LEAD BEDSIDE DISPOSABLE ECG (1SET OF 5/EA)

## (undated) DEVICE — GLOVE BIOGEL SZ 8 SURGICAL PF LTX - (50PR/BX 4BX/CA)

## (undated) DEVICE — ARGON COAG 10MM PROBE - 10/CA

## (undated) DEVICE — SPONGE GAUZE NON-STERILE 4X4 - (2000/CA 10PK/CA)

## (undated) DEVICE — CONNECTOR Y TBG CRTY 5 IN 1 STERILE (50EA/CA)

## (undated) DEVICE — CATHETER IV SAFETY 24 GA X 3/4 (50EA/BX)

## (undated) DEVICE — SYSTEM CHEST DRAIN ADULT/PEDS W/AUTO TRANSFUSION CAPABILITY SAHARA (6EA/CA)

## (undated) DEVICE — Device

## (undated) DEVICE — SEALANT TISSUE CLOSURE KIT FIBIRIN VISTASEAL 10ML (1EA/BX)

## (undated) DEVICE — TROCAR 5X100 NON BLADED Z-TH - READ KII (6/BX)

## (undated) DEVICE — TUBE NG SALEM SUMP 16FR (50EA/CA)

## (undated) DEVICE — SET SUCTION/IRRIGATION WITH DISPOSABLE TIP (6/CA )PART #0250-070-520 IS A SUB

## (undated) DEVICE — GOWN WARMING STANDARD FLEX - (30/CA)

## (undated) DEVICE — STAPLE 45MM WHTE 2.5MM - (12/BX)

## (undated) DEVICE — TUBE E-T HI-LO CUFF 6.5MM (10EA/BX)

## (undated) DEVICE — GLOVE, LITE (PAIR)

## (undated) DEVICE — SET I.V. EXTENSION DIAL-A- - FLOW REGULATOR (48EA/CA)

## (undated) DEVICE — LACTATED RINGERS INJ 1000 ML - (14EA/CA 60CA/PF)

## (undated) DEVICE — CANNULA W/SEAL 5X100 Z-THRE - ADED KII (12/BX)

## (undated) DEVICE — SENSOR OXIMETER ADULT SPO2 RD SET (20EA/BX)

## (undated) DEVICE — ROBOTIC SURGERY SERVICES

## (undated) DEVICE — SUTURE 0 VICRYL PLUS CT-2 - 27 INCH (36/BX)

## (undated) DEVICE — SUTURE GENERAL

## (undated) DEVICE — PACK LAP CHOLE OR - (2EA/CA)

## (undated) DEVICE — GLOVE BIOGEL INDICATOR SZ 8 SURGICAL PF LTX - (50/BX 4BX/CA)

## (undated) DEVICE — WATER IRRIGATION STERILE 1000ML (12EA/CA)

## (undated) DEVICE — TUBE E-T HI-LO CUFF 8.0MM (10EA/PK)

## (undated) DEVICE — SUCTION INSTRUMENT YANKAUER BULBOUS TIP W/O VENT (50EA/CA)

## (undated) DEVICE — DRAPE CHEST/BREAST - (12EA/CA)

## (undated) DEVICE — GLOVE BIOGEL M SZ 8 SURGICAL PF LTX - (50/BX 4BX/CA)

## (undated) DEVICE — DRESSING TRANSPARENT FILM TEGADERM 2.375 X 2.75"  (100EA/BX)"

## (undated) DEVICE — TUBE E-T HI-LO CUFF 8.5MM (10EA/PK)

## (undated) DEVICE — CATHETER IV SAFETY 20 GA X 1-1/4 (50/BX)

## (undated) DEVICE — SET EXTENSION WITH 2 PORTS (48EA/CA) ***PART #2C8610 IS A SUBSTITUTE*****

## (undated) DEVICE — GOWN SURGEONS LARGE - (32/CA)

## (undated) DEVICE — STAPLE 45MM GRAY 2.0MM (12EA/BX)

## (undated) DEVICE — ELECTRODE DUAL RETURN W/ CORD - (50/PK)

## (undated) DEVICE — MASK WITH FACE SHIELD (25/BX 4BX/CA)

## (undated) DEVICE — CONTAINER SPECIMEN BAG OR - STERILE 4 OZ W/LID (100EA/CA)

## (undated) DEVICE — SOD. CHL. INJ. 0.9% 1000 ML - (14EA/CA 60CA/PF)

## (undated) DEVICE — TUBE E-T HI-LO CUFF 7.5MM (10EA/PK)

## (undated) DEVICE — DRESSING NON-ADHERING 8 X 3 - (50/BX)

## (undated) DEVICE — SLEEVE, VASO, THIGH, MED

## (undated) DEVICE — DRAPESURG STERI-DRAPE LONG - (10/BX 4BX/CA)

## (undated) DEVICE — SYRINGE SAFETY 5 ML 18 GA X 1-1/2 BLUNT LL (100/BX 4BX/CA)

## (undated) DEVICE — SHEET THYROID - (10EA/CA)

## (undated) DEVICE — CANISTER SUCTION 3000ML MECHANICAL FILTER AUTO SHUTOFF MEDI-VAC NONSTERILE LF DISP  (40EA/CA)

## (undated) DEVICE — SPONGE DRAIN 4 X 4IN 6-PLY - (2/PK25PK/BX12BX/CS)

## (undated) DEVICE — CHLORAPREP 26 ML APPLICATOR - ORANGE TINT(25/CA)

## (undated) DEVICE — CONNECTOR HUBLESS DRAINAGE - ONE WAY (20/BX)

## (undated) DEVICE — SYRINGE SAFETY 3 ML 18 GA X 1 1/2 BLUNT LL (100/BX 8BX/CA)

## (undated) DEVICE — SYRINGE SAFETY 10 ML 18 GA X 1 1/2 BLUNT LL (100/BX 4BX/CA)

## (undated) DEVICE — TUBE SUCTION YANKAUER  1/4 X 6FT (20EA/CA)"

## (undated) DEVICE — STAPLER SKIN DISP - (6/BX 10BX/CA) VISISTAT

## (undated) DEVICE — SYRINGE 10 ML CONTROL LL (25EA/BX 4BX/CA)